# Patient Record
Sex: MALE | Race: BLACK OR AFRICAN AMERICAN | NOT HISPANIC OR LATINO | Employment: OTHER | URBAN - METROPOLITAN AREA
[De-identification: names, ages, dates, MRNs, and addresses within clinical notes are randomized per-mention and may not be internally consistent; named-entity substitution may affect disease eponyms.]

---

## 2017-01-09 DIAGNOSIS — Z21 HIV POSITIVE: ICD-10-CM

## 2017-01-09 DIAGNOSIS — B20 HUMAN IMMUNODEFICIENCY VIRUS (HIV) DISEASE: ICD-10-CM

## 2017-01-09 NOTE — TELEPHONE ENCOUNTER
Rx need to be faxed to [facility] for 90 day supply with patient ID# [id number]. (phone number [phone])

## 2017-01-09 NOTE — TELEPHONE ENCOUNTER
----- Message from Marci Kapadia sent at 1/9/2017  3:16 PM CST -----  Contact: patient  [phone]  Dave  -  Patient stated that a form that was sent over , the patient has the information on where to send the prescription to - call  Back number [phone]  Thanks,

## 2017-01-13 ENCOUNTER — LAB VISIT (OUTPATIENT)
Dept: LAB | Facility: HOSPITAL | Age: 57
End: 2017-01-13
Attending: INTERNAL MEDICINE
Payer: COMMERCIAL

## 2017-01-13 DIAGNOSIS — Z21 HIV POSITIVE: ICD-10-CM

## 2017-01-13 LAB
ALBUMIN SERPL BCP-MCNC: 4 G/DL
ALP SERPL-CCNC: 75 U/L
ALT SERPL W/O P-5'-P-CCNC: 44 U/L
ANION GAP SERPL CALC-SCNC: 8 MMOL/L
AST SERPL-CCNC: 44 U/L
BASOPHILS # BLD AUTO: 0.01 K/UL
BASOPHILS NFR BLD: 0.3 %
BILIRUB SERPL-MCNC: 0.4 MG/DL
BUN SERPL-MCNC: 18 MG/DL
CALCIUM SERPL-MCNC: 9 MG/DL
CHLORIDE SERPL-SCNC: 106 MMOL/L
CHOLEST/HDLC SERPL: 2.7 {RATIO}
CO2 SERPL-SCNC: 26 MMOL/L
CREAT SERPL-MCNC: 1.1 MG/DL
DIFFERENTIAL METHOD: ABNORMAL
EOSINOPHIL # BLD AUTO: 0.1 K/UL
EOSINOPHIL NFR BLD: 1.3 %
ERYTHROCYTE [DISTWIDTH] IN BLOOD BY AUTOMATED COUNT: 12.6 %
EST. GFR  (AFRICAN AMERICAN): >60 ML/MIN/1.73 M^2
EST. GFR  (NON AFRICAN AMERICAN): >60 ML/MIN/1.73 M^2
GLUCOSE SERPL-MCNC: 96 MG/DL
HCT VFR BLD AUTO: 42.6 %
HDL/CHOLESTEROL RATIO: 36.4 %
HDLC SERPL-MCNC: 173 MG/DL
HDLC SERPL-MCNC: 63 MG/DL
HGB BLD-MCNC: 14.3 G/DL
LDLC SERPL CALC-MCNC: 100 MG/DL
LYMPHOCYTES # BLD AUTO: 2.1 K/UL
LYMPHOCYTES NFR BLD: 53.8 %
MCH RBC QN AUTO: 30.6 PG
MCHC RBC AUTO-ENTMCNC: 33.6 %
MCV RBC AUTO: 91 FL
MONOCYTES # BLD AUTO: 0.4 K/UL
MONOCYTES NFR BLD: 10.5 %
NEUTROPHILS # BLD AUTO: 1.3 K/UL
NEUTROPHILS NFR BLD: 34.1 %
NONHDLC SERPL-MCNC: 110 MG/DL
PLATELET # BLD AUTO: 221 K/UL
PMV BLD AUTO: 11 FL
POTASSIUM SERPL-SCNC: 4.1 MMOL/L
PROT SERPL-MCNC: 7.3 G/DL
RBC # BLD AUTO: 4.67 M/UL
SODIUM SERPL-SCNC: 140 MMOL/L
TRIGL SERPL-MCNC: 50 MG/DL
WBC # BLD AUTO: 3.92 K/UL

## 2017-01-13 PROCEDURE — 80053 COMPREHEN METABOLIC PANEL: CPT

## 2017-01-13 PROCEDURE — 85025 COMPLETE CBC W/AUTO DIFF WBC: CPT

## 2017-01-13 PROCEDURE — 87536 HIV-1 QUANT&REVRSE TRNSCRPJ: CPT

## 2017-01-13 PROCEDURE — 80061 LIPID PANEL: CPT

## 2017-01-13 PROCEDURE — 86361 T CELL ABSOLUTE COUNT: CPT

## 2017-01-14 LAB
CD3+CD4+ CELLS # BLD: 768 CELLS/UL (ref 300–1400)
CD3+CD4+ CELLS NFR BLD: 34.6 % (ref 28–57)

## 2017-01-16 LAB
HIV UQ DATE RECEIVED: NORMAL
HIV UQ DATE REPORTED: NORMAL
HIV1 RNA # SERPL NAA+PROBE: <40 COPIES/ML
HIV1 RNA SERPL NAA+PROBE-LOG#: <1.6 LOG (10) COPIES/ML
HIV1 RNA SERPL QL NAA+PROBE: NOT DETECTED

## 2017-01-30 ENCOUNTER — OFFICE VISIT (OUTPATIENT)
Dept: INFECTIOUS DISEASES | Facility: CLINIC | Age: 57
End: 2017-01-30
Payer: COMMERCIAL

## 2017-01-30 VITALS
TEMPERATURE: 98 F | HEART RATE: 59 BPM | DIASTOLIC BLOOD PRESSURE: 75 MMHG | BODY MASS INDEX: 28.94 KG/M2 | SYSTOLIC BLOOD PRESSURE: 125 MMHG | WEIGHT: 225.5 LBS | HEIGHT: 74 IN

## 2017-01-30 DIAGNOSIS — M25.552 PAIN OF LEFT HIP JOINT: ICD-10-CM

## 2017-01-30 DIAGNOSIS — B20 HUMAN IMMUNODEFICIENCY VIRUS (HIV) DISEASE: ICD-10-CM

## 2017-01-30 DIAGNOSIS — B20 HIV DISEASE: Primary | ICD-10-CM

## 2017-01-30 PROCEDURE — 99999 PR PBB SHADOW E&M-EST. PATIENT-LVL III: CPT | Mod: PBBFAC,,, | Performed by: INTERNAL MEDICINE

## 2017-01-30 PROCEDURE — 90471 IMMUNIZATION ADMIN: CPT | Mod: S$GLB,,, | Performed by: INTERNAL MEDICINE

## 2017-01-30 PROCEDURE — 99214 OFFICE O/P EST MOD 30 MIN: CPT | Mod: 25,S$GLB,, | Performed by: INTERNAL MEDICINE

## 2017-01-30 PROCEDURE — 90733 MPSV4 VACCINE SUBQ: CPT | Mod: S$GLB,,, | Performed by: INTERNAL MEDICINE

## 2017-01-30 RX ORDER — FLUOCINONIDE GEL 0.5 MG/G
GEL TOPICAL 2 TIMES DAILY
Qty: 15 G | Refills: 1 | Status: SHIPPED | OUTPATIENT
Start: 2017-01-30 | End: 2017-08-10 | Stop reason: SDUPTHER

## 2017-01-30 NOTE — MR AVS SNAPSHOT
[facility]  [address]  [facility] [address]  Phone: [phone]  Fax: [phone]                  Simón Fong   2017 2:30 PM   Office Visit    Description:  Male : 1960   Provider:  Parisa Fowler MD   Department:  [facility]           Reason for Visit     Follow-up           Diagnoses this Visit        Comments    HIV disease    -  Primary     Pain of left hip joint                To Do List           Goals (5 Years of Data)     None       These Medications        Disp Refills Start End    koiyiri-xlph-elhtql-tenofo ala [phone] mg Tab 30 tablet 3 2017     Take 1 tablet by mouth once daily. - Oral    Pharmacy: [facility] - [place], [place] - [address] AT [facility] #: [phone]         [facility] On Call     [facility] On Call Nurse Care Line -  Assistance  Registered nurses in the [facility] On Call Center provide clinical advisement, health education, appointment booking, and other advisory services.  Call for this free service at [phone].             Medications           Message regarding Medications     Verify the changes and/or additions to your medication regime listed below are the same as discussed with your clinician today.  If any of these changes or additions are incorrect, please notify your healthcare provider.        START taking these NEW medications        Refills    wtspgtg-caqy-fdboqa-tenofo ala [phone] mg Tab 3    Sig: Take 1 tablet by mouth once daily.    Class: Print    Route: Oral           Verify that the below list of medications is an accurate representation of the medications you are currently taking.  If none reported, the list may be blank. If incorrect, please contact your healthcare provider. Carry this list with you in case of emergency.           Current Medications     zpyzwezm-xwpejjqh-axnatr-tenof, STRIBILD,  "[phone] mg (STRIBILD) [phone] mg per tablet Take 1 tablet by mouth once daily.    fluocinonide (LIDEX) 0.05 % gel Apply topically 2 (two) times daily.    itraconazole (SPORANOX) 100 mg Cap TAKE 2 CAPSULES BY MOUTH ONCE DAILY    qbvqixr-fafs-mdtldd-tenofo ala [phone] mg Tab Take 1 tablet by mouth once daily.           Clinical Reference Information           Vital Signs - Last Recorded  Most recent update: 1/30/2017  2:38 PM by Pamela Palumbo MA    BP Pulse Temp Ht Wt BMI    125/75 (BP Location: Right arm) (!) 59 97.9 °F (36.6 °C) (Oral) 6' 2" (1.88 m) 102.3 kg (225 lb 8.5 oz) 28.96 kg/m2      Blood Pressure          Most Recent Value    BP  125/75      Allergies as of 1/30/2017     Sulfa (Sulfonamide Antibiotics)      Immunizations Administered on Date of Encounter - 1/30/2017     None      Orders Placed During Today's Visit      Normal Orders This Visit    Ambulatory consult to Sports Medicine     Future Labs/Procedures Expected by Expires    Hepatitis B surface antibody  1/30/2017 1/30/2018    PSA, Screening  1/30/2017 1/30/2018    Quantiferon Gold TB  1/30/2017 1/30/2018    Vitamin D  1/30/2017 1/30/2018    FTA antibodies, IgG and IgM  1/30/2018 3/31/2018    Meningococcal conjugate vaccine 4-valent IM  As directed 7/30/2017      "

## 2017-01-30 NOTE — PROGRESS NOTES
Subjective:      Patient ID: Simón Fong is a 56 y.o. male.    Chief Complaint:HIV Positive/AIDS      History of Present Illness    Mr. Fong presents today to follow up for his HIV.     The patient has been seen by us for a number of years and in the past, he was on   Combivir and ritonavir.  He did have a drug holiday and he asked about that   once again and he has been discouraged from doing such.  His current medications   include Truvada and boosted Reyataz and most recently changed to Stribild.    Highly compliant with medications; no problems reported.  Still c/o of onychomycosis on great toe but better.       Routine examinations:  1. Vitamin D supplements daily.  2. Colonoscopy due 2018.  3. Last anal pap 2012; and 2015.  No dysplasia.  Family history:  two brothers were diagnosed with prostate cancer - the older one at 64 and a younger one, one year ago.  Component      Latest Ref Rng & Units 1/13/2017   WBC      3.90 - 12.70 K/uL 3.92   RBC      4.60 - 6.20 M/uL 4.67   Hemoglobin      14.0 - 18.0 g/dL 14.3   Hematocrit      40.0 - 54.0 % 42.6   MCV      82 - 98 fL 91   MCH      27.0 - 31.0 pg 30.6   MCHC      32.0 - 36.0 % 33.6   RDW      11.5 - 14.5 % 12.6   Platelets      150 - 350 K/uL 221   MPV      9.2 - 12.9 fL 11.0   Gran #      1.8 - 7.7 K/uL 1.3 (L)   Lymph #      1.0 - 4.8 K/uL 2.1   Mono #      0.3 - 1.0 K/uL 0.4   Eos #      0.0 - 0.5 K/uL 0.1   Baso #      0.00 - 0.20 K/uL 0.01   Gran%      38.0 - 73.0 % 34.1 (L)   Lymph%      18.0 - 48.0 % 53.8 (H)   Mono%      4.0 - 15.0 % 10.5   Eosinophil%      0.0 - 8.0 % 1.3   Basophil%      0.0 - 1.9 % 0.3   Differential Method       Automated   Sodium      136 - 145 mmol/L 140   Potassium      3.5 - 5.1 mmol/L 4.1   Chloride      95 - 110 mmol/L 106   CO2      23 - 29 mmol/L 26   Glucose      70 - 110 mg/dL 96   BUN, Bld      6 - 20 mg/dL 18   Creatinine      0.5 - 1.4 mg/dL 1.1   Calcium      8.7 - 10.5 mg/dL 9.0   Total Protein      6.0 - 8.4  g/dL 7.3   Albumin      3.5 - 5.2 g/dL 4.0   Total Bilirubin      0.1 - 1.0 mg/dL 0.4   Alkaline Phosphatase      55 - 135 U/L 75   AST      10 - 40 U/L 44 (H)   ALT      10 - 44 U/L 44   Anion Gap      8 - 16 mmol/L 8   eGFR if African American      >60 mL/min/1.73 m:2 >60.0   eGFR if non African American      >60 mL/min/1.73 m:2 >60.0   Cholesterol      120 - 199 mg/dL 173   Triglycerides      30 - 150 mg/dL 50   HDL      40 - 75 mg/dL 63   LDL Cholesterol      63.0 - 159.0 mg/dL 100.0   HDL/Chol Ratio      20.0 - 50.0 % 36.4   Total Cholesterol/HDL Ratio      2.0 - 5.0 2.7   Non-HDL Cholesterol      mg/dL 110   HIV-1 RNA      Not detected Not detected   HIV 1 RNA Ultra      <40 Copies/mL <40   Log HIV Copies/mL      <1.60 Log (10) Copies/mL <1.60   HIV UQ Date Received       1/14/17   HIV UQ Date Reported       1/16/17   CD4 % [place] T Cell      28 - 57 % 34.6   Absolute CD4      300 - 1400 cells/ul 768     Review of Systems   Review of Systems   Constitution: Negative for chills, decreased appetite, fever, weakness, malaise/fatigue, night sweats, weight gain and weight loss.   HENT: Negative for congestion, ear pain, headaches, hearing loss, hoarse voice, sore throat and tinnitus.    Eyes: Negative for blurred vision, redness and visual disturbance.   Cardiovascular: Negative for chest pain, leg swelling and palpitations.   Respiratory: Negative for cough, hemoptysis, shortness of breath and sputum production.    Hematologic/Lymphatic: Negative for adenopathy. Does not bruise/bleed easily.   Skin: Negative for dry skin, itching, rash and suspicious lesions.   Musculoskeletal: Positive for myalgias. Negative for back pain, joint pain and neck pain.   Gastrointestinal: Negative for abdominal pain, constipation, diarrhea, heartburn, nausea and vomiting.   Genitourinary: Negative for dysuria, flank pain, frequency, hematuria, hesitancy and urgency.   Neurological: Negative for dizziness, numbness and paresthesias.    Psychiatric/Behavioral: Negative for depression and memory loss. The patient does not have insomnia and is not nervous/anxious.      Objective:   Physical Exam   Constitutional: He is oriented to person, place, and time. He appears well-developed and well-nourished.   HENT:   Head: Normocephalic and atraumatic.   Right Ear: External ear normal.   Left Ear: External ear normal.   Mouth/Throat: Oropharynx is clear and moist.   Eyes: Conjunctivae and EOM are normal. Pupils are equal, round, and reactive to light.   Neck: Normal range of motion. Neck supple. No thyromegaly present.   Cardiovascular: Normal rate, regular rhythm and normal heart sounds.    No murmur heard.  Pulmonary/Chest: Effort normal and breath sounds normal. He has no wheezes. He has no rales.   Abdominal: Soft. Bowel sounds are normal. He exhibits no mass. There is no tenderness. There is no rebound.   Musculoskeletal: Normal range of motion.   Lymphadenopathy:     He has no cervical adenopathy.   Neurological: He is alert and oriented to person, place, and time.   Skin: Skin is warm and dry.   Psychiatric: He has a normal mood and affect. His behavior is normal.   Vitals reviewed.    Assessment:       1. HIV disease    2. Pain of left hip joint    3. Human immunodeficiency virus (HIV) disease          Plan:       1. Change Stribild to Genvoya.  2. Flu already done.  3. Menactra.  4. zostavax in the future.  5. Colonoscopy 2018.  6. Hip pain - referral to ortho.

## 2017-02-03 ENCOUNTER — INITIAL CONSULT (OUTPATIENT)
Dept: PHYSICAL MEDICINE AND REHAB | Facility: CLINIC | Age: 57
End: 2017-02-03
Payer: COMMERCIAL

## 2017-02-03 VITALS
SYSTOLIC BLOOD PRESSURE: 142 MMHG | BODY MASS INDEX: 28.76 KG/M2 | HEART RATE: 57 BPM | DIASTOLIC BLOOD PRESSURE: 85 MMHG | HEIGHT: 74 IN | WEIGHT: 224.13 LBS

## 2017-02-03 DIAGNOSIS — M54.16 LUMBAR RADICULOPATHY: Primary | ICD-10-CM

## 2017-02-03 DIAGNOSIS — S33.2XXS: ICD-10-CM

## 2017-02-03 PROBLEM — S33.2XXA SI JOINT DISLOCATION: Status: ACTIVE | Noted: 2017-02-03

## 2017-02-03 PROBLEM — G89.29 CHRONIC LEFT-SIDED LOW BACK PAIN WITH SCIATICA: Status: ACTIVE | Noted: 2017-02-03

## 2017-02-03 PROBLEM — M54.40 CHRONIC LEFT-SIDED LOW BACK PAIN WITH SCIATICA: Status: ACTIVE | Noted: 2017-02-03

## 2017-02-03 PROCEDURE — 99999 PR PBB SHADOW E&M-EST. PATIENT-LVL III: CPT | Mod: PBBFAC,,, | Performed by: PHYSICAL MEDICINE & REHABILITATION

## 2017-02-03 PROCEDURE — 99204 OFFICE O/P NEW MOD 45 MIN: CPT | Mod: S$GLB,,, | Performed by: PHYSICAL MEDICINE & REHABILITATION

## 2017-02-03 RX ORDER — GABAPENTIN 300 MG/1
300 CAPSULE ORAL 3 TIMES DAILY
Qty: 90 CAPSULE | Refills: 1 | Status: SHIPPED | OUTPATIENT
Start: 2017-02-03 | End: 2018-05-14 | Stop reason: ALTCHOICE

## 2017-02-03 RX ORDER — NAPROXEN 500 MG/1
500 TABLET ORAL 2 TIMES DAILY
Qty: 60 TABLET | Refills: 2 | Status: SHIPPED | OUTPATIENT
Start: 2017-02-03 | End: 2018-02-27 | Stop reason: SDUPTHER

## 2017-02-03 NOTE — MR AVS SNAPSHOT
[facility]  [address]  [facility] [address]  Phone: [phone]                  Simón Fong   2/3/2017 4:00 PM   Initial consult    Description:  Male : 1960   Provider:  Annie Garica MD   Department:  [facility]           Reason for Visit     Leg Pain           Diagnoses this Visit        Comments    Lumbar radiculopathy    -  Primary     SI joint dislocation, sequela                To Do List           Goals (5 Years of Data)     None      Follow-Up and Disposition     Return in about 3 weeks (around 2017).       These Medications        Disp Refills Start End    gabapentin (NEURONTIN) 300 MG capsule 90 capsule 1 2/3/2017 3/5/2017    Take 1 capsule (300 mg total) by mouth 3 (three) times daily. - Oral    Pharmacy: [facility] [address] [place] [address] [address] [facility] AT [facility] Ph #: [phone]       naproxen (EC NAPROSYN) 500 MG EC tablet 60 tablet 2 2/3/2017 3/5/2017    Take 1 tablet (500 mg total) by mouth 2 (two) times daily. - Oral    Pharmacy: [facility] [address] [place] [address] [address] [facility] Ph #: [phone]         [facility] On Call     [facility] On Call Nurse Care Line -  Assistance  Registered nurses in the [facility] On Call Center provide clinical advisement, health education, appointment booking, and other advisory services.  Call for this free service at [phone].             Medications           Message regarding Medications     Verify the changes and/or additions to your medication regime listed below are the same as discussed with your clinician today.  If any of these changes or additions are incorrect, please notify your healthcare provider.        START taking these NEW medications        Refills    gabapentin (NEURONTIN) 300 MG capsule 1    Sig: Take 1 capsule (300 mg total) by mouth 3 (three) times daily.    Class: Normal    Route:  "Oral    naproxen (EC NAPROSYN) 500 MG EC tablet 2    Sig: Take 1 tablet (500 mg total) by mouth 2 (two) times daily.    Class: Normal    Route: Oral           Verify that the below list of medications is an accurate representation of the medications you are currently taking.  If none reported, the list may be blank. If incorrect, please contact your healthcare provider. Carry this list with you in case of emergency.           Current Medications     fpwcdzk-yfaj-tbncxt-tenofo ala [phone] mg Tab Take 1 tablet by mouth once daily.    rtpvexyd-cvfsmejd-acbfft-tenof, STRIBILD, [phone] mg (STRIBILD) [phone] mg per tablet Take 1 tablet by mouth once daily.    fluocinonide (LIDEX) 0.05 % gel Apply topically 2 (two) times daily.    itraconazole (SPORANOX) 100 mg Cap TAKE 2 CAPSULES BY MOUTH ONCE DAILY    gabapentin (NEURONTIN) 300 MG capsule Take 1 capsule (300 mg total) by mouth 3 (three) times daily.    naproxen (EC NAPROSYN) 500 MG EC tablet Take 1 tablet (500 mg total) by mouth 2 (two) times daily.           Clinical Reference Information           Your Vitals Were     BP Pulse Height Weight BMI    142/85 57 6' 2" (1.88 m) 101.6 kg (224 lb 1.6 oz) 28.77 kg/m2      Blood Pressure          Most Recent Value    BP  (!)  142/85      Allergies as of 2/3/2017     Sulfa (Sulfonamide Antibiotics)      Immunizations Administered on Date of Encounter - 2/3/2017     None      Orders Placed During Today's Visit     Future Labs/Procedures Expected by Expires    MRI Lumbar Spine Without Contrast  2/3/2017 2/3/2018    X-Ray Hips Bilateral 2 View Inc AP Pelvis  2/3/2017 2/3/2018    X-Ray Lumbar Complete With Flex And Ext  2/3/2017 2/3/2018      Language Assistance Services     ATTENTION: Language assistance services are available, free of charge. Please call [phone].      ATENCIÓN: Si habla español, tiene a edwards disposición servicios gratuitos de asistencia lingüística. Llame al [phone].     CHÚ Ý: " N?u b?n nói Ti?ng Vi?t, có các d?ch v? h? tr? ngôn ng? mi?n phí dành cho b?n. G?i s? [phone].         [facility] complies with applicable Federal civil rights laws and does not discriminate on the basis of race, color, national origin, age, disability, or sex.

## 2017-02-03 NOTE — PROGRESS NOTES
Subjective:       Patient ID: Simón Fong is a 56 y.o. male.    Chief Complaint: Leg Pain (left side )    HPI   Mr. Fong is coming first time to clinic for left sided buttock pain.  Refereed by PCP, Janeth Molina.    Pain intensity: Current pain is 5 , an average pain is 5, worst pain  7-8, best pain 0.   Pain is localized at the lower left buttocks and upper thigh of left leg.     Acute on chronic pain . Length  ~3 months.   Left sided buttock pain is present since mid November 2016 when he was doing launches, alternating legs.   No significant back pain reported.    No past, recent injury, fall. He worked out and after he worked out, he noticed a pain.    Timing : pain is worse at daytime, when sitting, driving, no pain with walking, no pain at night while laying down.  Gets better with standing and walking.     QUALITY: Pain is a sharp pain, like shooting, stabbing pain, less of a burning pain.   No tingling, numbness in left buttock.    Symptoms interfere with daily activity.     Patient denies night fever/night sweats, bowel incontinence, significant weight loss and significant motor weakness ( red flags).    Patient denies any suicidal or homicidal ideations    Pain Medications:  Current:takes no pain medications.    Tried/ failed  in Past:  NSAIDs -  TCA -Never  SNRI - none  Anti-convulsants -none   Muscle Relaxants -none   Opioids-Never    Physical Therapy/Home Exercise: yes, he is very active  in gym,     report: Reviewed and consistent with  .    Pain Procedures:  No TREE  He is here for evaluation and treatment.    Past Medical History   Diagnosis Date    HIV infection        History reviewed. No pertinent past surgical history.    Family History   Problem Relation Age of Onset    Diabetes Mother     Heart disease Mother     Hypertension Mother     Stroke Mother     Hypertension Father     Cancer Sister     Cancer Brother        Social History     Social History    Marital status:  Single     Spouse name: N/A    Number of children: N/A    Years of education: N/A     Social History Main Topics    Smoking status: Never Smoker    Smokeless tobacco: None    Alcohol use No    Drug use: No    Sexual activity: Not Asked     Other Topics Concern    None     Social History Narrative       Current Outpatient Prescriptions   Medication Sig Dispense Refill    heguirx-uyal-pjbpvf-tenofo ala [phone] mg Tab Take 1 tablet by mouth once daily. 30 tablet 3    ivdeailq-ngbmdvrs-ocqtky-tenof, STRIBILD, [phone] mg (STRIBILD) [phone] mg per tablet Take 1 tablet by mouth once daily. 90 tablet 0    fluocinonide (LIDEX) 0.05 % gel Apply topically 2 (two) times daily. 15 g 1    itraconazole (SPORANOX) 100 mg Cap TAKE 2 CAPSULES BY MOUTH ONCE DAILY 60 capsule 0    gabapentin (NEURONTIN) 300 MG capsule Take 1 capsule (300 mg total) by mouth 3 (three) times daily. 90 capsule 1    naproxen (EC NAPROSYN) 500 MG EC tablet Take 1 tablet (500 mg total) by mouth 2 (two) times daily. 60 tablet 2     No current facility-administered medications for this visit.        Review of patient's allergies indicates:   Allergen Reactions    Sulfa (sulfonamide antibiotics)      Review of Systems   Constitutional: Negative for appetite change and fatigue.   Eyes: Negative for visual disturbance.   Respiratory: Negative for shortness of breath.    Cardiovascular: Negative for chest pain.   Gastrointestinal: Negative for constipation and diarrhea.   Genitourinary: Negative for dysuria, frequency and urgency.   Musculoskeletal: Negative for arthralgias, back pain, gait problem, joint swelling, myalgias, neck pain and neck stiffness.   Skin: Negative for rash and wound.   Neurological: Negative for dizziness, tremors, weakness, numbness and headaches.   Psychiatric/Behavioral: Negative for dysphoric mood.   All other systems reviewed and are negative.          Objective:      Physical Exam    GENERAL: The  patient is alert, oriented, pleasant.   HEENT; PERRLA  NECK: supple,   MUSCULOSKELETAL:   Gait is normal, he is able to walk on toes/heels, able to hop on either leg, with no difficulties.   Cervical spine :Full AROM in cervical spine,   Lumbar spine, full range of motion in all planes, flexion to 90 degrees , ext. 0.  Side bending and rotation to 35-40 degrees, b/l, with no pain,  Straight leg raising Negative bilaterally.   Positive tenderness at sciatica notch.   Negative sacral/lumbar thrust.  Negative SI joint tenderness.  Negative Mickey sign.   Full range of motion in all joints x4 extremities.   No pain with IR of Left hip.   Muscle strength 5/5 throughout x4 extremities.   No  joint laxity throughout x4 extremities.   NEUROLOGIC: Cranial nerves II through XII intact.   Deep tendon reflexes is normal, +2 in the upper and lower extremities bilaterally.   Muscle tone is normal.   Sensory is intact to light touch and pinprick throughout x4 extremities.       Assessment:           1. Lumbar radiculopathy    2. SI joint dislocation, sequela        Plan:        Simón was seen today for leg pain.    Diagnoses and all orders for this visit:    Lumbar radiculopathy  -     X-Ray Lumbar Complete With Flex And Ext; Future  -     X-Ray Hips Bilateral 2 View Inc AP Pelvis; Future  -     MRI Lumbar Spine Without Contrast; Future  -     gabapentin (NEURONTIN) 300 MG capsule; Take 1 capsule (300 mg total) by mouth 3 (three) times daily.  -     naproxen (EC NAPROSYN) 500 MG EC tablet; Take 1 tablet (500 mg total) by mouth 2 (two) times daily.    SI joint dislocation, sequela  -     X-Ray Lumbar Complete With Flex And Ext; Future  -     X-Ray Hips Bilateral 2 View Inc AP Pelvis; Future  -     MRI Lumbar Spine Without Contrast; Future  -     gabapentin (NEURONTIN) 300 MG capsule; Take 1 capsule (300 mg total) by mouth 3 (three) times daily.  -     naproxen (EC NAPROSYN) 500 MG EC tablet; Take 1 tablet (500 mg total) by mouth  2 (two) times daily.    Patient with acute back pain, in left buttock and upper thigh, in Dif. Dx. Left SI joint dysfunction and /or lumbar radiculopathy.     1. Discussed the left buttock and leg pain, its course and treatment. Questions answered.  Educational material distributed.  Discussed appropriate home exercise program.  Dicsussed appropriate use of ice and heat.     2. Diagnostics: Imaging Xray of Lumbar spine and hips to evaluate LS, and SI joints.  I also ordered MRI of Lumbar spine to evaluate for possible disc dz, and/or possible lumbar radiculopathy.     3. Pain management:  Patient refused all pain medications,   Naproxen 500 mg po BID, regularly   and Neurontin 300 mg, TID if tolerated, if not only at QHS, for neuropathic pain.     RTC in 4 weeks.      Total time spent face to face with patient was 45 minutes.   More than 50% of that time was spent in counseling on differential diagnosis, prognosis and treatment options.   I also caunsel patient on common and most usual side effect of prescribed medications.  I reviewed Primary care , and other specialty's notes to better coordinate patient's care.   All questions were answered, and patient voiced understanding.

## 2017-02-06 NOTE — PROGRESS NOTES
Subjective:       Patient ID: Simón Fong is a 56 y.o. male.    Chief Complaint: Leg Pain (left side )    Leg Pain    Pertinent negatives include no numbness.      Mr. Fong is coming first time to clinic for left sided buttock pain.    Referred by PCP, dr. Parisa Fowler.     Pain intensity: Current pain is 5 , an average pain is 5, worst pain  7-8, best pain 0.   Pain is localized at the lower left buttocks, very low, bellow the sciatica notch, and in upper thigh of left leg.     This is an acute on chronic pain . Length  2.5 months.   Left sided buttock pain is present since mid November 2016 when he was doing launches, alternating legs.   No significant back pain reported.  No past, recent injury, fall. He worked out and after he worked out, he noticed a pain.    Timing : pain is worse at daytime, when sitting, driving, no pain with walking, no pain at night while laying down.  Gets better with standing and walking.     QUALITY: Pain is a sharp pain, like a shooting, stabbing pain, less of a burning pain.   No tingling, numbness in left buttock.    Symptoms interfere with daily activity.     Patient denies night fever/night sweats, bowel incontinence, significant weight loss and significant motor weakness ( red flags).    Patient denies any suicidal or homicidal ideations    Pain Medications:   Current: takes no pain medications.  Tried/ failed  in Past:  NSAIDs -  TCA -Never  SNRI - none  Anti-convulsants -none   Muscle Relaxants -none   Opioids-Never    Physical Therapy/Home Exercise: he is very active, athletic, goes to gym.     report: Reviewed and consistent with  .    Pain Procedures:   +/- TREE  He is here for evaluation and treatment.    Past Medical History   Diagnosis Date    HIV infection        History reviewed. No pertinent past surgical history.    Family History   Problem Relation Age of Onset    Diabetes Mother     Heart disease Mother     Hypertension Mother     Stroke Mother      Hypertension Father     Cancer Sister     Cancer Brother        Social History     Social History    Marital status: Single     Spouse name: N/A    Number of children: N/A    Years of education: N/A     Social History Main Topics    Smoking status: Never Smoker    Smokeless tobacco: None    Alcohol use No    Drug use: No    Sexual activity: Not Asked     Other Topics Concern    None     Social History Narrative       Current Outpatient Prescriptions   Medication Sig Dispense Refill    tnefkzx-jffc-rgvvox-tenofo ala [phone] mg Tab Take 1 tablet by mouth once daily. 30 tablet 3    geasvpwg-ppziitnk-zeqocs-tenof, STRIBILD, [phone] mg (STRIBILD) [phone] mg per tablet Take 1 tablet by mouth once daily. 90 tablet 0    fluocinonide (LIDEX) 0.05 % gel Apply topically 2 (two) times daily. 15 g 1    itraconazole (SPORANOX) 100 mg Cap TAKE 2 CAPSULES BY MOUTH ONCE DAILY 60 capsule 0    gabapentin (NEURONTIN) 300 MG capsule Take 1 capsule (300 mg total) by mouth 3 (three) times daily. 90 capsule 1    naproxen (EC NAPROSYN) 500 MG EC tablet Take 1 tablet (500 mg total) by mouth 2 (two) times daily. 60 tablet 2     No current facility-administered medications for this visit.        Review of patient's allergies indicates:   Allergen Reactions    Sulfa (sulfonamide antibiotics)        Review of Systems   Constitutional: Negative for appetite change and fatigue.   Eyes: Negative for visual disturbance.   Respiratory: Negative for shortness of breath.    Cardiovascular: Negative for chest pain.   Gastrointestinal: Negative for constipation and diarrhea.   Genitourinary: Negative for dysuria, frequency and urgency.   Musculoskeletal: Positive for back pain. Negative for arthralgias, gait problem, joint swelling, myalgias, neck pain and neck stiffness.   Skin: Negative for rash and wound.   Neurological: Negative for dizziness, tremors, weakness, numbness and headaches.    Psychiatric/Behavioral: Negative for dysphoric mood.   All other systems reviewed and are negative.      Objective:      Physical Exam      GENERAL: The patient is alert, oriented, pleasant.   HEENT; PERRLA  NECK: supple,   MUSCULOSKELETAL:   Gait is normal .  Cervical spine :full  AROM in cervical spine in all planes.  Lumbar spine, full range of motion in all planes, flexion to 90 degrees , ext. 0.  Side bending and rotation to 35-40 degrees, b/l.  Negative facet loading b/l.  No paravertebral lumbar muscle tenderness.  Straight leg raising Negative bilaterally.   Full range of motion in all joints x4 extremities.   Muscle strength 5/5 throughout x4 extremities.   No  joint laxity throughout x4 extremities.   NEUROLOGIC: Cranial nerves II through XII intact.   Deep tendon reflexes is normal, +2 in the upper and lower extremities bilaterally.   Muscle tone is normal.   Sensory is intact to light touch and pinprick throughout x4 extremities.     Imaging: None.    Assessment:       1. Lumbar radiculopathy    2. SI joint dislocation, sequela        Plan:        Simón was seen today for leg pain.    Diagnoses and all orders for this visit:    Lumbar radiculopathy  -     X-Ray Lumbar Complete With Flex And Ext; Future  -     X-Ray Hips Bilateral 2 View Inc AP Pelvis; Future  -     MRI Lumbar Spine Without Contrast; Future  -     gabapentin (NEURONTIN) 300 MG capsule; Take 1 capsule (300 mg total) by mouth 3 (three) times daily.  -     naproxen (EC NAPROSYN) 500 MG EC tablet; Take 1 tablet (500 mg total) by mouth 2 (two) times daily.    SI joint dislocation, sequela  -     X-Ray Lumbar Complete With Flex And Ext; Future  -     X-Ray Hips Bilateral 2 View Inc AP Pelvis; Future  -     MRI Lumbar Spine Without Contrast; Future  -     gabapentin (NEURONTIN) 300 MG capsule; Take 1 capsule (300 mg total) by mouth 3 (three) times daily.  -     naproxen (EC NAPROSYN) 500 MG EC tablet; Take 1 tablet (500 mg total) by mouth 2  (two) times daily.    Patient with acute back pain, in left buttock and upper thigh, in Dif. Dx. Left SI joint,and /or lumbar radiculopathy.    1. Discussed the left buttock and leg pain, its course and treatment. Questions answered.  Educational material distributed.  Discussed appropriate home exercise program.  Dicsussed appropriate use of ice and heat.    2. Diagnostics: Imaging Xray of Lumbar spine and hips to evaluate LS, and SI joints.   I also ordered MRI of Lumbar spine to evaluate for possible disc dz, and/or possible lumbar radiculopathy.    3. Pain management:  Patient refused all pain medications,    Naproxen 500 mg po BID, regularly   and Neurontin 300 mg, TID if tolerated, if not only at [facility], for neuropathic pain.       RTC in 4 weeks.     Total time spent face to face with patient was 45 minutes.   More than 50% of that time was spent in counseling on diagnosis, prognosis and treatment options.   I also caunsel patient  on common and most usual side effect of prescribed medications.  I reviewed Primary care , and other specialty's notes to better coordinate patient's  care.   All questions were answered, and patient voiced understanding.

## 2017-02-06 NOTE — PROGRESS NOTES
Subjective:       Patient ID: Simón Fong is a 56 y.o. male.    Chief Complaint: Leg Pain (left side )    Leg Pain    Pertinent negatives include no numbness.      Mr. Fong is coming first time to clinic for left sided buttock pain.    Referred by PCP, dr. Parisa Fowler.     Pain intensity: Current pain is 5 , an average pain is 5, worst pain  7-8, best pain 0.   Pain is localized at the lower left buttocks, very low, bellow the sciatica notch, and in upper thigh of left leg.     This is an acute on chronic pain . Length  2.5 months.   Left sided buttock pain is present since mid November 2016 when he was doing launches, alternating legs.   No significant back pain reported.  No past, recent injury, fall. He worked out and after he worked out, he noticed a pain.    Timing : pain is worse at daytime, when sitting, driving, no pain with walking, no pain at night while laying down.  Gets better with standing and walking.     QUALITY: Pain is a sharp pain, like a shooting, stabbing pain, less of a burning pain.   No tingling, numbness in left buttock.    Symptoms interfere with daily activity.     Patient denies night fever/night sweats, bowel incontinence, significant weight loss and significant motor weakness ( red flags).    Patient denies any suicidal or homicidal ideations    Pain Medications:   Current: takes no pain medications.  Tried/ failed  in Past:  NSAIDs -  TCA -Never  SNRI - none  Anti-convulsants -none   Muscle Relaxants -none   Opioids-Never    Physical Therapy/Home Exercise: he is very active, athletic, goes to gym.     report: Reviewed and consistent with  .    Pain Procedures:   +/- TREE  He is here for evaluation and treatment.    Past Medical History   Diagnosis Date    HIV infection        History reviewed. No pertinent past surgical history.    Family History   Problem Relation Age of Onset    Diabetes Mother     Heart disease Mother     Hypertension Mother     Stroke Mother      Hypertension Father     Cancer Sister     Cancer Brother        Social History     Social History    Marital status: Single     Spouse name: N/A    Number of children: N/A    Years of education: N/A     Social History Main Topics    Smoking status: Never Smoker    Smokeless tobacco: None    Alcohol use No    Drug use: No    Sexual activity: Not Asked     Other Topics Concern    None     Social History Narrative       Current Outpatient Prescriptions   Medication Sig Dispense Refill    qfzloyw-znvo-ellaji-tenofo ala [phone] mg Tab Take 1 tablet by mouth once daily. 30 tablet 3    oecnspvz-tzqymlqy-lmmlbh-tenof, STRIBILD, [phone] mg (STRIBILD) [id number] mg per tablet Take 1 tablet by mouth once daily. 90 tablet 0    fluocinonide (LIDEX) 0.05 % gel Apply topically 2 (two) times daily. 15 g 1    itraconazole (SPORANOX) 100 mg Cap TAKE 2 CAPSULES BY MOUTH ONCE DAILY 60 capsule 0    gabapentin (NEURONTIN) 300 MG capsule Take 1 capsule (300 mg total) by mouth 3 (three) times daily. 90 capsule 1    naproxen (EC NAPROSYN) 500 MG EC tablet Take 1 tablet (500 mg total) by mouth 2 (two) times daily. 60 tablet 2     No current facility-administered medications for this visit.        Review of patient's allergies indicates:   Allergen Reactions    Sulfa (sulfonamide antibiotics)        Review of Systems   Constitutional: Negative for appetite change and fatigue.   Eyes: Negative for visual disturbance.   Respiratory: Negative for shortness of breath.    Cardiovascular: Negative for chest pain.   Gastrointestinal: Negative for constipation and diarrhea.   Genitourinary: Negative for dysuria, frequency and urgency.   Musculoskeletal: Positive for back pain. Negative for arthralgias, gait problem, joint swelling, myalgias, neck pain and neck stiffness.   Skin: Negative for rash and wound.   Neurological: Negative for dizziness, tremors, weakness, numbness and headaches.    Psychiatric/Behavioral: Negative for dysphoric mood.   All other systems reviewed and are negative.      Objective:      Physical Exam      GENERAL: The patient is alert, oriented, pleasant.   HEENT; PERRLA  NECK: supple,   MUSCULOSKELETAL:   Gait is normal .  Cervical spine :full  AROM in cervical spine in all planes.  Lumbar spine, full range of motion in all planes, flexion to 90 degrees , ext. 0.  Side bending and rotation to 35-40 degrees, b/l.  Negative facet loading b/l.  No paravertebral lumbar muscle tenderness.  Straight leg raising Negative bilaterally.   Full range of motion in all joints x4 extremities.   Muscle strength 5/5 throughout x4 extremities.   No  joint laxity throughout x4 extremities.   NEUROLOGIC: Cranial nerves II through XII intact.   Deep tendon reflexes is normal, +2 in the upper and lower extremities bilaterally.   Muscle tone is normal.   Sensory is intact to light touch and pinprick throughout x4 extremities.     Imaging: None.    Assessment:       1. Lumbar radiculopathy    2. SI joint dislocation, sequela        Plan:        Simón was seen today for leg pain.    Diagnoses and all orders for this visit:    Lumbar radiculopathy  -     X-Ray Lumbar Complete With Flex And Ext; Future  -     X-Ray Hips Bilateral 2 View Inc AP Pelvis; Future  -     MRI Lumbar Spine Without Contrast; Future  -     gabapentin (NEURONTIN) 300 MG capsule; Take 1 capsule (300 mg total) by mouth 3 (three) times daily.  -     naproxen (EC NAPROSYN) 500 MG EC tablet; Take 1 tablet (500 mg total) by mouth 2 (two) times daily.    SI joint dislocation, sequela  -     X-Ray Lumbar Complete With Flex And Ext; Future  -     X-Ray Hips Bilateral 2 View Inc AP Pelvis; Future  -     MRI Lumbar Spine Without Contrast; Future  -     gabapentin (NEURONTIN) 300 MG capsule; Take 1 capsule (300 mg total) by mouth 3 (three) times daily.  -     naproxen (EC NAPROSYN) 500 MG EC tablet; Take 1 tablet (500 mg total) by mouth 2  (two) times daily.    Patient with acute back pain, in left buttock and upper thigh, in Dif. Dx. Left SI joint,and /or lumbar radiculopathy.    1. Discussed the left buttock and leg pain, its course and treatment. Questions answered.  Educational material distributed.  Discussed appropriate home exercise program.  Dicsussed appropriate use of ice and heat.    2. Diagnostics: Imaging Xray of Lumbar spine and hips to evaluate LS, and SI joints.   I also ordered MRI of Lumbar spine to evaluate for possible disc dz, and/or possible lumbar radiculopathy.    3. Pain management:  Patient refused all pain medications,    Naproxen 500 mg po BID, regularly   and Neurontin 300 mg, TID if tolerated, if not only at [facility], for neuropathic pain.       RTC in 4 weeks.     Total time spent face to face with patient was 45 minutes.   More than 50% of that time was spent in counseling on diagnosis, prognosis and treatment options.   I also caunsel patient  on common and most usual side effect of prescribed medications.  I reviewed Primary care , and other specialty's notes to better coordinate patient's  care.   All questions were answered, and patient voiced understanding.

## 2017-02-10 ENCOUNTER — HOSPITAL ENCOUNTER (OUTPATIENT)
Dept: RADIOLOGY | Facility: HOSPITAL | Age: 57
Discharge: HOME | End: 2017-02-10
Attending: PHYSICAL MEDICINE & REHABILITATION
Payer: COMMERCIAL

## 2017-02-10 DIAGNOSIS — M54.16 LUMBAR RADICULOPATHY: ICD-10-CM

## 2017-02-10 DIAGNOSIS — S33.2XXS: ICD-10-CM

## 2017-02-10 PROCEDURE — 72114 X-RAY EXAM L-S SPINE BENDING: CPT | Mod: 26,,, | Performed by: RADIOLOGY

## 2017-02-10 PROCEDURE — 72148 MRI LUMBAR SPINE W/O DYE: CPT | Mod: 26,GC,, | Performed by: RADIOLOGY

## 2017-02-10 PROCEDURE — 73521 X-RAY EXAM HIPS BI 2 VIEWS: CPT | Mod: TC

## 2017-02-10 PROCEDURE — 72148 MRI LUMBAR SPINE W/O DYE: CPT | Mod: TC

## 2017-02-10 PROCEDURE — 72114 X-RAY EXAM L-S SPINE BENDING: CPT | Mod: TC

## 2017-02-10 PROCEDURE — 73521 X-RAY EXAM HIPS BI 2 VIEWS: CPT | Mod: 26,,, | Performed by: RADIOLOGY

## 2017-02-15 ENCOUNTER — TELEPHONE (OUTPATIENT)
Dept: PHYSICAL MEDICINE AND REHAB | Facility: CLINIC | Age: 57
End: 2017-02-15

## 2017-02-15 NOTE — TELEPHONE ENCOUNTER
----- Message from Annie Garcia MD sent at 2/15/2017  1:29 AM CST -----  Contact: Patient [phone]  It is OK to stop Neurontin if he is not responding.  If he is still in pain and needs to come earlier, we can schedule him for earlier appointment.  Thanks,       ----- Message -----     From: Candida Harman MA     Sent: 2/14/2017   2:43 PM       To: Annie Garcia MD        ----- Message -----     From: Joycelyn Caraballo     Sent: 2/14/2017   2:40 PM       To: Radha Valencia Staff    Patient is calling to see if should keep taking the gabapentin (NEURONTIN) 300 MG capsule, patient says he is not responding to it. He says medication is for his possible pinched nerve.

## 2017-02-15 NOTE — TELEPHONE ENCOUNTER
----- Message from Annie Garcia MD sent at 2/15/2017  1:29 AM CST -----  Contact: Patient [phone]  It is OK to stop Neurontin if he is not responding.  If he is still in pain and needs to come earlier, we can schedule him for earlier appointment.  Thanks,       ----- Message -----     From: Candida Harman MA     Sent: 2/14/2017   2:43 PM       To: Annie Garcia MD        ----- Message -----     From: Joycelyn Caraballo     Sent: 2/14/2017   2:40 PM       To: Radha Valencia Staff    Patient is calling to see if should keep taking the gabapentin (NEURONTIN) 300 MG capsule, patient says he is not responding to it. He says medication is for his possible pinched nerve.

## 2017-02-16 ENCOUNTER — OFFICE VISIT (OUTPATIENT)
Dept: PHYSICAL MEDICINE AND REHAB | Facility: CLINIC | Age: 57
End: 2017-02-16
Payer: COMMERCIAL

## 2017-02-16 VITALS
HEART RATE: 53 BPM | BODY MASS INDEX: 28.45 KG/M2 | SYSTOLIC BLOOD PRESSURE: 165 MMHG | DIASTOLIC BLOOD PRESSURE: 84 MMHG | WEIGHT: 221.69 LBS | HEIGHT: 74 IN

## 2017-02-16 DIAGNOSIS — G89.29 CHRONIC LEFT-SIDED LOW BACK PAIN WITH SCIATICA, SCIATICA LATERALITY UNSPECIFIED: ICD-10-CM

## 2017-02-16 DIAGNOSIS — M54.40 CHRONIC LEFT-SIDED LOW BACK PAIN WITH SCIATICA, SCIATICA LATERALITY UNSPECIFIED: ICD-10-CM

## 2017-02-16 DIAGNOSIS — M53.3 SACROILIAC JOINT DYSFUNCTION OF LEFT SIDE: Primary | ICD-10-CM

## 2017-02-16 DIAGNOSIS — M54.16 LUMBAR RADICULOPATHY: ICD-10-CM

## 2017-02-16 PROCEDURE — 99999 PR PBB SHADOW E&M-EST. PATIENT-LVL III: CPT | Mod: PBBFAC,,, | Performed by: PHYSICAL MEDICINE & REHABILITATION

## 2017-02-16 PROCEDURE — 99214 OFFICE O/P EST MOD 30 MIN: CPT | Mod: S$GLB,,, | Performed by: PHYSICAL MEDICINE & REHABILITATION

## 2017-02-16 NOTE — PROGRESS NOTES
Subjective:       Patient ID: Simón Fong is a 56 y.o. male.    Chief Complaint: Back Pain    Back Pain   Pertinent negatives include no chest pain, dysuria, headaches, numbness or weakness.    Mr. Fong returns  to clinic for left sided buttock pain.  V 02/03/ 17.   Pain intensity:   Current pain is 5 , an average pain is 5, worst pain  7 best pain 0.   Pain is localized at the lower left buttocks and upper thigh of left leg.     Acute on chronic pain . Length ~3 months.   Left sided buttock pain is present since mid November 2016 when he was doing launches, alternating legs.   No significant back pain reported.    No past, recent injury, fall. He worked out and after he worked out, he noticed a pain.    Timing : pain is worse at daytime, when sitting, driving, no pain with walking, no pain at night while laying down.  Gets better with standing and walking.     QUALITY: Pain is a sharp pain, like shooting, stabbing pain, less of a burning pain.   No tingling, numbness in left buttock.    Symptoms interfere with daily activity.     Pain Medications:  Current:takes no pain medications.   Stopped taking Gabapentin.       Physical Therapy/NO  Does Home Exercise: yes, he is very active  in gym,    Pain Procedures:  No TREE  Patient denies night fever/night sweats, bowel incontinence, significant weight loss and significant motor weakness ( red flags).  He is here for follow up,re  evaluation and further  treatment.    Past Medical History   Diagnosis Date    HIV infection        No past surgical history on file.    Family History   Problem Relation Age of Onset    Diabetes Mother     Heart disease Mother     Hypertension Mother     Stroke Mother     Hypertension Father     Cancer Sister     Cancer Brother        Social History     Social History    Marital status: Single     Spouse name: N/A    Number of children: N/A    Years of education: N/A     Social History Main Topics    Smoking status: Never  Smoker    Smokeless tobacco: None    Alcohol use No    Drug use: No    Sexual activity: Not Asked     Other Topics Concern    None     Social History Narrative       Current Outpatient Prescriptions   Medication Sig Dispense Refill    fwhnqal-cahj-cfhlld-tenofo ala [phone] mg Tab Take 1 tablet by mouth once daily. 30 tablet 3    nyomnsjg-gagveisc-hyyivn-tenof, STRIBILD, [phone] mg (STRIBILD) [id number] mg per tablet Take 1 tablet by mouth once daily. 90 tablet 0    fluocinonide (LIDEX) 0.05 % gel Apply topically 2 (two) times daily. 15 g 1    gabapentin (NEURONTIN) 300 MG capsule Take 1 capsule (300 mg total) by mouth 3 (three) times daily. 90 capsule 1    itraconazole (SPORANOX) 100 mg Cap TAKE 2 CAPSULES BY MOUTH ONCE DAILY 60 capsule 0    naproxen (EC NAPROSYN) 500 MG EC tablet Take 1 tablet (500 mg total) by mouth 2 (two) times daily. 60 tablet 2     No current facility-administered medications for this visit.        Review of patient's allergies indicates:   Allergen Reactions    Sulfa (sulfonamide antibiotics)      Review of Systems   Constitutional: Negative for appetite change and fatigue.   Eyes: Negative for visual disturbance.   Respiratory: Negative for shortness of breath.    Cardiovascular: Negative for chest pain.   Gastrointestinal: Negative for constipation and diarrhea.   Genitourinary: Negative for dysuria, frequency and urgency.   Musculoskeletal: Positive for back pain. Negative for arthralgias, gait problem, joint swelling, myalgias, neck pain and neck stiffness.   Skin: Negative for rash and wound.   Neurological: Negative for dizziness, tremors, weakness, numbness and headaches.   Psychiatric/Behavioral: Negative for dysphoric mood.   All other systems reviewed and are negative.          Objective:      Physical Exam    GENERAL: The patient is alert, oriented, pleasant.   HEENT; PERRLA  NECK: supple,   MUSCULOSKELETAL:   Gait is normal, he is able to walk on  toes/heels, able to hop on either leg, with no difficulties.   Cervical spine :Full AROM in cervical spine,   Lumbar spine, full range of motion in all planes, flexion to 90 degrees , ext. 0.  Side bending and rotation to 35-40 degrees, b/l, with no pain,  Straight leg raising Negative bilaterally.   Positive tenderness at sciatica notch.   Negative sacral/lumbar thrust.  Negative SI joint tenderness.  Negative Mickey sign.   Full range of motion in all joints x4 extremities.   No pain with IR of Left hip.   Muscle strength 5/5 throughout x4 extremities.   No  joint laxity throughout x4 extremities.   NEUROLOGIC: Cranial nerves II through XII intact.   Deep tendon reflexes is normal, +2 in the upper and lower extremities bilaterally.   Muscle tone is normal.   Sensory is intact to light touch and pinprick throughout x4 extremities.       Assessment:           1. Lumbar radiculopathy    2. Chronic left-sided low back pain with sciatica, sciatica laterality unspecified        Plan:       Sacroiliac joint dysfunction of left side  -     IR SI Joint Injection w/Imaging; Future  -     Ambulatory Referral to Physical/Occupational Therapy    Lumbar radiculopathy    Chronic left-sided low back pain with sciatica, sciatica laterality unspecified  -     Ambulatory Referral to Physical/Occupational Therapy         Patient with acute back pain, in left buttock and upper thigh, in Dif. Dx.still  Left SI joint dysfunction and /or lumbar radiculopathy.     1. Discussed the left buttock and leg pain, its' course and treatment.   Questions answered.  Referred to PT, for back SI joint stabilization, and back school.     2. Diagnostics: Imaging Xray of Lumbar spine and hips discussed.  MRI of Lumbar spine discussed in details on spine model.     3. Pain management:  Patient refused all pain medications,   Recommend to resume NSAIDs, Naproxen 500 mg po BID.  Stopped Neurontin.     4. Referred for Left SI joint injection by IR.    RTC  in 2 months.     Total time spent face to face with patient was 25  minutes.   More than 50% of that time was spent in counseling on differential diagnosis, prognosis and treatment options.   I also caunsel patient on common and most usual side effect of prescribed medications.  I reviewed Primary care , and other specialty's notes to better coordinate patient's care.   All questions were answered, and patient voiced understanding.

## 2017-02-16 NOTE — MR AVS SNAPSHOT
[facility]  [address]  [facility] [address]  Phone: [phone]                  Simón Fong   2017 10:00 AM   Office Visit    Description:  Male : 1960   Provider:  Annie Garcia MD   Department:  [facility]           Reason for Visit     Back Pain           Diagnoses this Visit        Comments    Sacroiliac joint dysfunction of left side    -  Primary     Lumbar radiculopathy         Chronic left-sided low back pain with sciatica, sciatica laterality unspecified                To Do List           Goals (5 Years of Data)     None      Follow-Up and Disposition     Return in about 4 weeks (around 3/16/2017).      [facility] On Call     [facility] On Call Nurse [facility] Line -  Assistance  Registered nurses in the [facility] On Call Center provide clinical advisement, health education, appointment booking, and other advisory services.  Call for this free service at [phone].             Medications           Message regarding Medications     Verify the changes and/or additions to your medication regime listed below are the same as discussed with your clinician today.  If any of these changes or additions are incorrect, please notify your healthcare provider.             Verify that the below list of medications is an accurate representation of the medications you are currently taking.  If none reported, the list may be blank. If incorrect, please contact your healthcare provider. Carry this list with you in case of emergency.           Current Medications     lcotbzr-ufyd-vbwywf-tenofo ala [phone] mg Tab Take 1 tablet by mouth once daily.    lkyfxqav-kucamsdc-ivpzfi-tenof, STRIBILD, [phone] mg (STRIBILD) [phone] mg per tablet Take 1 tablet by mouth once daily.    fluocinonide (LIDEX) 0.05 % gel Apply topically 2 (two) times daily.    gabapentin (NEURONTIN) 300 MG capsule Take 1 capsule (300 mg total) by mouth 3 (three)  "times daily.    itraconazole (SPORANOX) 100 mg Cap TAKE 2 CAPSULES BY MOUTH ONCE DAILY    naproxen (EC NAPROSYN) 500 MG EC tablet Take 1 tablet (500 mg total) by mouth 2 (two) times daily.           Clinical Reference Information           Your Vitals Were     BP Pulse Height Weight BMI    165/84 53 6' 2" (1.88 m) 100.5 kg (221 lb 10.8 oz) 28.46 kg/m2      Blood Pressure          Most Recent Value    BP  (!)  165/84      Allergies as of 2/16/2017     Sulfa (Sulfonamide Antibiotics)      Immunizations Administered on Date of Encounter - 2/16/2017     None      Orders Placed During Today's Visit     Future Labs/Procedures Expected by Expires    IR SI Joint Injection w/Imaging  2/16/2017 2/16/2018      Language Assistance Services     ATTENTION: Language assistance services are available, free of charge. Please call [phone].      ATENCIÓN: Si lizethla miranda, tiene a edwards disposición servicios gratuitos de asistencia lingüística. Llame al [phone].     TENNILLE Ý: N?u b?n nói Ti?ng Vi?t, có các d?ch v? h? tr? ngôn ng? mi?n phí dành cho b?n. G?i s? [phone].         [facility] complies with applicable Federal civil rights laws and does not discriminate on the basis of race, color, national origin, age, disability, or sex.        "

## 2017-02-16 NOTE — LETTER
February 19, 2017      Parisa Fowler MD  [address]  [facility] [address]           [facility]  [address]  [facility] [address]  Phone: [phone]          Patient: Simón Fong   MR Number: [id number]   YOB: 1960   Date of Visit: 2/16/2017       Dear Dr. Parisa Fowler:    Thank you for referring Simón Fong to me for evaluation. Attached you will find relevant portions of my assessment and plan of care.    If you have questions, please do not hesitate to call me. I look forward to following Simón Fong along with you.    Sincerely,    Annie Garcia MD    Enclosure  CC:  No Recipients    If you would like to receive this communication electronically, please contact [email or web address] or [phone] to request more information on [employer] access.    For providers and/or their staff who would like to refer a patient to [facility], please contact us through our one-stop-shop provider referral line, [facility], at [phone].    If you feel you have received this communication in error or would no longer like to receive these types of communications, please e-mail [email or web address]

## 2017-02-17 ENCOUNTER — TELEPHONE (OUTPATIENT)
Dept: PHYSICAL MEDICINE AND REHAB | Facility: CLINIC | Age: 57
End: 2017-02-17

## 2017-02-17 NOTE — TELEPHONE ENCOUNTER
----- Message from Josie Varma sent at 2/17/2017  1:20 PM CST -----  Contact: pt [phone]  Pt is calling to speak with nurse regarding an injection appt.pls call

## 2017-04-04 DIAGNOSIS — B20 HUMAN IMMUNODEFICIENCY VIRUS (HIV) DISEASE: ICD-10-CM

## 2017-04-05 DIAGNOSIS — B20 HUMAN IMMUNODEFICIENCY VIRUS (HIV) DISEASE: ICD-10-CM

## 2017-04-11 DIAGNOSIS — B20 HUMAN IMMUNODEFICIENCY VIRUS (HIV) DISEASE: ICD-10-CM

## 2017-04-11 NOTE — TELEPHONE ENCOUNTER
----- Message from Sherley Nguyen sent at 4/11/2017 12:05 PM CDT -----  Contact: Patient: [phone]  Patient called and stated that he needs a refill on his Stribild medicine.    [facility] - [address] [phone] (Phone)  [phone] (Fax)    Patient stated that the pharmacy needs to hear from the staff to fill the medicine.  Patient can be reached at [phone].  Please call.

## 2017-04-12 NOTE — TELEPHONE ENCOUNTER
----- Message from Marci Kapadia sent at 4/12/2017  9:10 AM CDT -----  Contact: patient   [phone]  Dave   -   Patient calling to speak with the Dr in regards to getting his medication   Thanks,

## 2017-04-14 ENCOUNTER — PATIENT MESSAGE (OUTPATIENT)
Dept: INFECTIOUS DISEASES | Facility: HOSPITAL | Age: 57
End: 2017-04-14

## 2017-04-17 DIAGNOSIS — B20 HUMAN IMMUNODEFICIENCY VIRUS (HIV) DISEASE: ICD-10-CM

## 2017-04-24 ENCOUNTER — TELEPHONE (OUTPATIENT)
Dept: INFECTIOUS DISEASES | Facility: CLINIC | Age: 57
End: 2017-04-24

## 2017-04-24 NOTE — TELEPHONE ENCOUNTER
----- Message from Eileen Alonso sent at 2017  3:11 PM CDT -----  Contact: Simón   [phone]   's pt./ pt.says he has been trying for 2 wks to get his medication:  Stribild,   but he needs a prior authorization .  Pharmacy: [facility]  [phone]  Give his name, , and ID No.   [place]  [id number]   .   Pls call when this is done.    Pt. Says he needs this done asap.

## 2017-07-26 ENCOUNTER — LAB VISIT (OUTPATIENT)
Dept: LAB | Facility: HOSPITAL | Age: 57
End: 2017-07-26
Attending: INTERNAL MEDICINE
Payer: COMMERCIAL

## 2017-07-26 DIAGNOSIS — B20 HIV DISEASE: ICD-10-CM

## 2017-07-26 LAB
25(OH)D3+25(OH)D2 SERPL-MCNC: 34 NG/ML
COMPLEXED PSA SERPL-MCNC: 4.6 NG/ML
HBV SURFACE AB SER-ACNC: POSITIVE M[IU]/ML

## 2017-07-26 PROCEDURE — 86780 TREPONEMA PALLIDUM: CPT

## 2017-07-26 PROCEDURE — 84153 ASSAY OF PSA TOTAL: CPT

## 2017-07-26 PROCEDURE — 36415 COLL VENOUS BLD VENIPUNCTURE: CPT

## 2017-07-26 PROCEDURE — 82306 VITAMIN D 25 HYDROXY: CPT

## 2017-07-26 PROCEDURE — 86706 HEP B SURFACE ANTIBODY: CPT

## 2017-08-02 LAB — T PALLIDUM AB SER QL IF: REACTIVE

## 2017-08-03 ENCOUNTER — PATIENT MESSAGE (OUTPATIENT)
Dept: INFECTIOUS DISEASES | Facility: CLINIC | Age: 57
End: 2017-08-03

## 2017-08-07 ENCOUNTER — TELEPHONE (OUTPATIENT)
Dept: INFECTIOUS DISEASES | Facility: CLINIC | Age: 57
End: 2017-08-07

## 2017-08-07 DIAGNOSIS — R97.20 ELEVATED PSA: Primary | ICD-10-CM

## 2017-08-09 ENCOUNTER — OFFICE VISIT (OUTPATIENT)
Dept: INFECTIOUS DISEASES | Facility: CLINIC | Age: 57
End: 2017-08-09
Payer: COMMERCIAL

## 2017-08-09 VITALS
HEART RATE: 73 BPM | HEIGHT: 73 IN | DIASTOLIC BLOOD PRESSURE: 82 MMHG | SYSTOLIC BLOOD PRESSURE: 142 MMHG | WEIGHT: 226.44 LBS | TEMPERATURE: 98 F | BODY MASS INDEX: 30.01 KG/M2

## 2017-08-09 DIAGNOSIS — R97.20 ELEVATED PSA: ICD-10-CM

## 2017-08-09 DIAGNOSIS — Z80.42 FAMILY HISTORY OF PROSTATE CANCER: ICD-10-CM

## 2017-08-09 DIAGNOSIS — B20 HUMAN IMMUNODEFICIENCY VIRUS (HIV) DISEASE: ICD-10-CM

## 2017-08-09 DIAGNOSIS — B20 HIV DISEASE: Primary | ICD-10-CM

## 2017-08-09 PROCEDURE — 99999 PR PBB SHADOW E&M-EST. PATIENT-LVL III: CPT | Mod: PBBFAC,,, | Performed by: INTERNAL MEDICINE

## 2017-08-09 PROCEDURE — 99214 OFFICE O/P EST MOD 30 MIN: CPT | Mod: S$GLB,,, | Performed by: INTERNAL MEDICINE

## 2017-08-09 NOTE — PROGRESS NOTES
Subjective:      Patient ID: Simón Fong is a 57 y.o. male.    Chief Complaint:Follow-up      History of Present Illness    Mr. Fong presents today to follow up for his HIV.     The patient has been seen by us for a number of years and in the past, he was on   Combivir and ritonavir.  He did have a drug holiday and he asked about that   once again and he has been discouraged from doing such.  His current medications   include Truvada and boosted Reyataz and most recently changed to Stribild.    Highly compliant with medications; no problems reported.  Still c/o of onychomycosis on great toe but better.       Routine examinations:  1. Vitamin D supplements daily.  2. Colonoscopy due 2018.  3. Last anal pap 2012; and 2015.  No dysplasia.  Family history:  two brothers were diagnosed with prostate cancer - the older one at 64 and a younger one, one year ago.    No major issues since was last seen.  He has retired and keeping active; exercises daily.    Component      Latest Ref Rng & Units 7/26/2017   PSA, SCREEN      0.00 - 4.00 ng/mL 4.6 (H)   Hep B S Ab       Positive (A)   Vit D, 25-Hydroxy      30 - 96 ng/mL 34   FTA-ABS      Non-reactive Reactive (A)       Review of Systems   Constitution: Negative for chills, decreased appetite, fever, weakness, malaise/fatigue, night sweats, weight gain and weight loss.   HENT: Negative for congestion, ear pain, headaches, hearing loss, hoarse voice, sore throat and tinnitus.    Eyes: Negative for blurred vision, redness and visual disturbance.   Cardiovascular: Negative for chest pain, leg swelling and palpitations.   Respiratory: Negative for cough, hemoptysis, shortness of breath and sputum production.    Hematologic/Lymphatic: Negative for adenopathy. Does not bruise/bleed easily.   Skin: Negative for dry skin, itching, rash and suspicious lesions.   Musculoskeletal: Negative for back pain, joint pain, myalgias and neck pain.   Gastrointestinal: Negative for abdominal  pain, constipation, diarrhea, heartburn, nausea and vomiting.   Genitourinary: Negative for dysuria, flank pain, frequency, hematuria, hesitancy and urgency.   Neurological: Negative for dizziness, numbness and paresthesias.   Psychiatric/Behavioral: Negative for depression and memory loss. The patient does not have insomnia and is not nervous/anxious.      Objective:   Physical Exam   Constitutional: He is oriented to person, place, and time. He appears well-developed and well-nourished.   HENT:   Head: Normocephalic and atraumatic.   Right Ear: External ear normal.   Left Ear: External ear normal.   Mouth/Throat: Oropharynx is clear and moist.   Eyes: Conjunctivae and EOM are normal. Pupils are equal, round, and reactive to light.   Neck: Normal range of motion. Neck supple. No thyromegaly present.   Cardiovascular: Normal rate, regular rhythm and normal heart sounds.    No murmur heard.  Pulmonary/Chest: Effort normal and breath sounds normal. He has no wheezes. He has no rales.   Abdominal: Soft. Bowel sounds are normal. He exhibits no mass. There is no tenderness. There is no rebound.   Musculoskeletal: Normal range of motion.   Lymphadenopathy:     He has no cervical adenopathy.   Neurological: He is alert and oriented to person, place, and time.   Skin: Skin is warm and dry.   Psychiatric: He has a normal mood and affect. His behavior is normal.   Vitals reviewed.    Assessment:       1. HIV disease    2. Human immunodeficiency virus (HIV) disease    3. Family history of prostate cancer    4. Elevated PSA          Plan:       1. In view of elevated PSA and family history, referral was placed for urology and patient declines at this time.  2. Will ck PSA with follow up in 6 months, at which time if still elevated, he will agree for referral.  3. Labs today.   4. He will notify us in 3 months and will change meds to Genvoya then.  5. zostavax on next visit.

## 2017-08-10 DIAGNOSIS — B20 HUMAN IMMUNODEFICIENCY VIRUS (HIV) DISEASE: ICD-10-CM

## 2017-08-11 RX ORDER — FLUOCINONIDE GEL 0.5 MG/G
GEL TOPICAL 2 TIMES DAILY
Qty: 15 G | Refills: 1 | Status: SHIPPED | OUTPATIENT
Start: 2017-08-11 | End: 2020-06-11

## 2017-08-13 ENCOUNTER — PATIENT MESSAGE (OUTPATIENT)
Dept: INFECTIOUS DISEASES | Facility: CLINIC | Age: 57
End: 2017-08-13

## 2017-08-13 PROBLEM — R97.20 ELEVATED PSA: Status: ACTIVE | Noted: 2017-08-13

## 2017-10-02 ENCOUNTER — PATIENT MESSAGE (OUTPATIENT)
Dept: INFECTIOUS DISEASES | Facility: CLINIC | Age: 57
End: 2017-10-02

## 2017-10-02 DIAGNOSIS — B20 HUMAN IMMUNODEFICIENCY VIRUS (HIV) DISEASE: ICD-10-CM

## 2017-10-02 NOTE — TELEPHONE ENCOUNTER
Simón Fowler MD 1 hour ago (8:28 AM)         Hi Dr. Acosta,     It is time for me to reorder my 3 month supply of Stribild.  You ask me to remind you to send in a prescription of  the newer version of the drug to my formerly [facility] mail order pharmacy.     thanks,

## 2017-10-19 ENCOUNTER — TELEPHONE (OUTPATIENT)
Dept: INFECTIOUS DISEASES | Facility: CLINIC | Age: 57
End: 2017-10-19

## 2017-10-19 NOTE — TELEPHONE ENCOUNTER
----- Message from Eileen Alonso sent at 10/16/2017 12:57 PM CDT -----  Contact: ranjeet tel:     [phone]   Pt.says he needs Fluocinoide sent to [email or web address] [email or web address].  Pls send the Prior Authorization.

## 2017-10-19 NOTE — TELEPHONE ENCOUNTER
Per patient's insurance, this medication does not need a PA and does not offer a tier decrease (to change from a 3 or 4 to a 1). Patient will have to pay the price of a tier 3 or 4. Patient is requesting an alternative medication. Please advise.

## 2017-10-27 NOTE — TELEPHONE ENCOUNTER
Per Dr. Acosta, patient will have to make an appointment with dermatology to get an alternative medication. Dr. Acosta is not the prescribing doctor for this medication.

## 2017-12-18 ENCOUNTER — TELEPHONE (OUTPATIENT)
Dept: INFECTIOUS DISEASES | Facility: CLINIC | Age: 57
End: 2017-12-18

## 2017-12-18 RX ORDER — ALBUTEROL SULFATE 90 UG/1
2 AEROSOL, METERED RESPIRATORY (INHALATION) EVERY 6 HOURS PRN
Qty: 18 G | Refills: 1 | Status: CANCELLED | OUTPATIENT
Start: 2017-12-18 | End: 2018-12-18

## 2017-12-18 NOTE — TELEPHONE ENCOUNTER
Patient did not go to Urgent care due to having a bad experience before. He stated he had this before.

## 2017-12-18 NOTE — TELEPHONE ENCOUNTER
----- Message from Marci Kapadia sent at 12/18/2017 12:49 PM CST -----  Contact: pt  [phone]  Dave  -  Pt   Calling to speak with the Dr in regards to his inhaler , call the pt back  to discuss

## 2018-02-20 ENCOUNTER — OFFICE VISIT (OUTPATIENT)
Dept: PODIATRY | Facility: CLINIC | Age: 58
End: 2018-02-20
Payer: COMMERCIAL

## 2018-02-20 ENCOUNTER — LAB VISIT (OUTPATIENT)
Dept: LAB | Facility: OTHER | Age: 58
End: 2018-02-20
Payer: COMMERCIAL

## 2018-02-20 VITALS — BODY MASS INDEX: 30.01 KG/M2 | HEIGHT: 73 IN | WEIGHT: 226.44 LBS

## 2018-02-20 DIAGNOSIS — B35.1 ONYCHOMYCOSIS DUE TO DERMATOPHYTE: Primary | ICD-10-CM

## 2018-02-20 DIAGNOSIS — B35.1 ONYCHOMYCOSIS DUE TO DERMATOPHYTE: ICD-10-CM

## 2018-02-20 DIAGNOSIS — M72.2 PLANTAR FASCIAL FIBROMATOSIS OF RIGHT FOOT: ICD-10-CM

## 2018-02-20 LAB
ALBUMIN SERPL BCP-MCNC: 4.4 G/DL
ALP SERPL-CCNC: 72 U/L
ALT SERPL W/O P-5'-P-CCNC: 25 U/L
AST SERPL-CCNC: 33 U/L
BILIRUB DIRECT SERPL-MCNC: 0.1 MG/DL
BILIRUB SERPL-MCNC: 0.2 MG/DL
PROT SERPL-MCNC: 7.6 G/DL

## 2018-02-20 PROCEDURE — 80076 HEPATIC FUNCTION PANEL: CPT

## 2018-02-20 PROCEDURE — 3008F BODY MASS INDEX DOCD: CPT | Mod: S$GLB,,,

## 2018-02-20 PROCEDURE — 36415 COLL VENOUS BLD VENIPUNCTURE: CPT

## 2018-02-20 PROCEDURE — 99203 OFFICE O/P NEW LOW 30 MIN: CPT | Mod: S$GLB,,,

## 2018-02-20 RX ORDER — METHYLPREDNISOLONE 4 MG/1
TABLET ORAL
Qty: 1 PACKAGE | Refills: 0 | Status: SHIPPED | OUTPATIENT
Start: 2018-02-20 | End: 2018-05-14 | Stop reason: ALTCHOICE

## 2018-02-20 RX ORDER — CLOTRIMAZOLE 1 G/ML
SOLUTION TOPICAL DAILY
Qty: 30 ML | Refills: 11 | Status: SHIPPED | OUTPATIENT
Start: 2018-02-20 | End: 2018-05-14 | Stop reason: ALTCHOICE

## 2018-02-20 NOTE — PROGRESS NOTES
Subjective:       Patient ID: Simón Fong is a 57 y.o. male.    Chief Complaint: Nail Problem (nail fungus )    HPI  Patient presents to the clinic reporting pain in his plantar mid arch and long thick toenails which are tender to the touch. He's had no prior treatment today. The pain in his arches worse when he standing. He generally wears tennis shoes. He does exercise on a routine basis.    Past Medical History:   Diagnosis Date    HIV infection        History reviewed. No pertinent surgical history.    Family History   Problem Relation Age of Onset    Diabetes Mother     Heart disease Mother     Hypertension Mother     Stroke Mother     Hypertension Father     Cancer Sister     Cancer Brother        Social History     Social History    Marital status: Single     Spouse name: N/A    Number of children: N/A    Years of education: N/A     Social History Main Topics    Smoking status: Never Smoker    Smokeless tobacco: Never Used    Alcohol use No    Drug use: No    Sexual activity: Not Asked     Other Topics Concern    None     Social History Narrative    None       Current Outpatient Prescriptions   Medication Sig Dispense Refill    elviteg-cob-emtri-tenof alafen (GENVOYA) [id number] mg Tab Take 1 tablet by mouth once daily. 90 tablet 1    itraconazole (SPORANOX) 100 mg Cap TAKE 2 CAPSULES BY MOUTH ONCE DAILY 60 capsule 0    naproxen (EC NAPROSYN) 500 MG EC tablet Take 1 tablet (500 mg total) by mouth 2 (two) times daily. 60 tablet 2    clotrimazole (LOTRIMIN) 1 % Soln Apply topically once daily. 30 mL 11    fluocinonide (LIDEX) 0.05 % gel Apply topically 2 (two) times daily. 15 g 1    gabapentin (NEURONTIN) 300 MG capsule Take 1 capsule (300 mg total) by mouth 3 (three) times daily. 90 capsule 1    methylPREDNISolone (MEDROL DOSEPACK) 4 mg tablet use as directed 1 Package 0     No current facility-administered medications for this visit.        Review of patient's allergies  indicates:   Allergen Reactions    Sulfa (sulfonamide antibiotics)        Review of Systems  ROS:  Constitution: Negative for chills, fever, weakness and malaise/fatigue.   HEENT: Negative for headaches.   Cardiovascular: Negative for chest pain and claudication.   Respiratory: Negative for cough and shortness of breath.   Musculoskeletal: Positive for right foot pain.  Negative for muscle cramps and muscle weakness.   Gastrointestinal: Negative for nausea and vomiting.   Neurological: Negative for numbness and paresthesias.   Dermatological: Negativefor skin rash, Negative for calluses, Positive for fungal nails, Negative for wound.        Objective:      Physical Exam  Constitutional:  Patient is oriented to person, place, and time. Vital signs are normal.  Appears well-developed and well-nourished.     Vascular:  Dorsalis pedis pulses are 2/4 on the right side, and 2/4 on the left side.   Posterior tibial pulses are 2/4 on the right side, and 2/4 on the left side.   Positive for digital hair growth, capillary fill time to all toes <3 seconds, toes are warm touch, trace pedal swelling    Skin/Dermatological:  Skin is warm and intact.  No cyanosis or clubbing.  No rashes noted.  No open wounds.  B/L great and left 3, b/l 5th  toenails yellow discolored, thickened 2-4 mm to base with subungual debris.  (--) keratosis     Musculoskeletal:      Pes cavus.   Fibrous minimally painful mass right mid plantar fascia.  Pedal rom within normal limits.  (--) ankle joint DF restriction with both knee flexed and extened.    Neurological:  (--) deficits to sharp/dull, light touch or vibratory sensation bilateral feet, ten points tested.   Muscle strength to tibialis anterior, extensor hallucis longus, extensor digitorum longus, peroneal muscles, flexor hallucis/digotorum longus, posterior tibial and gastrosoleal complex is 5/5, normal tone without assymmetry   Patellar reflexes are 2+ on the right side and 2+ on the left  side.  Achilles reflexes are 2+ on the right side and 2+ on the left side.      Assessment:       1. Onychomycosis due to dermatophyte    2. Plantar fascial fibromatosis of right foot        Plan:       Onychomycosis due to dermatophyte  -     Hepatic function panel; Future; Expected date: 02/20/2018  -     clotrimazole (LOTRIMIN) 1 % Soln; Apply topically once daily.  Dispense: 30 mL; Refill: 11    Plantar fascial fibromatosis of right foot  -     methylPREDNISolone (MEDROL DOSEPACK) 4 mg tablet; use as directed  Dispense: 1 Package; Refill: 0      We discussed using Lamisil for onychomycosis. LFT today.  This drug offers a fairly high but not universal cure rate. A 12 week treatment course is recommended. The patient is aware that rare cases of liver injury have been reported; and agrees to come in for liver function tests at 6 weeks of treatment. The symptoms of liver disease have been discussed; call if such occurs. In addition, some insurance plans do not cover the expense of this drug for treating a cosmetic condition, and the patient understands they may have to pay for the medication. Other side effects, such as headaches and rashes, have also been discussed.  He was also ordered topical Lotrimin to be used daily.  Keep feet clean and dry.  Vinegar soaks weekly (1 parts vinegar/2 part warm water).   Avoid cotton socks.  Powders to shoes, antiperspirants to feet daily. Lysol spray to shoes twice weekly.     Recommend Spenco orthotics for his cavus foot.  Consider intralesional injection if symptoms pesrists or intensify.  We did discuss excision as well.    RTC 3 months.    2/21/18 Lotrimin not covered, prescribed Penlac. LFT ok, oral lamisil ordered.  He does not want Medrol Dosepack and will be given an injection tomorrow.

## 2018-02-20 NOTE — PATIENT INSTRUCTIONS
Get blood test, if ok will order 12 weeks of oral lamisil.  You need to have to have another blood test in one month.    Biotin vitamin    Patient tolerated well.  Keep feet clean and dry.  Avoid cotton socks.  Antifungal sprays to the feet.  Lysol to shoes qod.  Vinegar soaks twice weekly.      Spenco Orthotic Arch Supports                               SPENCO Orthotic Arch Supports (AKA Spenco Orthotic Insoles) are ¾ length nylon (plastic arch supports that are covered with [employer] classic green neoprene cushion material. SPENCO Orthotic Arch Supports are designed to support the medial and lateral arch. A SPENCO Orthotic Arch Support is ideal for people that need corrective support, but have tried other higher or harder orthotics and found them to be uncomfortable to wear. The nylon support of the SPENCO Orthotic Arch support is softer and more flexible than plastics used in other, harder orthotics and arch supports. This means that people with naturally lower arches or people with extremely flat feet with find that these arch supports are more comfortable to get used to than other higher arch supports.  May be obtained at:     [employer], [employer] or online

## 2018-02-21 ENCOUNTER — TELEPHONE (OUTPATIENT)
Dept: PODIATRY | Facility: CLINIC | Age: 58
End: 2018-02-21

## 2018-02-21 RX ORDER — TERBINAFINE HYDROCHLORIDE 250 MG/1
250 TABLET ORAL DAILY
Qty: 90 TABLET | Refills: 0 | Status: SHIPPED | OUTPATIENT
Start: 2018-02-21 | End: 2018-05-14 | Stop reason: ALTCHOICE

## 2018-02-21 NOTE — TELEPHONE ENCOUNTER
Spoke with patient and let him know that his Liver function test is normal and that Dr. Fermin sent in [facility] to his Pharmacy. I also made him an appointment to get a steroid injection fro tomorrow.

## 2018-02-21 NOTE — TELEPHONE ENCOUNTER
----- Message from José Miguel Fermin DPM sent at 2/21/2018  8:46 AM CST -----  Please let the patient know that liver function test was normal and the patient may start the 3 month course of Lamisil.  Please remind to obtain second lft in 6 weeks.

## 2018-02-27 DIAGNOSIS — M54.16 LUMBAR RADICULOPATHY: ICD-10-CM

## 2018-02-27 DIAGNOSIS — S33.2XXS: ICD-10-CM

## 2018-02-27 RX ORDER — NAPROXEN 500 MG/1
500 TABLET ORAL 2 TIMES DAILY
Qty: 60 TABLET | Refills: 2 | Status: SHIPPED | OUTPATIENT
Start: 2018-02-27 | End: 2019-01-07 | Stop reason: ALTCHOICE

## 2018-03-23 ENCOUNTER — LAB VISIT (OUTPATIENT)
Dept: LAB | Facility: OTHER | Age: 58
End: 2018-03-23
Payer: COMMERCIAL

## 2018-03-23 DIAGNOSIS — B35.1 ONYCHOMYCOSIS DUE TO DERMATOPHYTE: ICD-10-CM

## 2018-03-23 LAB
ALBUMIN SERPL BCP-MCNC: 4.3 G/DL
ALP SERPL-CCNC: 63 U/L
ALT SERPL W/O P-5'-P-CCNC: 36 U/L
AST SERPL-CCNC: 63 U/L
BILIRUB DIRECT SERPL-MCNC: 0.2 MG/DL
BILIRUB SERPL-MCNC: 0.5 MG/DL
PROT SERPL-MCNC: 7.3 G/DL

## 2018-03-23 PROCEDURE — 36415 COLL VENOUS BLD VENIPUNCTURE: CPT

## 2018-03-23 PROCEDURE — 80076 HEPATIC FUNCTION PANEL: CPT

## 2018-03-24 ENCOUNTER — PATIENT MESSAGE (OUTPATIENT)
Dept: PODIATRY | Facility: CLINIC | Age: 58
End: 2018-03-24

## 2018-03-26 ENCOUNTER — PATIENT MESSAGE (OUTPATIENT)
Dept: PLASTIC SURGERY | Facility: CLINIC | Age: 58
End: 2018-03-26

## 2018-03-27 DIAGNOSIS — B35.1 ONYCHOMYCOSIS DUE TO DERMATOPHYTE: Primary | ICD-10-CM

## 2018-03-28 ENCOUNTER — TELEPHONE (OUTPATIENT)
Dept: PODIATRY | Facility: CLINIC | Age: 58
End: 2018-03-28

## 2018-03-28 NOTE — TELEPHONE ENCOUNTER
----- Message from José Miguel Fermin DPM sent at 3/24/2018 10:03 AM CDT -----  Please have this patient stop taking oral lamisil medication because his liver function test is elevated. Have him avoid alcohol. Repeat liver function test in one month. Please call him Monday morning.      Called and spoke with Patient letting him know to stop taking lamisil because liver function is elevated and  would like to repeat test in 1 month pt understand.

## 2018-03-29 ENCOUNTER — OFFICE VISIT (OUTPATIENT)
Dept: PODIATRY | Facility: CLINIC | Age: 58
End: 2018-03-29
Payer: COMMERCIAL

## 2018-03-29 VITALS — WEIGHT: 226 LBS | BODY MASS INDEX: 29.95 KG/M2 | HEIGHT: 73 IN

## 2018-03-29 DIAGNOSIS — M72.2 PLANTAR FASCIITIS: Primary | ICD-10-CM

## 2018-03-29 PROCEDURE — 99213 OFFICE O/P EST LOW 20 MIN: CPT | Mod: 25,S$GLB,,

## 2018-03-29 PROCEDURE — 20550 NJX 1 TENDON SHEATH/LIGAMENT: CPT | Mod: RT,S$GLB,,

## 2018-03-29 RX ORDER — DEXAMETHASONE SODIUM PHOSPHATE 4 MG/ML
4 INJECTION, SOLUTION INTRA-ARTICULAR; INTRALESIONAL; INTRAMUSCULAR; INTRAVENOUS; SOFT TISSUE
Status: COMPLETED | OUTPATIENT
Start: 2018-03-29 | End: 2018-03-29

## 2018-03-29 RX ADMIN — DEXAMETHASONE SODIUM PHOSPHATE 4 MG: 4 INJECTION, SOLUTION INTRA-ARTICULAR; INTRALESIONAL; INTRAMUSCULAR; INTRAVENOUS; SOFT TISSUE at 10:03

## 2018-03-29 NOTE — PROGRESS NOTES
Subjective:       Patient ID: Simón Fong is a 57 y.o. male.    Chief Complaint: Heel Pain (Right Heel)    HPI  Patient returns to the clinic reporting pain in the right heel, previously tried spenco orthotics and stretching.  Worse in am when he first steps down on floor.    Past Medical History:   Diagnosis Date    HIV infection        History reviewed. No pertinent surgical history.    Family History   Problem Relation Age of Onset    Diabetes Mother     Heart disease Mother     Hypertension Mother     Stroke Mother     Hypertension Father     Cancer Sister     Cancer Brother        Social History     Social History    Marital status: Single     Spouse name: N/A    Number of children: N/A    Years of education: N/A     Social History Main Topics    Smoking status: Never Smoker    Smokeless tobacco: Never Used    Alcohol use No    Drug use: No    Sexual activity: Not Asked     Other Topics Concern    None     Social History Narrative    None       Current Outpatient Prescriptions   Medication Sig Dispense Refill    clotrimazole (LOTRIMIN) 1 % Soln Apply topically once daily. 30 mL 11    elviteg-cob-emtri-tenof alafen (GENVOYA) [phone] mg Tab Take 1 tablet by mouth once daily. 90 tablet 1    fluocinonide (LIDEX) 0.05 % gel Apply topically 2 (two) times daily. 15 g 1    gabapentin (NEURONTIN) 300 MG capsule Take 1 capsule (300 mg total) by mouth 3 (three) times daily. 90 capsule 1    itraconazole (SPORANOX) 100 mg Cap TAKE 2 CAPSULES BY MOUTH ONCE DAILY 60 capsule 0    methylPREDNISolone (MEDROL DOSEPACK) 4 mg tablet use as directed 1 Package 0    naproxen (EC NAPROSYN) 500 MG EC tablet Take 1 tablet (500 mg total) by mouth 2 (two) times daily. 60 tablet 2    terbinafine HCl (LAMISIL) 250 mg tablet Take 1 tablet (250 mg total) by mouth once daily. 90 tablet 0     Current Facility-Administered Medications   Medication Dose Route Frequency Provider Last Rate Last Dose     dexamethasone injection 4 mg  4 mg Other 1 time in Clinic/HOD José Miguel Fermin DPM        triamcinolone acetonide injection 12 mg  12 mg Other 1 time in Clinic/HOD José Miguel Fermin DPM           Review of patient's allergies indicates:   Allergen Reactions    Sulfa (sulfonamide antibiotics)        Review of Systems  ROS:  Constitution: Negative for chills, fever, weakness and malaise/fatigue.   HEENT: Negative for headaches.   Cardiovascular: Negative for chest pain and claudication.   Respiratory: Negative for cough and shortness of breath.   Musculoskeletal: Positive for right foot pain.  Negative for muscle cramps and muscle weakness.   Gastrointestinal: Negative for nausea and vomiting.   Neurological: Negative for numbness and paresthesias.   Dermatological: Negativefor skin rash, Negative for calluses, Positive for fungal nails, Negative for wound.        Objective:      Physical Exam  Constitutional:  Patient is oriented to person, place, and time. Vital signs are normal.  Appears well-developed and well-nourished.     Vascular:  Dorsalis pedis pulses are 2/4 on the right side, and 2/4 on the left side.   Posterior tibial pulses are 2/4 on the right side, and 2/4 on the left side.   Positive for digital hair growth, capillary fill time to all toes <3 seconds, toes are warm touch, trace pedal swelling    Skin/Dermatological:  Skin is warm and intact.  No cyanosis or clubbing.  No rashes noted.  No open wounds.  B/L great and left 3, b/l 5th  toenails yellow discolored, thickened 2-4 mm to base with subungual debris.  (--) keratosis     Musculoskeletal:      Pes cavus.   Fibrous minimally painful mass right mid plantar fascia.  Pain right medial proximal plantar fascia. Pedal rom within normal limits.  (--) ankle joint DF restriction with both knee flexed and extened.    Neurological:  (--) deficits to sharp/dull, light touch or vibratory sensation bilateral feet, ten points tested.   Muscle strength to  tibialis anterior, extensor hallucis longus, extensor digitorum longus, peroneal muscles, flexor hallucis/digotorum longus, posterior tibial and gastrosoleal complex is 5/5, normal tone without assymmetry   Patellar reflexes are 2+ on the right side and 2+ on the left side.  Achilles reflexes are 2+ on the right side and 2+ on the left side.      Assessment:       1. Plantar fasciitis - Right Foot        Plan:       Plantar fasciitis - Right Foot  -     triamcinolone acetonide injection 12 mg; 1.2 mLs (12 mg total) by Other route one time.  -     dexamethasone injection 4 mg; 1 mL (4 mg total) by Other route one time.      #1 Plantar fasciitis right foot: We discussed the etiology of the problem and the patient was given literature regarding plantar fasciitis and stretching.  With the patient's permission, an injection of 12 mg of kenalog, 4 mg of dexamethasone phosphate and 1 cc of lidocaine 1% and 1 cc of marcaine 0.5% into the right medial plantar heel.  Patient tolerated well. We also discussed proper shoe gear.    Spenco orthotic supports were also recommended.  We discussed the possibility of another injection or physical therapy or surgery should this not resolve the problem.  Return to clinic prn.  .

## 2018-04-04 NOTE — TELEPHONE ENCOUNTER
----- Message from Mai Blandon sent at 4/4/2018  2:56 PM CDT -----  Contact: Self  Says the prescription wants them go through [facility].        ...  [facility] - [place], [place] - [address]  [address]  [address]  [facility] [address]  Phone: [phone] Fax: [phone]

## 2018-04-05 ENCOUNTER — TELEPHONE (OUTPATIENT)
Dept: INFECTIOUS DISEASES | Facility: CLINIC | Age: 58
End: 2018-04-05

## 2018-04-05 NOTE — TELEPHONE ENCOUNTER
----- Message from Mai Blandon sent at 4/5/2018 10:30 AM CDT -----  Contact: Self  Pt needs 90 day supply GENJOHN.        .[facility] - [place], [place] - [address]  [address]  [address]  [place] [address]  Phone: [phone] Fax: [phone]

## 2018-04-17 ENCOUNTER — LAB VISIT (OUTPATIENT)
Dept: LAB | Facility: OTHER | Age: 58
End: 2018-04-17
Payer: COMMERCIAL

## 2018-04-17 DIAGNOSIS — B35.1 ONYCHOMYCOSIS DUE TO DERMATOPHYTE: ICD-10-CM

## 2018-04-17 LAB
ALBUMIN SERPL BCP-MCNC: 4.1 G/DL
ALP SERPL-CCNC: 71 U/L
ALT SERPL W/O P-5'-P-CCNC: 36 U/L
AST SERPL-CCNC: 40 U/L
BILIRUB DIRECT SERPL-MCNC: 0.2 MG/DL
BILIRUB SERPL-MCNC: 0.4 MG/DL
PROT SERPL-MCNC: 7.6 G/DL

## 2018-04-17 PROCEDURE — 36415 COLL VENOUS BLD VENIPUNCTURE: CPT

## 2018-04-17 PROCEDURE — 80076 HEPATIC FUNCTION PANEL: CPT

## 2018-04-24 ENCOUNTER — TELEPHONE (OUTPATIENT)
Dept: INFECTIOUS DISEASES | Facility: CLINIC | Age: 58
End: 2018-04-24

## 2018-04-24 DIAGNOSIS — Z00.00 ENCOUNTER FOR PREVENTIVE CARE: ICD-10-CM

## 2018-04-24 DIAGNOSIS — Z80.42 FAMILY HISTORY OF PROSTATE CANCER: Primary | ICD-10-CM

## 2018-04-24 DIAGNOSIS — Z21 HIV POSITIVE: ICD-10-CM

## 2018-04-26 ENCOUNTER — TELEPHONE (OUTPATIENT)
Dept: OTOLARYNGOLOGY | Facility: CLINIC | Age: 58
End: 2018-04-26

## 2018-04-26 ENCOUNTER — PATIENT MESSAGE (OUTPATIENT)
Dept: PODIATRY | Facility: CLINIC | Age: 58
End: 2018-04-26

## 2018-04-26 NOTE — TELEPHONE ENCOUNTER
----- Message from José Miguel Fermin DPM sent at 4/26/2018 12:34 PM CDT -----  Please let the patient know that second liver function test was normal and the patient may finish the 3 month course of Lamisil using the pulsed dosing schedule.    Called pt, no answer. Left voice message requesting a call back at [phone].

## 2018-04-30 ENCOUNTER — LAB VISIT (OUTPATIENT)
Dept: LAB | Facility: HOSPITAL | Age: 58
End: 2018-04-30
Payer: COMMERCIAL

## 2018-04-30 DIAGNOSIS — B20 HIV DISEASE: ICD-10-CM

## 2018-04-30 DIAGNOSIS — Z21 HIV POSITIVE: ICD-10-CM

## 2018-04-30 DIAGNOSIS — Z80.42 FAMILY HISTORY OF PROSTATE CANCER: ICD-10-CM

## 2018-04-30 DIAGNOSIS — Z00.00 ENCOUNTER FOR PREVENTIVE CARE: ICD-10-CM

## 2018-04-30 LAB
ALBUMIN SERPL BCP-MCNC: 4 G/DL
ALP SERPL-CCNC: 70 U/L
ALT SERPL W/O P-5'-P-CCNC: 28 U/L
ANION GAP SERPL CALC-SCNC: 8 MMOL/L
AST SERPL-CCNC: 32 U/L
BASOPHILS # BLD AUTO: 0.01 K/UL
BASOPHILS NFR BLD: 0.2 %
BILIRUB SERPL-MCNC: 0.4 MG/DL
BUN SERPL-MCNC: 19 MG/DL
CALCIUM SERPL-MCNC: 9.6 MG/DL
CD3+CD4+ CELLS # BLD: 935 CELLS/UL (ref 300–1400)
CD3+CD4+ CELLS NFR BLD: 37.5 % (ref 28–57)
CHLORIDE SERPL-SCNC: 105 MMOL/L
CHOLEST SERPL-MCNC: 206 MG/DL
CHOLEST/HDLC SERPL: 2.7 {RATIO}
CO2 SERPL-SCNC: 28 MMOL/L
COMPLEXED PSA SERPL-MCNC: 7.5 NG/ML
CREAT SERPL-MCNC: 1 MG/DL
DIFFERENTIAL METHOD: NORMAL
EOSINOPHIL # BLD AUTO: 0 K/UL
EOSINOPHIL NFR BLD: 0.4 %
ERYTHROCYTE [DISTWIDTH] IN BLOOD BY AUTOMATED COUNT: 12.6 %
EST. GFR  (AFRICAN AMERICAN): >60 ML/MIN/1.73 M^2
EST. GFR  (NON AFRICAN AMERICAN): >60 ML/MIN/1.73 M^2
GLUCOSE SERPL-MCNC: 94 MG/DL
HCT VFR BLD AUTO: 45.9 %
HDLC SERPL-MCNC: 75 MG/DL
HDLC SERPL: 36.4 %
HGB BLD-MCNC: 15 G/DL
IMM GRANULOCYTES # BLD AUTO: 0.01 K/UL
IMM GRANULOCYTES NFR BLD AUTO: 0.2 %
LDLC SERPL CALC-MCNC: 117.6 MG/DL
LYMPHOCYTES # BLD AUTO: 2 K/UL
LYMPHOCYTES NFR BLD: 43.8 %
MCH RBC QN AUTO: 30.7 PG
MCHC RBC AUTO-ENTMCNC: 32.7 G/DL
MCV RBC AUTO: 94 FL
MONOCYTES # BLD AUTO: 0.4 K/UL
MONOCYTES NFR BLD: 8.8 %
NEUTROPHILS # BLD AUTO: 2.1 K/UL
NEUTROPHILS NFR BLD: 46.6 %
NONHDLC SERPL-MCNC: 131 MG/DL
NRBC BLD-RTO: 0 /100 WBC
PLATELET # BLD AUTO: 218 K/UL
PMV BLD AUTO: 9.7 FL
POTASSIUM SERPL-SCNC: 4.2 MMOL/L
PROT SERPL-MCNC: 7.7 G/DL
RBC # BLD AUTO: 4.89 M/UL
SODIUM SERPL-SCNC: 141 MMOL/L
TRIGL SERPL-MCNC: 67 MG/DL
WBC # BLD AUTO: 4.54 K/UL

## 2018-04-30 PROCEDURE — 85025 COMPLETE CBC W/AUTO DIFF WBC: CPT

## 2018-04-30 PROCEDURE — 84153 ASSAY OF PSA TOTAL: CPT

## 2018-04-30 PROCEDURE — 86361 T CELL ABSOLUTE COUNT: CPT

## 2018-04-30 PROCEDURE — 87536 HIV-1 QUANT&REVRSE TRNSCRPJ: CPT

## 2018-04-30 PROCEDURE — 80061 LIPID PANEL: CPT

## 2018-04-30 PROCEDURE — 80053 COMPREHEN METABOLIC PANEL: CPT

## 2018-05-14 ENCOUNTER — OFFICE VISIT (OUTPATIENT)
Dept: INFECTIOUS DISEASES | Facility: CLINIC | Age: 58
End: 2018-05-14
Payer: COMMERCIAL

## 2018-05-14 ENCOUNTER — CLINICAL SUPPORT (OUTPATIENT)
Dept: INFECTIOUS DISEASES | Facility: CLINIC | Age: 58
End: 2018-05-14
Payer: COMMERCIAL

## 2018-05-14 ENCOUNTER — LAB VISIT (OUTPATIENT)
Dept: LAB | Facility: HOSPITAL | Age: 58
End: 2018-05-14
Attending: INTERNAL MEDICINE
Payer: COMMERCIAL

## 2018-05-14 VITALS
SYSTOLIC BLOOD PRESSURE: 157 MMHG | WEIGHT: 211.19 LBS | TEMPERATURE: 98 F | HEART RATE: 99 BPM | DIASTOLIC BLOOD PRESSURE: 102 MMHG | HEIGHT: 73 IN | BODY MASS INDEX: 27.99 KG/M2

## 2018-05-14 DIAGNOSIS — R97.20 ELEVATED PSA: Primary | ICD-10-CM

## 2018-05-14 DIAGNOSIS — B20 HIV DISEASE: ICD-10-CM

## 2018-05-14 DIAGNOSIS — R97.20 ELEVATED PSA: ICD-10-CM

## 2018-05-14 LAB
BACTERIA #/AREA URNS AUTO: NORMAL /HPF
BILIRUB UR QL STRIP: NEGATIVE
CLARITY UR REFRACT.AUTO: ABNORMAL
COLOR UR AUTO: ABNORMAL
GLUCOSE UR QL STRIP: NEGATIVE
HGB UR QL STRIP: NEGATIVE
KETONES UR QL STRIP: NEGATIVE
LEUKOCYTE ESTERASE UR QL STRIP: NEGATIVE
MICROSCOPIC COMMENT: NORMAL
NITRITE UR QL STRIP: NEGATIVE
PH UR STRIP: 5 [PH] (ref 5–8)
PROT UR QL STRIP: NEGATIVE
RBC #/AREA URNS AUTO: 0 /HPF (ref 0–4)
SP GR UR STRIP: 1.02 (ref 1–1.03)
SQUAMOUS #/AREA URNS AUTO: 0 /HPF
URN SPEC COLLECT METH UR: ABNORMAL
UROBILINOGEN UR STRIP-ACNC: NEGATIVE EU/DL
WBC #/AREA URNS AUTO: 1 /HPF (ref 0–5)

## 2018-05-14 PROCEDURE — 99999 PR PBB SHADOW E&M-EST. PATIENT-LVL III: CPT | Mod: PBBFAC,,, | Performed by: INTERNAL MEDICINE

## 2018-05-14 PROCEDURE — 88112 CYTOPATH CELL ENHANCE TECH: CPT | Mod: 26,,, | Performed by: PATHOLOGY

## 2018-05-14 PROCEDURE — 87086 URINE CULTURE/COLONY COUNT: CPT

## 2018-05-14 PROCEDURE — 99214 OFFICE O/P EST MOD 30 MIN: CPT | Mod: S$GLB,,, | Performed by: INTERNAL MEDICINE

## 2018-05-14 PROCEDURE — 88112 CYTOPATH CELL ENHANCE TECH: CPT | Performed by: PATHOLOGY

## 2018-05-14 PROCEDURE — 90736 HZV VACCINE LIVE SUBQ: CPT | Mod: S$GLB,,, | Performed by: INTERNAL MEDICINE

## 2018-05-14 PROCEDURE — 90471 IMMUNIZATION ADMIN: CPT | Mod: S$GLB,,, | Performed by: INTERNAL MEDICINE

## 2018-05-14 PROCEDURE — 81001 URINALYSIS AUTO W/SCOPE: CPT

## 2018-05-14 RX ORDER — CICLOPIROX 80 MG/ML
SOLUTION TOPICAL
Refills: 11 | COMMUNITY
Start: 2018-02-21 | End: 2018-05-14 | Stop reason: ALTCHOICE

## 2018-05-14 NOTE — PROGRESS NOTES
Pt received the Shingrix vaccination. Pt tolerated the injection well. Pt left the unit in NAD. Return appt provided.

## 2018-05-14 NOTE — PROGRESS NOTES
Subjective:      Patient ID: Simón Fong is a 58 y.o. male.    Chief Complaint:Follow-up      History of Present Illness    Mr. Fong presents today to follow up for his HIV.     The patient has been seen by us for a number of years and in the past, he was on   Combivir and ritonavir.  He did have a drug holiday and he asked about that   once again and he has been discouraged from doing such.  His current medications   include Truvada and boosted Reyataz and most recently changed to Stribild.    Highly compliant with medications; no problems reported.      Routine examinations:  1. Vitamin D supplements daily.  2. Colonoscopy due 2018.  3. Last anal pap 2012; and 2015.  No dysplasia.  Family history:  two brothers were diagnosed with prostate cancer - the older one at 64 and a younger one, one year ago.    PSA was elevated on last visit - 4.6.  Blood pressure elevated - 157/102 -    No major issues since was last seen.  He has retired and keeping active; exercises daily.    Review of Systems   Constitution: Negative for chills, decreased appetite, fever, weakness, malaise/fatigue, night sweats, weight gain and weight loss.   HENT: Negative for congestion, ear pain, hearing loss, hoarse voice, sore throat and tinnitus.    Eyes: Negative for blurred vision, redness and visual disturbance.   Cardiovascular: Negative for chest pain, leg swelling and palpitations.   Respiratory: Negative for cough, hemoptysis, shortness of breath and sputum production.    Hematologic/Lymphatic: Negative for adenopathy. Does not bruise/bleed easily.   Skin: Negative for dry skin, itching, rash and suspicious lesions.   Musculoskeletal: Negative for back pain, joint pain, myalgias and neck pain.   Gastrointestinal: Negative for abdominal pain, constipation, diarrhea, heartburn, nausea and vomiting.   Genitourinary: Negative for dysuria, flank pain, frequency, hematuria, hesitancy and urgency.   Neurological: Negative for dizziness,  headaches, numbness and paresthesias.   Psychiatric/Behavioral: Negative for depression and memory loss. The patient does not have insomnia and is not nervous/anxious.      Objective:   Physical Exam   Constitutional: He is oriented to person, place, and time. He appears well-developed and well-nourished.   HENT:   Head: Normocephalic and atraumatic.   Mouth/Throat: Oropharynx is clear and moist.   Eyes: Conjunctivae and EOM are normal. Pupils are equal, round, and reactive to light.   Neck: Normal range of motion. Neck supple. No thyromegaly present.   Cardiovascular: Normal rate, regular rhythm and normal heart sounds.    No murmur heard.  Pulmonary/Chest: Effort normal and breath sounds normal. He has no wheezes. He has no rales.   Abdominal: Soft. Bowel sounds are normal. He exhibits no mass. There is no tenderness. There is no rebound.   Genitourinary: Prostate normal.   Genitourinary Comments: No nodules palpated.    Musculoskeletal: Normal range of motion.   Lymphadenopathy:     He has no cervical adenopathy.   Neurological: He is alert and oriented to person, place, and time.   Skin: Skin is warm and dry.   Psychiatric: He has a normal mood and affect. His behavior is normal.   Vitals reviewed.    Assessment:       1. Elevated PSA    2. HIV disease          Plan:       1. Urinalysis and urine culture after prostatic massage.  2. Referral to urology.  3. Anal pap.   4. Shingrix x 2 and menactra.  5. Follow up in 6 months with labs prior.

## 2018-05-15 LAB — BACTERIA UR CULT: NO GROWTH

## 2018-06-04 ENCOUNTER — OFFICE VISIT (OUTPATIENT)
Dept: UROLOGY | Facility: CLINIC | Age: 58
End: 2018-06-04
Payer: COMMERCIAL

## 2018-06-04 VITALS
BODY MASS INDEX: 27.7 KG/M2 | WEIGHT: 209 LBS | RESPIRATION RATE: 15 BRPM | DIASTOLIC BLOOD PRESSURE: 92 MMHG | HEART RATE: 86 BPM | SYSTOLIC BLOOD PRESSURE: 162 MMHG | HEIGHT: 73 IN

## 2018-06-04 DIAGNOSIS — R97.20 ELEVATED PSA: Primary | ICD-10-CM

## 2018-06-04 DIAGNOSIS — D49.59 NEOPLASM OF PROSTATE: ICD-10-CM

## 2018-06-04 DIAGNOSIS — Z80.42 FAMILY HISTORY OF PROSTATE CANCER: ICD-10-CM

## 2018-06-04 PROCEDURE — 99999 PR PBB SHADOW E&M-EST. PATIENT-LVL III: CPT | Mod: PBBFAC,,, | Performed by: UROLOGY

## 2018-06-04 PROCEDURE — 99205 OFFICE O/P NEW HI 60 MIN: CPT | Mod: S$GLB,,, | Performed by: UROLOGY

## 2018-06-04 RX ORDER — CREATINE 100 %
POWDER (GRAM) MISCELLANEOUS
COMMUNITY
End: 2023-02-13

## 2018-06-04 RX ORDER — CHOLESTEROL/SOYBEAN OIL/C/E 60-200-80
POWDER (GRAM) ORAL
COMMUNITY

## 2018-06-04 RX ORDER — NICOTINE POLACRILEX 2 MG
GUM BUCCAL
COMMUNITY
End: 2023-02-13

## 2018-06-04 RX ORDER — AMOXICILLIN 500 MG
CAPSULE ORAL DAILY
COMMUNITY

## 2018-06-04 RX ORDER — BLACK COHOSH ROOT 200 MG
1000 CAPSULE ORAL DAILY
COMMUNITY

## 2018-06-04 RX ORDER — GLUCOSAMINE/CHONDRO SU A 500-400 MG
1 TABLET ORAL 3 TIMES DAILY
COMMUNITY

## 2018-06-04 NOTE — PROGRESS NOTES
Subjective:       Patient ID: Simón Fong is a 58 y.o. male.    Chief Complaint: Elevated PSA (family hx of prostate ca)      HPI: Simón Fong is a 58 y.o. Black or  male who presents today for evaluation and management of an elevated PSA.    The patient does have a family history of prostate cancer with two of his brothers having undergone surgery for their localized prostate cancer.    The patient's PSA chronology is listed below:    Lab Results   Component Value Date    PSA 7.5 (H) 04/30/2018    PSA 4.6 (H) 07/26/2017    PSA 3.5 04/17/2014    PSA 3.48 12/19/2012    PSA 2.97 02/29/2012    PSA 2.3 04/15/2010    PSA 2.4 01/08/2009    PSA 0.8 09/24/2007    PSA 1.1 05/17/2006    PSA 1.0 09/07/2004     He denies hematuria and/or UTI symptoms.    He presents today to discuss his elevated PSA and strong family history of prostate cancer.    Review of patient's allergies indicates:   Allergen Reactions    Sulfa (sulfonamide antibiotics)        Current Outpatient Prescriptions   Medication Sig Dispense Refill    alendronate-vitamin D3 (FOSAMAX PLUS D) 70 mg- 2,800 unit per tablet Take 1 tablet by mouth every 7 days.      ascorbic acid, vitamin C, (VITAMIN C) 1000 MG tablet Take 1,000 mg by mouth once daily.      biotin 1 mg Cap Take by mouth.      creatine, bulk, 100 % Powd by Misc.(Non-Drug; Combo Route) route.      elviteg-cob-emtri-tenof ALAFEN (GENVOYA) [phone] mg Tab Take 1 tablet by mouth once daily. 90 tablet 1    fish oil-omega-3 fatty acids 300-1,000 mg capsule Take by mouth once daily.      glucosamine-chondroitin 500-400 mg tablet Take 1 tablet by mouth 3 (three) times daily.      leucine-isoleucine-valine (NEOKE BCAA4) 82 gram-328 kcal/100 gram Powd Take by mouth.      fluocinonide (LIDEX) 0.05 % gel Apply topically 2 (two) times daily. 15 g 1    naproxen (EC NAPROSYN) 500 MG EC tablet Take 1 tablet (500 mg total) by mouth 2 (two) times daily. 60 tablet 2     No current  facility-administered medications for this visit.        Past Medical History:   Diagnosis Date    HIV infection        History reviewed. No pertinent surgical history.    Family History   Problem Relation Age of Onset    Diabetes Mother     Heart disease Mother     Hypertension Mother     Stroke Mother     Hypertension Father     Cancer Sister     Cancer Brother        Review of Systems    Review of Systems   Constitutional: Negative for fever, chills, activity change, appetite change and unexpected weight change.   HENT: Negative for congestion, nosebleeds, sneezing, sore throat and trouble swallowing.    Eyes: Negative for pain, discharge, redness and visual disturbance.   Respiratory: Negative for cough, choking, chest tightness and shortness of breath.    Cardiovascular: Negative for chest pain, palpitations and leg swelling.   Gastrointestinal: Negative for nausea, vomiting, abdominal pain, diarrhea, blood in stool, abdominal distention and anal bleeding.  Genitourinary: As documented per HPI   Endocrine: Negative for cold intolerance, heat intolerance, polydipsia, polyphagia and polyuria.   Musculoskeletal: Negative for myalgias, gait problem, neck pain and neck stiffness.   Skin: Negative for color change, pallor, rash and wound.   Allergic/Immunologic: Negative for immunocompromised state.   Neurological: Negative for seizures, facial asymmetry, speech difficulty, weakness and light-headedness.   Hematological: Negative for adenopathy. Does not bruise/bleed easily.   Psychiatric/Behavioral: Negative for hallucinations, behavioral problems, self-injury and agitation. The patient is not hyperactive.    All other systems were reviewed and were negative.      Objective:     Vitals:    06/04/18 0758   BP: (!) 162/92   Pulse: 86   Resp: 15     Physical Exam   Vitals reviewed.  Constitutional: He is oriented to person, place, and time. He appears well-developed and well-nourished. No distress.   HENT:    Head: Normocephalic and atraumatic.   Right Ear: External ear normal.   Left Ear: External ear normal.   Nose: Nose normal.   Eyes: EOM are normal. Pupils are equal, round, and reactive to light. Right eye exhibits no discharge. Left eye exhibits no discharge.   Neck: Normal range of motion. Neck supple. No tracheal deviation present. No thyroid enlargement or tenderness.  Cardiovascular: Regular rhythm and intact distal pulses. No signs of peripheral edema.    Pulmonary/Chest: Effort normal and breath sounds normal. No stridor. No respiratory distress. He has no wheezes.   Abdominal: Soft. Bowel sounds are normal. He exhibits no distension. There is no tenderness. Hernia confirmed negative in the right inguinal area and confirmed negative in the left inguinal area. No hepatic or splenic enlargement or tenderness.  Genitourinary: Penis normal. Right testis shows no mass, no swelling and no tenderness. Right testis is descended. Left testis shows no mass, no swelling and no tenderness. Left testis is descended. Circumcised. No phimosis or hypospadias.   BRIANNA: Normal rectal tone. Gr. 2, smooth, no nodules or induration.  Musculoskeletal: Normal range of motion. He exhibits no edema.   Neurological: He is alert and oriented to person, place, and time. He exhibits normal muscle tone. Coordination normal.   Skin: Skin is warm. No rash noted.   Lymphatic: No palpable lymph nodes.  Psychiatric: He has a normal mood and affect. His behavior is normal. Judgment and thought content normal.     Lab Results   Component Value Date    PSA 7.5 (H) 04/30/2018    PSA 4.6 (H) 07/26/2017    PSA 3.5 04/17/2014     Lab Results   Component Value Date    CREATININE 1.0 04/30/2018     Lab Results   Component Value Date    EGFRNONAA >60.0 04/30/2018     Lab Results   Component Value Date    ESTGFRAFRICA >60.0 04/30/2018     No relevant  imaging to review.    Assessment:       1. Elevated PSA    2. Family history of prostate cancer    3.  Neoplasm of prostate        Plan:     Simón was seen today for elevated psa.    Diagnoses and all orders for this visit:    Elevated PSA    Family history of prostate cancer    Neoplasm of prostate  -     MRI Prostate W W/O Contrast; Future    The patient has an elevated PSA with a strong family history of prostate cancer.     I discussed with the patient the finding of an abnormal PSA test.  The patient is aware that PSA is a protein made by the prostate in both benign and malignant conditions.  We discussed the most common differential diagnosis of an elevated PSA including BPH, prostatitis (chronic and acute), PIN (a pre-cancerous condition of the prostate) and prostate cancer.  The issues of sensitivity,   specificity and the predictive values of PSA were discussed in detail.  The   patient is aware that the relevant issues regarding his risk of having prostate cancer are his PSA and any abnormalities of a BRIANNA. It was explained to the patient in detail that not all men with an elevated PSA have prostate cancer nor do all men without prostate cancer have a normal PSA. We had a long discussion about the benefits and limitations of PSA testing/screening. Although imperfect, PSA testing does have some utility, and I explained what I thought were the benefits of PSA screening. Importantly, it can be helpful in identifying at risk patients for a significant prostate cancer.    The patient is very well-informed. We discussed the utility of a MRI of the prostate in this circumstance as a way to better determine if a prostate needle biopsy is both necessary and, if so, better directed. The patient was amenable to proceeding in this direction.    We will plan on a MRI of the prostate to determine if a prostate needle biopsy is necessary. I will see the patient back after his MRI.    I answered all his questions.    I encouraged him or any of his family members to call or email me with questions and/or concerns.    I  spent 60 minutes with the patient of which more than half was spent in coordinating the patient's care as well as in direct consultation with the patient in regards to our treatment and plan.

## 2018-06-04 NOTE — LETTER
June 4, 2018      Parisa Fowler MD  [address]  [facility] [address]           [facility]  [address]  [facility] [address]  Phone: [phone]          Patient: Simón Fong   MR Number: [id number]   YOB: 1960   Date of Visit: 6/4/2018       Dear Dr. Parisa Fowler:    Thank you for referring Simón Fong to me for evaluation. Attached you will find relevant portions of my assessment and plan of care.    If you have questions, please do not hesitate to call me. I look forward to following Simón Fong along with you.    Sincerely,    José Miguel Richardson MD    Enclosure  CC:  No Recipients    If you would like to receive this communication electronically, please contact [email or web address] or [phone] to request more information on [facility] Link access.    For providers and/or their staff who would like to refer a patient to [facility], please contact us through our one-stop-shop provider referral line, [facility] , at [phone].    If you feel you have received this communication in error or would no longer like to receive these types of communications, please e-mail [email or web address]

## 2018-06-04 NOTE — PATIENT INSTRUCTIONS
PSA Test     PSA is a simple blood test.   The prostate specific antigen (PSA) test is a blood test. It can help find prostate cancer. PSA is a substance in semen. Its made by the prostate. It is normal for some PSA to leak from the prostate into the bloodstream. But sometimes, more than a normal amount of PSA gets into the blood. The PSA test measures the amount of PSA in the blood. If the test shows high blood level of PSA, other tests are needed to help find the cause.  Possible causes of increased PSA  Several things can cause extra PSA to enter the blood, such as:  · Prostate cancer  · Prostate infection (prostatitis)  · Enlarged prostate not due to cancer (benign prostatic hyperplasia, or BPH)  · Prostate massage  · Prostate biopsy  Why a PSA test is done  A PSA test can be done to check for prostate cancer. But the PSA test by itself cant tell for sure whether or not a man has prostate cancer. And not all health care providers agree if men should have PSA tests to screen for prostate cancer. The American Cancer Society and many other organizations advise men talk with their health care provider about if prostate cancer screening is right for them. Talk with your health care provider about the PSA test beginning around age 50, or earlier if youre at higher risk.  A PSA test may also be done if a problem is found during a routine prostate exam. And it may be done if you have symptoms that suggest that you have a prostate problem. You may need a PSA if you have symptoms such as:  · Needing to urinate more often  · Waking often to urinate at night  · Having to strain when urinating  · Seeing blood in your urine  · Having pain when urinating  How a PSA test is done  Before a PSA test, you may have a digital rectal exam (BRIANNA) of the prostate. For this exam, the health care provider inserts a lubricated, gloved finger into the rectum to feel the prostate. Then youll be sent to have your blood drawn for the PSA  test. The test may be done in the health care providers office. Or it may be at a lab, clinic, or hospital. Blood is taken from your arm and sent to a lab to be tested.  Getting your results  The time it takes to get your test results varies. Ask your health care provider when you can expect your results. You and your health care provider will discuss the results. A normal range for your PSA depends on a number of factors. These include your age and the size of your prostate. They also include your risk factors for cancer, your symptoms, and the results of any previous PSA tests youve had. All of these things are taken into account when your PSA tests numbers are assessed.  If you're at higher risk for prostate cancer  If you are -American or have a family history of prostate cancer, talk with your health care provider about PSA tests by age 40 to 45.   Date Last Reviewed: 3/24/2015  © 8583-2700 [employer]. [address] [place] [address], [place] [address]. All rights reserved. This information is not intended as a substitute for professional medical care. Always follow your healthcare professional's instructions.

## 2018-06-28 ENCOUNTER — OFFICE VISIT (OUTPATIENT)
Dept: OPTOMETRY | Facility: CLINIC | Age: 58
End: 2018-06-28
Payer: COMMERCIAL

## 2018-06-28 DIAGNOSIS — Z13.5 GLAUCOMA SCREENING: ICD-10-CM

## 2018-06-28 DIAGNOSIS — H52.13 MYOPIA OF BOTH EYES WITH ASTIGMATISM AND PRESBYOPIA: ICD-10-CM

## 2018-06-28 DIAGNOSIS — H25.13 NUCLEAR SCLEROSIS, BILATERAL: Primary | ICD-10-CM

## 2018-06-28 DIAGNOSIS — H52.4 MYOPIA OF BOTH EYES WITH ASTIGMATISM AND PRESBYOPIA: ICD-10-CM

## 2018-06-28 DIAGNOSIS — H52.203 MYOPIA OF BOTH EYES WITH ASTIGMATISM AND PRESBYOPIA: ICD-10-CM

## 2018-06-28 PROCEDURE — 92004 COMPRE OPH EXAM NEW PT 1/>: CPT | Mod: S$GLB,,, | Performed by: OPTOMETRIST

## 2018-06-28 PROCEDURE — 92015 DETERMINE REFRACTIVE STATE: CPT | Mod: S$GLB,,, | Performed by: OPTOMETRIST

## 2018-06-28 PROCEDURE — 99999 PR PBB SHADOW E&M-EST. PATIENT-LVL II: CPT | Mod: PBBFAC,,, | Performed by: OPTOMETRIST

## 2018-06-28 NOTE — PROGRESS NOTES
HPI     Pt states that he believes his vision is doing well for distance and near   but did just start playing tennis and wants to see how his vision is. Pt   denies any signs of flashes or floaters OU at this time. No pain or   discomfort OU. No use of eye gtts.     RAYMOND- many years ago with Dr. Garcia     Last edited by Julissa Lambert on 6/28/2018  9:29 AM. (History)            Assessment /Plan     For exam results, see Encounter Report.    Nuclear sclerosis, bilateral  -Educated patient on presence of cataracts at today's exam, monitor at annual dilated fundus exam. 8+ years surgical estimate.    Glaucoma screening  -Monitor with annual eye exam and IOP check    Myopia of both eyes with astigmatism and presbyopia  Eyeglass Final Rx     Eyeglass Final Rx       Sphere Cylinder Axis Dist VA Add    Right -0.50 +1.25 165 20/20 +2.00    Left -0.50 +1.75 180 20/25 +2.00    Type:  PAL    Expiration Date:  6/29/2019                  RTC 1 yr

## 2018-07-02 ENCOUNTER — PATIENT MESSAGE (OUTPATIENT)
Dept: OPTOMETRY | Facility: CLINIC | Age: 58
End: 2018-07-02

## 2018-07-11 ENCOUNTER — CLINICAL SUPPORT (OUTPATIENT)
Dept: INFECTIOUS DISEASES | Facility: CLINIC | Age: 58
End: 2018-07-11
Payer: COMMERCIAL

## 2018-07-11 PROCEDURE — 90750 HZV VACC RECOMBINANT IM: CPT | Mod: S$GLB,,, | Performed by: INTERNAL MEDICINE

## 2018-07-11 PROCEDURE — 90471 IMMUNIZATION ADMIN: CPT | Mod: S$GLB,,, | Performed by: INTERNAL MEDICINE

## 2018-07-11 PROCEDURE — 99999 PR PBB SHADOW E&M-EST. PATIENT-LVL I: CPT | Mod: PBBFAC,,,

## 2018-07-18 ENCOUNTER — PATIENT MESSAGE (OUTPATIENT)
Dept: INFECTIOUS DISEASES | Facility: CLINIC | Age: 58
End: 2018-07-18

## 2018-07-20 ENCOUNTER — HOSPITAL ENCOUNTER (OUTPATIENT)
Dept: RADIOLOGY | Facility: HOSPITAL | Age: 58
Discharge: HOME | End: 2018-07-20
Attending: UROLOGY
Payer: COMMERCIAL

## 2018-07-20 DIAGNOSIS — D49.59 NEOPLASM OF PROSTATE: ICD-10-CM

## 2018-07-20 PROCEDURE — 72197 MRI PELVIS W/O & W/DYE: CPT | Mod: TC

## 2018-07-20 PROCEDURE — 25500020 PHARM REV CODE 255: Performed by: UROLOGY

## 2018-07-20 PROCEDURE — A9585 GADOBUTROL INJECTION: HCPCS | Performed by: UROLOGY

## 2018-07-20 PROCEDURE — 72197 MRI PELVIS W/O & W/DYE: CPT | Mod: 26,,, | Performed by: RADIOLOGY

## 2018-07-20 RX ORDER — GADOBUTROL 604.72 MG/ML
10 INJECTION INTRAVENOUS
Status: COMPLETED | OUTPATIENT
Start: 2018-07-20 | End: 2018-07-20

## 2018-07-20 RX ADMIN — GADOBUTROL 10 ML: 604.72 INJECTION INTRAVENOUS at 06:07

## 2018-07-23 ENCOUNTER — OFFICE VISIT (OUTPATIENT)
Dept: UROLOGY | Facility: CLINIC | Age: 58
End: 2018-07-23
Payer: COMMERCIAL

## 2018-07-23 VITALS
WEIGHT: 210 LBS | HEIGHT: 73 IN | SYSTOLIC BLOOD PRESSURE: 148 MMHG | HEART RATE: 84 BPM | DIASTOLIC BLOOD PRESSURE: 90 MMHG | RESPIRATION RATE: 15 BRPM | BODY MASS INDEX: 27.83 KG/M2

## 2018-07-23 DIAGNOSIS — Z80.42 FAMILY HISTORY OF PROSTATE CANCER: ICD-10-CM

## 2018-07-23 DIAGNOSIS — R97.20 ELEVATED PSA: Primary | ICD-10-CM

## 2018-07-23 PROCEDURE — 99999 PR PBB SHADOW E&M-EST. PATIENT-LVL III: CPT | Mod: PBBFAC,,, | Performed by: UROLOGY

## 2018-07-23 PROCEDURE — 99215 OFFICE O/P EST HI 40 MIN: CPT | Mod: S$GLB,,, | Performed by: UROLOGY

## 2018-07-23 NOTE — PROGRESS NOTES
Subjective:       Patient ID: Simón Fong is a 58 y.o. male.    Chief Complaint: Elevated PSA (MRI results)      HPI: Simón Fong is a 58 y.o. Black or  male who returns today in follow-up for evaluation and management of an elevated PSA.    The patient does have a family history of prostate cancer with two of his brothers having undergone surgery for their localized prostate cancer.    The patient's PSA chronology is listed below:    Lab Results   Component Value Date    PSA 7.5 (H) 04/30/2018    PSA 4.6 (H) 07/26/2017    PSA 3.5 04/17/2014    PSA 3.48 12/19/2012    PSA 2.97 02/29/2012    PSA 2.3 04/15/2010    PSA 2.4 01/08/2009    PSA 0.8 09/24/2007    PSA 1.1 05/17/2006    PSA 1.0 09/07/2004     He denies hematuria and/or UTI symptoms.    He returns today to discuss his elevated PSA and strong family history of prostate cancer as well as review a dedicated MRI of his prostate to better evaluate his elevated PSA.    Review of patient's allergies indicates:   Allergen Reactions    Sulfa (sulfonamide antibiotics)        Current Outpatient Prescriptions   Medication Sig Dispense Refill    alendronate-vitamin D3 (FOSAMAX PLUS D) 70 mg- 2,800 unit per tablet Take 1 tablet by mouth every 7 days.      ascorbic acid, vitamin C, (VITAMIN C) 1000 MG tablet Take 1,000 mg by mouth once daily.      biotin 1 mg Cap Take by mouth.      creatine, bulk, 100 % Powd by Misc.(Non-Drug; Combo Route) route.      elviteg-cob-emtri-tenof ALAFEN (GENVOYA) 022-018-517-10 mg Tab Take 1 tablet by mouth once daily. 90 tablet 1    fish oil-omega-3 fatty acids 300-1,000 mg capsule Take by mouth once daily.      glucosamine-chondroitin 500-400 mg tablet Take 1 tablet by mouth 3 (three) times daily.      leucine-isoleucine-valine (NEOKE BCAA4) 82 gram-328 kcal/100 gram Powd Take by mouth.      fluocinonide (LIDEX) 0.05 % gel Apply topically 2 (two) times daily. 15 g 1    naproxen (EC NAPROSYN) 500 MG EC tablet Take  1 tablet (500 mg total) by mouth 2 (two) times daily. 60 tablet 2     No current facility-administered medications for this visit.        Past Medical History:   Diagnosis Date    HIV infection        History reviewed. No pertinent surgical history.    Family History   Problem Relation Age of Onset    Diabetes Mother     Heart disease Mother     Hypertension Mother     Stroke Mother     Hypertension Father     Cancer Sister     Cancer Brother        Review of Systems    Review of Systems   Constitutional: Negative for fever, chills, activity change, appetite change and unexpected weight change.   HENT: Negative for congestion, nosebleeds, sneezing, sore throat and trouble swallowing.    Eyes: Negative for pain, discharge, redness and visual disturbance.   Respiratory: Negative for cough, choking, chest tightness and shortness of breath.    Cardiovascular: Negative for chest pain, palpitations and leg swelling.   Gastrointestinal: Negative for nausea, vomiting, abdominal pain, diarrhea, blood in stool, abdominal distention and anal bleeding.  Genitourinary: As documented per HPI   Endocrine: Negative for cold intolerance, heat intolerance, polydipsia, polyphagia and polyuria.   Musculoskeletal: Negative for myalgias, gait problem, neck pain and neck stiffness.   Skin: Negative for color change, pallor, rash and wound.   Allergic/Immunologic: Negative for immunocompromised state.   Neurological: Negative for seizures, facial asymmetry, speech difficulty, weakness and light-headedness.   Hematological: Negative for adenopathy. Does not bruise/bleed easily.   Psychiatric/Behavioral: Negative for hallucinations, behavioral problems, self-injury and agitation. The patient is not hyperactive.    All other systems were reviewed and were negative.      Objective:     Vitals:    07/23/18 0757   BP: (!) 148/90   Pulse: 84   Resp: 15     Physical Exam   Vitals reviewed.  Constitutional: He is oriented to person, place,  and time. He appears well-developed and well-nourished. No distress.   HENT:   Head: Normocephalic and atraumatic.   Right Ear: External ear normal.   Left Ear: External ear normal.   Nose: Nose normal.   Eyes: EOM are normal. Pupils are equal, round, and reactive to light. Right eye exhibits no discharge. Left eye exhibits no discharge.   Neck: Normal range of motion. Neck supple. No tracheal deviation present. No thyroid enlargement or tenderness.  Cardiovascular: Regular rhythm and intact distal pulses. No signs of peripheral edema.    Pulmonary/Chest: Effort normal and breath sounds normal. No stridor. No respiratory distress. He has no wheezes.   Abdominal: Soft. Bowel sounds are normal. He exhibits no distension. There is no tenderness. Hernia confirmed negative in the right inguinal area and confirmed negative in the left inguinal area. No hepatic or splenic enlargement or tenderness.  Genitourinary: Penis normal. Right testis shows no mass, no swelling and no tenderness. Right testis is descended. Left testis shows no mass, no swelling and no tenderness. Left testis is descended. Circumcised. No phimosis or hypospadias.   BRIANNA: Normal rectal tone. Gr. 2, smooth, no nodules or induration.  Musculoskeletal: Normal range of motion. He exhibits no edema.   Neurological: He is alert and oriented to person, place, and time. He exhibits normal muscle tone. Coordination normal.   Skin: Skin is warm. No rash noted.   Lymphatic: No palpable lymph nodes.  Psychiatric: He has a normal mood and affect. His behavior is normal. Judgment and thought content normal.     Lab Results   Component Value Date    PSA 7.5 (H) 04/30/2018    PSA 4.6 (H) 07/26/2017    PSA 3.5 04/17/2014     Lab Results   Component Value Date    CREATININE 1.0 04/30/2018     Lab Results   Component Value Date    EGFRNONAA >60.0 04/30/2018     Lab Results   Component Value Date    ESTGFRAFRICA >60.0 04/30/2018     MRI prostate with and without (7/20/18):  Pending    Assessment:       1. Elevated PSA    2. Family history of prostate cancer        Plan:     Simón was seen today for elevated psa.    Diagnoses and all orders for this visit:    Elevated PSA    Family history of prostate cancer    The patient has an elevated PSA with a strong family history of prostate cancer.     I discussed with the patient the finding of an abnormal PSA test.  The patient is aware that PSA is a protein made by the prostate in both benign and malignant conditions.  We discussed the most common differential diagnosis of an elevated PSA including BPH, prostatitis (chronic and acute), PIN (a pre-cancerous condition of the prostate) and prostate cancer.  The issues of sensitivity,   specificity and the predictive values of PSA were discussed in detail.  The   patient is aware that the relevant issues regarding his risk of having prostate cancer are his PSA and any abnormalities of a BRIANNA. It was explained to the patient in detail that not all men with an elevated PSA have prostate cancer nor do all men without prostate cancer have a normal PSA. We had a long discussion about the benefits and limitations of PSA testing/screening. Although imperfect, PSA testing does have some utility, and I explained what I thought were the benefits of PSA screening. Importantly, it can be helpful in identifying at risk patients for a significant prostate cancer.    The patient is very well-informed. His MRI read is still pending, however by my interpretation, it appears that he has a hypoechoic area in the left peripheral zone. I reviewed the films with the patient and discussed the likely need for a MRI fusion biopsy. We discussed that procedure, and I answered all his questions. We will wait for the final radiology read and then proceed from there.    I encouraged him or any of his family members to call or email me with questions and/or concerns.    I spent 45 minutes with the patient of which more than  half was spent in coordinating the patient's care as well as in direct consultation with the patient in regards to our treatment and plan.

## 2018-07-23 NOTE — PATIENT INSTRUCTIONS
Magnetic Resonance Imaging (MRI)     You will be asked to hold very still during the scan.     Magnetic resonance imaging (MRI) is a test that lets your doctor see detailed pictures of the inside of your body. MRI combines the use of strong magnets and radio waves to form an MRI image.  How do I get ready for an MRI?  · Follow any directions you are given for not eating or drinking before the test.  · Ask your provider if you should stop taking any medicine before the test.  · Follow your normal daily routine unless your provider tells you otherwise.  · You'll be asked to remove your watch, jewelry, hearing aids, credit cards, pens, pocket knives, eyeglasses, and other metal objects.  · You may be asked to remove your makeup. Makeup may contain some metal.  · Most MRI tests take 30 to 60 minutes. Depending on the type of MRI you are having, the test may take longer. Give yourself extra time to check in.     MRI uses strong magnets. Metal is affected by magnets and can distort the image. The magnet used in MRI can cause metal objects in your body to move. If you have a metal implant, you may not be able to have an MRI unless the implant is certified as MRI safe. People with these implants should not have an MRI:  · Ear (cochlear) implants  · Certain clips used for brain aneurysms  · Certain metal coils put in blood vessels  · Most defibrillators  · Most pacemakers  Be sure to tell the radiologist or technologist if you:  · Have had any previous surgeries  · Have a pacemaker, surgical clips, metal plate or pins, an artificial joint, staples or screws, ear (cochlear) implants, or other implants  · Wear a medicated adhesive patch  · Have metal splinters in your body  · Have implanted nerve stimulators or drug-infusion ports  · Have tattoos or body piercings. Some tattoo inks contain metal.  · Work with metal  · Have braces. You must remove any dental work.  · Have a bullet or other metal in your body  Also tell the  radiologist or technologist if you:  · Are pregnant or think you may be  · Are afraid of small, enclosed spaces (claustrophobic)  · Are allergic to X-ray dye (contrast medium), iodine, shellfish, or any medicines  · Have other allergies  · Are breastfeeding  · Have a history of cancer  · Have any serious health problems. This includes kidney disease or a liver transplant. You may not be able to have the contrast material used for MRI.   What happens during an MRI?  · You may be asked to wear a hospital gown.  · You may be given earplugs to wear if you need them.  · You may be injected with a special dye (contrast) that improves the MRI image.   · Youll lie down on a platform that slides into the magnet.  What happens after an MRI?  · You can get back to normal activities right away. If you were given contrast, it will pass naturally through your body within a day. You may be told to drink more water or other fluids during this time.   · Your doctor will discuss the test results with you during a follow-up appointment or over the phone.  · Your next appointment is: __________________  Date Last Reviewed: 6/2/2015  © 3973-6797 [employer]. [address] [place] [address], [place] [address]. All rights reserved. This information is not intended as a substitute for professional medical care. Always follow your healthcare professional's instructions.

## 2018-07-24 ENCOUNTER — TELEPHONE (OUTPATIENT)
Dept: UROLOGY | Facility: CLINIC | Age: 58
End: 2018-07-24

## 2018-07-24 ENCOUNTER — PATIENT MESSAGE (OUTPATIENT)
Dept: UROLOGY | Facility: CLINIC | Age: 58
End: 2018-07-24

## 2018-07-24 DIAGNOSIS — R97.20 ELEVATED PSA: Primary | ICD-10-CM

## 2018-07-24 RX ORDER — CIPROFLOXACIN 500 MG/1
500 TABLET ORAL 2 TIMES DAILY
Qty: 4 TABLET | Refills: 0 | Status: SHIPPED | OUTPATIENT
Start: 2018-07-24 | End: 2018-07-26

## 2018-07-24 NOTE — TELEPHONE ENCOUNTER
----- Message from Juanita Damian sent at 7/24/2018  4:05 PM CDT -----  Contact: pt   Name of Who is Calling: DYANA DIETZ [[id number]]      What is the request in detail: Patient is requesting a callback from staff to clarify information about his procedure on 8/13. Please contact to further discuss and advise      Can the clinic reply by [facility]: No       [facility]: [phone]

## 2018-07-24 NOTE — TELEPHONE ENCOUNTER
----- Message from Ramya Harmon sent at 7/24/2018  2:29 PM CDT -----  Contact: Pt  Name of Who is Calling: DYANA DIETZ [[id number]]      What is the request in detail: Patient states he would like to speak with the staff in regards to scheduling a biopsy he states an order has been placed by his urologist at ....Please contact to further discuss and advise       Can the clinic reply by [facility]: No      What Number to Call Back if not in [facility]: [phone]

## 2018-07-25 ENCOUNTER — PATIENT MESSAGE (OUTPATIENT)
Dept: UROLOGY | Facility: CLINIC | Age: 58
End: 2018-07-25

## 2018-07-26 ENCOUNTER — LAB VISIT (OUTPATIENT)
Dept: LAB | Facility: OTHER | Age: 58
End: 2018-07-26
Payer: COMMERCIAL

## 2018-07-26 DIAGNOSIS — R97.20 ELEVATED PSA: ICD-10-CM

## 2018-07-26 PROCEDURE — 87086 URINE CULTURE/COLONY COUNT: CPT

## 2018-07-27 ENCOUNTER — TELEPHONE (OUTPATIENT)
Dept: RADIATION ONCOLOGY | Facility: CLINIC | Age: 58
End: 2018-07-27

## 2018-07-27 ENCOUNTER — PATIENT MESSAGE (OUTPATIENT)
Dept: UROLOGY | Facility: CLINIC | Age: 58
End: 2018-07-27

## 2018-07-27 LAB — BACTERIA UR CULT: NO GROWTH

## 2018-07-27 NOTE — TELEPHONE ENCOUNTER
----- Message from Crystal Singh sent at 7/27/2018 12:57 PM CDT -----  Pt would like to make a consult appointment.  Please call at [phone].  Return call to patient questions answered regarding radiation therapy Patient is having bx on 8/13/18   He will call for appt if his bx comes back +

## 2018-07-30 ENCOUNTER — TELEPHONE (OUTPATIENT)
Dept: UROLOGY | Facility: CLINIC | Age: 58
End: 2018-07-30

## 2018-07-30 NOTE — TELEPHONE ENCOUNTER
----- Message from Diamante Kumar sent at 7/30/2018 11:06 AM CDT -----            Name of Who is Calling: DYANA DIETZ [[id number]]      What is the request in detail: Pt is calling to speak with Macarena regarding his upcoming Biopsy. Please call to discuss.      Can the clinic reply by [facility]: no      What [facility]: [phone]

## 2018-08-13 ENCOUNTER — PROCEDURE VISIT (OUTPATIENT)
Dept: UROLOGY | Facility: CLINIC | Age: 58
End: 2018-08-13
Payer: COMMERCIAL

## 2018-08-13 VITALS
WEIGHT: 209.69 LBS | DIASTOLIC BLOOD PRESSURE: 84 MMHG | HEART RATE: 52 BPM | TEMPERATURE: 98 F | SYSTOLIC BLOOD PRESSURE: 167 MMHG | HEIGHT: 74 IN | RESPIRATION RATE: 18 BRPM | BODY MASS INDEX: 26.91 KG/M2

## 2018-08-13 DIAGNOSIS — R97.20 ELEVATED PSA: ICD-10-CM

## 2018-08-13 PROCEDURE — 88342 IMHCHEM/IMCYTCHM 1ST ANTB: CPT | Mod: 26,,, | Performed by: PATHOLOGY

## 2018-08-13 PROCEDURE — 88341 IMHCHEM/IMCYTCHM EA ADD ANTB: CPT | Performed by: PATHOLOGY

## 2018-08-13 PROCEDURE — 88341 IMHCHEM/IMCYTCHM EA ADD ANTB: CPT | Mod: 26,,, | Performed by: PATHOLOGY

## 2018-08-13 PROCEDURE — 76872 US TRANSRECTAL: CPT | Mod: 26,S$GLB,, | Performed by: UROLOGY

## 2018-08-13 PROCEDURE — 55700 TRANSRECTAL ULTRASOUND W/ BIOPSY: CPT | Mod: S$GLB,,, | Performed by: UROLOGY

## 2018-08-13 PROCEDURE — 76942 ECHO GUIDE FOR BIOPSY: CPT | Mod: 26,59,S$GLB, | Performed by: UROLOGY

## 2018-08-13 PROCEDURE — 96372 THER/PROPH/DIAG INJ SC/IM: CPT | Mod: S$GLB,,, | Performed by: UROLOGY

## 2018-08-13 PROCEDURE — 88305 TISSUE EXAM BY PATHOLOGIST: CPT | Mod: 26,,, | Performed by: PATHOLOGY

## 2018-08-13 PROCEDURE — 88305 TISSUE EXAM BY PATHOLOGIST: CPT | Performed by: PATHOLOGY

## 2018-08-13 RX ORDER — CEFTRIAXONE 1 G/1
1 INJECTION, POWDER, FOR SOLUTION INTRAMUSCULAR; INTRAVENOUS
Status: COMPLETED | OUTPATIENT
Start: 2018-08-13 | End: 2018-08-13

## 2018-08-13 RX ORDER — LIDOCAINE HYDROCHLORIDE 20 MG/ML
JELLY TOPICAL
Status: COMPLETED | OUTPATIENT
Start: 2018-08-13 | End: 2018-08-13

## 2018-08-13 RX ORDER — LIDOCAINE HYDROCHLORIDE AND EPINEPHRINE 10; 10 MG/ML; UG/ML
20 INJECTION, SOLUTION INFILTRATION; PERINEURAL
Status: COMPLETED | OUTPATIENT
Start: 2018-08-13 | End: 2018-08-13

## 2018-08-13 RX ADMIN — LIDOCAINE HYDROCHLORIDE: 20 JELLY TOPICAL at 03:08

## 2018-08-13 RX ADMIN — LIDOCAINE HYDROCHLORIDE AND EPINEPHRINE 20 ML: 10; 10 INJECTION, SOLUTION INFILTRATION; PERINEURAL at 03:08

## 2018-08-13 RX ADMIN — CEFTRIAXONE 1 G: 1 INJECTION, POWDER, FOR SOLUTION INTRAMUSCULAR; INTRAVENOUS at 03:08

## 2018-08-13 NOTE — PROCEDURES
"Transrectal Ultrasound w/ Biopsy  Date/Time: 8/13/2018 4:04 PM  Performed by: Reyes Wright MD  Authorized by: Marline May NP     Consent Done?:  Yes (Written)  Time out: Immediately prior to procedure a "time out" was called to verify the correct patient, procedure, equipment, support staff and site/side marked as required.    Indications: Prostate Nodules and Elevated PSA    Preparation: Patient was prepped and draped in usual sterile fashion    Anesthesia:  Lidocaine jelly and 20cc's 1% Lidocaine  Patient sedated: No    Prostate Size:  36  Lesions:: No    Left Base Biopsies: 2  Left Mid Biopsies: 2  Left [place] Biopsies: 2  Right Base Biopsies: 2  Right Mid Biopsies: 2  Right [place] Biopsies: 2  Total Biopsies:  12    Patient tolerance:  Patient tolerated the procedure well with no immediate complications     Urine cs neg  cipro and rocephin done  Enemas done    MR and US images segmented and co-registered with UroNav  2 additional biopsies taken from area of PIRADS3 target    Elevated psa  Prostate nodule in MR PIRADS3  Fam hist prostate ca    Standard post biopsy instructions given  Dr Richardson will give patient biopsy results        "

## 2018-08-13 NOTE — PATIENT INSTRUCTIONS

## 2018-08-14 ENCOUNTER — PATIENT MESSAGE (OUTPATIENT)
Dept: UROLOGY | Facility: CLINIC | Age: 58
End: 2018-08-14

## 2018-08-16 ENCOUNTER — TELEPHONE (OUTPATIENT)
Dept: UROLOGY | Facility: CLINIC | Age: 58
End: 2018-08-16

## 2018-08-16 NOTE — TELEPHONE ENCOUNTER
I spoke to pt.  He was concerned because he is having blood in his urine and semen.  I told him this was normal.  I asked if he had had a BM and he had not, so I suggested a suppository today and miralax BID.  He agreed.  He will call in AM to update us.  He understands that if he has chills or fever, he is to go directly to ER.

## 2018-08-16 NOTE — TELEPHONE ENCOUNTER
----- Message from Juanita Rickie sent at 8/16/2018  9:37 AM CDT -----  Contact: Pt   Name of Who is Calling: DYANA DIETZ [[id number]]      What is the request in detail: Patient states he is experiencing some pain and a little blood in urine from biopsy on 8/13, requesting a callback from staff to confirm if it's normal. Please contact to further discuss and advise      Can the clinic reply by [facility]: No       What Number to [facility] Back [facility]: [phone]

## 2018-08-23 ENCOUNTER — PATIENT MESSAGE (OUTPATIENT)
Dept: UROLOGY | Facility: CLINIC | Age: 58
End: 2018-08-23

## 2018-08-27 ENCOUNTER — PATIENT MESSAGE (OUTPATIENT)
Dept: UROLOGY | Facility: CLINIC | Age: 58
End: 2018-08-27

## 2018-08-27 ENCOUNTER — RESEARCH ENCOUNTER (OUTPATIENT)
Dept: RESEARCH | Facility: HOSPITAL | Age: 58
End: 2018-08-27

## 2018-08-27 ENCOUNTER — OFFICE VISIT (OUTPATIENT)
Dept: UROLOGY | Facility: CLINIC | Age: 58
End: 2018-08-27
Payer: COMMERCIAL

## 2018-08-27 VITALS
BODY MASS INDEX: 27.08 KG/M2 | RESPIRATION RATE: 15 BRPM | SYSTOLIC BLOOD PRESSURE: 136 MMHG | HEIGHT: 74 IN | DIASTOLIC BLOOD PRESSURE: 82 MMHG | WEIGHT: 211 LBS | HEART RATE: 74 BPM

## 2018-08-27 DIAGNOSIS — C61 PROSTATE CANCER: Primary | ICD-10-CM

## 2018-08-27 DIAGNOSIS — Z80.42 FAMILY HISTORY OF PROSTATE CANCER: ICD-10-CM

## 2018-08-27 PROCEDURE — 99999 PR PBB SHADOW E&M-EST. PATIENT-LVL III: CPT | Mod: PBBFAC,,, | Performed by: UROLOGY

## 2018-08-27 PROCEDURE — 99215 OFFICE O/P EST HI 40 MIN: CPT | Mod: S$GLB,,, | Performed by: UROLOGY

## 2018-08-27 NOTE — PATIENT INSTRUCTIONS
What Is Prostate Cancer?    Cancer is when cells in the body change and grow out of control. Cancer cells can form lumps of tissue called tumors. Cancer that starts in the prostate is called prostate cancer. It can grow and spread beyond the prostate. Cancer that spreads is harder to treat.  Understanding the prostate  The prostate is a gland in men about the size and shape of a walnut. It surrounds the upper part of the urethra. This is the tube that carries urine from the bladder. The prostate makes some of the fluid thats part of semen. During orgasm, semen leaves the body through the urethra.  When prostate cancer forms  As a man ages, the cells of his prostate may change to form tumors or other growths. The types of growths include:  · Noncancerous growths. As a man ages, the prostate may grow larger. This is called benign prostatic hyperplasia (BPH). With BPH, extra prostate tissue often squeezes the urethra, causing symptoms such as trouble urinating. But BPH is not cancer and does not lead to cancer.  · Atypical cells. Sometimes prostate cells dont look like normal (typical) prostate cells. One type of abnormal growth is called prostatic intraepithelial neoplasia or PIN. Although PIN cells are not cancer cells, they may be a sign that cancer is likely to form.  · Cancer. When abnormal prostate cells grow out of control and start to invade other tissues, they are called cancer cells. These cells may or may not lead to symptoms. Some tumors can be felt during a physical exam, and some cant. Prostate cancer may grow into nearby organs or spread to nearby lymph nodes. Lymph nodes are small organs around the body that are part of the immune system. In some cases, the cancer spreads to bones or organs in distant parts of the body. This is called metastasis.  Diagnosing prostate cancer  Prostate cancer may not cause symptoms at first. Urinary problems are often not a sign of cancer, but of another condition,  such as BPH. To find out if you have prostate cancer, your healthcare provider must examine you and order tests. The tests help confirm a diagnosis of cancer. They also help give more information about a cancerous tumor. Tests might include:  · Prostate specific antigen (PSA) testing. PSA is a chemical made by prostate tissue. The amount of PSA in the blood (PSA level) is tested to check a mans risk for prostate cancer. In general, a high or rising PSA level may mean an increased cancer risk. A PSA test by itself cannot show if a man has prostate cancer. PSA testing is also used to check the success of cancer treatments.  · Core needle biopsy. This test is done to determine if a man has prostate cancer. A hollow needle is used to take small pieces of tissue from the prostate. This helps give more information about the cells. Before the test, pain medicine may be given to prevent pain. During the test, a small probe is inserted into the rectum. The probe sends an image of the prostate to a video monitor. With this image as a guide, the healthcare provider uses a thin, hollow needle to remove tiny tissue samples from the prostate. These are sent to a lab where they are looked at for cancer cells.  Date Last Reviewed: 5/1/2017  © 7101-5491 [employer]. [address] [place] [address], [place] [address]. All rights reserved. This information is not intended as a substitute for professional medical care. Always follow your healthcare professional's instructions.

## 2018-08-27 NOTE — PROGRESS NOTES
"  Protocol: [id number], Testing Decision Aids to Improve Prostate Cancer Decisions for Minority Men  Sponsor: [employer]  IRB# [id number]  Investigator: Dr. MANGO Richardson  Patient Initials: DAFNE LEOS        Informed Consent Process     Research coordinator met with patient on Monday, August 27, 2018 to discussed the above mentioned study. Patient was awake, alert, and oriented to person, place, and time. He stated he was informed and eligible for the above-mentioned trial and expressed interest in discussing participation as outline below. Consent was discussed in detailed.     The patient states he understands the purpose of the trial: to test whether the use of a decision aid (a visual aid with educational information) can improve patients' knowledge of their condition and options for treatment, and whether using a decision aid can help when talking with their doctor. Patient stated understanding.  Length of Study and Number of participants were discussed. The patient will be enrolled in the study for 12 months. About 172 men will participate in this study. Patient stated understanding.  Procedures were discussed. A computer will by chance assign the hospital to one of four groups in the study (randomization). Neither patient nor his doctor can choose the group he will be in. [facility] was assigned Group 2- use one decision aid ("Knowing your Options"), just before his visit with the urologist. Group 2 will be asked to go to the clinic for his visit with the urologist early so that he will have time to use the decision aid. All Groups- After patient's visit with the urologist, he will be asked to complete a survey about his treatment choice, his knowledge about his disease and treatment choices, and his experience in discussing treatment options. All Groups- About 1 year after his visit with the urologist, if he has an appointment with him or her, patient will be asked to complete another survey to ask him about his prostate " cancer treatment choice and about the results of that choice. If he does not have an appointment with his urologist at this time, research staff from his doctor's office will mail to him the survey so that he can complete it at home. A stamped, addressed envelope will be provided with the survey. The survey should take about 15 to 20 minutes to complete. Patient stated understanding.  Risks- Patient may lose time at work or home and spend more time in the hospital or doctor's office than usual. He may be asked sensitive or private questions which he normally does not discuss. He doesn't have to answer any question that he does not want to during the surveys. Patient stated understanding.  Potential Benefits- were discussed. It is not possible to know now if a decision aid for prostate cancer treatment decisions will increase patient's knowledge of treatment options and help him talk with his urologist about his treatment preferences. It is also not known if a decision aid used before his doctor visit or during his doctor visit is more helpful. This study will help researchers learn how to better help people in the future who must make difficult treatment decisions. Patient stated understanding.  Costs were discussed. There are no costs for participating in this study. Patient will not be paid for taking part in this study. Although the sponsor may pay for certain study-related items and services, any other tests, procedures, or medications that may be necessary for the treatment of his medical condition will be billed to his insurance in the normal way. He may be responsible for co-payments or deductibles. These costs are not covered by this research study.   If patient have any questions about treatment for which he may be responsible for paying and is encouraging to discuss this with his physician or study staff. Patient stated understanding.  Alternatives were discussed. Patient does not have to join this study.  If he does not join, his care at [facility] will not be affected. Patient stated understanding.  Voluntary participation, new findings, and study withdrawal were discussed. Participation in this study is voluntary and he may withdraw consent at any time. Patient stated understanding.  Confidentiality and HIPPA were reviewed as per listed in ICF. Patient stated understanding.     After the above information was reviewed, the patient was given the opportunity to ask questions and all questions were answered to their satisfaction. The patient stated he freely consented to participate in this trial and signed consent form unassisted. Patient was given a signed copy of the consent form. My contact information was given to the patient in the event he has any questions as it relates to above-mentioned trial. He was also encouraged to visit, clinicaltrials.gov should he wish to find out more information about this trial. Patient stated understanding.

## 2018-08-27 NOTE — PROGRESS NOTES
Protocol: [id number], Testing Decision Aids to Improve Prostate Cancer Decisions for Minority Men  Sponsor: [employer]  IRB # [id number]  Investigator: Dr. MANGO Richardson  Patient Initials: J,J     Eligibility Note     The patient is eligible for above-mentioned trial based on the following per section 3.2 of protocol:     Patient biopsy was within 4 months prior to registration showing newly diagnosed prostate cancer, per pathology report from 2018 and Dr. Richardson notes.  Patient PSA was 7.5 which is < 50 ng/ML.  Patient does not have a history of non-cutaneous malignancy in the previous 5 years.  Patient first consultation was performed by Dr. Richardson on 2018.  Patient is not enrolled in any other clinical trial for the treatment of cancer.  Patient has no impaired decision-making capacity.  Patient can read and comprehend English.  Patient  is 1960  58 years old which is > 18 years old.

## 2018-08-27 NOTE — PROGRESS NOTES
Subjective:       Patient ID: Simón oFng is a 58 y.o. male.    Chief Complaint: Elevated PSA (Uro-caity results)      HPI: Simón Fong is a 58 y.o. Black or  male who returns today in follow-up for evaluation and management of an elevated PSA s/p MRI fusion prostate needle biopsy on 8/13/18.    The patient does have a family history of prostate cancer with two of his brothers having undergone surgery for their localized prostate cancer.    The patient's PSA chronology is listed below:    Lab Results   Component Value Date    PSA 7.5 (H) 04/30/2018    PSA 4.6 (H) 07/26/2017    PSA 3.5 04/17/2014    PSA 3.48 12/19/2012    PSA 2.97 02/29/2012    PSA 2.3 04/15/2010    PSA 2.4 01/08/2009    PSA 0.8 09/24/2007    PSA 1.1 05/17/2006    PSA 1.0 09/07/2004     The patient underwent a MRI fusion biopsy on 8/13/18 which showed 2 positive cores with Alcides 6 disease, less than 5% on each positive core.    The patient returns today to discuss his prostate needle biopsy results.    He is doing well since the biopsy with no complaints.    Review of patient's allergies indicates:   Allergen Reactions    Sulfa (sulfonamide antibiotics)        Current Outpatient Medications   Medication Sig Dispense Refill    alendronate-vitamin D3 (FOSAMAX PLUS D) 70 mg- 2,800 unit per tablet Take 1 tablet by mouth every 7 days.      ascorbic acid, vitamin C, (VITAMIN C) 1000 MG tablet Take 1,000 mg by mouth once daily.      biotin 1 mg Cap Take by mouth.      creatine, bulk, 100 % Powd by Misc.(Non-Drug; Combo Route) route.      elviteg-cob-emtri-tenof ALAFEN (GENVOYA) [id number] mg Tab Take 1 tablet by mouth once daily. 90 tablet 1    fish oil-omega-3 fatty acids 300-1,000 mg capsule Take by mouth once daily.      glucosamine-chondroitin 500-400 mg tablet Take 1 tablet by mouth 3 (three) times daily.      leucine-isoleucine-valine (NEOKE BCAA4) 82 gram-328 kcal/100 gram Powd Take by mouth.      fluocinonide  (LIDEX) 0.05 % gel Apply topically 2 (two) times daily. 15 g 1    naproxen (EC NAPROSYN) 500 MG EC tablet Take 1 tablet (500 mg total) by mouth 2 (two) times daily. 60 tablet 2     No current facility-administered medications for this visit.        Past Medical History:   Diagnosis Date    HIV infection        History reviewed. No pertinent surgical history.    Family History   Problem Relation Age of Onset    Diabetes Mother     Heart disease Mother     Hypertension Mother     Stroke Mother     Hypertension Father     Cancer Sister     Cancer Brother        Review of Systems    Review of Systems   Constitutional: Negative for fever, chills, activity change, appetite change and unexpected weight change.   HENT: Negative for congestion, nosebleeds, sneezing, sore throat and trouble swallowing.    Eyes: Negative for pain, discharge, redness and visual disturbance.   Respiratory: Negative for cough, choking, chest tightness and shortness of breath.    Cardiovascular: Negative for chest pain, palpitations and leg swelling.   Gastrointestinal: Negative for nausea, vomiting, abdominal pain, diarrhea, blood in stool, abdominal distention and anal bleeding.  Genitourinary: As documented per HPI   Endocrine: Negative for cold intolerance, heat intolerance, polydipsia, polyphagia and polyuria.   Musculoskeletal: Negative for myalgias, gait problem, neck pain and neck stiffness.   Skin: Negative for color change, pallor, rash and wound.   Allergic/Immunologic: Negative for immunocompromised state.   Neurological: Negative for seizures, facial asymmetry, speech difficulty, weakness and light-headedness.   Hematological: Negative for adenopathy. Does not bruise/bleed easily.   Psychiatric/Behavioral: Negative for hallucinations, behavioral problems, self-injury and agitation. The patient is not hyperactive.    All other systems were reviewed and were negative.      Objective:     Vitals:    08/27/18 0801   BP: 136/82    Pulse: 74   Resp: 15     Physical Exam   Vitals reviewed.  Constitutional: He is oriented to person, place, and time. He appears well-developed and well-nourished. No distress.   HENT:   Head: Normocephalic and atraumatic.   Right Ear: External ear normal.   Left Ear: External ear normal.   Nose: Nose normal.   Eyes: EOM are normal. Pupils are equal, round, and reactive to light. Right eye exhibits no discharge. Left eye exhibits no discharge.   Neck: Normal range of motion. Neck supple. No tracheal deviation present. No thyroid enlargement or tenderness.  Cardiovascular: Regular rhythm and intact distal pulses. No signs of peripheral edema.    Pulmonary/Chest: Effort normal and breath sounds normal. No stridor. No respiratory distress. He has no wheezes.   Abdominal: Soft. Bowel sounds are normal. He exhibits no distension. There is no tenderness. Hernia confirmed negative in the right inguinal area and confirmed negative in the left inguinal area. No hepatic or splenic enlargement or tenderness.  Genitourinary: Penis normal. Right testis shows no mass, no swelling and no tenderness. Right testis is descended. Left testis shows no mass, no swelling and no tenderness. Left testis is descended. Circumcised. No phimosis or hypospadias.   BRIANNA: Normal rectal tone. Gr. 2, smooth, no nodules or induration.  Musculoskeletal: Normal range of motion. He exhibits no edema.   Neurological: He is alert and oriented to person, place, and time. He exhibits normal muscle tone. Coordination normal.   Skin: Skin is warm. No rash noted.   Lymphatic: No palpable lymph nodes.  Psychiatric: He has a normal mood and affect. His behavior is normal. Judgment and thought content normal.     Lab Results   Component Value Date    PSA 7.5 (H) 04/30/2018    PSA 4.6 (H) 07/26/2017    PSA 3.5 04/17/2014     Lab Results   Component Value Date    CREATININE 1.0 04/30/2018     Lab Results   Component Value Date    EGFRNONAA >60.0 04/30/2018      Lab Results   Component Value Date    ESTGFRAFRICA >60.0 04/30/2018     MRI prostate with and without (7/20/18): Heterogeneity of peripheral zone T2 signal, with single lesion appearing equivocal for prostate cancer.  PIRADS 3 (the presence of clinically significant cancer is equivocal)    Assessment:       1. Prostate cancer    2. Family history of prostate cancer        Plan:     Simón was seen today for elevated psa.    Diagnoses and all orders for this visit:    Prostate cancer  -     PSA, total and free; Future    Family history of prostate cancer  -     PSA, total and free; Future    The patient has newly diagnosed low-risk, low-volume prostate cancer.    We spent a long time discussing the nature and natural history of prostate cancer. We also spent a long time discussing the difference between significant and insignificant prostate cancer and what each means and the treatment recommended for each. For low-risk, low-volume prostate cancer, active surveillance is an excellent treatment strategy, and I outlined that for the patient. It is a very reasonable alternative to active treatment and appears to carry very little risk of disease progression while on a course of active surveillance. Importantly, the patient and/or his partner/family need to be comfortable with active surveillance and the necessary follow-up. For those who wish definitve treatment, both surgery and radiation therapy offer excellent treatment results with varying potential side-effects (erectile dysfunction, voiding dysfunction, and bowel dysfunction [specific to radiation therapy]). We spent a long-time discussing the logistics of each treatment choice, and I answered all questions completely.    For this patient, I have recommended active surveillance of his prostate cancer, which the patient was more than happy to follow. We discussed the protocol here for active surveillance. I will see the patient back in 3 months with a PSA.    I  answered all his questions.    I encouraged him or any of his family members to call or email me with questions and/or concerns.    I spent 40 minutes with the patient of which more than half was spent in coordinating the patient's care as well as in direct consultation with the patient in regards to our treatment and plan.

## 2018-10-05 ENCOUNTER — TELEPHONE (OUTPATIENT)
Dept: INFECTIOUS DISEASES | Facility: CLINIC | Age: 58
End: 2018-10-05

## 2018-10-05 DIAGNOSIS — Z00.00 PREVENTATIVE HEALTH CARE: Primary | ICD-10-CM

## 2018-10-05 DIAGNOSIS — Z21 HIV POSITIVE: ICD-10-CM

## 2018-10-09 ENCOUNTER — PATIENT MESSAGE (OUTPATIENT)
Dept: INFECTIOUS DISEASES | Facility: CLINIC | Age: 58
End: 2018-10-09

## 2018-11-06 ENCOUNTER — PATIENT MESSAGE (OUTPATIENT)
Dept: UROLOGY | Facility: CLINIC | Age: 58
End: 2018-11-06

## 2018-11-14 ENCOUNTER — PATIENT MESSAGE (OUTPATIENT)
Dept: INFECTIOUS DISEASES | Facility: HOSPITAL | Age: 58
End: 2018-11-14

## 2018-11-14 ENCOUNTER — TELEPHONE (OUTPATIENT)
Dept: INFECTIOUS DISEASES | Facility: CLINIC | Age: 58
End: 2018-11-14

## 2018-11-14 NOTE — TELEPHONE ENCOUNTER
----- Message from Brigitte Tapia sent at 11/14/2018  8:18 AM CST -----  Contact: Self [phone]  PT BP was 175/110 at his dentist appointment on yesterday.   11/13 at 6 PM  208/111   11/13 @ 9 /101   11/14 @ 730 /96     PT is requesting a call at [phone].

## 2018-11-15 ENCOUNTER — OFFICE VISIT (OUTPATIENT)
Dept: INTERNAL MEDICINE | Facility: CLINIC | Age: 58
End: 2018-11-15
Payer: COMMERCIAL

## 2018-11-15 VITALS
DIASTOLIC BLOOD PRESSURE: 100 MMHG | HEIGHT: 74 IN | HEART RATE: 71 BPM | SYSTOLIC BLOOD PRESSURE: 142 MMHG | WEIGHT: 213.38 LBS | OXYGEN SATURATION: 99 % | BODY MASS INDEX: 27.39 KG/M2

## 2018-11-15 DIAGNOSIS — I10 HYPERTENSION, UNSPECIFIED TYPE: Primary | ICD-10-CM

## 2018-11-15 PROCEDURE — 99999 PR PBB SHADOW E&M-EST. PATIENT-LVL III: CPT | Mod: PBBFAC,,, | Performed by: STUDENT IN AN ORGANIZED HEALTH CARE EDUCATION/TRAINING PROGRAM

## 2018-11-15 PROCEDURE — 99213 OFFICE O/P EST LOW 20 MIN: CPT | Mod: S$GLB,,, | Performed by: STUDENT IN AN ORGANIZED HEALTH CARE EDUCATION/TRAINING PROGRAM

## 2018-11-15 RX ORDER — AMLODIPINE BESYLATE 10 MG/1
10 TABLET ORAL DAILY
Qty: 90 TABLET | Refills: 3 | Status: SHIPPED | OUTPATIENT
Start: 2018-11-15 | End: 2019-09-16 | Stop reason: SDUPTHER

## 2018-11-15 NOTE — PATIENT INSTRUCTIONS
Discharge Instructions: Taking Your Blood Pressure  Blood pressure is the force of blood as it moves from the heart through the blood vessels. You can take your own blood pressure reading using a digital monitor. Take readings as often as your healthcare provider instructs. Take your readings each time in the same way, using the same arm. Here are guidelines for taking your blood pressure.  The American Heart Association (AHA) recommends purchasing a blood pressure monitor that is validated and approved by the Association for the Advancement of Medical Instrumentation, the [place] Hypertension Society, and the International Protocol for the Validation of Automated BP Measuring Devices. If the blood pressure monitor is for a senior adult, a pregnant woman, or a child, make certain it is validated for use with such a population. For the most reliable readings, the AHA recommends an automatic, cuff-style, upper arm (bicep) monitor. The readings from finger and wrist monitors are not as reliable as the upper arm monitor.        Step 1. Relax    · Wait at least a half hour after smoking, eating, or exercising. Do not drink coffee, tea, soda, or other caffeinated beverages before checking your blood pressure.   · Sit comfortably at a table. Place the monitor near you.  · Rest for a few minutes before you begin.        Step 2. Wrap the cuff    · Place your arm on the table, palm up. Put your arm in a position that is level with your heart. Wrap the cuff around your upper arm, about an inch above your elbow. Its best to wrap the cuff on bare skin, not over clothing.  · Make sure your cuff fits. If it doesnt wrap around your upper arm, order a larger cuff. A cuff that is too large or too small can result in an inaccurate blood pressure reading.           Step 3. Inflate the cuff    · Pump the cuff until the scale reads 200. If you have a self-inflating cuff, push the button that starts the pump.  · The cuff will  tighten, then loosen.  · The numbers will change. When they stop changing, your blood pressure reading will appear.  · If you get a reading that is too high or too low for you, relax for a few minutes. Then do the test again.    Step 4. Write down the results  · Write down your blood pressure numbers. Zack the date and time. Keep your results in one place, such as a notebook.  · Remove the cuff from your arm. Turn off the machine.  · Take the readings with you to your medical appointments.  · If you start a new blood pressure medicine, or change a blood pressure medicine dose, note the day you started the new drug or dosage on your blood pressure recording sheet. This will help your healthcare provider monitor the effect of medication changes.     Date Last Reviewed: 4/27/2016  © 8771-2659 [employer]. [address] [place] [address]. All rights reserved. This information is not intended as a substitute for professional medical care. Always follow your healthcare professional's instructions.        Discharge Instructions: Taking Calcium Channel Blockers  Your healthcare provider prescribed a medicine called a calcium channel blocker for you. This type of medicine can treat high blood pressure, correct abnormal heart rhythms, and relieve a type of chest pain called angina.     The name of your calcium channel blocker is  _____________________      Home care  · Follow the fact sheet that came with your medicine. It tells you when and how to take your medicine. Ask for a sheet if you didnt get one.  · Take this medicine exactly as directed, even if you feel fine.  · If you miss a dose of this medicine, take it as soon as you remember--unless its almost time for your next dose. In that case, just wait and take your next dose at the normal time. Dont take a double dose. If you aren't sure what to do, call your healthcare provider or your pharmacist.  · Dont drive until you know how you will  react to this medicine.  · Tell your healthcare provider about any other medicines or herbal remedies you are using.  · Be sure to give this medicine time to work. It may take several weeks to lower blood pressure.  · Learn to take your own pulse. Keep a record of your results. Ask your provider which pulse rates mean that you need medical attention.  · Dont eat grapefruit or drink grapefruit juice. These may interact with calcium channel blockers.  · Ask your healthcare provider how much exercise and activity is safe.  · See your provider regularly while taking this medicine.  Possible side effects  Tell your healthcare provider if you have any of these side effects. Dont stop taking the medicine unless your doctor tells you to. Mild side effects include:  · Sore, bleeding gums  · Mild headache  · Dizziness or lightheadedness  · Flushing  · Nausea  · Leg swelling  When to call your healthcare provider  Call your healthcare provider right away if you have any of the following:  · Severe headache  · Slow, weak pulse  · Breathing problems, coughing, or wheezing  · Irregular, fast, or pounding heartbeat  · Skin rash  · Swollen ankles, feet, or lower legs  · Constipation   Date Last Reviewed: 6/1/2016  © 3499-5663 [employer]. [address] [place] [address], [place] [address]. All rights reserved. This information is not intended as a substitute for professional medical care. Always follow your healthcare professional's instructions.

## 2018-11-15 NOTE — PROGRESS NOTES
Subjective     Chief Complaint: High Blood Pressure    History of Present Illness:  Mr. Simón Fong is a 58 y.o. male with PMHx of HIV (seen by Dr. Dave Avitia in ID clinic) who presents today for further evaluated of high blood pressure. Patient states that he was at the dentists' office when he was told his BP was 175/110 and then 176/94 however patient remained asymptomatic with no headaches, lightheadedness/dizziness, chest pain, or SOB. Patient has been having elevated BP as seen in chart review. Patient has not been having any stress at home and is retired, is very physically active and works out twice a day (strength training and cardio). States that he takes a fat burner and preworkout everyday before his workouts. Family history significant for HTN in his mom and dad and in all 9 of his other siblings.     ROS    PAST HISTORY:     Past Medical History:   Diagnosis Date    HIV infection        No past surgical history on file.    Family History   Problem Relation Age of Onset    Diabetes Mother     Heart disease Mother     Hypertension Mother     Stroke Mother     Hypertension Father     Cancer Sister     Cancer Brother        Social History     Socioeconomic History    Marital status: Single     Spouse name: Not on file    Number of children: Not on file    Years of education: Not on file    Highest education level: Not on file   Social Needs    Financial resource strain: Not on file    Food insecurity - worry: Not on file    Food insecurity - inability: Not on file    Transportation needs - medical: Not on file    Transportation needs - non-medical: Not on file   Occupational History    Not on file   Tobacco Use    Smoking status: Never Smoker    Smokeless tobacco: Never Used   Substance and Sexual Activity    Alcohol use: No    Drug use: No    Sexual activity: Not on file   Other Topics Concern    Not on file   Social History Narrative    Not on file       MEDICATIONS &  ALLERGIES:     Current Outpatient Medications on File Prior to Visit   Medication Sig    alendronate-vitamin D3 (FOSAMAX PLUS D) 70 mg- 2,800 unit per tablet Take 1 tablet by mouth every 7 days.    ascorbic acid, vitamin C, (VITAMIN C) 1000 MG tablet Take 1,000 mg by mouth once daily.    biotin 1 mg Cap Take by mouth.    creatine, bulk, 100 % Powd by Misc.(Non-Drug; Combo Route) route.    elviteg-cob-emtri-tenof ALAFEN (GENVOYA) [id number] mg Tab Take 1 tablet by mouth once daily.    fish oil-omega-3 fatty acids 300-1,000 mg capsule Take by mouth once daily.    fluocinonide (LIDEX) 0.05 % gel Apply topically 2 (two) times daily.    glucosamine-chondroitin 500-400 mg tablet Take 1 tablet by mouth 3 (three) times daily.    leucine-isoleucine-valine (NEOKE BCAA4) 82 gram-328 kcal/100 gram Powd Take by mouth.    naproxen (EC NAPROSYN) 500 MG EC tablet Take 1 tablet (500 mg total) by mouth 2 (two) times daily.     No current facility-administered medications on file prior to visit.        Review of patient's allergies indicates:   Allergen Reactions    Sulfa (sulfonamide antibiotics)        OBJECTIVE:     Vital Signs:  There were no vitals filed for this visit.    There is no height or weight on file to calculate BMI.     Physical Exam:  General:  Well developed, well nourished, no acute distress  Head: Normocephalic, atraumatic  Eyes: PERRL, EOMI, clear sclera  Throat: No posterior pharyngeal erythema or exudate, no tonsillar exudate  Neck: supple, normal ROM, no thyromegaly   CVS:  RRR, S1 and S2 normal, no murmurs, rubs, gallops, 2+ peripheral pulses  Resp:  Lungs clear to auscultation, no wheezes, rales, rhonchi, cough  GI:  Abdomen soft, non-tender, non-distended, normoactive bowel sounds  MSK:  No muscle atrophy, cyanosis, peripheral edema   Skin:  No rashes, ulcers, erythema  Neuro:  CNII-XII grossly intact, no focal deficits noted  Psych:  Appropriate mood and affect, normal  judgement    Laboratory  Lab Results   Component Value Date    WBC 4.54 04/30/2018    HGB 15.0 04/30/2018    HCT 45.9 04/30/2018    MCV 94 04/30/2018     04/30/2018     @LGIDIXASV34(GLU,NA,K,Cl,CO2,BUN,Creatinine,Calcium,MG)@  Lab Results   Component Value Date    INR 1.2 02/08/2012       ASSESSMENT & PLAN:   Mr. Simón Fong is a 58 y.o. male with PMHx of HIV (seen by Dr. Dave Avitia in ID clinic) who presents today for further evaluated of high blood pressure.     Simón was seen today for hypertension and establish care.    Diagnoses and all orders for this visit:    Hypertension, unspecified type  -     amLODIPine (NORVASC) 10 MG tablet; Take 1 tablet (10 mg total) by mouth once daily.  -     Persistently elevated SBP during prior clinic visits, will start amlodipine 10 mg daily as patient without evidence of CKD or DMII  -     CBC, CMP, U/A without any proteinuria  -     Lipid panel reveals ASCVD risk of 13.4% however will treat HTN prior to adding moderate intensity statin therapy  -     Instructed to take BP at least twice daily and RTC with logs, restrict salt intake, continue exercise and diet    RTC in 1 month     Perla Coffey MD  Internal Medicine PGY3  [facility]  [address] [place] [id number] [phone]  Attending Physician: Dr. Elias

## 2018-12-06 ENCOUNTER — TELEPHONE (OUTPATIENT)
Dept: INFECTIOUS DISEASES | Facility: CLINIC | Age: 58
End: 2018-12-06

## 2018-12-06 NOTE — TELEPHONE ENCOUNTER
----- Message from Eileen Alonso sent at 12/6/2018  8:03 AM CST -----  Contact: Kobe   tel:  [phone]   Pt.says he wants to speak to  You today.   [birth year] your birthday.

## 2018-12-14 ENCOUNTER — PATIENT MESSAGE (OUTPATIENT)
Dept: INTERNAL MEDICINE | Facility: CLINIC | Age: 58
End: 2018-12-14

## 2018-12-21 ENCOUNTER — LAB VISIT (OUTPATIENT)
Dept: LAB | Facility: HOSPITAL | Age: 58
End: 2018-12-21
Attending: INTERNAL MEDICINE
Payer: COMMERCIAL

## 2018-12-21 DIAGNOSIS — Z00.00 PREVENTATIVE HEALTH CARE: ICD-10-CM

## 2018-12-21 DIAGNOSIS — Z21 HIV POSITIVE: ICD-10-CM

## 2018-12-21 LAB
ALBUMIN SERPL BCP-MCNC: 4.2 G/DL
ALP SERPL-CCNC: 74 U/L
ALT SERPL W/O P-5'-P-CCNC: 32 U/L
ANION GAP SERPL CALC-SCNC: 8 MMOL/L
AST SERPL-CCNC: 46 U/L
BASOPHILS # BLD AUTO: 0.02 K/UL
BASOPHILS NFR BLD: 0.6 %
BILIRUB SERPL-MCNC: 0.3 MG/DL
BUN SERPL-MCNC: 22 MG/DL
CALCIUM SERPL-MCNC: 9.9 MG/DL
CD3+CD4+ CELLS # BLD: 565 CELLS/UL (ref 300–1400)
CD3+CD4+ CELLS NFR BLD: 35.9 % (ref 28–57)
CHLORIDE SERPL-SCNC: 104 MMOL/L
CHOLEST SERPL-MCNC: 166 MG/DL
CHOLEST/HDLC SERPL: 2.3 {RATIO}
CO2 SERPL-SCNC: 29 MMOL/L
COMPLEXED PSA SERPL-MCNC: 6.5 NG/ML
CREAT SERPL-MCNC: 0.9 MG/DL
DIFFERENTIAL METHOD: ABNORMAL
EOSINOPHIL # BLD AUTO: 0.1 K/UL
EOSINOPHIL NFR BLD: 1.4 %
ERYTHROCYTE [DISTWIDTH] IN BLOOD BY AUTOMATED COUNT: 12.4 %
EST. GFR  (AFRICAN AMERICAN): >60 ML/MIN/1.73 M^2
EST. GFR  (NON AFRICAN AMERICAN): >60 ML/MIN/1.73 M^2
GLUCOSE SERPL-MCNC: 94 MG/DL
HCT VFR BLD AUTO: 43.2 %
HDLC SERPL-MCNC: 72 MG/DL
HDLC SERPL: 43.4 %
HGB BLD-MCNC: 13.8 G/DL
IMM GRANULOCYTES # BLD AUTO: 0.01 K/UL
IMM GRANULOCYTES NFR BLD AUTO: 0.3 %
LDLC SERPL CALC-MCNC: 84 MG/DL
LYMPHOCYTES # BLD AUTO: 1.6 K/UL
LYMPHOCYTES NFR BLD: 46.2 %
MCH RBC QN AUTO: 29.6 PG
MCHC RBC AUTO-ENTMCNC: 31.9 G/DL
MCV RBC AUTO: 93 FL
MONOCYTES # BLD AUTO: 0.4 K/UL
MONOCYTES NFR BLD: 11.8 %
NEUTROPHILS # BLD AUTO: 1.4 K/UL
NEUTROPHILS NFR BLD: 39.7 %
NONHDLC SERPL-MCNC: 94 MG/DL
NRBC BLD-RTO: 0 /100 WBC
PLATELET # BLD AUTO: 217 K/UL
PMV BLD AUTO: 10.8 FL
POTASSIUM SERPL-SCNC: 4.6 MMOL/L
PROT SERPL-MCNC: 7.8 G/DL
RBC # BLD AUTO: 4.67 M/UL
SODIUM SERPL-SCNC: 141 MMOL/L
TRIGL SERPL-MCNC: 50 MG/DL
WBC # BLD AUTO: 3.55 K/UL

## 2018-12-21 PROCEDURE — 80053 COMPREHEN METABOLIC PANEL: CPT

## 2018-12-21 PROCEDURE — 85025 COMPLETE CBC W/AUTO DIFF WBC: CPT

## 2018-12-21 PROCEDURE — 84153 ASSAY OF PSA TOTAL: CPT

## 2018-12-21 PROCEDURE — 87536 HIV-1 QUANT&REVRSE TRNSCRPJ: CPT

## 2018-12-21 PROCEDURE — 86361 T CELL ABSOLUTE COUNT: CPT

## 2018-12-21 PROCEDURE — 80061 LIPID PANEL: CPT

## 2019-01-04 ENCOUNTER — TELEPHONE (OUTPATIENT)
Dept: UROLOGY | Facility: CLINIC | Age: 59
End: 2019-01-04

## 2019-01-07 ENCOUNTER — TELEPHONE (OUTPATIENT)
Dept: UROLOGY | Facility: CLINIC | Age: 59
End: 2019-01-07

## 2019-01-07 ENCOUNTER — CLINICAL SUPPORT (OUTPATIENT)
Dept: INFECTIOUS DISEASES | Facility: CLINIC | Age: 59
End: 2019-01-07
Payer: COMMERCIAL

## 2019-01-07 ENCOUNTER — OFFICE VISIT (OUTPATIENT)
Dept: INFECTIOUS DISEASES | Facility: CLINIC | Age: 59
End: 2019-01-07
Payer: COMMERCIAL

## 2019-01-07 VITALS
TEMPERATURE: 99 F | SYSTOLIC BLOOD PRESSURE: 150 MMHG | DIASTOLIC BLOOD PRESSURE: 80 MMHG | HEIGHT: 74 IN | WEIGHT: 212.5 LBS | BODY MASS INDEX: 27.27 KG/M2 | HEART RATE: 93 BPM

## 2019-01-07 DIAGNOSIS — B20 HUMAN IMMUNODEFICIENCY VIRUS (HIV) DISEASE: Primary | ICD-10-CM

## 2019-01-07 DIAGNOSIS — Z80.42 FAMILY HISTORY OF PROSTATE CANCER: ICD-10-CM

## 2019-01-07 DIAGNOSIS — R97.20 ELEVATED PSA: ICD-10-CM

## 2019-01-07 DIAGNOSIS — I10 ESSENTIAL HYPERTENSION: ICD-10-CM

## 2019-01-07 DIAGNOSIS — R74.8 ELEVATED LIVER ENZYMES: ICD-10-CM

## 2019-01-07 PROCEDURE — 90471 MENINGOCOCCAL CONJUGATE VACCINE 4-VALENT IM (MENACTRA): ICD-10-PCS | Mod: S$GLB,,, | Performed by: INTERNAL MEDICINE

## 2019-01-07 PROCEDURE — 90734 MENINGOCOCCAL CONJUGATE VACCINE 4-VALENT IM (MENACTRA): ICD-10-PCS | Mod: S$GLB,,, | Performed by: INTERNAL MEDICINE

## 2019-01-07 PROCEDURE — 90471 IMMUNIZATION ADMIN: CPT | Mod: S$GLB,,, | Performed by: INTERNAL MEDICINE

## 2019-01-07 PROCEDURE — 99214 OFFICE O/P EST MOD 30 MIN: CPT | Mod: S$GLB,,, | Performed by: INTERNAL MEDICINE

## 2019-01-07 PROCEDURE — 99999 PR PBB SHADOW E&M-EST. PATIENT-LVL III: CPT | Mod: PBBFAC,,, | Performed by: INTERNAL MEDICINE

## 2019-01-07 PROCEDURE — 90734 MENACWYD/MENACWYCRM VACC IM: CPT | Mod: S$GLB,,, | Performed by: INTERNAL MEDICINE

## 2019-01-07 PROCEDURE — 99214 PR OFFICE/OUTPT VISIT, EST, LEVL IV, 30-39 MIN: ICD-10-PCS | Mod: S$GLB,,, | Performed by: INTERNAL MEDICINE

## 2019-01-07 PROCEDURE — 99999 PR PBB SHADOW E&M-EST. PATIENT-LVL III: ICD-10-PCS | Mod: PBBFAC,,, | Performed by: INTERNAL MEDICINE

## 2019-01-07 NOTE — PROGRESS NOTES
Subjective:      Patient ID: Simón Fong is a 58 y.o. male.    Chief Complaint:Follow-up      History of Present Illness    Mr. Fong presents today to follow up for his HIV.     The patient has been seen by us for a number of years and in the past, he was on   Combivir and ritonavir.  He did have a drug holiday and he asked about that   once again and he has been discouraged from doing such.    He was switched to Truvada and boosted Reyataz; and then Stribild and Genvoya now.      Highly compliant with medications; no problems reported.      Routine examinations:  1. Vitamin D supplements daily.  2. Colonoscopy due 2018 which would be 7 years from the last.  3. Last anal pap 2012; and 2015 and 2018.  No dysplasia.  Family history:  two brothers were diagnosed with prostate cancer - the older one at 64 and a younger one, one year ago.  4. Vaccines updated except for menactra.     PSA was elevated on last visit - 4.6 and seen by urology - biopsy 8/13/2018 - prostate adenocarcinoma; followed by [facility].    Blood pressure elevated on last visit.. - 157/102 - BP med started - amlodipine.  BP at 150/80    He has retired and keeping active; exercises daily.    Component      Latest Ref Rng & Units 12/21/2018   WBC      3.90 - 12.70 K/uL 3.55 (L)   RBC      4.60 - 6.20 M/uL 4.67   Hemoglobin      14.0 - 18.0 g/dL 13.8 (L)   Hematocrit      40.0 - 54.0 % 43.2   MCV      82 - 98 fL 93   MCH      27.0 - 31.0 pg 29.6   MCHC      32.0 - 36.0 g/dL 31.9 (L)   RDW      11.5 - 14.5 % 12.4   Platelets      150 - 350 K/uL 217   MPV      9.2 - 12.9 fL 10.8   Immature Granulocytes      0.0 - 0.5 % 0.3   Gran # (ANC)      1.8 - 7.7 K/uL 1.4 (L)   Immature Grans (Abs)      0.00 - 0.04 K/uL 0.01   Lymph #      1.0 - 4.8 K/uL 1.6   Mono #      0.3 - 1.0 K/uL 0.4   Eos #      0.0 - 0.5 K/uL 0.1   Baso #      0.00 - 0.20 K/uL 0.02   nRBC      0 /100 WBC 0   Gran%      38.0 - 73.0 % 39.7   Lymph%      18.0 - 48.0 % 46.2   Mono%      4.0 -  15.0 % 11.8   Eosinophil%      0.0 - 8.0 % 1.4   Basophil%      0.0 - 1.9 % 0.6   Differential Method       Automated   Sodium      136 - 145 mmol/L 141   Potassium      3.5 - 5.1 mmol/L 4.6   Chloride      95 - 110 mmol/L 104   CO2      23 - 29 mmol/L 29   Glucose      70 - 110 mg/dL 94   BUN, Bld      6 - 20 mg/dL 22 (H)   Creatinine      0.5 - 1.4 mg/dL 0.9   Calcium      8.7 - 10.5 mg/dL 9.9   Total Protein      6.0 - 8.4 g/dL 7.8   Albumin      3.5 - 5.2 g/dL 4.2   Total Bilirubin      0.1 - 1.0 mg/dL 0.3   Alkaline Phosphatase      55 - 135 U/L 74   AST      10 - 40 U/L 46 (H)   ALT      10 - 44 U/L 32   Anion Gap      8 - 16 mmol/L 8   eGFR if African American      >60 mL/min/1.73 m:2 >60.0   eGFR if non African American      >60 mL/min/1.73 m:2 >60.0   Cholesterol      120 - 199 mg/dL 166   Triglycerides      30 - 150 mg/dL 50   HDL      40 - 75 mg/dL 72   LDL Cholesterol      63.0 - 159.0 mg/dL 84.0   HDL/Chol Ratio      20.0 - 50.0 % 43.4   Total Cholesterol/HDL Ratio      2.0 - 5.0 2.3   Non-HDL Cholesterol      mg/dL 94   HIV-1 RNA, Qual      Not detected Not detected   HIV 1 RNA Ultra      <40 Copies/mL <40   Log HIV Copies/mL      <1.60 Log (10) Copies/mL <1.60   HIV UQ Date Received       12/22/18   HIV UQ Date Reported       12/24/18   CD4 % [place] T Cell      28 - 57 % 35.9   Absolute CD4      300 - 1400 cells/ul 565   PSA, SCREEN      0.00 - 4.00 ng/mL 6.5 (H)       Review of Systems   Constitution: Negative for chills, decreased appetite, fever, weakness, malaise/fatigue, night sweats, weight gain and weight loss.   HENT: Negative for congestion, ear pain, hearing loss, hoarse voice, sore throat and tinnitus.    Eyes: Negative for blurred vision, redness and visual disturbance.   Cardiovascular: Negative for chest pain, leg swelling and palpitations.   Respiratory: Negative for cough, hemoptysis, shortness of breath and sputum production.    Hematologic/Lymphatic: Negative for adenopathy. Does not  bruise/bleed easily.   Skin: Negative for dry skin, itching, rash and suspicious lesions.   Musculoskeletal: Negative for back pain, joint pain, myalgias and neck pain.   Gastrointestinal: Negative for abdominal pain, constipation, diarrhea, heartburn, nausea and vomiting.   Genitourinary: Negative for dysuria, flank pain, frequency, hematuria, hesitancy and urgency.   Neurological: Negative for dizziness, headaches, numbness and paresthesias.   Psychiatric/Behavioral: Negative for depression and memory loss. The patient does not have insomnia and is not nervous/anxious.      Objective:   Physical Exam   Constitutional: He is oriented to person, place, and time. He appears well-developed and well-nourished.   HENT:   Head: Normocephalic and atraumatic.   Right Ear: External ear normal.   Left Ear: External ear normal.   Mouth/Throat: Oropharynx is clear and moist.   Eyes: Conjunctivae and EOM are normal. Pupils are equal, round, and reactive to light.   Neck: Normal range of motion. Neck supple. No thyromegaly present.   Cardiovascular: Normal rate, regular rhythm and normal heart sounds.   No murmur heard.  Pulmonary/Chest: Effort normal and breath sounds normal. He has no wheezes. He has no rales.   Abdominal: Soft. Bowel sounds are normal. He exhibits no mass. There is no tenderness. There is no rebound.   Musculoskeletal: Normal range of motion.   Lymphadenopathy:     He has no cervical adenopathy.   Neurological: He is alert and oriented to person, place, and time.   Skin: Skin is warm and dry.   Psychiatric: He has a normal mood and affect. His behavior is normal.   Vitals reviewed.    Assessment:       1. Human immunodeficiency virus (HIV) disease    2. Essential hypertension    3. Elevated liver enzymes    4. Elevated PSA    5. Family history of prostate cancer          Plan:       1. Given elevated liver enzymes, will get liver ultrasound; hepatitis serologies negative.  2. Colonoscopy 2020.   3. Update  vaccines - Menactra.  4. Follow up with [facility] for prostate cancer.   5. Continue vitamin d supplements; if not elevated, will consider Rx.

## 2019-01-07 NOTE — TELEPHONE ENCOUNTER
----- Message from Jodee Boo sent at 1/7/2019  3:48 PM CST -----  Contact: Pt:[phone]  .Needs Advice    Reason for call:Pt called and states he would like to speak with the nurse in regards to some questions and concerns he has.       Communication Preference:[phone]    Additional Information:

## 2019-01-07 NOTE — TELEPHONE ENCOUNTER
Called pt and cancelled his psa since he just had one drawn per his PCP.  All questions answered.  Pt verbalized understanding to all.

## 2019-01-11 ENCOUNTER — TELEPHONE (OUTPATIENT)
Dept: INFECTIOUS DISEASES | Facility: CLINIC | Age: 59
End: 2019-01-11

## 2019-01-11 NOTE — TELEPHONE ENCOUNTER
----- Message from Parisa Fowler MD sent at 1/11/2019  1:06 PM CST -----  Contact: Self  Whenever he wants to do it - is ok  ----- Message -----  From: Pamela Palumbo MA  Sent: 1/11/2019  11:30 AM  To: Parisa Fowler MD    Patient would like to know if this test has to be done now? He has a deductible and would like to know if this test could be done later?    Please advise.    ----- Message -----  From: Mai Blandon  Sent: 1/11/2019  10:44 AM  To: Janeth Corrigan    .Needs Advice    Reason for call: Pt is asking if absolutly necessary or can be delay : US ABD will cost Pt- $200, scheduled for 1/14, please call        Communication Preference:  [phone]    Additional Information:

## 2019-01-14 ENCOUNTER — HOSPITAL ENCOUNTER (OUTPATIENT)
Dept: CARDIOLOGY | Facility: CLINIC | Age: 59
Discharge: HOME | End: 2019-01-14
Payer: COMMERCIAL

## 2019-01-14 DIAGNOSIS — I10 ESSENTIAL HYPERTENSION: ICD-10-CM

## 2019-01-14 PROCEDURE — 93010 ELECTROCARDIOGRAM REPORT: CPT | Mod: S$GLB,,, | Performed by: INTERNAL MEDICINE

## 2019-01-14 PROCEDURE — 93005 ELECTROCARDIOGRAM TRACING: CPT | Mod: S$GLB,,, | Performed by: INTERNAL MEDICINE

## 2019-01-14 PROCEDURE — 93010 EKG 12-LEAD: ICD-10-PCS | Mod: S$GLB,,, | Performed by: INTERNAL MEDICINE

## 2019-01-14 PROCEDURE — 93005 EKG 12-LEAD: ICD-10-PCS | Mod: S$GLB,,, | Performed by: INTERNAL MEDICINE

## 2019-01-16 ENCOUNTER — OFFICE VISIT (OUTPATIENT)
Dept: UROLOGY | Facility: CLINIC | Age: 59
End: 2019-01-16
Payer: COMMERCIAL

## 2019-01-16 VITALS
WEIGHT: 213.88 LBS | HEART RATE: 84 BPM | DIASTOLIC BLOOD PRESSURE: 84 MMHG | BODY MASS INDEX: 27.46 KG/M2 | SYSTOLIC BLOOD PRESSURE: 131 MMHG

## 2019-01-16 DIAGNOSIS — C61 PROSTATE CANCER: Primary | ICD-10-CM

## 2019-01-16 DIAGNOSIS — Z80.42 FAMILY HISTORY OF PROSTATE CANCER: ICD-10-CM

## 2019-01-16 PROCEDURE — 99999 PR PBB SHADOW E&M-EST. PATIENT-LVL III: CPT | Mod: PBBFAC,,, | Performed by: UROLOGY

## 2019-01-16 PROCEDURE — 99214 PR OFFICE/OUTPT VISIT, EST, LEVL IV, 30-39 MIN: ICD-10-PCS | Mod: S$GLB,,, | Performed by: UROLOGY

## 2019-01-16 PROCEDURE — 99214 OFFICE O/P EST MOD 30 MIN: CPT | Mod: S$GLB,,, | Performed by: UROLOGY

## 2019-01-16 PROCEDURE — 99999 PR PBB SHADOW E&M-EST. PATIENT-LVL III: ICD-10-PCS | Mod: PBBFAC,,, | Performed by: UROLOGY

## 2019-01-16 NOTE — PROGRESS NOTES
Subjective:       Patient ID: Simón Fong is a 58 y.o. male.    Chief Complaint: Follow-up      HPI: Simón Fong is a 58 y.o. Black or  male who returns today in follow-up for management of a low-volume, low-risk prostate cancer on active surveillance since 8/2018.    The patient presented with an elevated PSA and a family history of prostate cancer. He underwent a MRI fusion prostate needle biopsy on 8/13/18 which showed 2 positive cores with Marcella 6 disease, less than 5% on each positive core.    The patient's PSA chronology is listed below:    Lab Results   Component Value Date    PSA 6.5 (H) 12/21/2018    PSA 7.5 (H) 04/30/2018    PSA 4.6 (H) 07/26/2017    PSA 3.5 04/17/2014    PSA 3.48 12/19/2012    PSA 2.97 02/29/2012    PSA 2.3 04/15/2010    PSA 2.4 01/08/2009    PSA 0.8 09/24/2007    PSA 1.1 05/17/2006     The patient returns today for routine surveillance as part of his active surveillance protocol.    He is doing very well without any complaints.    Review of patient's allergies indicates:   Allergen Reactions    Sulfa (sulfonamide antibiotics)        Current Outpatient Medications   Medication Sig Dispense Refill    amLODIPine (NORVASC) 10 MG tablet Take 1 tablet (10 mg total) by mouth once daily. 90 tablet 3    ascorbic acid, vitamin C, (VITAMIN C) 1000 MG tablet Take 1,000 mg by mouth once daily.      biotin 1 mg Cap Take by mouth.      creatine, bulk, 100 % Powd by Misc.(Non-Drug; Combo Route) route.      elviteg-cob-emtri-tenof ALAFEN (GENVOYA) 034-315-406-10 mg Tab Take 1 tablet by mouth once daily. 90 tablet 3    fish oil-omega-3 fatty acids 300-1,000 mg capsule Take by mouth once daily.      fluocinonide (LIDEX) 0.05 % gel Apply topically 2 (two) times daily. 15 g 1    glucosamine-chondroitin 500-400 mg tablet Take 1 tablet by mouth 3 (three) times daily.      leucine-isoleucine-valine (NEOKE BCAA4) 82 gram-328 kcal/100 gram Powd Take by mouth.       No current  facility-administered medications for this visit.        Past Medical History:   Diagnosis Date    HIV infection        History reviewed. No pertinent surgical history.    Family History   Problem Relation Age of Onset    Diabetes Mother     Heart disease Mother     Hypertension Mother     Stroke Mother     Hypertension Father     Cancer Sister     Cancer Brother        Review of Systems    Review of Systems   Constitutional: Negative for fever, chills, activity change, appetite change and unexpected weight change.   HENT: Negative for congestion, nosebleeds, sneezing, sore throat and trouble swallowing.    Eyes: Negative for pain, discharge, redness and visual disturbance.   Respiratory: Negative for cough, choking, chest tightness and shortness of breath.    Cardiovascular: Negative for chest pain, palpitations and leg swelling.   Gastrointestinal: Negative for nausea, vomiting, abdominal pain, diarrhea, blood in stool, abdominal distention and anal bleeding.  Genitourinary: As documented per HPI   Endocrine: Negative for cold intolerance, heat intolerance, polydipsia, polyphagia and polyuria.   Musculoskeletal: Negative for myalgias, gait problem, neck pain and neck stiffness.   Skin: Negative for color change, pallor, rash and wound.   Allergic/Immunologic: Negative for immunocompromised state.   Neurological: Negative for seizures, facial asymmetry, speech difficulty, weakness and light-headedness.   Hematological: Negative for adenopathy. Does not bruise/bleed easily.   Psychiatric/Behavioral: Negative for hallucinations, behavioral problems, self-injury and agitation. The patient is not hyperactive.    All other systems were reviewed and were negative.      Objective:     Vitals:    01/16/19 0943   BP: 131/84   Pulse: 84     Physical Exam   Vitals reviewed.  Constitutional: He is oriented to person, place, and time. He appears well-developed and well-nourished. No distress.   HENT:   Head:  Normocephalic and atraumatic.   Right Ear: External ear normal.   Left Ear: External ear normal.   Nose: Nose normal.   Eyes: EOM are normal. Pupils are equal, round, and reactive to light. Right eye exhibits no discharge. Left eye exhibits no discharge.   Neck: Normal range of motion. Neck supple. No tracheal deviation present. No thyroid enlargement or tenderness.  Cardiovascular: Regular rhythm and intact distal pulses. No signs of peripheral edema.    Pulmonary/Chest: Effort normal and breath sounds normal. No stridor. No respiratory distress. He has no wheezes.   Abdominal: Soft. Bowel sounds are normal. He exhibits no distension. There is no tenderness. Hernia confirmed negative in the right inguinal area and confirmed negative in the left inguinal area. No hepatic or splenic enlargement or tenderness.  Genitourinary: Penis normal. Right testis shows no mass, no swelling and no tenderness. Right testis is descended. Left testis shows no mass, no swelling and no tenderness. Left testis is descended. Circumcised. No phimosis or hypospadias.   BRIANNA: Normal rectal tone. Gr. 2, smooth, no nodules or induration.  Musculoskeletal: Normal range of motion. He exhibits no edema.   Neurological: He is alert and oriented to person, place, and time. He exhibits normal muscle tone. Coordination normal.   Skin: Skin is warm. No rash noted.   Lymphatic: No palpable lymph nodes.  Psychiatric: He has a normal mood and affect. His behavior is normal. Judgment and thought content normal.     Lab Results   Component Value Date    PSA 6.5 (H) 12/21/2018    PSA 7.5 (H) 04/30/2018    PSA 4.6 (H) 07/26/2017     Lab Results   Component Value Date    CREATININE 0.9 12/21/2018     Lab Results   Component Value Date    EGFRNONAA >60.0 12/21/2018     Lab Results   Component Value Date    ESTGFRAFRICA >60.0 12/21/2018     MRI prostate with and without (7/20/18): Heterogeneity of peripheral zone T2 signal, with single lesion appearing  equivocal for prostate cancer.  PIRADS 3 (the presence of clinically significant cancer is equivocal)    Assessment:       1. Prostate cancer    2. Family history of prostate cancer        Plan:     Simón was seen today for follow-up.    Diagnoses and all orders for this visit:    Prostate cancer  -     Prostate Specific Antigen, Diagnostic; Future    Family history of prostate cancer  -     Prostate Specific Antigen, Diagnostic; Future    The patient has low-risk, low-volume prostate cancer on active surveillance since 8/2018.    We spent a long time discussing the nature and natural history of prostate cancer. We also spent a long time discussing the difference between significant and insignificant prostate cancer and what each means and the treatment recommended for each. For low-risk, low-volume prostate cancer, active surveillance is an excellent treatment strategy, and I outlined that for the patient. It is a very reasonable alternative to active treatment and appears to carry very little risk of disease progression while on a course of active surveillance. Importantly, the patient and/or his partner/family need to be comfortable with active surveillance and the necessary follow-up. For those who wish definitve treatment, both surgery and radiation therapy offer excellent treatment results with varying potential side-effects (erectile dysfunction, voiding dysfunction, and bowel dysfunction [specific to radiation therapy]). We spent a long-time discussing the logistics of each treatment choice, and I answered all questions completely.    The patient is doing very well and his PSA result is reassuring. We will continue with active surveillance of his prostate cancer.    I will see him back in 6 months with a repeat PSA.    I answered all his questions.    I encouraged him or any of his family members to call or email me with questions and/or concerns.    I spent 30 minutes with the patient of which more than  half was spent in coordinating the patient's care as well as in direct consultation with the patient in regards to our treatment and plan.

## 2019-01-16 NOTE — PATIENT INSTRUCTIONS
What Is Prostate Cancer?    Cancer is when cells in the body change and grow out of control. Cancer cells can form lumps of tissue called tumors. Cancer that starts in the prostate is called prostate cancer. It can grow and spread beyond the prostate. Cancer that spreads is harder to treat.  Understanding the prostate  The prostate is a gland in men about the size and shape of a walnut. It surrounds the upper part of the urethra. This is the tube that carries urine from the bladder. The prostate makes some of the fluid thats part of semen. During orgasm, semen leaves the body through the urethra.  When prostate cancer forms  As a man ages, the cells of his prostate may change to form tumors or other growths. The types of growths include:  · Noncancerous growths. As a man ages, the prostate may grow larger. This is called benign prostatic hyperplasia (BPH). With BPH, extra prostate tissue often squeezes the urethra, causing symptoms such as trouble urinating. But BPH is not cancer and does not lead to cancer.  · Atypical cells. Sometimes prostate cells dont look like normal (typical) prostate cells. One type of abnormal growth is called prostatic intraepithelial neoplasia or PIN. Although PIN cells are not cancer cells, they may be a sign that cancer is likely to form.  · Cancer. When abnormal prostate cells grow out of control and start to invade other tissues, they are called cancer cells. These cells may or may not lead to symptoms. Some tumors can be felt during a physical exam, and some cant. Prostate cancer may grow into nearby organs or spread to nearby lymph nodes. Lymph nodes are small organs around the body that are part of the immune system. In some cases, the cancer spreads to bones or organs in distant parts of the body. This is called metastasis.  Diagnosing prostate cancer  Prostate cancer may not cause symptoms at first. Urinary problems are often not a sign of cancer, but of another condition,  such as BPH. To find out if you have prostate cancer, your healthcare provider must examine you and order tests. The tests help confirm a diagnosis of cancer. They also help give more information about a cancerous tumor. Tests might include:  · Prostate specific antigen (PSA) testing. PSA is a chemical made by prostate tissue. The amount of PSA in the blood (PSA level) is tested to check a mans risk for prostate cancer. In general, a high or rising PSA level may mean an increased cancer risk. A PSA test by itself cannot show if a man has prostate cancer. PSA testing is also used to check the success of cancer treatments.  · Core needle biopsy. This test is done to determine if a man has prostate cancer. A hollow needle is used to take small pieces of tissue from the prostate. This helps give more information about the cells. Before the test, pain medicine may be given to prevent pain. During the test, a small probe is inserted into the rectum. The probe sends an image of the prostate to a video monitor. With this image as a guide, the healthcare provider uses a thin, hollow needle to remove tiny tissue samples from the prostate. These are sent to a lab where they are looked at for cancer cells.  Date Last Reviewed: 5/1/2017  © 8048-3464 [employer]. [address] [place] [address], [place] [address]. All rights reserved. This information is not intended as a substitute for professional medical care. Always follow your healthcare professional's instructions.

## 2019-06-11 ENCOUNTER — OFFICE VISIT (OUTPATIENT)
Dept: DERMATOLOGY | Facility: CLINIC | Age: 59
End: 2019-06-11
Payer: COMMERCIAL

## 2019-06-11 VITALS — HEIGHT: 74 IN | WEIGHT: 213 LBS | BODY MASS INDEX: 27.34 KG/M2

## 2019-06-11 DIAGNOSIS — B35.1 ONYCHOMYCOSIS: ICD-10-CM

## 2019-06-11 DIAGNOSIS — L60.3 ONYCHODYSTROPHY: Primary | ICD-10-CM

## 2019-06-11 DIAGNOSIS — B36.0 TINEA VERSICOLOR: ICD-10-CM

## 2019-06-11 DIAGNOSIS — L80 VITILIGO: ICD-10-CM

## 2019-06-11 PROCEDURE — 99203 PR OFFICE/OUTPT VISIT, NEW, LEVL III, 30-44 MIN: ICD-10-PCS | Mod: S$GLB,,, | Performed by: DERMATOLOGY

## 2019-06-11 PROCEDURE — 99203 OFFICE O/P NEW LOW 30 MIN: CPT | Mod: S$GLB,,, | Performed by: DERMATOLOGY

## 2019-06-11 PROCEDURE — 99999 PR PBB SHADOW E&M-EST. PATIENT-LVL III: ICD-10-PCS | Mod: PBBFAC,,, | Performed by: DERMATOLOGY

## 2019-06-11 PROCEDURE — 99999 PR PBB SHADOW E&M-EST. PATIENT-LVL III: CPT | Mod: PBBFAC,,, | Performed by: DERMATOLOGY

## 2019-06-11 RX ORDER — CICLOPIROX 80 MG/ML
SOLUTION TOPICAL
Qty: 1 BOTTLE | Refills: 5 | Status: SHIPPED | OUTPATIENT
Start: 2019-06-11 | End: 2020-06-11

## 2019-06-11 RX ORDER — SELENIUM SULFIDE 22.5 MG/ML
SHAMPOO TOPICAL
Qty: 180 ML | Refills: 2 | Status: SHIPPED | OUTPATIENT
Start: 2019-06-11 | End: 2019-06-11

## 2019-06-11 RX ORDER — SELENIUM SULFIDE 2.5 MG/100ML
LOTION TOPICAL
Qty: 118 ML | Refills: 2 | Status: SHIPPED | OUTPATIENT
Start: 2019-06-11 | End: 2020-06-11

## 2019-06-11 NOTE — PROGRESS NOTES
Subjective:       Patient ID:  Simón Fong is a 59 y.o. male who presents for   Chief Complaint   Patient presents with    Skin Discoloration     scalp    Nail Problem     R big toe     HPI    New patient here today for skin discoloration in the scalp, noticed about 2 months ago, just having color changes. Tried psoriasis shampoo and cream, no change.   Hx of toenail fungus to the R big toe, was cleared with lamisil. Came back about 2 months ago, using topical RX penlac solution, doesn't notice change.    Review of Systems   Constitutional: Negative for fever, chills and fatigue.   HENT: Negative for congestion and sore throat.    Respiratory: Negative for cough.    Musculoskeletal: Negative for joint swelling and arthralgias.   Skin: Positive for dry skin, activity-related sunscreen use and wears hat. Negative for itching and rash.   Hematologic/Lymphatic: Does not bruise/bleed easily.        Objective:    Physical Exam   Constitutional: He appears well-developed and well-nourished. No distress.   Eyes: No conjunctival no injection.   Cardiovascular: There is no local extremity swelling.     Neurological: He is alert and oriented to person, place, and time. He is not disoriented.   Psychiatric: He has a normal mood and affect.   Skin:   Areas Examined (abnormalities noted in diagram):   Scalp / Hair Palpated and Inspected  Head / Face Inspection Performed  Neck Inspection Performed  Back Inspection Performed  RUE Inspected  LUE Inspection Performed  Nails and Digits Inspection Performed                       Diagram Legend     Erythematous scaling macule/papule c/w actinic keratosis       Vascular papule c/w angioma      Pigmented verrucoid papule/plaque c/w seborrheic keratosis      Yellow umbilicated papule c/w sebaceous hyperplasia      Irregularly shaped tan macule c/w lentigo     1-2 mm smooth white papules consistent with Milia      Movable subcutaneous cyst with punctum c/w epidermal inclusion cyst       Subcutaneous movable cyst c/w pilar cyst      Firm pink to brown papule c/w dermatofibroma      Pedunculated fleshy papule(s) c/w skin tag(s)      Evenly pigmented macule c/w junctional nevus     Mildly variegated pigmented, slightly irregular-bordered macule c/w mildly atypical nevus      Flesh colored to evenly pigmented papule c/w intradermal nevus       Pink pearly papule/plaque c/w basal cell carcinoma      Erythematous hyperkeratotic cursted plaque c/w SCC      Surgical scar with no sign of skin cancer recurrence      Open and closed comedones      Inflammatory papules and pustules      Verrucoid papule consistent consistent with wart     Erythematous eczematous patches and plaques     Dystrophic onycholytic nail with subungual debris c/w onychomycosis     Umbilicated papule    Erythematous-base heme-crusted tan verrucoid plaque consistent with inflamed seborrheic keratosis     Erythematous Silvery Scaling Plaque c/w Psoriasis     See annotation      Assessment / Plan:        Onychodystrophy  Discussed with patient the etiology and pathogenesis of the disease or skin lesion(s) and possible treatments and aggravators.    Patient instructed to start Amlactin cream or lotion nightly to AK prone areas or other specified affected areas.  Warned of skin irritation and to decrease frequency of usage if this occurs.  No hot water bathing reviewed.  Primary issue dc'd.    Onychomycosis  -     ciclopirox (PENLAC) 8 % Soln; Apply daily to affected nail. Must remove and restart weekly.  Dispense: 1 Bottle; Refill: 5  Reviewed with patient different treatment options and associated risks.  Chronic nature of this condition discussed with patient.  Proper application of medications and or care for affected area(s) and condition(s) reviewed.  Secondary issue dc'd.    Tinea versicolor  -     selenium sulfide 2.25 % Sham; Use on scalp x 3 minute soaks qd or qod.  Watch for irritation.  Dispense: 180 mL; Refill:  2  Possible.  Discussed with patient the etiology and pathogenesis of the disease or skin lesion(s) and possible treatments and aggravators.    Proper application of medications and or care for affected area(s) and condition(s) reviewed.  Reviewed with patient different treatment options and associated risks.    Vitiligo  Suspected.  Treat first for atypical tv and then reeval.  Discussed with patient the etiology and pathogenesis of the disease or skin lesion(s) and possible treatments and aggravators.               No follow-ups on file.

## 2019-06-12 ENCOUNTER — TELEPHONE (OUTPATIENT)
Dept: INFECTIOUS DISEASES | Facility: CLINIC | Age: 59
End: 2019-06-12

## 2019-06-12 DIAGNOSIS — Z21 HIV POSITIVE: Primary | ICD-10-CM

## 2019-06-21 ENCOUNTER — LAB VISIT (OUTPATIENT)
Dept: LAB | Facility: OTHER | Age: 59
End: 2019-06-21
Payer: COMMERCIAL

## 2019-06-21 DIAGNOSIS — Z21 HIV POSITIVE: ICD-10-CM

## 2019-06-21 LAB
ALBUMIN SERPL BCP-MCNC: 4.3 G/DL (ref 3.5–5.2)
ALP SERPL-CCNC: 69 U/L (ref 55–135)
ALT SERPL W/O P-5'-P-CCNC: 24 U/L (ref 10–44)
ANION GAP SERPL CALC-SCNC: 9 MMOL/L (ref 8–16)
AST SERPL-CCNC: 25 U/L (ref 10–40)
BASOPHILS # BLD AUTO: 0.01 K/UL (ref 0–0.2)
BASOPHILS NFR BLD: 0.3 % (ref 0–1.9)
BILIRUB SERPL-MCNC: 0.5 MG/DL (ref 0.1–1)
BUN SERPL-MCNC: 17 MG/DL (ref 6–20)
CALCIUM SERPL-MCNC: 9.4 MG/DL (ref 8.7–10.5)
CD3+CD4+ CELLS # BLD: 687 CELLS/UL (ref 300–1400)
CD3+CD4+ CELLS NFR BLD: 36.9 % (ref 28–57)
CHLORIDE SERPL-SCNC: 106 MMOL/L (ref 95–110)
CHOLEST SERPL-MCNC: 162 MG/DL (ref 120–199)
CHOLEST/HDLC SERPL: 2.4 {RATIO} (ref 2–5)
CO2 SERPL-SCNC: 27 MMOL/L (ref 23–29)
CREAT SERPL-MCNC: 1.1 MG/DL (ref 0.5–1.4)
DIFFERENTIAL METHOD: ABNORMAL
EOSINOPHIL # BLD AUTO: 0.1 K/UL (ref 0–0.5)
EOSINOPHIL NFR BLD: 1.5 % (ref 0–8)
ERYTHROCYTE [DISTWIDTH] IN BLOOD BY AUTOMATED COUNT: 12.9 % (ref 11.5–14.5)
EST. GFR  (AFRICAN AMERICAN): >60 ML/MIN/1.73 M^2
EST. GFR  (NON AFRICAN AMERICAN): >60 ML/MIN/1.73 M^2
GLUCOSE SERPL-MCNC: 87 MG/DL (ref 70–110)
HCT VFR BLD AUTO: 42.2 % (ref 40–54)
HDLC SERPL-MCNC: 67 MG/DL (ref 40–75)
HDLC SERPL: 41.4 % (ref 20–50)
HGB BLD-MCNC: 14.1 G/DL (ref 14–18)
LDLC SERPL CALC-MCNC: 84.6 MG/DL (ref 63–159)
LYMPHOCYTES # BLD AUTO: 1.6 K/UL (ref 1–4.8)
LYMPHOCYTES NFR BLD: 48.6 % (ref 18–48)
MCH RBC QN AUTO: 30.1 PG (ref 27–31)
MCHC RBC AUTO-ENTMCNC: 33.4 G/DL (ref 32–36)
MCV RBC AUTO: 90 FL (ref 82–98)
MONOCYTES # BLD AUTO: 0.3 K/UL (ref 0.3–1)
MONOCYTES NFR BLD: 10.2 % (ref 4–15)
NEUTROPHILS # BLD AUTO: 1.3 K/UL (ref 1.8–7.7)
NEUTROPHILS NFR BLD: 39.1 % (ref 38–73)
NONHDLC SERPL-MCNC: 95 MG/DL
PLATELET # BLD AUTO: 244 K/UL (ref 150–350)
PMV BLD AUTO: 9.9 FL (ref 9.2–12.9)
POTASSIUM SERPL-SCNC: 4.2 MMOL/L (ref 3.5–5.1)
PROT SERPL-MCNC: 7.4 G/DL (ref 6–8.4)
RBC # BLD AUTO: 4.69 M/UL (ref 4.6–6.2)
SODIUM SERPL-SCNC: 142 MMOL/L (ref 136–145)
TRIGL SERPL-MCNC: 52 MG/DL (ref 30–150)
WBC # BLD AUTO: 3.33 K/UL (ref 3.9–12.7)

## 2019-06-21 PROCEDURE — 85025 COMPLETE CBC W/AUTO DIFF WBC: CPT

## 2019-06-21 PROCEDURE — 80061 LIPID PANEL: CPT

## 2019-06-21 PROCEDURE — 80053 COMPREHEN METABOLIC PANEL: CPT

## 2019-06-21 PROCEDURE — 86361 T CELL ABSOLUTE COUNT: CPT

## 2019-06-21 PROCEDURE — 87536 HIV-1 QUANT&REVRSE TRNSCRPJ: CPT

## 2019-07-08 ENCOUNTER — OFFICE VISIT (OUTPATIENT)
Dept: INFECTIOUS DISEASES | Facility: CLINIC | Age: 59
End: 2019-07-08
Payer: COMMERCIAL

## 2019-07-08 VITALS
TEMPERATURE: 98 F | HEIGHT: 73 IN | SYSTOLIC BLOOD PRESSURE: 151 MMHG | BODY MASS INDEX: 28.16 KG/M2 | HEART RATE: 77 BPM | DIASTOLIC BLOOD PRESSURE: 90 MMHG | WEIGHT: 212.5 LBS

## 2019-07-08 DIAGNOSIS — R97.20 ELEVATED PSA: ICD-10-CM

## 2019-07-08 DIAGNOSIS — B20 HUMAN IMMUNODEFICIENCY VIRUS (HIV) DISEASE: ICD-10-CM

## 2019-07-08 DIAGNOSIS — I10 ESSENTIAL HYPERTENSION: ICD-10-CM

## 2019-07-08 DIAGNOSIS — Z80.42 FAMILY HISTORY OF PROSTATE CANCER: ICD-10-CM

## 2019-07-08 DIAGNOSIS — R74.8 ELEVATED LIVER ENZYMES: Primary | ICD-10-CM

## 2019-07-08 PROCEDURE — 99214 OFFICE O/P EST MOD 30 MIN: CPT | Mod: S$GLB,,, | Performed by: INTERNAL MEDICINE

## 2019-07-08 PROCEDURE — 99999 PR PBB SHADOW E&M-EST. PATIENT-LVL III: ICD-10-PCS | Mod: PBBFAC,,, | Performed by: INTERNAL MEDICINE

## 2019-07-08 PROCEDURE — 99999 PR PBB SHADOW E&M-EST. PATIENT-LVL III: CPT | Mod: PBBFAC,,, | Performed by: INTERNAL MEDICINE

## 2019-07-08 PROCEDURE — 99214 PR OFFICE/OUTPT VISIT, EST, LEVL IV, 30-39 MIN: ICD-10-PCS | Mod: S$GLB,,, | Performed by: INTERNAL MEDICINE

## 2019-07-08 NOTE — PROGRESS NOTES
Subjective:      Patient ID: Simón Fong is a 59 y.o. male.    Chief Complaint:Follow-up      History of Present Illness    Mr. Fong presents today to follow up for his HIV.     Summary:   The patient has been seen by us for a number of years and in the past, he was on   Combivir and ritonavir.  He did have a drug holiday and he asked about that   once again and he has been discouraged from doing such.    He was switched to Truvada and boosted Reyataz; and then Stribild and Genvoya now.      Highly adherent with medications; no problems reported.      Routine examinations:  1. Vitamin D supplements daily.  2. Colonoscopy due 2018 which would be 7 years from the last.  3. Last anal pap 2012; and 2015 and 2018.  No dysplasia.  Family history:  two brothers were diagnosed with prostate cancer - the older one at 64 and a younger one, one year ago.  4. Vaccines updated    PSA was elevated on last visit - 4.6 and seen by urology - biopsy 8/13/2018 - prostate adenocarcinoma; followed by [facility].    Blood pressure elevated on last visit.. - 157/102 - BP med started - amlodipine.  BP at 150/80  Now still elevated.     He has retired and keeping active; exercises daily.    Review of Systems   Constitution: Negative for chills, decreased appetite, fever, malaise/fatigue, night sweats, weight gain and weight loss.   HENT: Negative for congestion, ear pain, hearing loss, hoarse voice, sore throat and tinnitus.    Eyes: Negative for blurred vision, redness and visual disturbance.   Cardiovascular: Negative for chest pain, leg swelling and palpitations.   Respiratory: Negative for cough, hemoptysis, shortness of breath and sputum production.    Hematologic/Lymphatic: Negative for adenopathy. Does not bruise/bleed easily.   Skin: Negative for dry skin, itching, rash and suspicious lesions.   Musculoskeletal: Negative for back pain, joint pain, myalgias and neck pain.   Gastrointestinal: Negative for abdominal pain, constipation,  diarrhea, heartburn, nausea and vomiting.   Genitourinary: Negative for dysuria, flank pain, frequency, hematuria, hesitancy and urgency.   Neurological: Negative for dizziness, headaches, numbness, paresthesias and weakness.   Psychiatric/Behavioral: Negative for depression and memory loss. The patient does not have insomnia and is not nervous/anxious.      Objective:   Physical Exam   Constitutional: He is oriented to person, place, and time. He appears well-developed and well-nourished.   HENT:   Head: Normocephalic and atraumatic.   Mouth/Throat: Oropharynx is clear and moist.   Eyes: Pupils are equal, round, and reactive to light. Conjunctivae and EOM are normal.   Neck: Normal range of motion. Neck supple. No thyromegaly present.   Cardiovascular: Normal rate, regular rhythm and normal heart sounds.   No murmur heard.  Pulmonary/Chest: Effort normal and breath sounds normal. He has no wheezes. He has no rales.   Abdominal: Soft. Bowel sounds are normal. He exhibits no mass. There is no tenderness. There is no rebound.   Musculoskeletal: Normal range of motion.   Lymphadenopathy:     He has no cervical adenopathy.   Neurological: He is alert and oriented to person, place, and time.   Skin: Skin is warm and dry.   Psychiatric: He has a normal mood and affect. His behavior is normal.   Vitals reviewed.    Assessment:       1. Elevated liver enzymes    2. Human immunodeficiency virus (HIV) disease    3. Elevated PSA    4. Family history of prostate cancer    5. Essential hypertension          Plan:         1. Ultrasound abdomen.  2. Follow up in 6 months with labs prior.   3. Better control of blood pressure - keep diary but suspect needs additional drug.   4. Repeat colonoscopy either 2020 or 2021.

## 2019-07-18 ENCOUNTER — PATIENT MESSAGE (OUTPATIENT)
Dept: UROLOGY | Facility: CLINIC | Age: 59
End: 2019-07-18

## 2019-07-24 ENCOUNTER — PATIENT MESSAGE (OUTPATIENT)
Dept: INFECTIOUS DISEASES | Facility: CLINIC | Age: 59
End: 2019-07-24

## 2019-07-25 ENCOUNTER — PATIENT MESSAGE (OUTPATIENT)
Dept: INFECTIOUS DISEASES | Facility: CLINIC | Age: 59
End: 2019-07-25

## 2019-07-26 ENCOUNTER — TELEPHONE (OUTPATIENT)
Dept: UROLOGY | Facility: CLINIC | Age: 59
End: 2019-07-26

## 2019-07-26 NOTE — TELEPHONE ENCOUNTER
Called pt and rescheduled his appt per his request. Pt verbalized understanding and thanked me for calling him back.

## 2019-07-26 NOTE — TELEPHONE ENCOUNTER
----- Message from Stefani Valentine RN sent at 7/25/2019  4:57 PM CDT -----  Contact: Pt.Self       ----- Message -----  From: Bella Higginbotham  Sent: 7/25/2019   8:13 AM  To: [facility] Staff    Needs Advice    Reason for call:  Pt. States he would like to speak with nurse in reference to getting new urologist          Communication Preference:  [phone]    Additional Information:    Thank You

## 2019-07-29 ENCOUNTER — OFFICE VISIT (OUTPATIENT)
Dept: DERMATOLOGY | Facility: CLINIC | Age: 59
End: 2019-07-29
Payer: COMMERCIAL

## 2019-07-29 VITALS — WEIGHT: 212 LBS | BODY MASS INDEX: 27.97 KG/M2

## 2019-07-29 DIAGNOSIS — B36.0 TINEA VERSICOLOR: Primary | ICD-10-CM

## 2019-07-29 DIAGNOSIS — L80 VITILIGO: ICD-10-CM

## 2019-07-29 DIAGNOSIS — L60.3 ONYCHODYSTROPHY: ICD-10-CM

## 2019-07-29 DIAGNOSIS — B35.1 ONYCHOMYCOSIS: ICD-10-CM

## 2019-07-29 PROCEDURE — 99999 PR PBB SHADOW E&M-EST. PATIENT-LVL II: ICD-10-PCS | Mod: PBBFAC,,, | Performed by: DERMATOLOGY

## 2019-07-29 PROCEDURE — 99213 OFFICE O/P EST LOW 20 MIN: CPT | Mod: S$GLB,,, | Performed by: DERMATOLOGY

## 2019-07-29 PROCEDURE — 99213 PR OFFICE/OUTPT VISIT, EST, LEVL III, 20-29 MIN: ICD-10-PCS | Mod: S$GLB,,, | Performed by: DERMATOLOGY

## 2019-07-29 PROCEDURE — 99999 PR PBB SHADOW E&M-EST. PATIENT-LVL II: CPT | Mod: PBBFAC,,, | Performed by: DERMATOLOGY

## 2019-07-29 NOTE — PROGRESS NOTES
Last OV 6/11/2019  Onychodystrophy  Discussed with patient the etiology and pathogenesis of the disease or skin lesion(s) and possible treatments and aggravators.    Patient instructed to start Amlactin cream or lotion nightly to AK prone areas or other specified affected areas.  Warned of skin irritation and to decrease frequency of usage if this occurs.  No hot water bathing reviewed.  Primary issue dc'd.     Onychomycosis  -     ciclopirox (PENLAC) 8 % Soln; Apply daily to affected nail. Must remove and restart weekly.  Dispense: 1 Bottle; Refill: 5  Reviewed with patient different treatment options and associated risks.  Chronic nature of this condition discussed with patient.  Proper application of medications and or care for affected area(s) and condition(s) reviewed.  Secondary issue dc'd.     Tinea versicolor  -     selenium sulfide 2.25 % Sham; Use on scalp x 3 minute soaks qd or qod.  Watch for irritation.  Dispense: 180 mL; Refill: 2  Possible.  Discussed with patient the etiology and pathogenesis of the disease or skin lesion(s) and possible treatments and aggravators.    Proper application of medications and or care for affected area(s) and condition(s) reviewed.  Reviewed with patient different treatment options and associated risks.     Vitiligo  Suspected.  Treat first for atypical tv and then reeval.  Discussed with patient the etiology and pathogenesis of the disease or skin lesion(s) and possible treatments and aggravators.       Returns today for follow up.  Using the seleniun sulfide daily with about 3 minute soak.  Additionally using the Penlac to paint toenails daily.   Some improvement.      Subjective:       Patient ID:  Simón Fong is a 59 y.o. male who presents for No chief complaint on file.    HPI    Review of Systems   Constitutional: Negative for fever, chills and fatigue.   HENT: Negative for congestion and sore throat.    Respiratory: Negative for cough.    Musculoskeletal:  Negative for joint swelling and arthralgias.   Skin: Positive for dry skin, activity-related sunscreen use and wears hat. Negative for itching and rash.   Hematologic/Lymphatic: Does not bruise/bleed easily.        Objective:    Physical Exam   Constitutional: He appears well-developed and well-nourished. No distress.   Eyes: No conjunctival no injection.   Cardiovascular: There is no local extremity swelling.     Neurological: He is alert and oriented to person, place, and time. He is not disoriented.   Psychiatric: He has a normal mood and affect.   Skin:   Areas Examined (abnormalities noted in diagram):   Scalp / Hair Palpated and Inspected  Head / Face Inspection Performed  Neck Inspection Performed  Chest / Axilla Inspection Performed  Abdomen Inspection Performed  Back Inspection Performed  RUE Inspected  LUE Inspection Performed  RLE Inspected  LLE Inspection Performed  Nails and Digits Inspection Performed                       Diagram Legend     Erythematous scaling macule/papule c/w actinic keratosis       Vascular papule c/w angioma      Pigmented verrucoid papule/plaque c/w seborrheic keratosis      Yellow umbilicated papule c/w sebaceous hyperplasia      Irregularly shaped tan macule c/w lentigo     1-2 mm smooth white papules consistent with Milia      Movable subcutaneous cyst with punctum c/w epidermal inclusion cyst      Subcutaneous movable cyst c/w pilar cyst      Firm pink to brown papule c/w dermatofibroma      Pedunculated fleshy papule(s) c/w skin tag(s)      Evenly pigmented macule c/w junctional nevus     Mildly variegated pigmented, slightly irregular-bordered macule c/w mildly atypical nevus      Flesh colored to evenly pigmented papule c/w intradermal nevus       Pink pearly papule/plaque c/w basal cell carcinoma      Erythematous hyperkeratotic cursted plaque c/w SCC      Surgical scar with no sign of skin cancer recurrence      Open and closed comedones      Inflammatory papules and  pustules      Verrucoid papule consistent consistent with wart     Erythematous eczematous patches and plaques     Dystrophic onycholytic nail with subungual debris c/w onychomycosis     Umbilicated papule    Erythematous-base heme-crusted tan verrucoid plaque consistent with inflamed seborrheic keratosis     Erythematous Silvery Scaling Plaque c/w Psoriasis     See annotation      Assessment / Plan:        Tinea versicolor  Watch for seb derm.  Discussed with patient the etiology and pathogenesis of the disease or skin lesion(s) and possible treatments and aggravators.    Reviewed with patient different treatment options and associated risks.  Cont janet sulf 2.5 regularly.  Chronic nature of this condition discussed with patient.    Hypopigmentation  Okay to get some sun.  Discussed with the patient the risk of color scars or hyperpigmentation or hypopigmentation that could take months to resolve.  Discussed with patient the etiology and pathogenesis of the disease or skin lesion(s) and possible treatments and aggravators.    Skin biopsy offered today, but patient wishes to wait on this for now.    Onychomycosis  Suspected.  Cont penlac qhs.  Reviewed with patient different treatment options and associated risks.    Onychodystrophy  Improving.  Cont amlactin qam.  Reviewed with patient different treatment options and associated risks.    Vitiligo  Unlikely.  Discussed with patient the etiology and pathogenesis of the disease or skin lesion(s) and possible treatments and aggravators.    We will recheck the scalp at our follow up appointment.             Follow up in about 2 months (around 9/29/2019).

## 2019-07-29 NOTE — PATIENT INSTRUCTIONS
Continue the selenium sulfide as a body wash at least 3 times a week allowing to soak at least 3 minutes each treatment.    Continue the Penlac to the toenails daily.        These treatments will continue to make improvements to skin but will take time for the discoloration to improve.  Some nail treatments can take 6 months to a year to resolve and will need continued treatment.

## 2019-08-01 ENCOUNTER — PATIENT MESSAGE (OUTPATIENT)
Dept: UROLOGY | Facility: CLINIC | Age: 59
End: 2019-08-01

## 2019-08-02 ENCOUNTER — LAB VISIT (OUTPATIENT)
Dept: LAB | Facility: OTHER | Age: 59
End: 2019-08-02
Attending: UROLOGY
Payer: COMMERCIAL

## 2019-08-02 DIAGNOSIS — Z80.42 FAMILY HISTORY OF PROSTATE CANCER: ICD-10-CM

## 2019-08-02 DIAGNOSIS — C61 PROSTATE CANCER: ICD-10-CM

## 2019-08-02 LAB — COMPLEXED PSA SERPL-MCNC: 8.2 NG/ML (ref 0–4)

## 2019-08-02 PROCEDURE — 84153 ASSAY OF PSA TOTAL: CPT

## 2019-08-02 PROCEDURE — 36415 COLL VENOUS BLD VENIPUNCTURE: CPT

## 2019-08-06 ENCOUNTER — OFFICE VISIT (OUTPATIENT)
Dept: UROLOGY | Facility: CLINIC | Age: 59
End: 2019-08-06
Payer: COMMERCIAL

## 2019-08-06 VITALS
DIASTOLIC BLOOD PRESSURE: 78 MMHG | WEIGHT: 214.06 LBS | BODY MASS INDEX: 28.24 KG/M2 | SYSTOLIC BLOOD PRESSURE: 139 MMHG | HEART RATE: 84 BPM

## 2019-08-06 DIAGNOSIS — N40.1 BPH WITH URINARY OBSTRUCTION: ICD-10-CM

## 2019-08-06 DIAGNOSIS — C61 PROSTATE CANCER: Primary | ICD-10-CM

## 2019-08-06 DIAGNOSIS — R97.20 ELEVATED PSA: ICD-10-CM

## 2019-08-06 DIAGNOSIS — R35.1 NOCTURIA: ICD-10-CM

## 2019-08-06 DIAGNOSIS — N13.8 BPH WITH URINARY OBSTRUCTION: ICD-10-CM

## 2019-08-06 LAB
BILIRUB SERPL-MCNC: NORMAL MG/DL
BLOOD URINE, POC: NORMAL
COLOR, POC UA: YELLOW
GLUCOSE UR QL STRIP: NORMAL
KETONES UR QL STRIP: NORMAL
LEUKOCYTE ESTERASE URINE, POC: NORMAL
NITRITE, POC UA: NORMAL
PH, POC UA: 5
PROTEIN, POC: NORMAL
SPECIFIC GRAVITY, POC UA: 1.02
UROBILINOGEN, POC UA: NORMAL

## 2019-08-06 PROCEDURE — 81002 POCT URINE DIPSTICK WITHOUT MICROSCOPE: ICD-10-PCS | Mod: S$GLB,,, | Performed by: NURSE PRACTITIONER

## 2019-08-06 PROCEDURE — 99214 PR OFFICE/OUTPT VISIT, EST, LEVL IV, 30-39 MIN: ICD-10-PCS | Mod: 25,S$GLB,, | Performed by: NURSE PRACTITIONER

## 2019-08-06 PROCEDURE — 99214 OFFICE O/P EST MOD 30 MIN: CPT | Mod: 25,S$GLB,, | Performed by: NURSE PRACTITIONER

## 2019-08-06 PROCEDURE — 81002 URINALYSIS NONAUTO W/O SCOPE: CPT | Mod: S$GLB,,, | Performed by: NURSE PRACTITIONER

## 2019-08-06 PROCEDURE — 99999 PR PBB SHADOW E&M-EST. PATIENT-LVL IV: ICD-10-PCS | Mod: PBBFAC,,, | Performed by: NURSE PRACTITIONER

## 2019-08-06 PROCEDURE — 99999 PR PBB SHADOW E&M-EST. PATIENT-LVL IV: CPT | Mod: PBBFAC,,, | Performed by: NURSE PRACTITIONER

## 2019-08-06 RX ORDER — DUTASTERIDE 0.5 MG/1
0.5 CAPSULE, LIQUID FILLED ORAL DAILY
Qty: 90 CAPSULE | Refills: 3 | Status: SHIPPED | OUTPATIENT
Start: 2019-08-06 | End: 2020-06-11

## 2019-08-06 NOTE — PROGRESS NOTES
Subjective:       Patient ID: Simón Fong is a 59 y.o. male.    Chief Complaint: Prostate Cancer (f/u psa)    Simón Fong is a 59 y.o. male who returns today in follow-up for management of a low-volume, low-risk prostate cancer on active surveillance since 8/2018.  The patient presented with an elevated PSA and a family history of prostate cancer.   He underwent a MRI fusion prostate needle biopsy on 08/13/2018 which showed 2 positive cores with Sinton 6 disease, less than 5% on each positive core.  His PSA was 7.5 at the time with a prostate volume of 47. 3cc    He was last seen in clinic with Dr. Richardson 01/16/2019.    He is here today for his 6 month visit for active surveillance of prostate cancer.  He voices no complaints.  No issues with urination or ED  No abdominal pain or discomfort.     He had questions regarding his surveillance, PSA results, & treatment; he had interest in Laser Focal Therapy with MRI-guided laser ablation but he now realizes he is not a candidate.   He also concerned that his rise in PSA can be from BPH and not just prostate cancer.   No one has addressed his BPH.  He does report nocturia x 2.               PSA                      8.2 (H)             08/02/2019                    PSA                      6.5 (H)             12/21/2018                 PSA                      7.5 (H)             04/30/2018                 PSA                      4.6 (H)             07/26/2017                 PSA                      3.5                 04/17/2014                 PSA                      3.48                12/19/2012                 PSA                      2.97                02/29/2012                 PSA                      2.3                 04/15/2010                 PSA                      2.4                 01/08/2009                 PSA                      0.8                 09/24/2007                 PSA                      1.1                 05/17/2006                                Past Medical History:  No date: HIV infection    History reviewed. No pertinent surgical history.    Review of patient's family history indicates:  Problem: Diabetes      Relation: Mother          Age of Onset: (Not Specified)  Problem: Heart disease      Relation: Mother          Age of Onset: (Not Specified)  Problem: Hypertension      Relation: Mother          Age of Onset: (Not Specified)  Problem: Stroke      Relation: Mother          Age of Onset: (Not Specified)  Problem: Hypertension      Relation: Father          Age of Onset: (Not Specified)  Problem: Cancer      Relation: Sister          Age of Onset: (Not Specified)  Problem: Cancer      Relation: Brother          Age of Onset: (Not Specified)      Social History    Socioeconomic History      Marital status: Single      Spouse name: Not on file      Number of children: Not on file      Years of education: Not on file      Highest education level: Not on file    Occupational History      Not on file    Social Needs      Financial resource strain: Not on file      Food insecurity:        Worry: Not on file        Inability: Not on file      Transportation needs:        Medical: Not on file        Non-medical: Not on file    Tobacco Use      Smoking status: Never Smoker      Smokeless tobacco: Never Used    Substance and Sexual Activity      Alcohol use: No      Drug use: No      Sexual activity: Not on file    Lifestyle      Physical activity:        Days per week: Not on file        Minutes per session: Not on file      Stress: Not on file    Relationships      Social connections:        Talks on phone: Not on file        Gets together: Not on file        Attends [employer] service: Not on file        Active member of club or organization: Not on file        Attends meetings of clubs or organizations: Not on file        Relationship status: Not on file    Other Topics      Concerns:        Not on file    Social History Narrative      Not  on file      Allergies:  Sulfa (sulfonamide antibiotics)    Medications:  Current Outpatient Medications:   amLODIPine (NORVASC) 10 MG tablet, Take 1 tablet (10 mg total) by mouth once daily., Disp: 90 tablet, Rfl: 3  ascorbic acid, vitamin C, (VITAMIN C) 1000 MG tablet, Take 1,000 mg by mouth once daily., Disp: , Rfl:   biotin 1 mg Cap, Take by mouth., Disp: , Rfl:   ciclopirox (PENLAC) 8 % Soln, Apply daily to affected nail. Must remove and restart weekly., Disp: 1 Bottle, Rfl: 5  creatine, bulk, 100 % Powd, by Misc.(Non-Drug; Combo Route) route., Disp: , Rfl:   elviteg-cob-emtri-tenof ALAFEN (GENVOYA) [phone] mg Tab, Take 1 tablet by mouth once daily., Disp: 90 tablet, Rfl: 3  fish oil-omega-3 fatty acids 300-1,000 mg capsule, Take by mouth once daily., Disp: , Rfl:   glucosamine-chondroitin 500-400 mg tablet, Take 1 tablet by mouth 3 (three) times daily., Disp: , Rfl:   leucine-isoleucine-valine (NEOKE BCAA4) 82 gram-328 kcal/100 gram Powd, Take by mouth., Disp: , Rfl:   selenium sulfide 2.5 % Lotn, Use on scalp qd or less for 3-5 minute soaks.  Watch for skin irritation and if so, use less often or for less time., Disp: 118 mL, Rfl: 2  dutasteride (AVODART) 0.5 mg capsule, Take 1 capsule (0.5 mg total) by mouth once daily., Disp: 90 capsule, Rfl: 3  fluocinonide (LIDEX) 0.05 % gel, Apply topically 2 (two) times daily., Disp: 15 g, Rfl: 1             Review of Systems   Constitutional: Negative for activity change, appetite change, chills and fever.   HENT: Negative for facial swelling and trouble swallowing.    Eyes: Negative for visual disturbance.   Respiratory: Negative for chest tightness and shortness of breath.    Cardiovascular: Negative for chest pain and palpitations.   Gastrointestinal: Negative.  Negative for abdominal pain, constipation, diarrhea, nausea and vomiting.   Genitourinary: Positive for nocturia. Negative for difficulty urinating, dysuria, flank pain, hematuria, penile  pain, penile swelling, scrotal swelling and testicular pain.        Ok with urination  Nocturia x 2.     Musculoskeletal: Negative for back pain, gait problem, myalgias and neck stiffness.   Skin: Negative for rash.   Neurological: Negative for dizziness and speech difficulty.   Hematological: Does not bruise/bleed easily.   Psychiatric/Behavioral: Negative for behavioral problems.       Objective:      Physical Exam   Nursing note and vitals reviewed.  Constitutional: He is oriented to person, place, and time. Vital signs are normal. He appears well-developed and well-nourished. He is active and cooperative.  Non-toxic appearance. He does not have a sickly appearance.   Urine dipped clear of infection in the office today.     HENT:   Head: Normocephalic and atraumatic.   Right Ear: External ear normal.   Left Ear: External ear normal.   Nose: Nose normal.   Mouth/Throat: Mucous membranes are normal.   Eyes: Conjunctivae and lids are normal. No scleral icterus.   Neck: Trachea normal, normal range of motion and full passive range of motion without pain. Neck supple. No JVD present. No tracheal deviation present.   Cardiovascular: Normal rate, S1 normal and S2 normal.    Pulmonary/Chest: Effort normal. No respiratory distress. He exhibits no tenderness.   Abdominal: Soft. Normal appearance and bowel sounds are normal. There is no hepatosplenomegaly. There is no tenderness. There is no CVA tenderness.   Genitourinary: Rectum normal, testes normal and penis normal. Rectal exam shows no external hemorrhoid, no mass and no tenderness. Prostate is enlarged. Prostate is not tender. Right testis shows no mass, no swelling and no tenderness. Left testis shows no mass, no swelling and no tenderness. Circumcised. No hypospadias, penile erythema or penile tenderness. No discharge found.       Musculoskeletal: Normal range of motion.   Lymphadenopathy: No inguinal adenopathy noted on the right or left side.   Neurological: He is  alert and oriented to person, place, and time. He has normal strength.   Skin: Skin is warm, dry and intact.     Psychiatric: He has a normal mood and affect. His behavior is normal. Judgment and thought content normal.       Assessment:       1. Prostate cancer    2. Elevated PSA    3. BPH with urinary obstruction    4. Nocturia        Plan:         I spent 35 minutes with the patient of which more than half was spent in direct consultation with the patient in regards to our treatment and plan.    Education and recommendations of today's plan of care including home remedies.  We discussed his PSA and our recommendations for active surveillance.   Recommend restaging biopsy 14-16 months after initial biopsy; discussed check to make sure of his disease progression. If the same the monitor with PSA's.  He does not want another prostate biopsy.   We discussed MRI of prostate and PSA checks are just tools to help monitor; only a biopsy with can truly diagnosis progression; we used examples of (+) PCa with low risk findings on MRI;  We discussed that his own MRI PiRads 3 lesion that was targeted was negative.  We reviewed the other contributory factors for rising/changing PSA results besides PCa.   It can be from BPH but BPH would not cause a jump from 6.5 to 8.2 in 6 months.   We discussed treatment for BPH with medical management from alpha blocker as well as 5 alpha-reductase types; discussed benefits risks effectiveness of each;  We discussed the alpha blocker not going to have any effect on prostate like the 5 apha-reductase will; discussed it takes 6 months to see a difference as well   He is interested in trying 5 alpha-reductase;   Recommend repeating PSA in 3 weeks: 08.22/2019;  If all good then will start the Avodart (dutasteride 05mg) daily; understands that after 6 months his PSA will be lower; but to get his true PSA will need to be corrected by doubling.  This will also help monitor his PCa progression  since after 6 months only the PCa will cause changes in his PSA.   PSA 01/2020 with MRI

## 2019-08-06 NOTE — PATIENT INSTRUCTIONS
PSA                      8.2 (H)             08/02/2019                    PSA                      6.5 (H)             12/21/2018                 PSA                      7.5 (H)             04/30/2018                 PSA                      4.6 (H)             07/26/2017                 PSA                      3.5                 04/17/2014                 PSA                      3.48                12/19/2012                 PSA                      2.97                02/29/2012                 PSA                      2.3                 04/15/2010                 PSA                      2.4                 01/08/2009                 PSA                      0.8                 09/24/2007                 PSA                      1.1                 05/17/2006                               BPH (Enlarged Prostate)  The prostate is a gland at the base of the bladder. As some men get older, the prostate may get bigger in size. This problem is called benign prostatic hyperplasia (BPH). BPH puts pressure on the urethra. This is the tube that carries urine from the bladder to the penis. It may interfere with the flow of urine. It may also keep the bladder from emptying fully.    Symptoms of BPH include trouble starting urination and feeling as though the bladder isnt emptying all the way. It also includes a weak urine stream, dribbling and leaking of urine, and frequent and urgent urination (especially at night). BPH can increase the risk of urinary infections. It can also block off urine flow completely. If this occurs, a thin tube (catheter) may be passed into the bladder to help drain urine.  If symptoms are mild, no treatment may be needed right now. If symptoms are more severe, treatment is likely needed. The goal of treatment is to improve urine flow and reduce symptoms. Treatments can include medicine and procedures. Your healthcare provider will discuss treatment options with you as needed.  Home  care  The following guidelines will help you care for yourself at home:  · Urinate as soon as you feel the urge. Don't try to hold your urine.  · Don't limit your fluid intake during the day. Drink 6 to 8 glasses of water or liquids a day. This prevents bacteria from building up in the bladder.  · Avoid drinking fluids after dinner to help reduce urination during the night.  · Avoid medicines that can worsen your symptoms. These include certain cold and allergy medicines and antidepressants. Diuretics used for high blood pressure can also worsen symptoms. Talk to your doctor about the medicines you take. Other choices may work better for you.  Prostate cancer screening  BPH does not increase the risk of prostate cancer. But because prostate cancer is a common cancer in men, screening is sometimes recommended. This may help detect the cancer in its early stages when treatment is most effective. Factors that can increase the risk of prostate cancer include being -American or having a father or brother who had prostate cancer. A high-fat diet may also increase the risk of prostate cancer. Talk to your healthcare provider to see whether you should be screened for prostate cancer.  Follow-up care  Follow up with your healthcare provider, or as advised  To learn more, go to:  · National Kidney & Urologic Diseases Information Clearinghouse  kidney.niddk.nih.gov, 969.749.7721  When to seek medical advice  Call your healthcare provider right away if any of these occur:  · Fever of 100.4°F (38.0°C) or higher, or as advised  · Unable to pass urine for 8 hours  · Increasing pressure or pain in your bladder (lower abdomen)  · Blood in the urine  · Increasing low back pain, not related to injury  · Symptoms of urinary infection (increased urge to urinate, burning when passing urine, foul-smelling urine)  Date Last Reviewed: 7/1/2016  © 5628-7962 [employer]. [address] [place] [address], [place] [address]. All  rights reserved. This information is not intended as a substitute for professional medical care. Always follow your healthcare professional's instructions.        Prostate Cancer: Staging  Staging of cancer is a way of noting how big the cancer is and if it has spread. The stage is important in helping to decide on treatment choices.   How staging starts  Health care providers use different rating systems to stage cancer. The American Joint Committee on Cancer staging system is used most often for prostate cancer. Its called the TNM system:  · T stands for tumor. This notes the size of the tumor and if it has spread into nearby areas.  · N stands for nodes. Lymph nodes are small organs around the body. They help the body fight infections. This category notes if cancer cells have spread to the nearby lymph nodes.  · M stands for metastasis. This category notes if the cancer has spread to other organs in the body. This may include a lung, your bones, liver, or brain. It also includes lymph nodes that are not near your kidneys.  Numbers from 0 to 4 are assigned to the T, N, and M categories.  Additional information  Other information used to stage prostate cancer includes:  · Your blood PSA level at the time of diagnosis  · How different the cancer cells look from normal prostate cells (grade of cancer)  The stages of prostate cancer  The information above is then combined to give a prostate cancer stage. The stages are:   · Stage I. The cancer is only in 1 small part of the prostate. It has a low grade and a fairly low PSA level.  · Stage IIA and IIB. The cancer is only in the prostate, but in more areas of the prostate. Or the grade of the cancer or the PSA level is higher.  · Stage III. The cancer has grown outside the prostate. It has not reached nearby lymph nodes or distant organs.  · Stage IV. The cancer has grown into nearby tissues such as the bladder. It has spread to nearby lymph nodes. Or it has spread to  distant parts of the body.  Talking with your health care provider  The staging system for prostate cancer is complex. Ask your dayami care provider any questions you have about your stage.  Date Last Reviewed: 4/9/2015 © 2000-2017 [employer]. [address] [place] [address], [place] [address]. All rights reserved. This information is not intended as a substitute for professional medical care. Always follow your healthcare professional's instructions.        Prostate Problems and Related Urinary Symptoms    Many men have problems with the prostate at some time in their lives. The prostate gland is part of the male reproductive system. Its located just below the bladder. The prostate surrounds the urethra (the tube that carries urine and semen out of the body). The main function of the prostate gland is to add fluid to the semen. When problems occur in the prostate, the bladder and urethra are often affected as well. The most common prostate problems are described below.  BPH  BPH (benign prostatic hyperplasia) develops when changing hormone levels cause the prostate to grow larger. This often starts around age 50. Excess tissue can block the urethra, making it harder for urine to flow. The enlarged prostate can also press on the bladder, so you may need to urinate more often. Other symptoms include straining during urination, a weak urine stream, urinating more at night, incontinence, dribbling at the end of urination, and feeling that the bladder isnt emptying all the way. Note that BPH is not cancer and does not cause cancer.  How BPH affects the bladder  Pushing to urinate through a narrowed urethra can cause the bladder walls to thicken or stretch out of shape. A stretched bladder may have problems emptying all the way. If urine stays in the bladder longer than it should, you may develop an infection or bladder stones. Also, the kidneys cant drain properly into a bladder that doesnt empty completely.  This can lead to kidney failure. Pressure from urine buildup can also cause leaking of urine (called overflow incontinence).  Other prostate problems  · Prostatitis is an infection or inflammation that causes the prostate to become painful and swollen. The swelling narrows the urethra and can block the bladder neck. Prostatitis can cause a burning sensation during urination. You may also feel pressure or pain in the genital area. In some cases, prostatitis can cause fever and chills, and can make you very sick.  · Cancer occurs when abnormal cells form a tumor (a lump of cells that grow uncontrolled). Some tumors can be felt during a physical exam, others cant. Prostate cancer often causes no symptoms at all, especially in its early stages. Prostate symptoms are more likely to be caused by a problem that is NOT cancer.   Date Last Reviewed: 1/1/2017 © 2000-2017 [employer]. [address] [place] [address]. All rights reserved. This information is not intended as a substitute for professional medical care. Always follow your healthcare professional's instructions.        Dutasteride capsules  What is this medicine?  DUTASTERIDE (doo TAS ana lilia marleen) is used to treat benign prostatic hyperplasia (BPH) in men. This is a condition that causes you to have an enlarged prostate. This medicine helps to control your symptoms, decrease urinary retention, and reduces your risk of needing surgery.  How should I use this medicine?  Take this medicine by mouth with a glass of water. Follow the directions on the prescription label. Do not cut, crush, chew or open this medicine. You can take this medicine with or without food. Take your doses at regular intervals. Do not take your medicine more often than directed. Do not stop taking except on your doctor's advice.  Talk to your pediatrician regarding the use of this medicine in children. Special care may be needed.  What side effects may I notice from  receiving this medicine?  Side effects that you should report to your doctor or health care professional as soon as possible:  · allergic reactions like skin rash, itching or hives, swelling of the face, lips, or tongue  · changes in breast like lumps, pain or fluids leaking from the nipple  · pain in the testicles  Side effects that usually do not require medical attention (report to your doctor or health care professional if they continue or are bothersome):  · change in sex drive or performance  What may interact with this medicine?  · antiviral medicines for HIV or AIDS  · boceprevir  · certain medicines for fungal infections like ketoconazole, itraconazole, voriconazole  · certain medicines for infection like erythromycin, telithromycin  · cimetidine  · diltiazem  · saw palmetto or other dietary supplements  · verapamil  What if I miss a dose?  If you miss a dose, take it as soon as you can. If it is almost time for your next dose, take only that dose. Do not take double or extra doses.  Where should I keep my medicine?  Keep out of the reach of children.  Store at room temperature between 15 and 30 degrees C (59 and 86 degrees F). Keep container tightly closed. Throw away any unused medicine after the expiration date.  What should I tell my health care provider before I take this medicine?  They need to know if you have any of these conditions:  · liver disease  · prostate cancer  · an unusual or allergic reaction to dutasteride, finasteride, other medicines, foods, dyes, or preservatives  · pregnant or trying to get pregnant  · breast-feeding  What should I watch for while using this medicine?  Do not donate blood while you are taking this medicine. This will prevent giving this medicine to a pregnant female through a blood transfusion. Ask your doctor or health care professional when it is safe to donate blood after you stop taking this medicine.  Contact your doctor or health care professional if your  symptoms do not start to get better. You may need to take this medicine for 6 to 12 months to get the best results.  Women who are pregnant or may get pregnant must not handle this medicine. The active ingredient could harm the unborn baby. If a pregnant woman or woman who may become pregnant comes into contact with a leaking capsule, she should wash the exposed area of skin with soap and water immediately and check with her doctor or health care professional.  This medicine can interfere with PSA laboratory tests for prostate cancer. If you are scheduled to have a lab test for prostate cancer, tell your doctor or health care professional that you are taking this medicine.  NOTE:This sheet is a summary. It may not cover all possible information. If you have questions about this medicine, talk to your doctor, pharmacist, or health care provider. Copyright© 2017 [employer]

## 2019-08-12 ENCOUNTER — RESEARCH ENCOUNTER (OUTPATIENT)
Dept: RESEARCH | Facility: HOSPITAL | Age: 59
End: 2019-08-12

## 2019-08-12 ENCOUNTER — PATIENT MESSAGE (OUTPATIENT)
Dept: UROLOGY | Facility: CLINIC | Age: 59
End: 2019-08-12

## 2019-08-12 DIAGNOSIS — C61 PROSTATE CANCER: Primary | ICD-10-CM

## 2019-08-12 DIAGNOSIS — R97.20 ELEVATED PSA: ICD-10-CM

## 2019-08-12 NOTE — PROGRESS NOTES
Protocol: [id number], Testing Decision Aids to Improve Prostate Cancer Decisions for Minority Men  Sponsor: [employer]  IRB # [id number]  Treating Investigator: Dr. MANGO Richardson  Patient Initials: DAFNE LEOS     Progress Note     Completed patient J, J 12-month follow-up for above-mentioned protocol.     Patient most recent contact was 08/06/2019, KAL Wright NP (Urology) office visit.  His PSA (6 months after diagnosis) completed 12/21/2018 was 6.5. 12 months PSA 08/02/2019 was 8.2.    The patient did completed the (12 month) protocol assessment. Patient returned booklet in the self-addressed envelope.    Also completed End Study form. Primary reason- protocol-defined follow-up completed.

## 2019-08-22 ENCOUNTER — LAB VISIT (OUTPATIENT)
Dept: LAB | Facility: OTHER | Age: 59
End: 2019-08-22
Attending: NURSE PRACTITIONER
Payer: COMMERCIAL

## 2019-08-22 DIAGNOSIS — R97.20 ELEVATED PSA: ICD-10-CM

## 2019-08-22 DIAGNOSIS — C61 PROSTATE CANCER: ICD-10-CM

## 2019-08-22 LAB
PROSTATE SPECIFIC ANTIGEN, TOTAL: 6.4 NG/ML (ref 0–4)
PSA FREE MFR SERPL: 10 %
PSA FREE SERPL-MCNC: 0.64 NG/ML (ref 0.01–1.5)

## 2019-08-22 PROCEDURE — 84154 ASSAY OF PSA FREE: CPT

## 2019-08-22 PROCEDURE — 36415 COLL VENOUS BLD VENIPUNCTURE: CPT

## 2019-09-16 ENCOUNTER — PATIENT MESSAGE (OUTPATIENT)
Dept: INFECTIOUS DISEASES | Facility: CLINIC | Age: 59
End: 2019-09-16

## 2019-09-16 DIAGNOSIS — I10 HYPERTENSION, UNSPECIFIED TYPE: ICD-10-CM

## 2019-09-16 DIAGNOSIS — Z21 HIV POSITIVE: Primary | ICD-10-CM

## 2019-09-16 RX ORDER — AMLODIPINE BESYLATE 10 MG/1
10 TABLET ORAL DAILY
Qty: 90 TABLET | Refills: 0 | Status: SHIPPED | OUTPATIENT
Start: 2019-09-16 | End: 2019-09-17 | Stop reason: SDUPTHER

## 2019-09-16 NOTE — TELEPHONE ENCOUNTER
Hi, please call patient -- to set up with new pcp in resident clinic .  I will send in one prescription of this medicine, but patient needs to be seen for any further prescriptions.  Thank you, Dylan Elias

## 2019-09-17 ENCOUNTER — TELEPHONE (OUTPATIENT)
Dept: INFECTIOUS DISEASES | Facility: HOSPITAL | Age: 59
End: 2019-09-17

## 2019-09-17 RX ORDER — AMLODIPINE BESYLATE 10 MG/1
10 TABLET ORAL DAILY
Qty: 90 TABLET | Refills: 3 | Status: SHIPPED | OUTPATIENT
Start: 2019-09-17 | End: 2021-02-01

## 2019-10-23 ENCOUNTER — TELEPHONE (OUTPATIENT)
Dept: INFECTIOUS DISEASES | Facility: CLINIC | Age: 59
End: 2019-10-23

## 2019-10-23 NOTE — TELEPHONE ENCOUNTER
----- Message from Anne Marie Rodriguez MA sent at 10/23/2019  8:46 AM CDT -----  Contact: self/[phone]  Pt states he is requesting a call , he is trying to get an order for a flu shot

## 2019-10-29 ENCOUNTER — CLINICAL SUPPORT (OUTPATIENT)
Dept: INFECTIOUS DISEASES | Facility: CLINIC | Age: 59
End: 2019-10-29
Payer: COMMERCIAL

## 2019-10-29 PROCEDURE — 90471 FLU VACCINE (QUAD) GREATER THAN OR EQUAL TO 3YO PRESERVATIVE FREE IM: ICD-10-PCS | Mod: S$GLB,,, | Performed by: INTERNAL MEDICINE

## 2019-10-29 PROCEDURE — 90686 FLU VACCINE (QUAD) GREATER THAN OR EQUAL TO 3YO PRESERVATIVE FREE IM: ICD-10-PCS | Mod: S$GLB,,, | Performed by: INTERNAL MEDICINE

## 2019-10-29 PROCEDURE — 90471 IMMUNIZATION ADMIN: CPT | Mod: S$GLB,,, | Performed by: INTERNAL MEDICINE

## 2019-10-29 PROCEDURE — 90686 IIV4 VACC NO PRSV 0.5 ML IM: CPT | Mod: S$GLB,,, | Performed by: INTERNAL MEDICINE

## 2020-01-06 ENCOUNTER — LAB VISIT (OUTPATIENT)
Dept: LAB | Facility: OTHER | Age: 60
End: 2020-01-06
Attending: INTERNAL MEDICINE
Payer: COMMERCIAL

## 2020-01-06 DIAGNOSIS — N13.8 BPH WITH URINARY OBSTRUCTION: ICD-10-CM

## 2020-01-06 DIAGNOSIS — Z21 HIV POSITIVE: ICD-10-CM

## 2020-01-06 DIAGNOSIS — N40.1 BPH WITH URINARY OBSTRUCTION: ICD-10-CM

## 2020-01-06 DIAGNOSIS — C61 PROSTATE CANCER: ICD-10-CM

## 2020-01-06 DIAGNOSIS — R35.1 NOCTURIA: ICD-10-CM

## 2020-01-06 DIAGNOSIS — R97.20 ELEVATED PSA: ICD-10-CM

## 2020-01-06 LAB
ALBUMIN SERPL BCP-MCNC: 4.5 G/DL (ref 3.5–5.2)
ALP SERPL-CCNC: 87 U/L (ref 55–135)
ALT SERPL W/O P-5'-P-CCNC: 26 U/L (ref 10–44)
ANION GAP SERPL CALC-SCNC: 9 MMOL/L (ref 8–16)
AST SERPL-CCNC: 29 U/L (ref 10–40)
BASOPHILS # BLD AUTO: 0.01 K/UL (ref 0–0.2)
BASOPHILS NFR BLD: 0.3 % (ref 0–1.9)
BILIRUB SERPL-MCNC: 0.4 MG/DL (ref 0.1–1)
BUN SERPL-MCNC: 12 MG/DL (ref 6–20)
CALCIUM SERPL-MCNC: 9.8 MG/DL (ref 8.7–10.5)
CHLORIDE SERPL-SCNC: 104 MMOL/L (ref 95–110)
CO2 SERPL-SCNC: 29 MMOL/L (ref 23–29)
COMPLEXED PSA SERPL-MCNC: 7.3 NG/ML (ref 0–4)
CREAT SERPL-MCNC: 1.1 MG/DL (ref 0.5–1.4)
DIFFERENTIAL METHOD: ABNORMAL
EOSINOPHIL # BLD AUTO: 0.1 K/UL (ref 0–0.5)
EOSINOPHIL NFR BLD: 1.9 % (ref 0–8)
ERYTHROCYTE [DISTWIDTH] IN BLOOD BY AUTOMATED COUNT: 12.3 % (ref 11.5–14.5)
EST. GFR  (AFRICAN AMERICAN): >60 ML/MIN/1.73 M^2
EST. GFR  (NON AFRICAN AMERICAN): >60 ML/MIN/1.73 M^2
GLUCOSE SERPL-MCNC: 91 MG/DL (ref 70–110)
HCT VFR BLD AUTO: 43.4 % (ref 40–54)
HGB BLD-MCNC: 14.3 G/DL (ref 14–18)
IMM GRANULOCYTES # BLD AUTO: 0 K/UL (ref 0–0.04)
IMM GRANULOCYTES NFR BLD AUTO: 0 % (ref 0–0.5)
LYMPHOCYTES # BLD AUTO: 1.7 K/UL (ref 1–4.8)
LYMPHOCYTES NFR BLD: 55.2 % (ref 18–48)
MCH RBC QN AUTO: 29.5 PG (ref 27–31)
MCHC RBC AUTO-ENTMCNC: 32.9 G/DL (ref 32–36)
MCV RBC AUTO: 90 FL (ref 82–98)
MONOCYTES # BLD AUTO: 0.3 K/UL (ref 0.3–1)
MONOCYTES NFR BLD: 8.9 % (ref 4–15)
NEUTROPHILS # BLD AUTO: 1.1 K/UL (ref 1.8–7.7)
NEUTROPHILS NFR BLD: 33.7 % (ref 38–73)
NRBC BLD-RTO: 0 /100 WBC
PLATELET # BLD AUTO: 261 K/UL (ref 150–350)
PMV BLD AUTO: 9.7 FL (ref 9.2–12.9)
POTASSIUM SERPL-SCNC: 4.8 MMOL/L (ref 3.5–5.1)
PROT SERPL-MCNC: 7.9 G/DL (ref 6–8.4)
RBC # BLD AUTO: 4.84 M/UL (ref 4.6–6.2)
SODIUM SERPL-SCNC: 142 MMOL/L (ref 136–145)
WBC # BLD AUTO: 3.15 K/UL (ref 3.9–12.7)

## 2020-01-06 PROCEDURE — 84153 ASSAY OF PSA TOTAL: CPT

## 2020-01-06 PROCEDURE — 80061 LIPID PANEL: CPT

## 2020-01-06 PROCEDURE — 80053 COMPREHEN METABOLIC PANEL: CPT

## 2020-01-06 PROCEDURE — 36415 COLL VENOUS BLD VENIPUNCTURE: CPT

## 2020-01-06 PROCEDURE — 87536 HIV-1 QUANT&REVRSE TRNSCRPJ: CPT

## 2020-01-06 PROCEDURE — 85025 COMPLETE CBC W/AUTO DIFF WBC: CPT

## 2020-01-06 PROCEDURE — 86361 T CELL ABSOLUTE COUNT: CPT

## 2020-01-07 ENCOUNTER — PATIENT MESSAGE (OUTPATIENT)
Dept: UROLOGY | Facility: CLINIC | Age: 60
End: 2020-01-07

## 2020-01-07 DIAGNOSIS — C61 PROSTATE CANCER: Primary | ICD-10-CM

## 2020-01-07 DIAGNOSIS — R97.20 ELEVATED PSA: ICD-10-CM

## 2020-01-07 LAB
CD3+CD4+ CELLS # BLD: 650 CELLS/UL (ref 300–1400)
CD3+CD4+ CELLS NFR BLD: 36.5 % (ref 28–57)
CHOLEST SERPL-MCNC: 157 MG/DL (ref 120–199)
CHOLEST/HDLC SERPL: 2.6 {RATIO} (ref 2–5)
HDLC SERPL-MCNC: 60 MG/DL (ref 40–75)
HDLC SERPL: 38.2 % (ref 20–50)
LDLC SERPL CALC-MCNC: 86.4 MG/DL (ref 63–159)
NONHDLC SERPL-MCNC: 97 MG/DL
TRIGL SERPL-MCNC: 53 MG/DL (ref 30–150)

## 2020-01-08 ENCOUNTER — PATIENT MESSAGE (OUTPATIENT)
Dept: UROLOGY | Facility: CLINIC | Age: 60
End: 2020-01-08

## 2020-02-10 ENCOUNTER — OFFICE VISIT (OUTPATIENT)
Dept: INFECTIOUS DISEASES | Facility: CLINIC | Age: 60
End: 2020-02-10
Payer: COMMERCIAL

## 2020-02-10 VITALS
DIASTOLIC BLOOD PRESSURE: 94 MMHG | TEMPERATURE: 98 F | HEART RATE: 79 BPM | HEIGHT: 73 IN | WEIGHT: 217.63 LBS | SYSTOLIC BLOOD PRESSURE: 140 MMHG | BODY MASS INDEX: 28.84 KG/M2

## 2020-02-10 DIAGNOSIS — Z12.11 COLON CANCER SCREENING: Primary | ICD-10-CM

## 2020-02-10 DIAGNOSIS — R97.20 ELEVATED PSA: ICD-10-CM

## 2020-02-10 DIAGNOSIS — B20 HUMAN IMMUNODEFICIENCY VIRUS (HIV) DISEASE: ICD-10-CM

## 2020-02-10 DIAGNOSIS — Z80.42 FAMILY HISTORY OF PROSTATE CANCER: ICD-10-CM

## 2020-02-10 PROCEDURE — 99214 PR OFFICE/OUTPT VISIT, EST, LEVL IV, 30-39 MIN: ICD-10-PCS | Mod: S$GLB,,, | Performed by: INTERNAL MEDICINE

## 2020-02-10 PROCEDURE — 99999 PR PBB SHADOW E&M-EST. PATIENT-LVL IV: CPT | Mod: PBBFAC,,, | Performed by: INTERNAL MEDICINE

## 2020-02-10 PROCEDURE — 99999 PR PBB SHADOW E&M-EST. PATIENT-LVL IV: ICD-10-PCS | Mod: PBBFAC,,, | Performed by: INTERNAL MEDICINE

## 2020-02-10 PROCEDURE — 99214 OFFICE O/P EST MOD 30 MIN: CPT | Mod: S$GLB,,, | Performed by: INTERNAL MEDICINE

## 2020-02-10 NOTE — PROGRESS NOTES
Subjective:      Patient ID: Simón Fong is a 59 y.o. male.    Chief Complaint:HIV Positive/AIDS      History of Present Illness    Presents for HIV follow up.    Summary:   The patient has been seen by us for a number of years and in the past, he was on   Combivir and ritonavir.  He did have a drug holiday and he asked about that   once again and he has been discouraged from doing such.    He was switched to Truvada and boosted Reyataz; and then Stribild and Genvoya now.      Highly adherent with medications; no problems reported.      Routine examinations:  1. Vitamin D supplements daily.  2. Colonoscopy due 2018 which would be 7 years from the last.  3. Last anal pap 2012; and 2015 and 2018.  No dysplasia.  Family history:  two brothers were diagnosed with prostate cancer - the older one at 64 and a younger one also.  4. Vaccines updated - pneumovax.    Prostate cancer: biopsy 8/13/2018 - prostate adenocarcinoma; followed by Tammy Wright.    Blood pressure elevated here;   Will follow up at home and bring BP machine to next apt.     He has retired and keeping active; exercises daily.    Component      Latest Ref Rng & Units 1/6/2020   WBC      3.90 - 12.70 K/uL 3.15 (L)   RBC      4.60 - 6.20 M/uL 4.84   Hemoglobin      14.0 - 18.0 g/dL 14.3   Hematocrit      40.0 - 54.0 % 43.4   MCV      82 - 98 fL 90   MCH      27.0 - 31.0 pg 29.5   MCHC      32.0 - 36.0 g/dL 32.9   RDW      11.5 - 14.5 % 12.3   Platelets      150 - 350 K/uL 261   MPV      9.2 - 12.9 fL 9.7   Immature Granulocytes      0.0 - 0.5 % 0.0   Gran # (ANC)      1.8 - 7.7 K/uL 1.1 (L)   Immature Grans (Abs)      0.00 - 0.04 K/uL 0.00   Lymph #      1.0 - 4.8 K/uL 1.7   Mono #      0.3 - 1.0 K/uL 0.3   Eos #      0.0 - 0.5 K/uL 0.1   Baso #      0.00 - 0.20 K/uL 0.01   nRBC      0 /100 WBC 0   Gran%      38.0 - 73.0 % 33.7 (L)   Lymph%      18.0 - 48.0 % 55.2 (H)   Mono%      4.0 - 15.0 % 8.9   Eosinophil%      0.0 - 8.0 % 1.9   Basophil%      0.0 -  1.9 % 0.3   Differential Method       Automated   Sodium      136 - 145 mmol/L 142   Potassium      3.5 - 5.1 mmol/L 4.8   Chloride      95 - 110 mmol/L 104   CO2      23 - 29 mmol/L 29   Glucose      70 - 110 mg/dL 91   BUN, Bld      6 - 20 mg/dL 12   Creatinine      0.5 - 1.4 mg/dL 1.1   Calcium      8.7 - 10.5 mg/dL 9.8   PROTEIN TOTAL      6.0 - 8.4 g/dL 7.9   Albumin      3.5 - 5.2 g/dL 4.5   BILIRUBIN TOTAL      0.1 - 1.0 mg/dL 0.4   Alkaline Phosphatase      55 - 135 U/L 87   AST      10 - 40 U/L 29   ALT      10 - 44 U/L 26   Anion Gap      8 - 16 mmol/L 9   eGFR if African American      >60 mL/min/1.73 m:2 >60   eGFR if non African American      >60 mL/min/1.73 m:2 >60   Cholesterol      120 - 199 mg/dL 157   Triglycerides      30 - 150 mg/dL 53   HDL      40 - 75 mg/dL 60   LDL Cholesterol External      63.0 - 159.0 mg/dL 86.4   Hdl/Cholesterol Ratio      20.0 - 50.0 % 38.2   Total Cholesterol/HDL Ratio      2.0 - 5.0 2.6   Non-HDL Cholesterol      mg/dL 97   HIV-1 RNA, Qual      Not detected Not detected   HIV 1 RNA Ultra      <40 Copies/mL <40   Log HIV Copies/mL      <1.60 Log (10) Copies/mL <1.60   HIV UQ Date Received       1/8/20   HIV UQ Date Reported       1/8/20   CD4 % [place] T Cell      28 - 57 % 36.5   Absolute CD4      300 - 1400 cells/ul 650.0     Review of Systems   Constitution: Negative for chills, decreased appetite, fever, malaise/fatigue, night sweats, weight gain and weight loss.   HENT: Negative for congestion, ear pain, hearing loss, hoarse voice, sore throat and tinnitus.    Eyes: Negative for blurred vision, redness and visual disturbance.   Cardiovascular: Negative for chest pain, leg swelling and palpitations.   Respiratory: Negative for cough, hemoptysis, shortness of breath and sputum production.    Hematologic/Lymphatic: Negative for adenopathy. Does not bruise/bleed easily.   Skin: Negative for dry skin, itching, rash and suspicious lesions.   Musculoskeletal: Negative for  back pain, joint pain, myalgias and neck pain.   Gastrointestinal: Negative for abdominal pain, constipation, diarrhea, heartburn, nausea and vomiting.   Genitourinary: Negative for dysuria, flank pain, frequency, hematuria, hesitancy and urgency.   Neurological: Negative for dizziness, headaches, numbness, paresthesias and weakness.   Psychiatric/Behavioral: Negative for depression and memory loss. The patient does not have insomnia and is not nervous/anxious.      Objective:   Physical Exam   Constitutional: He is oriented to person, place, and time. He appears well-developed and well-nourished.   HENT:   Head: Normocephalic and atraumatic.   Mouth/Throat: Oropharynx is clear and moist.   Eyes: Pupils are equal, round, and reactive to light. Conjunctivae and EOM are normal.   Neck: Normal range of motion. Neck supple. No thyromegaly present.   Cardiovascular: Normal rate, regular rhythm and normal heart sounds.   No murmur heard.  Pulmonary/Chest: Effort normal and breath sounds normal. He has no wheezes. He has no rales.   Abdominal: Soft. Bowel sounds are normal. He exhibits no mass. There is no tenderness. There is no rebound.   Musculoskeletal: Normal range of motion.   Lymphadenopathy:     He has no cervical adenopathy.   Neurological: He is alert and oriented to person, place, and time.   Skin: Skin is warm and dry.   Psychiatric: He has a normal mood and affect. His behavior is normal.   Vitals reviewed.    Assessment:       1. Colon cancer screening    2. Human immunodeficiency virus (HIV) disease    3. Elevated PSA    4. Family history of prostate cancer          Plan:       1. All vaccines up to date - pneumovax.  2. Colonoscopy.   3. Follow up with urology for prostate cancer.  4. RTC 6 months with lab work prior.

## 2020-03-06 DIAGNOSIS — Z21 HIV POSITIVE: ICD-10-CM

## 2020-03-06 NOTE — TELEPHONE ENCOUNTER
----- Message from Dalia Harding MA sent at 3/6/2020  4:21 PM CST -----  Contact: Simón    teL:   [phone]       ----- Message -----  From: Eileen Alonso  Sent: 3/6/2020   4:13 PM CST  To: Janeth TRAMMELL Staff    Caller says he needs you to call the Pharmacy:  [facility]  .     Pt.says he left a msg yesterday.    GENVOYA.    Needs to get a refill.

## 2020-03-10 ENCOUNTER — TELEPHONE (OUTPATIENT)
Dept: INFECTIOUS DISEASES | Facility: CLINIC | Age: 60
End: 2020-03-10

## 2020-03-10 NOTE — TELEPHONE ENCOUNTER
Spoke with insurance company. Patients Genvoya has  Been approved for 12  Months starting today.    Overrode by Fabi LEOS  @ [facility] pharmacy management  Public Service  [phone]

## 2020-03-10 NOTE — TELEPHONE ENCOUNTER
----- Message from Gilma Min MA sent at 3/10/2020  3:56 PM CDT -----  Contact: Simón  tel:    [phone]       ----- Message -----  From: Eileen Alonso  Sent: 3/10/2020   3:51 PM CDT  To: Janeth TRAMMELL Staff    Caller says they closed the case on Genvoya.    They received your response after the case was closed.  It was received too late.     The no. To call is :  [phone]  And give the pre-authorization no. For the Genvoya.     Will need  You to call this in .

## 2020-03-17 ENCOUNTER — PATIENT MESSAGE (OUTPATIENT)
Dept: INFECTIOUS DISEASES | Facility: CLINIC | Age: 60
End: 2020-03-17

## 2020-06-08 ENCOUNTER — OFFICE VISIT (OUTPATIENT)
Dept: URGENT CARE | Facility: CLINIC | Age: 60
End: 2020-06-08
Payer: COMMERCIAL

## 2020-06-08 VITALS
SYSTOLIC BLOOD PRESSURE: 156 MMHG | RESPIRATION RATE: 16 BRPM | TEMPERATURE: 102 F | OXYGEN SATURATION: 96 % | DIASTOLIC BLOOD PRESSURE: 88 MMHG | HEART RATE: 104 BPM | HEIGHT: 73 IN | BODY MASS INDEX: 26.51 KG/M2 | WEIGHT: 200 LBS

## 2020-06-08 DIAGNOSIS — R50.9 FEVER AND CHILLS: Primary | ICD-10-CM

## 2020-06-08 DIAGNOSIS — I10 ESSENTIAL HYPERTENSION: ICD-10-CM

## 2020-06-08 DIAGNOSIS — M79.10 MUSCLE PAIN: ICD-10-CM

## 2020-06-08 LAB — SARS-COV-2 RNA RESP QL NAA+PROBE: NOT DETECTED

## 2020-06-08 PROCEDURE — 99214 OFFICE O/P EST MOD 30 MIN: CPT | Mod: S$GLB,,, | Performed by: NURSE PRACTITIONER

## 2020-06-08 PROCEDURE — U0003 INFECTIOUS AGENT DETECTION BY NUCLEIC ACID (DNA OR RNA); SEVERE ACUTE RESPIRATORY SYNDROME CORONAVIRUS 2 (SARS-COV-2) (CORONAVIRUS DISEASE [COVID-19]), AMPLIFIED PROBE TECHNIQUE, MAKING USE OF HIGH THROUGHPUT TECHNOLOGIES AS DESCRIBED BY CMS-2020-01-R: HCPCS

## 2020-06-08 PROCEDURE — 99214 PR OFFICE/OUTPT VISIT, EST, LEVL IV, 30-39 MIN: ICD-10-PCS | Mod: S$GLB,,, | Performed by: NURSE PRACTITIONER

## 2020-06-08 RX ORDER — IBUPROFEN 200 MG
600 TABLET ORAL
Status: COMPLETED | OUTPATIENT
Start: 2020-06-08 | End: 2020-06-08

## 2020-06-08 RX ADMIN — Medication 600 MG: at 09:06

## 2020-06-08 NOTE — PROGRESS NOTES
"Subjective:       Patient ID: Simón Fong is a 60 y.o. male.    Vitals:  height is 6' 1" (1.854 m) and weight is 90.7 kg (200 lb). His temperature is 101.5 °F (38.6 °C) (abnormal). His blood pressure is 156/88 (abnormal) and his pulse is 104. His respiration is 16 and oxygen saturation is 96%.     Chief Complaint: Fever    Patient states he started to feel bad out muscle aches yesterday.  Thought that it was the pre work at that he had taken.  He did take Aleve last night for the body aches.  Denies exposure that he is aware of.  States that he is compliant with all medication and is nondetectable at this time.    Fever    This is a new problem. The current episode started yesterday. The problem occurs constantly. The problem has been unchanged. His temperature was unmeasured prior to arrival. Associated symptoms include muscle aches. Pertinent negatives include no chest pain, congestion, coughing, diarrhea, headaches, nausea, rash, sore throat, urinary pain or vomiting. He has tried NSAIDs for the symptoms. The treatment provided mild relief.       Constitution: Positive for fever. Negative for chills and fatigue.   HENT: Negative for congestion and sore throat.    Neck: Negative for painful lymph nodes.   Cardiovascular: Negative for chest pain and leg swelling.   Eyes: Negative for double vision and blurred vision.   Respiratory: Negative for cough and shortness of breath.    Gastrointestinal: Negative for nausea, vomiting and diarrhea.   Genitourinary: Negative for dysuria, frequency and urgency.   Musculoskeletal: Positive for muscle ache. Negative for joint pain, joint swelling and muscle cramps.   Skin: Negative for color change, pale and rash.   Allergic/Immunologic: Negative for seasonal allergies.   Neurological: Negative for dizziness, history of vertigo, light-headedness, passing out and headaches.   Hematologic/Lymphatic: Negative for swollen lymph nodes, easy bruising/bleeding and history of blood " clots. Does not bruise/bleed easily.   Psychiatric/Behavioral: Negative for nervous/anxious, sleep disturbance and depression. The patient is not nervous/anxious.        Objective:      Physical Exam   Constitutional: He is oriented to person, place, and time. He appears well-developed and well-nourished. He is cooperative.  Non-toxic appearance. He does not have a sickly appearance. He does not appear ill. No distress.   Febrile in clinic, hypertensive   HENT:   Head: Normocephalic and atraumatic.   Right Ear: Hearing, tympanic membrane, external ear and ear canal normal.   Left Ear: Hearing, tympanic membrane, external ear and ear canal normal.   Nose: Nose normal. No mucosal edema, rhinorrhea or nasal deformity. No epistaxis. Right sinus exhibits no maxillary sinus tenderness and no frontal sinus tenderness. Left sinus exhibits no maxillary sinus tenderness and no frontal sinus tenderness.   Mouth/Throat: Uvula is midline, oropharynx is clear and moist and mucous membranes are normal. No trismus in the jaw. Normal dentition. No uvula swelling. No oropharyngeal exudate, posterior oropharyngeal edema or posterior oropharyngeal erythema.   Eyes: Conjunctivae and lids are normal. No scleral icterus.   Neck: Trachea normal, normal range of motion, full passive range of motion without pain and phonation normal. Neck supple. No neck rigidity. No edema and no erythema present.   Cardiovascular: Regular rhythm, S1 normal, S2 normal, normal heart sounds and normal pulses. Tachycardia present.   Pulmonary/Chest: Effort normal and breath sounds normal. No stridor. No respiratory distress. He has no decreased breath sounds. He has no wheezes. He has no rhonchi.   Abdominal: Soft. Normal appearance and bowel sounds are normal. He exhibits no distension. There is no tenderness. There is no guarding.   Musculoskeletal: Normal range of motion. He exhibits no edema or deformity.   Lymphadenopathy:     He has no cervical  adenopathy.   Neurological: He is alert and oriented to person, place, and time. He exhibits normal muscle tone. Coordination normal.   Skin: Skin is warm, dry, intact, not diaphoretic and not pale.   Psychiatric: He has a normal mood and affect. His speech is normal and behavior is normal. Judgment and thought content normal. Cognition and memory are normal.   Nursing note and vitals reviewed.        Assessment:       1. Fever and chills    2. Muscle pain    3. Essential hypertension        Plan:         Fever and chills    Muscle pain  -     COVID-19 Routine Screening    Essential hypertension    Other orders  -     ibuprofen tablet 600 mg         Patient Instructions   Instructions for Patients with Confirmed or Suspected COVID-19    If you are awaiting your test result, you will either be called or it will be released to the patient portal.  If you have any questions about your test, please visit [email or web address] or call our COVID-19 information line at [phone].      Preventing the Spread of Coronavirus Disease 2019 (COVID-19) in Homes and Residential Communities -- Patients     Prevention steps for people with confirmed or suspected COVID-19 (including persons under investigation) who do not need to be hospitalized and people with confirmed COVID-19 who were hospitalized and determined to be medically stable to go home.    Stay home except to get medical care.    Separate yourself from other people and animals in your home.    Call ahead before visiting your doctor.    Wear a face mask.    Cover your coughs and sneezes.    Clean your hands often.    Avoid sharing personal household items.    Clean all high-touch surfaces every day.    Monitor your symptoms. Seek prompt medical attention if your illness is worsening (e.g., difficulty breathing). Before seeking care, call your healthcare provider.    If you have a medical emergency and must call 911, notify the dispatcher that you  have or are being evaluated for COVID-19. If possible, put on a face mask before emergency medical services arrive.    Use the following symptom-based strategy to return to normal activity following a suspected or confirmed case of COVID-19. Continue isolation until:   o At least 3 days (72 hours) have passed since recovery defined as resolution of fever without the use of fever-reducing medications and improvement in respiratory symptoms (e.g. cough, shortness of breath), and   o At least 10 days have passed since symptoms first appeared.     Precautions for household members, intimate partners and caregivers in a non-healthcare setting of a patient with symptomatic laboratory-confirmed COVID-19 or a patient under investigation.     Household members, intimate partners and caregivers in a non-healthcare setting may have close contact with a person with symptomatic, laboratory-confirmed COVID-19 or a person under investigation. Close contacts should monitor their health; they should call their healthcare provider right away if they develop symptoms suggestive of COVID-19 (e.g., fever, cough, shortness of breath). Close contacts should also follow these recommendations:      Make sure that you understand and can help the patient follow their healthcare providers instructions for medication(s) and care. You should help the patient with basic needs in the home and provide support for getting groceries, prescriptions, and other personal needs.    Monitor the patients symptoms. If the patient is getting sicker, call his or her healthcare provider and tell them that the patient has laboratory-confirmed COVID-19. This will help the healthcare providers office take steps to keep people in the office or waiting room from getting infected. Ask the healthcare provider to call the local or state health department for additional guidance. If the patient has a medical emergency and you need to call 911, notify the dispatch  personnel that the patient has or is being evaluated for COVID-19.    Household members should stay in another room or be  from the patient as much as possible. Household members should use a separate bedroom and bathroom, if available.    Prohibit visitors who do not have an essential need to be in the home.    Household members should care for any pets. Do not handle pets or other animals while sick.    Make sure that shared spaces in the home have good air flow, such as by an air conditioner.    Perform hand hygiene frequently. Wash your hands often with soap and water for at least 20 seconds or use an alcohol-based hand  that contains 60 to 95% alcohol, covering all surfaces of your hands and rubbing them together until they feel dry. Soap and water are preferred if hands are visibly dirty.    Avoid touching your eyes, nose and mouth with unwashed hands.    The patient should wear a face mask when you are around other people. If the patient is not able to wear a face mask (for example, because it causes trouble breathing), you, as the caregiver, should wear a mask when you are in the same room as the patient.    Wear a disposable face mask and gloves when you touch or have contact with the patients blood, stool or body fluids, such as saliva, sputum, nasal mucus, vomit and urine.   o Throw out disposable face masks and gloves after using them. Do not reuse.   o When removing personal protective equipment, first remove and dispose of gloves. Then, immediately clean your hands with soap and water or alcohol-based hand . Next, remove and dispose of face mask, and immediately clean your hands again with soap and water or alcohol-based hand .    Avoid sharing household items with the patient. You should not share dishes, drinking glasses, cups, eating utensils, towels, bedding or other items. After the patient uses these items, you should wash them thoroughly (see below  Wash laundry thoroughly).    Clean all high-touch surfaces, such as counters, tabletops, doorknobs, bathroom fixtures, toilets, phones, keyboards, tablets and bedside tables, every day. Also, clean any surfaces that may have blood, stool or body fluids on them.   o Use a household cleaning spray or wipe, according to the label instructions. Labels contain instructions for safe and effective use of the cleaning product including precautions you should take when applying the product, such as wearing gloves and making sure you have good ventilation during use of the product.    Wash laundry thoroughly.   o Immediately remove and wash clothes or bedding that have blood, stool or body fluids on them.  o Wear disposable gloves while handling soiled items and keep soiled items away from your body. Clean your hands (with soap and water or an alcohol-based hand ) immediately after removing your gloves.   o Read and follow directions on labels of laundry or clothing items and detergent. In general, using a normal laundry detergent according to washing machine instructions and dry thoroughly using the warmest temperatures recommended on the clothing label.    Place all used disposable gloves, face masks and other contaminated items in a lined container before disposing of them with other household waste. Clean your hands (with soap and water or an alcohol-based hand ) immediately after handling these items. Soap and water should be used preferentially if hands are visibly dirty.   Discuss any additional questions with your state or local health department

## 2020-06-08 NOTE — PATIENT INSTRUCTIONS
Instructions for Patients with Confirmed or Suspected COVID-19    If you are awaiting your test result, you will either be called or it will be released to the patient portal.  If you have any questions about your test, please visit [email or web address] or call our COVID-19 information line at [phone].      Preventing the Spread of Coronavirus Disease 2019 (COVID-19) in Homes and Residential Communities -- Patients     Prevention steps for people with confirmed or suspected COVID-19 (including persons under investigation) who do not need to be hospitalized and people with confirmed COVID-19 who were hospitalized and determined to be medically stable to go home.    Stay home except to get medical care.    Separate yourself from other people and animals in your home.    Call ahead before visiting your doctor.    Wear a face mask.    Cover your coughs and sneezes.    Clean your hands often.    Avoid sharing personal household items.    Clean all high-touch surfaces every day.    Monitor your symptoms. Seek prompt medical attention if your illness is worsening (e.g., difficulty breathing). Before seeking care, call your healthcare provider.    If you have a medical emergency and must call 911, notify the dispatcher that you have or are being evaluated for COVID-19. If possible, put on a face mask before emergency medical services arrive.    Use the following symptom-based strategy to return to normal activity following a suspected or confirmed case of COVID-19. Continue isolation until:   o At least 3 days (72 hours) have passed since recovery defined as resolution of fever without the use of fever-reducing medications and improvement in respiratory symptoms (e.g. cough, shortness of breath), and   o At least 10 days have passed since symptoms first appeared.     Precautions for household members, intimate partners and caregivers in a non-healthcare setting of a patient with symptomatic  laboratory-confirmed COVID-19 or a patient under investigation.     Household members, intimate partners and caregivers in a non-healthcare setting may have close contact with a person with symptomatic, laboratory-confirmed COVID-19 or a person under investigation. Close contacts should monitor their health; they should call their healthcare provider right away if they develop symptoms suggestive of COVID-19 (e.g., fever, cough, shortness of breath). Close contacts should also follow these recommendations:      Make sure that you understand and can help the patient follow their healthcare providers instructions for medication(s) and care. You should help the patient with basic needs in the home and provide support for getting groceries, prescriptions, and other personal needs.    Monitor the patients symptoms. If the patient is getting sicker, call his or her healthcare provider and tell them that the patient has laboratory-confirmed COVID-19. This will help the healthcare providers office take steps to keep people in the office or waiting room from getting infected. Ask the healthcare provider to call the local or [facility] health department for additional guidance. If the patient has a medical emergency and you need to call 911, notify the dispatch personnel that the patient has or is being evaluated for COVID-19.    Household members should stay in another room or be  from the patient as much as possible. Household members should use a separate bedroom and bathroom, if available.    Prohibit visitors who do not have an essential need to be in the home.    Household members should care for any pets. Do not handle pets or other animals while sick.    Make sure that shared spaces in the home have good air flow, such as by an air conditioner.    Perform hand hygiene frequently. Wash your hands often with soap and water for at least 20 seconds or use an alcohol-based hand  that contains 60 to  95% alcohol, covering all surfaces of your hands and rubbing them together until they feel dry. Soap and water are preferred if hands are visibly dirty.    Avoid touching your eyes, nose and mouth with unwashed hands.    The patient should wear a face mask when you are around other people. If the patient is not able to wear a face mask (for example, because it causes trouble breathing), you, as the caregiver, should wear a mask when you are in the same room as the patient.    Wear a disposable face mask and gloves when you touch or have contact with the patients blood, stool or body fluids, such as saliva, sputum, nasal mucus, vomit and urine.   o Throw out disposable face masks and gloves after using them. Do not reuse.   o When removing personal protective equipment, first remove and dispose of gloves. Then, immediately clean your hands with soap and water or alcohol-based hand . Next, remove and dispose of face mask, and immediately clean your hands again with soap and water or alcohol-based hand .    Avoid sharing household items with the patient. You should not share dishes, drinking glasses, cups, eating utensils, towels, bedding or other items. After the patient uses these items, you should wash them thoroughly (see below Wash laundry thoroughly).    Clean all high-touch surfaces, such as counters, tabletops, doorknobs, bathroom fixtures, toilets, phones, keyboards, tablets and bedside tables, every day. Also, clean any surfaces that may have blood, stool or body fluids on them.   o Use a household cleaning spray or wipe, according to the label instructions. Labels contain instructions for safe and effective use of the cleaning product including precautions you should take when applying the product, such as wearing gloves and making sure you have good ventilation during use of the product.    Wash laundry thoroughly.   o Immediately remove and wash clothes or bedding that have  blood, stool or body fluids on them.  o Wear disposable gloves while handling soiled items and keep soiled items away from your body. Clean your hands (with soap and water or an alcohol-based hand ) immediately after removing your gloves.   o Read and follow directions on labels of laundry or clothing items and detergent. In general, using a normal laundry detergent according to washing machine instructions and dry thoroughly using the warmest temperatures recommended on the clothing label.    Place all used disposable gloves, face masks and other contaminated items in a lined container before disposing of them with other household waste. Clean your hands (with soap and water or an alcohol-based hand ) immediately after handling these items. Soap and water should be used preferentially if hands are visibly dirty.   Discuss any additional questions with your state or local health department

## 2020-06-09 ENCOUNTER — TELEPHONE (OUTPATIENT)
Dept: URGENT CARE | Facility: CLINIC | Age: 60
End: 2020-06-09

## 2020-06-09 ENCOUNTER — OFFICE VISIT (OUTPATIENT)
Dept: URGENT CARE | Facility: CLINIC | Age: 60
End: 2020-06-09
Payer: COMMERCIAL

## 2020-06-09 VITALS
RESPIRATION RATE: 16 BRPM | DIASTOLIC BLOOD PRESSURE: 78 MMHG | WEIGHT: 200 LBS | OXYGEN SATURATION: 96 % | TEMPERATURE: 103 F | HEIGHT: 73 IN | BODY MASS INDEX: 26.51 KG/M2 | SYSTOLIC BLOOD PRESSURE: 137 MMHG | HEART RATE: 95 BPM

## 2020-06-09 DIAGNOSIS — R68.83 CHILLS: ICD-10-CM

## 2020-06-09 DIAGNOSIS — R10.9 ACUTE FLANK PAIN: ICD-10-CM

## 2020-06-09 DIAGNOSIS — R05.9 COUGH: ICD-10-CM

## 2020-06-09 DIAGNOSIS — M79.10 MUSCLE PAIN: ICD-10-CM

## 2020-06-09 DIAGNOSIS — R50.81 FEVER IN OTHER DISEASES: Primary | ICD-10-CM

## 2020-06-09 DIAGNOSIS — B34.9 VIRAL SYNDROME: ICD-10-CM

## 2020-06-09 LAB
BILIRUB UR QL STRIP: NEGATIVE
GLUCOSE UR QL STRIP: NEGATIVE
KETONES UR QL STRIP: NEGATIVE
LEUKOCYTE ESTERASE UR QL STRIP: NEGATIVE
PH, POC UA: 5.5 (ref 5–8)
POC BLOOD, URINE: NEGATIVE
POC NITRATES, URINE: NEGATIVE
PROT UR QL STRIP: POSITIVE
SP GR UR STRIP: 1.02 (ref 1–1.03)
UROBILINOGEN UR STRIP-ACNC: NORMAL (ref 0.3–2.2)

## 2020-06-09 PROCEDURE — 99214 OFFICE O/P EST MOD 30 MIN: CPT | Mod: 25,S$GLB,, | Performed by: NURSE PRACTITIONER

## 2020-06-09 PROCEDURE — 99214 PR OFFICE/OUTPT VISIT, EST, LEVL IV, 30-39 MIN: ICD-10-PCS | Mod: 25,S$GLB,, | Performed by: NURSE PRACTITIONER

## 2020-06-09 PROCEDURE — U0003 INFECTIOUS AGENT DETECTION BY NUCLEIC ACID (DNA OR RNA); SEVERE ACUTE RESPIRATORY SYNDROME CORONAVIRUS 2 (SARS-COV-2) (CORONAVIRUS DISEASE [COVID-19]), AMPLIFIED PROBE TECHNIQUE, MAKING USE OF HIGH THROUGHPUT TECHNOLOGIES AS DESCRIBED BY CMS-2020-01-R: HCPCS

## 2020-06-09 PROCEDURE — 96372 PR INJECTION,THERAP/PROPH/DIAG2ST, IM OR SUBCUT: ICD-10-PCS | Mod: S$GLB,,, | Performed by: NURSE PRACTITIONER

## 2020-06-09 PROCEDURE — 81003 URINALYSIS AUTO W/O SCOPE: CPT | Mod: QW,S$GLB,, | Performed by: NURSE PRACTITIONER

## 2020-06-09 PROCEDURE — 81003 POCT URINALYSIS, DIPSTICK, AUTOMATED, W/O SCOPE: ICD-10-PCS | Mod: QW,S$GLB,, | Performed by: NURSE PRACTITIONER

## 2020-06-09 PROCEDURE — 87086 URINE CULTURE/COLONY COUNT: CPT

## 2020-06-09 PROCEDURE — 96372 THER/PROPH/DIAG INJ SC/IM: CPT | Mod: S$GLB,,, | Performed by: NURSE PRACTITIONER

## 2020-06-09 RX ORDER — KETOROLAC TROMETHAMINE 30 MG/ML
30 INJECTION, SOLUTION INTRAMUSCULAR; INTRAVENOUS
Status: COMPLETED | OUTPATIENT
Start: 2020-06-09 | End: 2020-06-09

## 2020-06-09 RX ADMIN — KETOROLAC TROMETHAMINE 30 MG: 30 INJECTION, SOLUTION INTRAMUSCULAR; INTRAVENOUS at 05:06

## 2020-06-09 NOTE — PROGRESS NOTES
"Subjective:       Patient ID: Simón Fong is a 60 y.o. male.    Vitals:  height is 6' 1" (1.854 m) and weight is 90.7 kg (200 lb). His tympanic temperature is 102.5 °F (39.2 °C) (abnormal). His blood pressure is 137/78 and his pulse is 95. His respiration is 16 and oxygen saturation is 96%.     Chief Complaint: URI     Mild cough, fever, chills, and back pain x 2 days. Pt. was seen here yesterday and tested negative for COVID.  He has become increasingly febrile depsite compliance with alternating tylenol and ibuprofen.  Last dose of Ibuprofen was at 3 PM.    Pt reports this morning he woke up feeling much better but took his supplements and deteriorated soon after.  Mr. Fong also stated that he is also constipated, but has no abdominal pain.    + HIV-- taking medication as prescribed.     URI    This is a new problem. The current episode started in the past 7 days. The problem has been gradually worsening. The maximum temperature recorded prior to his arrival was 102 - 102.9 F. The fever has been present for 3 to 4 days. Associated symptoms include coughing. Pertinent negatives include no abdominal pain, chest pain, congestion, diarrhea, dysuria, ear pain, headaches, joint pain, joint swelling, nausea, neck pain, plugged ear sensation, rash, rhinorrhea, sinus pain, sneezing, sore throat, swollen glands, vomiting or wheezing. He has tried NSAIDs and acetaminophen for the symptoms. The treatment provided mild relief.       Constitution: Positive for chills, sweating and fever. Negative for fatigue.   HENT: Negative for ear pain, congestion, sinus pain, sinus pressure, sore throat and voice change.    Neck: Negative for neck pain and painful lymph nodes.   Cardiovascular: Negative for chest pain.   Eyes: Negative for eye redness.   Respiratory: Positive for cough. Negative for chest tightness, sputum production, bloody sputum, COPD, shortness of breath, stridor, wheezing and asthma.    Gastrointestinal: Positive " for constipation. Negative for abdominal pain, nausea, vomiting and diarrhea.   Genitourinary: Negative for dysuria, frequency, urgency, urine decreased, flank pain, bladder incontinence, hematuria, history of kidney stones and penile discharge.   Musculoskeletal: Positive for back pain. Negative for trauma, joint pain and muscle ache.   Skin: Negative for rash and wound.   Allergic/Immunologic: Positive for immunocompromised state (HIV +-- on meds-- HIV not detectable) and immunizations up-to-date. Negative for seasonal allergies, asthma, chronic cough and sneezing.   Neurological: Negative for headaches, disorientation and altered mental status.   Hematologic/Lymphatic: Negative for swollen lymph nodes.   Psychiatric/Behavioral: Negative for altered mental status, disorientation and confusion.       Objective:      Physical Exam   Constitutional: He is oriented to person, place, and time. He appears well-developed and well-nourished. He is cooperative.  Non-toxic appearance. He does not have a sickly appearance. He does not appear ill. No distress.   HENT:   Head: Normocephalic and atraumatic.   Right Ear: Hearing and external ear normal.   Left Ear: Hearing and external ear normal.   Nose: Nose normal. No mucosal edema, rhinorrhea or nasal deformity. No epistaxis. Right sinus exhibits no maxillary sinus tenderness and no frontal sinus tenderness. Left sinus exhibits no maxillary sinus tenderness and no frontal sinus tenderness.   Mouth/Throat: Uvula is midline, oropharynx is clear and moist and mucous membranes are normal. No trismus in the jaw. Normal dentition. No uvula swelling.   Eyes: Conjunctivae and lids are normal. No scleral icterus.   Neck: Trachea normal, full passive range of motion without pain and phonation normal. Neck supple. No neck rigidity. No edema and no erythema present.   Cardiovascular: Normal rate, regular rhythm, normal heart sounds, intact distal pulses and normal pulses.    Pulmonary/Chest: Effort normal and breath sounds normal. No accessory muscle usage. No respiratory distress. He has no decreased breath sounds. He has no wheezes. He has no rhonchi. He has no rales.   Abdominal: Soft. Normal appearance. Bowel sounds are decreased. There is no tenderness. There is no rigidity, no rebound, no guarding and no CVA tenderness.   Musculoskeletal: Normal range of motion. He exhibits no edema or deformity.   Neurological: He is alert and oriented to person, place, and time. He exhibits normal muscle tone. Coordination normal.   Skin: Skin is warm, dry, intact, not diaphoretic, not pale and no rash.   Psychiatric: He has a normal mood and affect. His speech is normal and behavior is normal. Thought content normal.   Nursing note and vitals reviewed.        Assessment:       1. Fever in other diseases    2. Acute flank pain    3. Viral syndrome    4. Chills    5. Cough    6. Muscle pain    7. Fever        Plan:         Fever in other diseases  -     POCT Urinalysis, Dipstick, Automated, W/O Scope  -     Culture, Urine  -     ketorolac injection 30 mg    Acute flank pain  -     POCT Urinalysis, Dipstick, Automated, W/O Scope  -     Culture, Urine  -     ketorolac injection 30 mg    Viral syndrome  -     ketorolac injection 30 mg    Chills  -     COVID-19 Routine Screening    Cough  -     ketorolac injection 30 mg  -     COVID-19 Routine Screening    Muscle pain  -     COVID-19 Routine Screening    Fever  -     COVID-19 Routine Screening        Pt. Was tested yesterday for COVID-19, and it was negative.  Since patient is HIV+ (taking medication) with a 102 fever, and it's possible COVID test yesterday may be a false negative, will test again today.  Urine dip negative today-- sending urine culture.  No other symptoms

## 2020-06-09 NOTE — PATIENT INSTRUCTIONS
Return to Urgent Care or go to ER if symptoms worsen or fail to improve.  Follow up with PCP as recommended for further management.   Notify your infectious disease provider and let her know what is going on.   We will contact you in 24-48 hours with your test results for COVID and 3-5 days with your urine culture results.  Please remain home quarantined at least until you are notified of your test results.   Hold ibuprofen x 24 hours.  You may resume at 5pm tomorrow as prescribed.  May continue tylenol every 6 hours according to label instructions,.     To help treat your constipation, it is recommended that you do the followin. Increase your water intake in ounces equal to 1/2 your body weight in pounds. For instance if you weigh 150 pounds, drink 75 (1/2 of 150) ounces of water DAILY  2. Increase your intake of fresh fruits and vegetables and fiber gradually, as allowed considering any health conditions you may have.   3. Take Milk of Magnesia or Senokot-S according to label instructions. Both medications are over the counter at your  local pharmacy. Once bowel movement is achieved and abdominal bloating/distension resolves, it may be necessary  that you continue ONE of the following indefinitely:      A) Senakot-S: 1 capsule one to two times per day   B) Miralax: 1 capful daily per label instructions  C) Smooth move tea daily  D) Metamucil daily according to label instructions    Instructions for Patients with Confirmed or Suspected COVID-19    If you are awaiting your test result, you will either be called or it will be released to the patient portal.  If you have any questions about your test, please visit [email or web address] or call our COVID-19 information line at [phone].      Preventing the Spread of Coronavirus Disease 2019 (COVID-19) in Homes and Residential Communities -- Patients     Prevention steps for people with confirmed or suspected COVID-19 (including persons under  investigation) who do not need to be hospitalized and people with confirmed COVID-19 who were hospitalized and determined to be medically stable to go home.    Stay home except to get medical care.    Separate yourself from other people and animals in your home.    Call ahead before visiting your doctor.    Wear a face mask.    Cover your coughs and sneezes.    Clean your hands often.    Avoid sharing personal household items.    Clean all high-touch surfaces every day.    Monitor your symptoms. Seek prompt medical attention if your illness is worsening (e.g., difficulty breathing). Before seeking care, call your healthcare provider.    If you have a medical emergency and must call 911, notify the dispatcher that you have or are being evaluated for COVID-19. If possible, put on a face mask before emergency medical services arrive.    Use the following symptom-based strategy to return to normal activity following a suspected or confirmed case of COVID-19. Continue isolation until:   o At least 3 days (72 hours) have passed since recovery defined as resolution of fever without the use of fever-reducing medications and improvement in respiratory symptoms (e.g. cough, shortness of breath), and   o At least 10 days have passed since symptoms first appeared.     Precautions for household members, intimate partners and caregivers in a non-healthcare setting of a patient with symptomatic laboratory-confirmed COVID-19 or a patient under investigation.     Household members, intimate partners and caregivers in a non-healthcare setting may have close contact with a person with symptomatic, laboratory-confirmed COVID-19 or a person under investigation. Close contacts should monitor their health; they should call their healthcare provider right away if they develop symptoms suggestive of COVID-19 (e.g., fever, cough, shortness of breath). Close contacts should also follow these recommendations:      Make sure that  you understand and can help the patient follow their healthcare providers instructions for medication(s) and care. You should help the patient with basic needs in the home and provide support for getting groceries, prescriptions, and other personal needs.    Monitor the patients symptoms. If the patient is getting sicker, call his or her healthcare provider and tell them that the patient has laboratory-confirmed COVID-19. This will help the healthcare providers office take steps to keep people in the office or waiting room from getting infected. Ask the healthcare provider to call the local or [facility] health department for additional guidance. If the patient has a medical emergency and you need to call 911, notify the dispatch personnel that the patient has or is being evaluated for COVID-19.    Household members should stay in another room or be  from the patient as much as possible. Household members should use a separate bedroom and bathroom, if available.    Prohibit visitors who do not have an essential need to be in the home.    Household members should care for any pets. Do not handle pets or other animals while sick.    Make sure that shared spaces in the home have good air flow, such as by an air conditioner.    Perform hand hygiene frequently. Wash your hands often with soap and water for at least 20 seconds or use an alcohol-based hand  that contains 60 to 95% alcohol, covering all surfaces of your hands and rubbing them together until they feel dry. Soap and water are preferred if hands are visibly dirty.    Avoid touching your eyes, nose and mouth with unwashed hands.    The patient should wear a face mask when you are around other people. If the patient is not able to wear a face mask (for example, because it causes trouble breathing), you, as the caregiver, should wear a mask when you are in the same room as the patient.    Wear a disposable face mask and gloves when you  touch or have contact with the patients blood, stool or body fluids, such as saliva, sputum, nasal mucus, vomit and urine.   o Throw out disposable face masks and gloves after using them. Do not reuse.   o When removing personal protective equipment, first remove and dispose of gloves. Then, immediately clean your hands with soap and water or alcohol-based hand . Next, remove and dispose of face mask, and immediately clean your hands again with soap and water or alcohol-based hand .    Avoid sharing household items with the patient. You should not share dishes, drinking glasses, cups, eating utensils, towels, bedding or other items. After the patient uses these items, you should wash them thoroughly (see below Wash laundry thoroughly).    Clean all high-touch surfaces, such as counters, tabletops, doorknobs, bathroom fixtures, toilets, phones, keyboards, tablets and bedside tables, every day. Also, clean any surfaces that may have blood, stool or body fluids on them.   o Use a household cleaning spray or wipe, according to the label instructions. Labels contain instructions for safe and effective use of the cleaning product including precautions you should take when applying the product, such as wearing gloves and making sure you have good ventilation during use of the product.    Wash laundry thoroughly.   o Immediately remove and wash clothes or bedding that have blood, stool or body fluids on them.  o Wear disposable gloves while handling soiled items and keep soiled items away from your body. Clean your hands (with soap and water or an alcohol-based hand ) immediately after removing your gloves.   o Read and follow directions on labels of laundry or clothing items and detergent. In general, using a normal laundry detergent according to washing machine instructions and dry thoroughly using the warmest temperatures recommended on the clothing label.    Place all used disposable  gloves, face masks and other contaminated items in a lined container before disposing of them with other household waste. Clean your hands (with soap and water or an alcohol-based hand ) immediately after handling these items. Soap and water should be used preferentially if hands are visibly dirty.   Discuss any additional questions with your state or local health department

## 2020-06-11 ENCOUNTER — HOSPITAL ENCOUNTER (OUTPATIENT)
Facility: HOSPITAL | Age: 60
Discharge: HOME | End: 2020-06-13
Attending: EMERGENCY MEDICINE | Admitting: EMERGENCY MEDICINE
Payer: COMMERCIAL

## 2020-06-11 DIAGNOSIS — R05.9 COUGH: ICD-10-CM

## 2020-06-11 DIAGNOSIS — R50.9 FEVER: ICD-10-CM

## 2020-06-11 DIAGNOSIS — B20 HIV INFECTION, UNSPECIFIED SYMPTOM STATUS: ICD-10-CM

## 2020-06-11 DIAGNOSIS — J18.9 PNEUMONIA OF RIGHT LOWER LOBE DUE TO INFECTIOUS ORGANISM: Primary | ICD-10-CM

## 2020-06-11 LAB
ALBUMIN SERPL BCP-MCNC: 3.4 G/DL (ref 3.5–5.2)
ALP SERPL-CCNC: 94 U/L (ref 55–135)
ALT SERPL W/O P-5'-P-CCNC: 43 U/L (ref 10–44)
ANION GAP SERPL CALC-SCNC: 10 MMOL/L (ref 8–16)
AST SERPL-CCNC: 44 U/L (ref 10–40)
BACTERIA #/AREA URNS AUTO: NORMAL /HPF
BACTERIA UR CULT: NO GROWTH
BASOPHILS # BLD AUTO: 0.03 K/UL (ref 0–0.2)
BASOPHILS NFR BLD: 0.2 % (ref 0–1.9)
BILIRUB SERPL-MCNC: 0.9 MG/DL (ref 0.1–1)
BILIRUB UR QL STRIP: NEGATIVE
BUN SERPL-MCNC: 14 MG/DL (ref 6–20)
CALCIUM SERPL-MCNC: 9.5 MG/DL (ref 8.7–10.5)
CHLORIDE SERPL-SCNC: 93 MMOL/L (ref 95–110)
CLARITY UR REFRACT.AUTO: CLEAR
CO2 SERPL-SCNC: 28 MMOL/L (ref 23–29)
COLOR UR AUTO: YELLOW
CREAT SERPL-MCNC: 1.2 MG/DL (ref 0.5–1.4)
DIFFERENTIAL METHOD: ABNORMAL
EOSINOPHIL # BLD AUTO: 0 K/UL (ref 0–0.5)
EOSINOPHIL NFR BLD: 0 % (ref 0–8)
ERYTHROCYTE [DISTWIDTH] IN BLOOD BY AUTOMATED COUNT: 12.7 % (ref 11.5–14.5)
EST. GFR  (AFRICAN AMERICAN): >60 ML/MIN/1.73 M^2
EST. GFR  (NON AFRICAN AMERICAN): >60 ML/MIN/1.73 M^2
GLUCOSE SERPL-MCNC: 159 MG/DL (ref 70–110)
GLUCOSE UR QL STRIP: NEGATIVE
HCT VFR BLD AUTO: 39.6 % (ref 40–54)
HGB BLD-MCNC: 13.5 G/DL (ref 14–18)
HGB UR QL STRIP: ABNORMAL
HYALINE CASTS UR QL AUTO: 0 /LPF
IMM GRANULOCYTES # BLD AUTO: 0.11 K/UL (ref 0–0.04)
IMM GRANULOCYTES NFR BLD AUTO: 0.9 % (ref 0–0.5)
INFLUENZA A, MOLECULAR: NEGATIVE
INFLUENZA B, MOLECULAR: NEGATIVE
KETONES UR QL STRIP: NEGATIVE
LEUKOCYTE ESTERASE UR QL STRIP: NEGATIVE
LYMPHOCYTES # BLD AUTO: 0.5 K/UL (ref 1–4.8)
LYMPHOCYTES NFR BLD: 4.4 % (ref 18–48)
MAGNESIUM SERPL-MCNC: 2 MG/DL (ref 1.6–2.6)
MCH RBC QN AUTO: 29.7 PG (ref 27–31)
MCHC RBC AUTO-ENTMCNC: 34.1 G/DL (ref 32–36)
MCV RBC AUTO: 87 FL (ref 82–98)
MICROSCOPIC COMMENT: NORMAL
MONOCYTES # BLD AUTO: 0.7 K/UL (ref 0.3–1)
MONOCYTES NFR BLD: 5.6 % (ref 4–15)
NEUTROPHILS # BLD AUTO: 10.7 K/UL (ref 1.8–7.7)
NEUTROPHILS NFR BLD: 88.9 % (ref 38–73)
NITRITE UR QL STRIP: NEGATIVE
NRBC BLD-RTO: 0 /100 WBC
PH UR STRIP: 5 [PH] (ref 5–8)
PLATELET # BLD AUTO: 200 K/UL (ref 150–350)
PLATELET BLD QL SMEAR: ABNORMAL
PMV BLD AUTO: 10.1 FL (ref 9.2–12.9)
POTASSIUM SERPL-SCNC: 3.7 MMOL/L (ref 3.5–5.1)
PROT SERPL-MCNC: 7.7 G/DL (ref 6–8.4)
PROT UR QL STRIP: ABNORMAL
RBC # BLD AUTO: 4.54 M/UL (ref 4.6–6.2)
RBC #/AREA URNS AUTO: 4 /HPF (ref 0–4)
SARS-COV-2 RDRP RESP QL NAA+PROBE: NEGATIVE
SARS-COV-2 RNA RESP QL NAA+PROBE: NOT DETECTED
SODIUM SERPL-SCNC: 131 MMOL/L (ref 136–145)
SP GR UR STRIP: 1.02 (ref 1–1.03)
SPECIMEN SOURCE: NORMAL
SQUAMOUS #/AREA URNS AUTO: 0 /HPF
URN SPEC COLLECT METH UR: ABNORMAL
WBC # BLD AUTO: 12.05 K/UL (ref 3.9–12.7)
WBC #/AREA URNS AUTO: 2 /HPF (ref 0–5)

## 2020-06-11 PROCEDURE — 93010 ELECTROCARDIOGRAM REPORT: CPT | Mod: ,,, | Performed by: INTERNAL MEDICINE

## 2020-06-11 PROCEDURE — 83735 ASSAY OF MAGNESIUM: CPT

## 2020-06-11 PROCEDURE — 80053 COMPREHEN METABOLIC PANEL: CPT

## 2020-06-11 PROCEDURE — 87536 HIV-1 QUANT&REVRSE TRNSCRPJ: CPT

## 2020-06-11 PROCEDURE — 99285 EMERGENCY DEPT VISIT HI MDM: CPT | Mod: ,,, | Performed by: PHYSICIAN ASSISTANT

## 2020-06-11 PROCEDURE — 93005 ELECTROCARDIOGRAM TRACING: CPT

## 2020-06-11 PROCEDURE — G0378 HOSPITAL OBSERVATION PER HR: HCPCS

## 2020-06-11 PROCEDURE — 99285 EMERGENCY DEPT VISIT HI MDM: CPT | Mod: 25

## 2020-06-11 PROCEDURE — 81001 URINALYSIS AUTO W/SCOPE: CPT

## 2020-06-11 PROCEDURE — 93010 EKG 12-LEAD: ICD-10-PCS | Mod: ,,, | Performed by: INTERNAL MEDICINE

## 2020-06-11 PROCEDURE — 86361 T CELL ABSOLUTE COUNT: CPT

## 2020-06-11 PROCEDURE — 87502 INFLUENZA DNA AMP PROBE: CPT

## 2020-06-11 PROCEDURE — U0002 COVID-19 LAB TEST NON-CDC: HCPCS

## 2020-06-11 PROCEDURE — 99220 PR INITIAL OBSERVATION CARE,LEVL III: ICD-10-PCS | Mod: ,,, | Performed by: HOSPITALIST

## 2020-06-11 PROCEDURE — 63700000 PHARM REV CODE 250 ALT 637 W/O HCPCS: Performed by: PHYSICIAN ASSISTANT

## 2020-06-11 PROCEDURE — 87040 BLOOD CULTURE FOR BACTERIA: CPT

## 2020-06-11 PROCEDURE — 99285 PR EMERGENCY DEPT VISIT,LEVEL V: ICD-10-PCS | Mod: ,,, | Performed by: PHYSICIAN ASSISTANT

## 2020-06-11 PROCEDURE — 85025 COMPLETE CBC W/AUTO DIFF WBC: CPT

## 2020-06-11 PROCEDURE — 96374 THER/PROPH/DIAG INJ IV PUSH: CPT

## 2020-06-11 PROCEDURE — 25000003 PHARM REV CODE 250: Performed by: STUDENT IN AN ORGANIZED HEALTH CARE EDUCATION/TRAINING PROGRAM

## 2020-06-11 PROCEDURE — 99220 PR INITIAL OBSERVATION CARE,LEVL III: CPT | Mod: ,,, | Performed by: HOSPITALIST

## 2020-06-11 PROCEDURE — 63600175 PHARM REV CODE 636 W HCPCS: Performed by: PHYSICIAN ASSISTANT

## 2020-06-11 RX ORDER — CEFTRIAXONE 1 G/1
1 INJECTION, POWDER, FOR SOLUTION INTRAMUSCULAR; INTRAVENOUS
Status: COMPLETED | OUTPATIENT
Start: 2020-06-11 | End: 2020-06-11

## 2020-06-11 RX ORDER — ACETAMINOPHEN 500 MG
1000 TABLET ORAL EVERY 6 HOURS PRN
COMMUNITY
End: 2023-02-13

## 2020-06-11 RX ORDER — ONDANSETRON 8 MG/1
8 TABLET, ORALLY DISINTEGRATING ORAL EVERY 8 HOURS PRN
Status: DISCONTINUED | OUTPATIENT
Start: 2020-06-11 | End: 2020-06-13 | Stop reason: HOSPADM

## 2020-06-11 RX ORDER — CEFTRIAXONE 1 G/1
1 INJECTION, POWDER, FOR SOLUTION INTRAMUSCULAR; INTRAVENOUS
Status: DISCONTINUED | OUTPATIENT
Start: 2020-06-12 | End: 2020-06-12

## 2020-06-11 RX ORDER — AZITHROMYCIN 250 MG/1
500 TABLET, FILM COATED ORAL
Status: COMPLETED | OUTPATIENT
Start: 2020-06-11 | End: 2020-06-11

## 2020-06-11 RX ORDER — IBUPROFEN 400 MG/1
400 TABLET ORAL EVERY 6 HOURS PRN
COMMUNITY
End: 2021-06-17

## 2020-06-11 RX ORDER — AMOXICILLIN 250 MG
1 CAPSULE ORAL 2 TIMES DAILY
Status: DISCONTINUED | OUTPATIENT
Start: 2020-06-11 | End: 2020-06-13 | Stop reason: HOSPADM

## 2020-06-11 RX ORDER — TALC
6 POWDER (GRAM) TOPICAL NIGHTLY PRN
Status: DISCONTINUED | OUTPATIENT
Start: 2020-06-11 | End: 2020-06-13 | Stop reason: HOSPADM

## 2020-06-11 RX ORDER — AMLODIPINE BESYLATE 10 MG/1
10 TABLET ORAL DAILY
Status: DISCONTINUED | OUTPATIENT
Start: 2020-06-12 | End: 2020-06-13 | Stop reason: HOSPADM

## 2020-06-11 RX ORDER — ACETAMINOPHEN 500 MG
1000 TABLET ORAL EVERY 6 HOURS PRN
Status: DISCONTINUED | OUTPATIENT
Start: 2020-06-11 | End: 2020-06-13 | Stop reason: HOSPADM

## 2020-06-11 RX ADMIN — ACETAMINOPHEN 1000 MG: 500 TABLET ORAL at 08:06

## 2020-06-11 RX ADMIN — CEFTRIAXONE SODIUM 1 G: 1 INJECTION, POWDER, FOR SOLUTION INTRAMUSCULAR; INTRAVENOUS at 06:06

## 2020-06-11 RX ADMIN — AZITHROMYCIN 500 MG: 250 TABLET, FILM COATED ORAL at 06:06

## 2020-06-11 RX ADMIN — Medication 6 MG: at 08:06

## 2020-06-11 NOTE — ED PROVIDER NOTES
Encounter Date: 6/11/2020       History     Chief Complaint   Patient presents with    Fever     COVID negative on Monday and Tuesday     HPI   Simón Fong is a 60 y.o. male with medical history of HIV, HTN presenting to the ED with the chief complaint of fever. Patient reports intermittent fevers for the past 5 days. He experiences diffuse myaglia and headache whenever he experiences the fevers. He has been alternating Tylenol and IBU to manage his fevers. He developed a cough over the past 2 days. He tried taking Robitussin, but felt like it made his cough worse. He noticed a few dark red specs in a tissue after he coughed this morning which he thought was blood. He has been evaluated at  twice previously for his symptoms and had two negative COVID-19 testing as well as a negative urine dipstick. He reports he feels like his symptoms have improved today. He denies history of TB, recent foreign travel, pulmonary disease, history of DVT/PE. He is compliant with his anti-virals and has been non-detectable for over 30 years. No tobacco use and he exercises regularly.      Review of patient's allergies indicates:   Allergen Reactions    Sulfa (sulfonamide antibiotics)      Past Medical History:   Diagnosis Date    HIV infection     Hypertension      Past Surgical History:   Procedure Laterality Date    PROSTATE BIOPSY       Family History   Problem Relation Age of Onset    Diabetes Mother     Heart disease Mother     Hypertension Mother     Stroke Mother     Hypertension Father     Cancer Sister     Cancer Brother      Social History     Tobacco Use    Smoking status: Never Smoker    Smokeless tobacco: Never Used   Substance Use Topics    Alcohol use: No    Drug use: No     Review of Systems   Constitutional: Positive for fever.   HENT: Negative for sore throat.    Eyes: Negative for pain.   Respiratory: Positive for cough. Negative for shortness of breath.    Cardiovascular: Negative for chest  pain.   Gastrointestinal: Negative for nausea.   Genitourinary: Negative for discharge and dysuria.   Musculoskeletal: Positive for myalgias. Negative for back pain.   Skin: Negative for rash.   Neurological: Positive for headaches. Negative for weakness.   Hematological: Does not bruise/bleed easily.       Physical Exam     Initial Vitals [06/11/20 1442]   BP Pulse Resp Temp SpO2   (!) 148/72 102 15 100.2 °F (37.9 °C) (!) 94 %      MAP       --         Physical Exam    Constitutional: He appears well-developed and well-nourished. He is not diaphoretic. No distress.   HENT:   Head: Normocephalic and atraumatic.   Mouth/Throat: Oropharynx is clear and moist. No oropharyngeal exudate.   Eyes: EOM are normal. Pupils are equal, round, and reactive to light.   Neck: Normal range of motion. Neck supple.   Cardiovascular: Normal rate and regular rhythm.   Pulmonary/Chest: No respiratory distress. He has no wheezes.   Speaking full sentences without difficulty   Abdominal: Soft. There is no tenderness.   Musculoskeletal: Normal range of motion. He exhibits no tenderness.   No calf tenderness or asymmetry   Neurological: He is alert and oriented to person, place, and time. He has normal strength. No cranial nerve deficit or sensory deficit.   Skin: Skin is warm and dry. No rash noted. No erythema.       ED Course   Procedures  Labs Reviewed   CBC W/ AUTO DIFFERENTIAL - Abnormal; Notable for the following components:       Result Value    RBC 4.54 (*)     Hemoglobin 13.5 (*)     Hematocrit 39.6 (*)     Immature Granulocytes 0.9 (*)     Gran # (ANC) 10.7 (*)     Immature Grans (Abs) 0.11 (*)     Lymph # 0.5 (*)     Gran% 88.9 (*)     Lymph% 4.4 (*)     All other components within normal limits   COMPREHENSIVE METABOLIC PANEL - Abnormal; Notable for the following components:    Sodium 131 (*)     Chloride 93 (*)     Glucose 159 (*)     Albumin 3.4 (*)     AST 44 (*)     All other components within normal limits   URINALYSIS,  REFLEX TO URINE CULTURE - Abnormal; Notable for the following components:    Protein, UA 2+ (*)     Occult Blood UA 1+ (*)     All other components within normal limits    Narrative:     Preferred Collection Type->Urine, Clean Catch   INFLUENZA A & B BY MOLECULAR   CULTURE, BLOOD   CULTURE, BLOOD   CULTURE, RESPIRATORY   SARS-COV-2 RNA AMPLIFICATION, QUAL   MAGNESIUM   URINALYSIS MICROSCOPIC    Narrative:     Preferred Collection Type->Urine, Clean Catch   HIV RNA, QUANTITATIVE, PCR   T-HELPER CELLS (CD4) COUNT          Imaging Results          X-Ray Chest AP Portable (Final result)  Result time 06/11/20 17:08:26    Final result by Richar Schmitt MD (06/11/20 17:08:26)                 Impression:      Consolidation the right lung base, consistent with pneumonia.      Electronically signed by: Richar Schmitt MD  Date:    06/11/2020  Time:    17:08             Narrative:    EXAMINATION:  XR CHEST AP PORTABLE    CLINICAL HISTORY:  Cough    TECHNIQUE:  Single frontal view of the chest was performed.    COMPARISON:  02/08/2012.    FINDINGS:  The trachea is unremarkable.  The cardiomediastinal silhouette is upper limits of normal.  The left hemidiaphragm is unremarkable.  There is obscuration of the right hemidiaphragm.  There are no pleural effusions.  There is no evidence of a pneumothorax.  There is no evidence of pneumomediastinum.  There is a consolidation in the right lung base.  The osseous structures demonstrate degenerative changes.                                 Medical Decision Making:   History:   Old Medical Records: I decided to obtain old medical records.  Old Records Summarized: records from clinic visits.  Clinical Tests:   Lab Tests: Ordered and Reviewed  Radiological Study: Ordered and Reviewed  Medical Tests: Ordered and Reviewed  Other:   I have discussed this case with another health care provider.       <> Summary of the Discussion: Hospital Medicine       APC / Resident Notes:   60 y.o. male with  medical history of HIV, HTN presenting to the ED c/o intermittent fevers for the past 5 days. Previously seen in  twice and had COVID-19 testing negative x2. Reports having a cough over the past 2 days and thought he noticed blood in his sputum today. DDx broad and includes but not limited to pneumonia, COVID-19, viral syndrome, bacteremia, UTI, pulmonary embolism. No confusion, nuchal rigidity, neurovascular deficits and do not suspect meningitis.      Work-up significant for RLL pneumonia. WBC at upper limits 12. COVID-19 negative. Given history of HIV, do not feel outpatient therapy appropriate at this time. Placed in observation for further management. Rocephin and Azithromycin given for coverage. Patient expresses understanding and agreeable to the plan. I have discussed the care of this patient with my supervising physician.         Attending Attestation:     Physician Attestation Statement for NP/PA:   I discussed this assessment and plan of this patient with the NP/PA, but I did not personally examine the patient. The face to face encounter was performed by the NP/PA.                                Clinical Impression:       ICD-10-CM ICD-9-CM   1. Pneumonia of right lower lobe due to infectious organism J18.1 486   2. Fever R50.9 780.60   3. Cough R05 786.2   4. HIV infection, unspecified symptom status B20 V08         Disposition:   Disposition: Placed in Observation  Condition: Fair     ED Disposition Condition    Observation                           Presley Upton PA-C  06/11/20 2008       Tierney Zimmerman MD  06/13/20 [id number]

## 2020-06-11 NOTE — ED NOTES
LOC: The patient is awake, alert, and oriented to place, time, situation. Affect is appropriate.  Speech is appropriate and clear.     APPEARANCE: Patient resting comfortably in no acute distress.  Patient is clean and well groomed.    SKIN: The skin is warm and dry; color consistent with ethnicity.  Patient has normal skin turgor and moist mucus membranes.  Skin intact; no breakdown or bruising noted.     MUSCULOSKELETAL: Patient moving upper and lower extremities without difficulty.  Denies weakness.     RESPIRATORY: Airway is open and patent. Respirations spontaneous, even, easy, and non-labored.  Patient has a normal effort and rate.  No accessory muscle use noted. reports cough with production of blood-tinged to brown sputum.     CARDIAC: Pt is tachycardic with a HR of 102.  No peripheral edema noted. No complaints of chest pain.      ABDOMEN: Soft and non tender to palpation.  No distention noted.     NEUROLOGIC: Eyes open spontaneously.  Behavior appropriate to situation.  Follows commands; facial expression symmetrical.  Purposeful motor response noted; normal sensation in all extremities.    \

## 2020-06-11 NOTE — ED TRIAGE NOTES
Pt with fever and chills since Sunday. Pt was seen at Urgent Care on Monday where he tested negative for Covid-19. Pt states he developed a cough on Tuesday and was again tested for Covid and again was negative. Pt reports he is now noticing blood in his sputum.  Pt denies SOB.

## 2020-06-12 LAB
ANION GAP SERPL CALC-SCNC: 9 MMOL/L (ref 8–16)
ANISOCYTOSIS BLD QL SMEAR: SLIGHT
BASOPHILS # BLD AUTO: 0.02 K/UL (ref 0–0.2)
BASOPHILS NFR BLD: 0.2 % (ref 0–1.9)
BUN SERPL-MCNC: 13 MG/DL (ref 6–20)
CALCIUM SERPL-MCNC: 9.1 MG/DL (ref 8.7–10.5)
CD3+CD4+ CELLS # BLD: 167 CELLS/UL (ref 300–1400)
CD3+CD4+ CELLS NFR BLD: 29.8 % (ref 28–57)
CHLORIDE SERPL-SCNC: 95 MMOL/L (ref 95–110)
CK SERPL-CCNC: 912 U/L (ref 20–200)
CO2 SERPL-SCNC: 27 MMOL/L (ref 23–29)
CREAT SERPL-MCNC: 1 MG/DL (ref 0.5–1.4)
CRP SERPL-MCNC: 418.8 MG/L (ref 0–8.2)
D DIMER PPP IA.FEU-MCNC: 1.81 MG/L FEU
DIFFERENTIAL METHOD: ABNORMAL
EOSINOPHIL # BLD AUTO: 0 K/UL (ref 0–0.5)
EOSINOPHIL NFR BLD: 0 % (ref 0–8)
ERYTHROCYTE [DISTWIDTH] IN BLOOD BY AUTOMATED COUNT: 12.9 % (ref 11.5–14.5)
ERYTHROCYTE [SEDIMENTATION RATE] IN BLOOD BY WESTERGREN METHOD: 83 MM/HR (ref 0–23)
EST. GFR  (AFRICAN AMERICAN): >60 ML/MIN/1.73 M^2
EST. GFR  (NON AFRICAN AMERICAN): >60 ML/MIN/1.73 M^2
FERRITIN SERPL-MCNC: 706 NG/ML (ref 20–300)
GLUCOSE SERPL-MCNC: 141 MG/DL (ref 70–110)
HCT VFR BLD AUTO: 36.4 % (ref 40–54)
HGB BLD-MCNC: 12.3 G/DL (ref 14–18)
IMM GRANULOCYTES # BLD AUTO: 0.11 K/UL (ref 0–0.04)
IMM GRANULOCYTES NFR BLD AUTO: 0.9 % (ref 0–0.5)
LACTATE SERPL-SCNC: 1.3 MMOL/L (ref 0.5–2.2)
LDH SERPL L TO P-CCNC: 331 U/L (ref 110–260)
LYMPHOCYTES # BLD AUTO: 0.9 K/UL (ref 1–4.8)
LYMPHOCYTES NFR BLD: 6.9 % (ref 18–48)
MAGNESIUM SERPL-MCNC: 2.1 MG/DL (ref 1.6–2.6)
MCH RBC QN AUTO: 29.3 PG (ref 27–31)
MCHC RBC AUTO-ENTMCNC: 33.8 G/DL (ref 32–36)
MCV RBC AUTO: 87 FL (ref 82–98)
MONOCYTES # BLD AUTO: 0.9 K/UL (ref 0.3–1)
MONOCYTES NFR BLD: 7.4 % (ref 4–15)
NEUTROPHILS # BLD AUTO: 10.5 K/UL (ref 1.8–7.7)
NEUTROPHILS NFR BLD: 84.6 % (ref 38–73)
NRBC BLD-RTO: 0 /100 WBC
PHOSPHATE SERPL-MCNC: 2.9 MG/DL (ref 2.7–4.5)
PLATELET # BLD AUTO: 210 K/UL (ref 150–350)
PLATELET BLD QL SMEAR: ABNORMAL
PMV BLD AUTO: 10.5 FL (ref 9.2–12.9)
POIKILOCYTOSIS BLD QL SMEAR: SLIGHT
POLYCHROMASIA BLD QL SMEAR: ABNORMAL
POTASSIUM SERPL-SCNC: 3.4 MMOL/L (ref 3.5–5.1)
PROCALCITONIN SERPL IA-MCNC: 0.84 NG/ML
RBC # BLD AUTO: 4.2 M/UL (ref 4.6–6.2)
SARS-COV-2 RNA RESP QL NAA+PROBE: NOT DETECTED
SODIUM SERPL-SCNC: 131 MMOL/L (ref 136–145)
TOXIC GRANULES BLD QL SMEAR: PRESENT
WBC # BLD AUTO: 12.38 K/UL (ref 3.9–12.7)

## 2020-06-12 PROCEDURE — 83605 ASSAY OF LACTIC ACID: CPT

## 2020-06-12 PROCEDURE — 36415 COLL VENOUS BLD VENIPUNCTURE: CPT

## 2020-06-12 PROCEDURE — 84100 ASSAY OF PHOSPHORUS: CPT

## 2020-06-12 PROCEDURE — 85652 RBC SED RATE AUTOMATED: CPT

## 2020-06-12 PROCEDURE — 87205 SMEAR GRAM STAIN: CPT

## 2020-06-12 PROCEDURE — 85025 COMPLETE CBC W/AUTO DIFF WBC: CPT

## 2020-06-12 PROCEDURE — 96376 TX/PRO/DX INJ SAME DRUG ADON: CPT

## 2020-06-12 PROCEDURE — 96375 TX/PRO/DX INJ NEW DRUG ADDON: CPT

## 2020-06-12 PROCEDURE — 25000003 PHARM REV CODE 250: Performed by: STUDENT IN AN ORGANIZED HEALTH CARE EDUCATION/TRAINING PROGRAM

## 2020-06-12 PROCEDURE — 82550 ASSAY OF CK (CPK): CPT

## 2020-06-12 PROCEDURE — 82728 ASSAY OF FERRITIN: CPT

## 2020-06-12 PROCEDURE — 63600175 PHARM REV CODE 636 W HCPCS: Performed by: STUDENT IN AN ORGANIZED HEALTH CARE EDUCATION/TRAINING PROGRAM

## 2020-06-12 PROCEDURE — 86140 C-REACTIVE PROTEIN: CPT

## 2020-06-12 PROCEDURE — 63600175 PHARM REV CODE 636 W HCPCS: Performed by: HOSPITALIST

## 2020-06-12 PROCEDURE — 94761 N-INVAS EAR/PLS OXIMETRY MLT: CPT

## 2020-06-12 PROCEDURE — 83615 LACTATE (LD) (LDH) ENZYME: CPT

## 2020-06-12 PROCEDURE — 87040 BLOOD CULTURE FOR BACTERIA: CPT

## 2020-06-12 PROCEDURE — 80048 BASIC METABOLIC PNL TOTAL CA: CPT

## 2020-06-12 PROCEDURE — 87070 CULTURE OTHR SPECIMN AEROBIC: CPT | Mod: 59

## 2020-06-12 PROCEDURE — 85379 FIBRIN DEGRADATION QUANT: CPT

## 2020-06-12 PROCEDURE — G0378 HOSPITAL OBSERVATION PER HR: HCPCS

## 2020-06-12 PROCEDURE — 27000221 HC OXYGEN, UP TO 24 HOURS

## 2020-06-12 PROCEDURE — 83735 ASSAY OF MAGNESIUM: CPT

## 2020-06-12 PROCEDURE — 84145 PROCALCITONIN (PCT): CPT

## 2020-06-12 PROCEDURE — U0003 INFECTIOUS AGENT DETECTION BY NUCLEIC ACID (DNA OR RNA); SEVERE ACUTE RESPIRATORY SYNDROME CORONAVIRUS 2 (SARS-COV-2) (CORONAVIRUS DISEASE [COVID-19]), AMPLIFIED PROBE TECHNIQUE, MAKING USE OF HIGH THROUGHPUT TECHNOLOGIES AS DESCRIBED BY CMS-2020-01-R: HCPCS

## 2020-06-12 RX ORDER — GUAIFENESIN 600 MG/1
600 TABLET, EXTENDED RELEASE ORAL 2 TIMES DAILY
Status: DISCONTINUED | OUTPATIENT
Start: 2020-06-12 | End: 2020-06-13 | Stop reason: HOSPADM

## 2020-06-12 RX ORDER — ACETAMINOPHEN AND CODEINE PHOSPHATE 120; 12 MG/5ML; MG/5ML
5 SOLUTION ORAL EVERY 4 HOURS PRN
Status: DISCONTINUED | OUTPATIENT
Start: 2020-06-12 | End: 2020-06-13 | Stop reason: HOSPADM

## 2020-06-12 RX ORDER — BENZONATATE 100 MG/1
100 CAPSULE ORAL 3 TIMES DAILY PRN
Status: DISCONTINUED | OUTPATIENT
Start: 2020-06-12 | End: 2020-06-13 | Stop reason: HOSPADM

## 2020-06-12 RX ORDER — CEFTRIAXONE 1 G/1
1 INJECTION, POWDER, FOR SOLUTION INTRAMUSCULAR; INTRAVENOUS
Status: DISCONTINUED | OUTPATIENT
Start: 2020-06-12 | End: 2020-06-13 | Stop reason: HOSPADM

## 2020-06-12 RX ORDER — POTASSIUM CHLORIDE 20 MEQ/1
40 TABLET, EXTENDED RELEASE ORAL ONCE
Status: COMPLETED | OUTPATIENT
Start: 2020-06-12 | End: 2020-06-12

## 2020-06-12 RX ADMIN — GUAIFENESIN 600 MG: 600 TABLET, EXTENDED RELEASE ORAL at 08:06

## 2020-06-12 RX ADMIN — GUAIFENESIN 600 MG: 600 TABLET, EXTENDED RELEASE ORAL at 09:06

## 2020-06-12 RX ADMIN — PIPERACILLIN AND TAZOBACTAM 4.5 G: 4; .5 INJECTION, POWDER, LYOPHILIZED, FOR SOLUTION INTRAVENOUS; PARENTERAL at 04:06

## 2020-06-12 RX ADMIN — ACETAMINOPHEN 1000 MG: 500 TABLET ORAL at 02:06

## 2020-06-12 RX ADMIN — Medication 6 MG: at 08:06

## 2020-06-12 RX ADMIN — AMLODIPINE BESYLATE 10 MG: 10 TABLET ORAL at 09:06

## 2020-06-12 RX ADMIN — DOCUSATE SODIUM 50 MG AND SENNOSIDES 8.6 MG 1 TABLET: 8.6; 5 TABLET, FILM COATED ORAL at 09:06

## 2020-06-12 RX ADMIN — CEFTRIAXONE SODIUM 1 G: 1 INJECTION, POWDER, FOR SOLUTION INTRAMUSCULAR; INTRAVENOUS at 05:06

## 2020-06-12 RX ADMIN — BENZONATATE 100 MG: 100 CAPSULE ORAL at 03:06

## 2020-06-12 RX ADMIN — ACETAMINOPHEN AND CODEINE PHOSPHATE 5 ML: 120; 12 SOLUTION ORAL at 08:06

## 2020-06-12 RX ADMIN — ACETAMINOPHEN AND CODEINE PHOSPHATE 5 ML: 120; 12 SOLUTION ORAL at 02:06

## 2020-06-12 RX ADMIN — VANCOMYCIN HYDROCHLORIDE 2000 MG: 500 INJECTION, POWDER, LYOPHILIZED, FOR SOLUTION INTRAVENOUS at 04:06

## 2020-06-12 RX ADMIN — ACETAMINOPHEN 1000 MG: 500 TABLET ORAL at 01:06

## 2020-06-12 RX ADMIN — BENZONATATE 100 MG: 100 CAPSULE ORAL at 12:06

## 2020-06-12 RX ADMIN — DOCUSATE SODIUM 50 MG AND SENNOSIDES 8.6 MG 1 TABLET: 8.6; 5 TABLET, FILM COATED ORAL at 08:06

## 2020-06-12 RX ADMIN — POTASSIUM CHLORIDE 40 MEQ: 1500 TABLET, EXTENDED RELEASE ORAL at 12:06

## 2020-06-12 NOTE — HOSPITAL COURSE
"Pt admitted 6/11 for RLL pna. Pt started on azithromycin & ceftriaxone for CAP. CD4 on 6/11 was 167. Consulted ID. Per ID, "CD4 count can be artificially low and is an unreliable marker for immunosuppression  during illness. current 29% which is decrease from before but far above the cut off of 14% which is the equivalent of absolute CD4 less than 200." Fever curve and cough improving. Oxygen weaned to RA. Discharged home w/ tylenol and codeine and total 5 day course of levaquin. F/u w/ Dr. Fowler in clinic.   "

## 2020-06-12 NOTE — SUBJECTIVE & OBJECTIVE
Past Medical History:   Diagnosis Date    HIV infection     Hypertension        Past Surgical History:   Procedure Laterality Date    PROSTATE BIOPSY         Review of patient's allergies indicates:   Allergen Reactions    Sulfa (sulfonamide antibiotics)        No current facility-administered medications on file prior to encounter.      Current Outpatient Medications on File Prior to Encounter   Medication Sig    acetaminophen (TYLENOL) 500 MG tablet Take 1,000 mg by mouth every 6 (six) hours as needed for Pain.    amLODIPine (NORVASC) 10 MG tablet Take 1 tablet (10 mg total) by mouth once daily.    ascorbic acid, vitamin C, (VITAMIN C) 1000 MG tablet Take 1,000 mg by mouth once daily.     biotin 1 mg Cap Take by mouth.    creatine, bulk, 100 % Powd by Misc.(Non-Drug; Combo Route) route.    elviteg-cob-emtri-tenof ALAFEN (GENVOYA) [phone] mg Tab Take 1 tablet by mouth once daily.    fish oil-omega-3 fatty acids 300-1,000 mg capsule Take by mouth once daily.    ibuprofen (ADVIL,MOTRIN) 400 MG tablet Take 400 mg by mouth every 6 (six) hours as needed for Other.    leucine-isoleucine-valine (NEOKE BCAA4) 82 gram-328 kcal/100 gram Powd Take by mouth.    glucosamine-chondroitin 500-400 mg tablet Take 1 tablet by mouth 3 (three) times daily.    [DISCONTINUED] ciclopirox (PENLAC) 8 % Soln Apply daily to affected nail. Must remove and restart weekly.    [DISCONTINUED] dutasteride (AVODART) 0.5 mg capsule Take 1 capsule (0.5 mg total) by mouth once daily.    [DISCONTINUED] fluocinonide (LIDEX) 0.05 % gel Apply topically 2 (two) times daily.    [DISCONTINUED] selenium sulfide 2.5 % Lotn Use on scalp qd or less for 3-5 minute soaks.  Watch for skin irritation and if so, use less often or for less time.     Family History     Problem Relation (Age of Onset)    Cancer Sister, Brother    Diabetes Mother    Heart disease Mother    Hypertension Mother, Father    Stroke Mother        Tobacco Use    Smoking  status: Never Smoker    Smokeless tobacco: Never Used   Substance and Sexual Activity    Alcohol use: No    Drug use: No    Sexual activity: Not on file     Review of Systems   Constitutional: Positive for appetite change, chills and fever. Negative for diaphoresis.   Respiratory: Positive for cough. Negative for shortness of breath and wheezing.    Cardiovascular: Negative for chest pain and leg swelling.   Gastrointestinal: Negative for abdominal distention, abdominal pain, diarrhea and nausea.   Musculoskeletal: Positive for myalgias (mostly in arms b/l).   Skin: Negative for pallor and rash.   Neurological: Negative for weakness and light-headedness.   Hematological: Does not bruise/bleed easily.     Objective:     Vital Signs (Most Recent):  Temp: 100.2 °F (37.9 °C) (06/11/20 1910)  Pulse: 88 (06/11/20 1816)  Resp: 18 (06/11/20 1816)  BP: (!) 144/76 (06/11/20 1816)  SpO2: (!) 93 % (06/11/20 1816) Vital Signs (24h Range):  Temp:  [99.8 °F (37.7 °C)-100.4 °F (38 °C)] 100.2 °F (37.9 °C)  Pulse:  [] 88  Resp:  [15-18] 18  SpO2:  [93 %-94 %] 93 %  BP: (126-148)/(72-76) 144/76     Weight: 90.7 kg (200 lb)  Body mass index is 26.39 kg/m².    Physical Exam   Constitutional: He is oriented to person, place, and time. He appears well-developed and well-nourished.   Appears anxious   HENT:   Head: Normocephalic and atraumatic.   Neck: Normal range of motion. Neck supple.   Cardiovascular: Normal rate and regular rhythm.   No murmur heard.  Pulmonary/Chest: Effort normal and breath sounds normal. No respiratory distress. He has no wheezes. He has no rales.   Abdominal: Soft. Bowel sounds are normal. He exhibits no distension. There is no tenderness.   Musculoskeletal: He exhibits no edema or tenderness.   Lymphadenopathy:     He has no cervical adenopathy.   Neurological: He is alert and oriented to person, place, and time.   Skin: Skin is warm and dry.

## 2020-06-12 NOTE — CODE/ RAPID DOCUMENTATION
Rapid Response Nurse Chart Check     Chart check completed, patient has been running fevers, abnormal VS noted. Temp 101.5, tylenol administered. Call [phone] for further concerns or assistance.

## 2020-06-12 NOTE — PROGRESS NOTES
[facility] Hospital Medicine  Progress Note    Patient Name: Simón Fong  MRN: [id number]  Patient Class: OP- Observation   Admission Date: 6/11/2020  Length of Stay: 0 days  Attending Physician: Phillip Madera MD  Primary Care Provider: Parisa Fowler MD    [facility] Medicine Team: [facility] MED 3 Christiane Murray MD    Subjective:     Principal Problem:Pneumonia of right lower lobe due to infectious organism        HPI:  Pt is 60yoM w/ PMH HTN & HIV (CD4 650) who presents w/ 5dy intermittent fever & 2dys productive cough. Pt says he was feeling well until 6/7 when he started having fevers & myalgias. He went to urgent care twice where he tested negative for covid twice & they told him to alternate tylenol & ibuprofen that pt says provided only temporary relief. Pt says he's been drinking up to 1 gallon water daily for the last week & had decreased appetite. Pt denies night sweats, chest pain, SOB, diarrhea, LE swelling.     EKG showed normal sinus rhythm. CXR showed large RLL consolidation.   In ED: WBC 12, Na 131, Glu 159, T 100.4, O2 sat 95% RA, /76. Pt received 1 dose azithromycin & rocephin.     Overview/Hospital Course:  Pt admitted 6/11 for RLL pna. Pt started on azithromycin & ceftriaxone for CAP. CD4 on 6/11 was 167. Consulted ID.     Interval History: Pt was retested for COVID overnight because it seemed like pt had increased O2 requirement & had fever spikes. However, covid very unlikely & pt did not have increased O2 requirements.     Review of Systems   Constitutional: Positive for appetite change and fever. Negative for chills and diaphoresis.   Respiratory: Positive for cough. Negative for shortness of breath and wheezing.    Cardiovascular: Negative for chest pain and leg swelling.   Gastrointestinal: Negative for abdominal distention, abdominal pain, diarrhea and nausea.   Musculoskeletal: Negative for myalgias (mostly in arms b/l).   Skin: Negative for pallor and  rash.   Neurological: Negative for weakness and light-headedness.   Hematological: Does not bruise/bleed easily.     Objective:     Vital Signs (Most Recent):  Temp: 98.6 °F (37 °C) (06/12/20 1608)  Pulse: 93 (06/12/20 1233)  Resp: 18 (06/12/20 1233)  BP: (!) 172/79 (06/12/20 1233)  SpO2: 95 % (06/12/20 1233) Vital Signs (24h Range):  Temp:  [98.6 °F (37 °C)-102.6 °F (39.2 °C)] 98.6 °F (37 °C)  Pulse:  [74-93] 93  Resp:  [18-22] 18  SpO2:  [92 %-97 %] 95 %  BP: (115-172)/(62-86) 172/79     Weight: 104 kg (229 lb 4.5 oz)  Body mass index is 30.25 kg/m².  No intake or output data in the 24 hours ending 06/12/20 1642   Physical Exam   Constitutional: He is oriented to person, place, and time. He appears well-developed and well-nourished.   Appears anxious   HENT:   Head: Normocephalic and atraumatic.   Neck: Normal range of motion. Neck supple.   Cardiovascular: Normal rate and regular rhythm.   No murmur heard.  Pulmonary/Chest: Effort normal and breath sounds normal. No respiratory distress. He has no wheezes. He has no rales.   Abdominal: Soft. Bowel sounds are normal. He exhibits no distension. There is no tenderness.   Musculoskeletal: He exhibits no edema or tenderness.   Lymphadenopathy:     He has no cervical adenopathy.   Neurological: He is alert and oriented to person, place, and time.   Skin: Skin is warm and dry.           Assessment/Plan:      * Pneumonia of right lower lobe due to infectious organism  Pt admitted 6/11 for fever, cough & myalgias. CXR showed RLL consolidation. WBC 12, T 100.4. Pt O2 sat 95% RA. Covid neg X3. Flu neg. Pt started on azithromycin & rocephin in ED.     Plan:  - continue rocephin & azithromycin  - tylenol PRN myalgias  - f/u sputum culture  - daily CBC          Hypertension  Continue home amlodipine      Human immunodeficiency virus (HIV) disease  CD4 in January 2020 was 650. Viral load undetectable. Home med is Genvoya & pt says he is compliant w/ his meds.     Plan:  - f/u  repeat CD4 & RNA- repeat CD4 167  - consulted ID   - continue home Genvoya          VTE Risk Mitigation (From admission, onward)         Ordered     IP VTE LOW RISK PATIENT  Once      06/11/20 1902                      Christiane Murray MD  Department of Hospital Medicine   [facility]

## 2020-06-12 NOTE — PROGRESS NOTES
VANCOMYCIN DOSING BY PHARMACY DISCONTINUATION NOTE    Simón Fong is a 60 y.o. male who had been consulted for vancomycin dosing.    The pharmacy consult for vancomycin dosing has been discontinued.     Vancomycin Dosing by Pharmacy Consult will sign-off. Please reconsult if necessary. Thank you for allowing us to participate in this patient's care.       Verena Madrigal, PharmD  [id number]

## 2020-06-12 NOTE — ASSESSMENT & PLAN NOTE
Pt admitted 6/11 for fever, cough & myalgias. CXR showed RLL consolidation. WBC 12, T 100.4. Pt O2 sat 95% RA. Covid neg X3. Flu neg. Pt started on azithromycin & rocephin in ED.     Plan:  - continue rocephin & azithromycin  - tylenol PRN myalgias  - f/u sputum culture  - daily CBC

## 2020-06-12 NOTE — ASSESSMENT & PLAN NOTE
CD4 in January 2020 was 650. Viral load undetectable. Home med is Genvoya & pt says he is compliant w/ his meds.     Plan:  - f/u repeat CD4 & RNA- repeat CD4 167  - consulted ID   - continue home Genvoya

## 2020-06-12 NOTE — ASSESSMENT & PLAN NOTE
CD4 in January 2020 was 650. Viral load undetectable. Home med is Genvoya & pt says he is compliant w/ his meds.     Plan:  - f/u repeat CD4 & RNA  - continue home Genvoya

## 2020-06-12 NOTE — PROGRESS NOTES
Pharmacokinetic Initial Assessment: IV Vancomycin    Assessment/Plan:    Initiate intravenous vancomycin with loading dose of 2000 mg once followed by a maintenance dose of vancomycin 1500mg IV every 12 hours  Desired empiric serum trough concentration is 15 to 20 mcg/mL  Draw vancomycin trough level 30 min prior to fourth dose on 06/13 at approximately 1630  Pharmacy will continue to follow and monitor vancomycin.      Please contact pharmacy at extension [phone] with any questions regarding this assessment.     Thank you for the consult,   Armando Donovanborn       Patient brief summary:  Simón Fong is a 60 y.o. male initiated on antimicrobial therapy with IV Vancomycin for treatment of suspected pneumonia    Drug Allergies:   Review of patient's allergies indicates:   Allergen Reactions    Sulfa (sulfonamide antibiotics)        Actual Body Weight:   104 kg    Renal Function:   Estimated Creatinine Clearance: 82.9 mL/min (based on SCr of 1.2 mg/dL).,     Dialysis Method (if applicable):  N/A    CBC (last 72 hours):  Recent Labs   Lab Result Units 06/11/20  1641   WBC K/uL 12.05   Hemoglobin g/dL 13.5*   Hematocrit % 39.6*   Platelets K/uL 200   Gran% % 88.9*   Lymph% % 4.4*   Mono% % 5.6   Eosinophil% % 0.0   Basophil% % 0.2   Differential Method  Automated       Metabolic Panel (last 72 hours):  Recent Labs   Lab Result Units 06/11/20  1641 06/11/20  1642   Sodium mmol/L 131*  --    Potassium mmol/L 3.7  --    Chloride mmol/L 93*  --    CO2 mmol/L 28  --    Glucose mg/dL 159*  --    Glucose, UA   --  Negative   BUN, Bld mg/dL 14  --    Creatinine mg/dL 1.2  --    Albumin g/dL 3.4*  --    Total Bilirubin mg/dL 0.9  --    Alkaline Phosphatase U/L 94  --    AST U/L 44*  --    ALT U/L 43  --    Magnesium mg/dL 2.0  --        Drug levels (last 3 results):  No results for input(s): VANCOMYCINRA, VANCOMYCINPE, VANCOMYCINTR in the last 72 hours.    Microbiologic Results:  Microbiology Results (last 7 days)     Procedure  Component Value Units Date/Time    Blood culture [[id number]]     Order Status:  Sent Specimen:  Blood     Blood culture [[id number]]     Order Status:  Sent Specimen:  Blood     Blood culture #2 **CANNOT BE ORDERED STAT** [[id number]] Collected:  06/11/20 1642    Order Status:  Completed Specimen:  Blood from Peripheral, Antecubital, Left Updated:  06/12/20 0119     Blood Culture, Routine No Growth to date    Blood culture #1 **CANNOT BE ORDERED STAT** [[id number]] Collected:  06/11/20 1627    Order Status:  Completed Specimen:  Blood from Peripheral, Antecubital, Right Updated:  06/12/20 0119     Blood Culture, Routine No Growth to date    Culture, Respiratory with Gram Stain [[id number]]     Order Status:  No result Specimen:  Sputum, Expectorated     Influenza A & B by Molecular [[id number]] Collected:  06/11/20 1712    Order Status:  Completed Specimen:  Nasopharyngeal Swab Updated:  06/11/20 1743     Influenza A, Molecular Negative     Influenza B, Molecular Negative     Flu A & B Source NP    Influenza A & B by Molecular [[id number]] Collected:  06/11/20 1641    Order Status:  Canceled Specimen:  Nasopharyngeal Swab

## 2020-06-12 NOTE — PLAN OF CARE
CM called the patient to discuss discharge planning assessment. Pt lives with family and has transportation home at discharge. Pt verified PCP and Pharmacy. CM will continue to follow for discharge needs.       06/12/20 0621   Discharge Assessment   Assessment Type Discharge Planning Assessment   Confirmed/corrected address and phone number on facesheet? Yes   Assessment information obtained from? Patient   Communicated expected length of stay with patient/caregiver yes   Prior to hospitilization cognitive status: Alert/Oriented   Prior to hospitalization functional status: Independent   Current cognitive status: Alert/Oriented   Current Functional Status: Independent   Lives With alone   Able to Return to Prior Arrangements yes   Is patient able to care for self after discharge? Yes   Patient's perception of discharge disposition home or selfcare   Readmission Within the Last 30 Days no previous admission in last 30 days   Patient currently being followed by outpatient case management? No   Patient currently receives any other outside agency services? No   Equipment Currently Used at Home none   Do you have any problems affording any of your prescribed medications? No   Is the patient taking medications as prescribed? yes   Does the patient have transportation home? Yes   Transportation Anticipated family or friend will provide   Does the patient receive services at the [facility]madin Clinic? No   Discharge Plan A Home with family   Discharge Plan B Home Health   DME Needed Upon Discharge  none   Patient/Family in Agreement with Plan yes

## 2020-06-12 NOTE — MEDICAL/APP STUDENT
"[facility]    History & Physical      Patient Name: Simón Fong  MRN: [id number]  Admission Date: 6/11/2020  Attending Physician: Phillip Madera MD  Primary Care Provider: Parisa Fowler MD    [place]ospital Medicine [place] [facility] [place] [facility] 3 Genoveva Kennedy MS4    Patient information was obtained from patient and ER records.     Subjective:     Principal Problem:Pneumonia of right lower lobe due to infectious organism    Chief Complaint:   Chief Complaint   Patient presents with    Fever     COVID negative on Monday and Tuesday        HPI: Mr. Simón Fong is a 59 y/o man with PMHx of HTN and HIV (CD4 650 in 1/2020, viral load undetectable) who presents with 5 days of intermittent fevers up to 102.9 and 3 days of worsening productive cough. Patient reports feeling feverish and developing myalgias the night of 6/7. He has been alternating between tylenol and ibuprofen every 3 hours for his fevers with only some relief. He has associated chills and "shakes". His cough is productive of clear sputum with some brown/dark red streaks or specks. He tried taking Robitussin DM but felt it made his cough worse. Patient reports decreased appetite and increased water intake (1 gallon per day). He denies SOB, N/V, diarrhea, sore throat, recent sick contacts, and recent travel. He denies chest pain, palpitations, and lightheadedness. He presented to Urgent Care twice on 6/8 and 6/9 where he tested negative for Covid x2.       Patient follows up with his PCP regularly for his HIV and reports being compliant with his Genvoya. His viral load has been undetectable for 35 years. He reports receiving Prevnar vaccine in 4/2014 but missed the following one last year.     Past Medical History:   Diagnosis Date    HIV infection     Hypertension        Past Surgical History:   Procedure Laterality Date    PROSTATE BIOPSY         Review of patient's allergies indicates:   Allergen Reactions    Sulfa (sulfonamide antibiotics)  "       No current facility-administered medications on file prior to encounter.      Current Outpatient Medications on File Prior to Encounter   Medication Sig    acetaminophen (TYLENOL) 500 MG tablet Take 1,000 mg by mouth every 6 (six) hours as needed for Pain.    amLODIPine (NORVASC) 10 MG tablet Take 1 tablet (10 mg total) by mouth once daily.    ascorbic acid, vitamin C, (VITAMIN C) 1000 MG tablet Take 1,000 mg by mouth once daily.     biotin 1 mg Cap Take by mouth.    creatine, bulk, 100 % Powd by Misc.(Non-Drug; Combo Route) route.    elviteg-cob-emtri-tenof ALAFEN (GENVOYA) [phone] mg Tab Take 1 tablet by mouth once daily.    fish oil-omega-3 fatty acids 300-1,000 mg capsule Take by mouth once daily.    ibuprofen (ADVIL,MOTRIN) 400 MG tablet Take 400 mg by mouth every 6 (six) hours as needed for Other.    leucine-isoleucine-valine (NEOKE BCAA4) 82 gram-328 kcal/100 gram Powd Take by mouth.    glucosamine-chondroitin 500-400 mg tablet Take 1 tablet by mouth 3 (three) times daily.    [DISCONTINUED] ciclopirox (PENLAC) 8 % Soln Apply daily to affected nail. Must remove and restart weekly.    [DISCONTINUED] dutasteride (AVODART) 0.5 mg capsule Take 1 capsule (0.5 mg total) by mouth once daily.    [DISCONTINUED] fluocinonide (LIDEX) 0.05 % gel Apply topically 2 (two) times daily.    [DISCONTINUED] selenium sulfide 2.5 % Lotn Use on scalp qd or less for 3-5 minute soaks.  Watch for skin irritation and if so, use less often or for less time.     Family History     Problem Relation (Age of Onset)    Cancer Sister, Brother    Diabetes Mother    Heart disease Mother    Hypertension Mother, Father    Stroke Mother        Tobacco Use    Smoking status: Never Smoker    Smokeless tobacco: Never Used   Substance and Sexual Activity    Alcohol use: No    Drug use: No    Sexual activity: Not on file     Review of Systems   Constitutional: Positive for chills, fatigue and fever.   HENT: Negative for  congestion, ear discharge, ear pain, rhinorrhea, sinus pressure and sore throat.    Eyes: Negative for photophobia and discharge.   Respiratory: Positive for cough. Negative for chest tightness, shortness of breath, wheezing and stridor.    Cardiovascular: Negative for chest pain, palpitations and leg swelling.   Gastrointestinal: Negative for abdominal distention, abdominal pain, constipation, diarrhea, nausea and vomiting.   Genitourinary: Negative for dysuria, flank pain, frequency, hematuria and urgency.   Musculoskeletal: Positive for myalgias. Negative for arthralgias, joint swelling and neck stiffness.   Skin: Negative for pallor and rash.   Neurological: Positive for headaches. Negative for dizziness, weakness and light-headedness.   Psychiatric/Behavioral: Negative for confusion and decreased concentration.       Objective:     Vital Signs (Most Recent):  Temp: (!) 101.5 °F (38.6 °C) (06/11/20 2031)  Pulse: 91 (06/11/20 2023)  Resp: (!) 22 (06/11/20 2023)  BP: (!) 159/86 (06/11/20 2023)  SpO2: (!) 94 % (06/11/20 2023) Vital Signs (24h Range):  Temp:  [99.8 °F (37.7 °C)-101.5 °F (38.6 °C)] 101.5 °F (38.6 °C)  Pulse:  [] 91  Resp:  [15-22] 22  SpO2:  [93 %-94 %] 94 %  BP: (126-159)/(72-86) 159/86     Weight: 104 kg (229 lb 4.5 oz)  Body mass index is 30.25 kg/m².    Physical Exam   Constitutional: He is oriented to person, place, and time. He appears well-developed and well-nourished.   He appears anxious.   HENT:   Head: Normocephalic and atraumatic.   Mouth/Throat: Oropharynx is clear and moist. No oropharyngeal exudate.   Eyes: Pupils are equal, round, and reactive to light. Conjunctivae and EOM are normal. No scleral icterus.   Neck: Normal range of motion. Neck supple. No tracheal deviation present.   Cardiovascular: Normal rate, regular rhythm, normal heart sounds and intact distal pulses. Exam reveals no friction rub.   No murmur heard.  Pulmonary/Chest: No stridor. He has no wheezes.    Tachypneic and some work of breathing but patient attributes it to wearing a mask.   Lung sounds diminished in right lung base.   Abdominal: Soft. Bowel sounds are normal. He exhibits no distension. There is no tenderness.   Musculoskeletal: Normal range of motion. He exhibits no edema or tenderness.   Neurological: He is alert and oriented to person, place, and time.   Skin: Skin is warm and dry. Capillary refill takes less than 2 seconds. He is not diaphoretic.   Psychiatric: He has a normal mood and affect. His behavior is normal.         CRANIAL NERVES     CN III, IV, VI   Pupils are equal, round, and reactive to light.  Extraocular motions are normal.       Significant Labs:   CBC:   Recent Labs   Lab 06/11/20  1641   WBC 12.05   HGB 13.5*   HCT 39.6*        CMP:   Recent Labs   Lab 06/11/20  1641   *   K 3.7   CL 93*   CO2 28   *   BUN 14   CREATININE 1.2   CALCIUM 9.5   PROT 7.7   ALBUMIN 3.4*   BILITOT 0.9   ALKPHOS 94   AST 44*   ALT 43   ANIONGAP 10   EGFRNONAA >60.0       Urine Studies:   Recent Labs   Lab 06/11/20  1642   COLORU Yellow   APPEARANCEUA Clear   PHUR 5.0   SPECGRAV 1.025   PROTEINUA 2+*   GLUCUA Negative   KETONESU Negative   BILIRUBINUA Negative   OCCULTUA 1+*   NITRITE Negative   LEUKOCYTESUR Negative   RBCUA 4   WBCUA 2   BACTERIA Rare   SQUAMEPITHEL 0   HYALINECASTS 0       Significant Imaging:     CXR 6/11 - Consolidation in the right lung base, consistent with pneumonia.    Assessment/Plan:     Pneumonia of right lower lobe due to infectious organism  Patient was admitted 6/11 for fever, cough, and myalgias. Temperature at presentation 100.4 and O2 sat 95% on RA. ED workup revealed WBC 12 and consolidation in right lung base on CXR. Flu and repeat rapid Covid test negative. Started on antibiotics for presumed CAP.   - Continue CTX and azithromycin.  - Tylenol PRN for fevers and myalgias  - Sputum culture  - Blood culture  - Monitor O2 sat and  CBC    Hypertension  - Continue home amlodipine 10mg    Human immunodeficiency virus (HIV) disease  - Repeat CD4 (last CD4 650 in 1/2020)  - Repeat RNA (viral load undetectable)  - Continue home Genvoya        Active Diagnoses:    Diagnosis Date Noted POA    PRINCIPAL PROBLEM:  Pneumonia of right lower lobe due to infectious organism [J18.1] 06/11/2020 Yes    Hypertension [I10] 11/15/2018 Yes    Human immunodeficiency virus (HIV) disease [B20] 12/20/2012 Yes      Problems Resolved During this Admission:     VTE Risk Mitigation (From admission, onward)         Ordered     IP VTE LOW RISK PATIENT  Once      06/11/20 1902                  Genoveva Kennedy MS4  Department of Hospital Medicine   [facility]

## 2020-06-12 NOTE — MEDICAL/APP STUDENT
"      [facility] Hospital Medicine  Progress Note    Patient Name: Simón Fong  MRN: [id number]  Patient Class: OP- Observation   Admission Date: 6/11/2020  Length of Stay: 0 days  Attending Physician: Phillip Madera MD  Primary Care Provider: Parisa Fowler MD    [facility] Medicine Team: [facility]OSP MED 3 Genoveva Kennedy MS4    Subjective:     Principal Problem:Pneumonia of right lower lobe due to infectious organism    HPI: Mr. Simón Fong is a 59 y/o man with PMHx of HTN and HIV (CD4 650 in 1/2020, viral load undetectable) who presents with 5 days of intermittent fevers up to 102.9 and 3 days of worsening productive cough. Patient reports feeling feverish and developing myalgias the night of 6/7. He has been alternating between tylenol and ibuprofen every 3 hours for his fevers with only some relief. He has associated chills and "shakes". His cough is productive of clear sputum with some brown/dark red streaks or specks. He tried taking Robitussin DM but felt it made his cough worse. Patient reports decreased appetite and increased water intake (1 gallon per day). He denies SOB, N/V, diarrhea, sore throat, recent sick contacts, and recent travel. He denies chest pain, palpitations, and lightheadedness. He presented to Urgent Care twice on 6/8 and 6/9 where he tested negative for Covid x2.     Patient follows up with his PCP regularly for his HIV and reports being compliant with his Genvoya. His viral load has been undetectable for 35 years. He reports receiving Prevnar vaccine in 4/2014 but missed the following one last year.     Overview/Hospital Course: Patient was admitted on 6/11 for RLL pneumonia in the setting of HIV. He was started on azithromycin and ceftriaxone for presumed CAP.     Interval History: Patient continued to have fevers up to 102.3 overnight despite tylenol and CTX/AZT so antibiotics broadened to Vanc and Zosyn. Repeat Covid testing also done due to worry of decreasing O2 " sats (down to 92%), but remains negative. Patient feels overall better. His only complaint is his persistent cough, which he states was at its worst last night even with Tessalon perles. Otherwise, he is eating, urinating, and ambulating well. Denies any SOB, CP, palpitations, or lightheadedness.         Review of Systems   Constitutional: Positive for fever. Negative for activity change, appetite change and chills.   HENT: Negative for sore throat and trouble swallowing.    Eyes: Negative for photophobia.   Respiratory: Positive for cough. Negative for choking, chest tightness, shortness of breath and wheezing.    Cardiovascular: Negative for chest pain, palpitations and leg swelling.   Gastrointestinal: Negative for abdominal distention, abdominal pain, diarrhea, nausea and vomiting.   Genitourinary: Negative for dysuria and hematuria.   Musculoskeletal: Negative for arthralgias and neck stiffness.   Skin: Negative for color change and rash.   Neurological: Negative for dizziness, light-headedness and headaches.   Psychiatric/Behavioral: Negative for agitation and confusion.        Objective:     Vital Signs (Most Recent):  Temp: (!) 102.6 °F (39.2 °C) (06/12/20 1233)  Pulse: 93 (06/12/20 1233)  Resp: 18 (06/12/20 1233)  BP: (!) 172/79 (06/12/20 1233)  SpO2: 95 % (06/12/20 1233) Vital Signs (24h Range):  Temp:  [99.8 °F (37.7 °C)-102.6 °F (39.2 °C)] 102.6 °F (39.2 °C)  Pulse:  [74-93] 93  Resp:  [18-22] 18  SpO2:  [92 %-97 %] 95 %  BP: (115-172)/(62-86) 172/79     Weight: 104 kg (229 lb 4.5 oz)  Body mass index is 30.25 kg/m².  No intake or output data in the 24 hours ending 06/12/20 1513      Physical Exam   Constitutional: He is oriented to person, place, and time. He appears well-developed and well-nourished. Appears comfortable.   HENT:   Head: Normocephalic and atraumatic.   Mouth/Throat: Oropharynx is clear and moist. No oropharyngeal exudate.   Eyes: Pupils are equal, round, and reactive to light.  Conjunctivae and EOM are normal. No scleral icterus.   Neck: Normal range of motion. Neck supple. No tracheal deviation present.   Cardiovascular: Normal rate, regular rhythm, normal heart sounds and intact distal pulses. Exam reveals no friction rub.   No murmur heard.  Pulmonary/Chest: No stridor. He has no wheezes. No respiratory distress or increased work of breathing. Good O2 sat on RA.   Lung sounds diminished in right lung base.   Abdominal: Soft. Bowel sounds are normal. He exhibits no distension. There is no tenderness.   Musculoskeletal: Normal range of motion. He exhibits no edema or tenderness.   Neurological: He is alert and oriented to person, place, and time.   Skin: Skin is warm and dry. Capillary refill takes less than 2 seconds. He is not diaphoretic.   Psychiatric: He has a normal mood and affect. His behavior is normal.     Significant Labs:   Blood Culture:   Recent Labs   Lab 06/11/20  1627 06/11/20  1642 06/12/20  0438   LABBLOO No Growth to date  No Growth to date No Growth to date  No Growth to date No Growth to date  No Growth to date     CBC:   Recent Labs   Lab 06/11/20  1641 06/12/20  0438   WBC 12.05 12.38   HGB 13.5* 12.3*   HCT 39.6* 36.4*    210     CMP:   Recent Labs   Lab 06/11/20  1641 06/12/20  0438   * 131*   K 3.7 3.4*   CL 93* 95   CO2 28 27   * 141*   BUN 14 13   CREATININE 1.2 1.0   CALCIUM 9.5 9.1   PROT 7.7  --    ALBUMIN 3.4*  --    BILITOT 0.9  --    ALKPHOS 94  --    AST 44*  --    ALT 43  --    ANIONGAP 10 9   EGFRNONAA >60.0 >60.0     Lactic Acid:   Recent Labs   Lab 06/12/20  0438   LACTATE 1.3     Respiratory Culture:   Recent Labs   Lab 06/12/20  1434   GSRESP <10 epithelial cells per low power field.  Rare WBC's  Few Gram positive cocci  Few Gram negative rods       Ferritin 706  ESR 83  D-dimer 1.81      Procalcitonin 0.84    Significant Imaging:     CXR 6/11 - Consolidation in the right lung base, consistent with  pneumonia.    EKG 6/11 - NSR    Assessment/Plan:      Pneumonia of right lower lobe due to infectious organism  Patient was admitted 6/11 for fever, cough, and myalgias. Temperature at presentation 100.4 and O2 sat 95% on RA. ED workup revealed WBC 12 and consolidation in right lung base on CXR. Flu and repeat rapid Covid test negative. Started on antibiotics for presumed CAP, which were broadened to Vancomycin and Zosyn overnight on 6/11.   - Discontinue Vancomycin and Zosyn  - Continue CTX and azithromycin as it is still likely CAP  - Tylenol PRN for fevers and myalgias  - F/U Sputum culture  - Blood culture NGTD  - Monitor O2 sat (>/92%) and CBC  - Tessalon perles and Codeine syrup PRN for cough     Human immunodeficiency virus (HIV) disease  - Repeat CD4 167 (was 650 in Jan 2020)   - Patient is upset about this news and would like another repeat CD4   - Possible resistance?   - Considering starting patient on PJP prophylaxis. Due to sulfa allergy, would be Atovaquone.  - Repeat RNA pending  - Consult ID  - Continue home Genvoya    Hypertension  - Continue home amlodipine 10mg       Active Diagnoses:    Diagnosis Date Noted POA    PRINCIPAL PROBLEM:  Pneumonia of right lower lobe due to infectious organism [J18.1] 06/11/2020 Yes    Hypertension [I10] 11/15/2018 Yes    Human immunodeficiency virus (HIV) disease [B20] 12/20/2012 Yes      Problems Resolved During this Admission:     VTE Risk Mitigation (From admission, onward)         Ordered     IP VTE LOW RISK PATIENT  Once      06/11/20 1902                   Genoveva Kennedy MS4  Department of Hospital Medicine   [facility]

## 2020-06-12 NOTE — PLAN OF CARE
Pt resting in bed in Nad. 02 sats remain stable on 2l. Coughs frequently bringing up blood tinged sputum. IV antibiotics running. Temp still elevated despite tylenol. Pt ambulates to bathroom. No safety issues this shift

## 2020-06-12 NOTE — SUBJECTIVE & OBJECTIVE
Interval History: Pt was retested for COVID overnight because it seemed like pt had increased O2 requirement & had fever spikes. However, covid very unlikely & pt did not have increased O2 requirements.     Review of Systems   Constitutional: Positive for appetite change and fever. Negative for chills and diaphoresis.   Respiratory: Positive for cough. Negative for shortness of breath and wheezing.    Cardiovascular: Negative for chest pain and leg swelling.   Gastrointestinal: Negative for abdominal distention, abdominal pain, diarrhea and nausea.   Musculoskeletal: Negative for myalgias (mostly in arms b/l).   Skin: Negative for pallor and rash.   Neurological: Negative for weakness and light-headedness.   Hematological: Does not bruise/bleed easily.     Objective:     Vital Signs (Most Recent):  Temp: 98.6 °F (37 °C) (06/12/20 1608)  Pulse: 93 (06/12/20 1233)  Resp: 18 (06/12/20 1233)  BP: (!) 172/79 (06/12/20 1233)  SpO2: 95 % (06/12/20 1233) Vital Signs (24h Range):  Temp:  [98.6 °F (37 °C)-102.6 °F (39.2 °C)] 98.6 °F (37 °C)  Pulse:  [74-93] 93  Resp:  [18-22] 18  SpO2:  [92 %-97 %] 95 %  BP: (115-172)/(62-86) 172/79     Weight: 104 kg (229 lb 4.5 oz)  Body mass index is 30.25 kg/m².  No intake or output data in the 24 hours ending 06/12/20 1642   Physical Exam   Constitutional: He is oriented to person, place, and time. He appears well-developed and well-nourished.   Appears anxious   HENT:   Head: Normocephalic and atraumatic.   Neck: Normal range of motion. Neck supple.   Cardiovascular: Normal rate and regular rhythm.   No murmur heard.  Pulmonary/Chest: Effort normal and breath sounds normal. No respiratory distress. He has no wheezes. He has no rales.   Abdominal: Soft. Bowel sounds are normal. He exhibits no distension. There is no tenderness.   Musculoskeletal: He exhibits no edema or tenderness.   Lymphadenopathy:     He has no cervical adenopathy.   Neurological: He is alert and oriented to person,  place, and time.   Skin: Skin is warm and dry.

## 2020-06-12 NOTE — HPI
Pt is 60yoM w/ PMH HTN & HIV (CD4 650) who presents w/ 5dy intermittent fever & 2dys productive cough. Pt says he was feeling well until 6/7 when he started having fevers & myalgias. He went to urgent care twice where he tested negative for covid twice & they told him to alternate tylenol & ibuprofen that pt says provided only temporary relief. Pt says he's been drinking up to 1 gallon water daily for the last week & had decreased appetite. Pt denies night sweats, chest pain, SOB, diarrhea, LE swelling.     EKG showed normal sinus rhythm. CXR showed large RLL consolidation.   In ED: WBC 12, Na 131, Glu 159, T 100.4, O2 sat 95% RA, /76. Pt received 1 dose azithromycin & rocephin.

## 2020-06-12 NOTE — H&P
[facility] Hospital Medicine  History & Physical    Patient Name: Simón Fong  MRN: [id number]  Admission Date: 6/11/2020  Attending Physician: Phillip Madera MD   Primary Care Provider: Parisa Fowler MD    Hospital Medicine Team: [facility]OSP MED 3 Christiane Murray MD     Patient information was obtained from patient and ER records.     Subjective:     Principal Problem:Pneumonia of right lower lobe due to infectious organism    Chief Complaint:   Chief Complaint   Patient presents with    Fever     COVID negative on Monday and Tuesday        HPI: Pt is 60yoM w/ PMH HTN & HIV (CD4 650) who presents w/ 5dy intermittent fever & 2dys productive cough. Pt says he was feeling well until 6/7 when he started having fevers & myalgias. He went to urgent care twice where he tested negative for covid twice & they told him to alternate tylenol & ibuprofen that pt says provided only temporary relief. Pt says he's been drinking up to 1 gallon water daily for the last week & had decreased appetite. Pt denies night sweats, chest pain, SOB, diarrhea, LE swelling.     EKG showed normal sinus rhythm. CXR showed large RLL consolidation.   In ED: WBC 12, Na 131, Glu 159, T 100.4, O2 sat 95% RA, /76. Pt received 1 dose azithromycin & rocephin.     Past Medical History:   Diagnosis Date    HIV infection     Hypertension        Past Surgical History:   Procedure Laterality Date    PROSTATE BIOPSY         Review of patient's allergies indicates:   Allergen Reactions    Sulfa (sulfonamide antibiotics)        No current facility-administered medications on file prior to encounter.      Current Outpatient Medications on File Prior to Encounter   Medication Sig    acetaminophen (TYLENOL) 500 MG tablet Take 1,000 mg by mouth every 6 (six) hours as needed for Pain.    amLODIPine (NORVASC) 10 MG tablet Take 1 tablet (10 mg total) by mouth once daily.    ascorbic acid, vitamin C, (VITAMIN C) 1000 MG tablet  Take 1,000 mg by mouth once daily.     biotin 1 mg Cap Take by mouth.    creatine, bulk, 100 % Powd by Misc.(Non-Drug; Combo Route) route.    elviteg-cob-emtri-tenof ALAFEN (GENVOYA) 996-949-399-10 mg Tab Take 1 tablet by mouth once daily.    fish oil-omega-3 fatty acids 300-1,000 mg capsule Take by mouth once daily.    ibuprofen (ADVIL,MOTRIN) 400 MG tablet Take 400 mg by mouth every 6 (six) hours as needed for Other.    leucine-isoleucine-valine (NEOKE BCAA4) 82 gram-328 kcal/100 gram Powd Take by mouth.    glucosamine-chondroitin 500-400 mg tablet Take 1 tablet by mouth 3 (three) times daily.    [DISCONTINUED] ciclopirox (PENLAC) 8 % Soln Apply daily to affected nail. Must remove and restart weekly.    [DISCONTINUED] dutasteride (AVODART) 0.5 mg capsule Take 1 capsule (0.5 mg total) by mouth once daily.    [DISCONTINUED] fluocinonide (LIDEX) 0.05 % gel Apply topically 2 (two) times daily.    [DISCONTINUED] selenium sulfide 2.5 % Lotn Use on scalp qd or less for 3-5 minute soaks.  Watch for skin irritation and if so, use less often or for less time.     Family History     Problem Relation (Age of Onset)    Cancer Sister, Brother    Diabetes Mother    Heart disease Mother    Hypertension Mother, Father    Stroke Mother        Tobacco Use    Smoking status: Never Smoker    Smokeless tobacco: Never Used   Substance and Sexual Activity    Alcohol use: No    Drug use: No    Sexual activity: Not on file     Review of Systems   Constitutional: Positive for appetite change, chills and fever. Negative for diaphoresis.   Respiratory: Positive for cough. Negative for shortness of breath and wheezing.    Cardiovascular: Negative for chest pain and leg swelling.   Gastrointestinal: Negative for abdominal distention, abdominal pain, diarrhea and nausea.   Musculoskeletal: Positive for myalgias (mostly in arms b/l).   Skin: Negative for pallor and rash.   Neurological: Negative for weakness and light-headedness.    Hematological: Does not bruise/bleed easily.     Objective:     Vital Signs (Most Recent):  Temp: 100.2 °F (37.9 °C) (06/11/20 1910)  Pulse: 88 (06/11/20 1816)  Resp: 18 (06/11/20 1816)  BP: (!) 144/76 (06/11/20 1816)  SpO2: (!) 93 % (06/11/20 1816) Vital Signs (24h Range):  Temp:  [99.8 °F (37.7 °C)-100.4 °F (38 °C)] 100.2 °F (37.9 °C)  Pulse:  [] 88  Resp:  [15-18] 18  SpO2:  [93 %-94 %] 93 %  BP: (126-148)/(72-76) 144/76     Weight: 90.7 kg (200 lb)  Body mass index is 26.39 kg/m².    Physical Exam   Constitutional: He is oriented to person, place, and time. He appears well-developed and well-nourished.   Appears anxious   HENT:   Head: Normocephalic and atraumatic.   Neck: Normal range of motion. Neck supple.   Cardiovascular: Normal rate and regular rhythm.   No murmur heard.  Pulmonary/Chest: Effort normal and breath sounds normal. No respiratory distress. He has no wheezes. He has no rales.   Abdominal: Soft. Bowel sounds are normal. He exhibits no distension. There is no tenderness.   Musculoskeletal: He exhibits no edema or tenderness.   Lymphadenopathy:     He has no cervical adenopathy.   Neurological: He is alert and oriented to person, place, and time.   Skin: Skin is warm and dry.            Assessment/Plan:     * Pneumonia of right lower lobe due to infectious organism  Pt admitted 6/11 for fever, cough & myalgias. CXR showed RLL consolidation. WBC 12, T 100.4. Pt O2 sat 95% RA. Covid neg X3. Flu neg. Pt started on azithromycin & rocephin in ED.     Plan:  - continue rocephin & azithromycin  - tylenol PRN myalgias  - f/u sputum culture  - daily CBC          Hypertension  Continue home amlodipine      Human immunodeficiency virus (HIV) disease  CD4 in January 2020 was 650. Viral load undetectable. Home med is Genvoya & pt says he is compliant w/ his meds.     Plan:  - f/u repeat CD4 & RNA  - continue home Genvoya        VTE Risk Mitigation (From admission, onward)         Ordered     IP VTE LOW  RISK PATIENT  Once      06/11/20 9902                   Christiane Murray MD  Department of Hospital Medicine   [facility]

## 2020-06-13 VITALS
RESPIRATION RATE: 20 BRPM | HEIGHT: 73 IN | BODY MASS INDEX: 30.38 KG/M2 | WEIGHT: 229.25 LBS | HEART RATE: 80 BPM | SYSTOLIC BLOOD PRESSURE: 122 MMHG | DIASTOLIC BLOOD PRESSURE: 69 MMHG | TEMPERATURE: 99 F | OXYGEN SATURATION: 95 %

## 2020-06-13 LAB
ANION GAP SERPL CALC-SCNC: 11 MMOL/L (ref 8–16)
BASOPHILS # BLD AUTO: 0.03 K/UL (ref 0–0.2)
BASOPHILS NFR BLD: 0.3 % (ref 0–1.9)
BUN SERPL-MCNC: 11 MG/DL (ref 6–20)
CALCIUM SERPL-MCNC: 8.7 MG/DL (ref 8.7–10.5)
CHLORIDE SERPL-SCNC: 96 MMOL/L (ref 95–110)
CO2 SERPL-SCNC: 27 MMOL/L (ref 23–29)
CREAT SERPL-MCNC: 0.9 MG/DL (ref 0.5–1.4)
DIFFERENTIAL METHOD: ABNORMAL
EOSINOPHIL # BLD AUTO: 0 K/UL (ref 0–0.5)
EOSINOPHIL NFR BLD: 0.2 % (ref 0–8)
ERYTHROCYTE [DISTWIDTH] IN BLOOD BY AUTOMATED COUNT: 12.8 % (ref 11.5–14.5)
EST. GFR  (AFRICAN AMERICAN): >60 ML/MIN/1.73 M^2
EST. GFR  (NON AFRICAN AMERICAN): >60 ML/MIN/1.73 M^2
GLUCOSE SERPL-MCNC: 138 MG/DL (ref 70–110)
HCT VFR BLD AUTO: 35.5 % (ref 40–54)
HGB BLD-MCNC: 11.8 G/DL (ref 14–18)
IMM GRANULOCYTES # BLD AUTO: 0.14 K/UL (ref 0–0.04)
IMM GRANULOCYTES NFR BLD AUTO: 1.4 % (ref 0–0.5)
LYMPHOCYTES # BLD AUTO: 1.4 K/UL (ref 1–4.8)
LYMPHOCYTES NFR BLD: 13.7 % (ref 18–48)
MAGNESIUM SERPL-MCNC: 2.1 MG/DL (ref 1.6–2.6)
MCH RBC QN AUTO: 29 PG (ref 27–31)
MCHC RBC AUTO-ENTMCNC: 33.2 G/DL (ref 32–36)
MCV RBC AUTO: 87 FL (ref 82–98)
MONOCYTES # BLD AUTO: 1.2 K/UL (ref 0.3–1)
MONOCYTES NFR BLD: 11.8 % (ref 4–15)
NEUTROPHILS # BLD AUTO: 7.3 K/UL (ref 1.8–7.7)
NEUTROPHILS NFR BLD: 72.6 % (ref 38–73)
NRBC BLD-RTO: 0 /100 WBC
PHOSPHATE SERPL-MCNC: 3.2 MG/DL (ref 2.7–4.5)
PLATELET # BLD AUTO: 222 K/UL (ref 150–350)
PMV BLD AUTO: 10.1 FL (ref 9.2–12.9)
POTASSIUM SERPL-SCNC: 3.7 MMOL/L (ref 3.5–5.1)
RBC # BLD AUTO: 4.07 M/UL (ref 4.6–6.2)
SODIUM SERPL-SCNC: 134 MMOL/L (ref 136–145)
WBC # BLD AUTO: 10.03 K/UL (ref 3.9–12.7)

## 2020-06-13 PROCEDURE — G0378 HOSPITAL OBSERVATION PER HR: HCPCS

## 2020-06-13 PROCEDURE — 85025 COMPLETE CBC W/AUTO DIFF WBC: CPT

## 2020-06-13 PROCEDURE — 83735 ASSAY OF MAGNESIUM: CPT

## 2020-06-13 PROCEDURE — 80048 BASIC METABOLIC PNL TOTAL CA: CPT

## 2020-06-13 PROCEDURE — 84100 ASSAY OF PHOSPHORUS: CPT

## 2020-06-13 PROCEDURE — 36415 COLL VENOUS BLD VENIPUNCTURE: CPT

## 2020-06-13 PROCEDURE — 63600175 PHARM REV CODE 636 W HCPCS: Performed by: STUDENT IN AN ORGANIZED HEALTH CARE EDUCATION/TRAINING PROGRAM

## 2020-06-13 PROCEDURE — 25000003 PHARM REV CODE 250: Performed by: STUDENT IN AN ORGANIZED HEALTH CARE EDUCATION/TRAINING PROGRAM

## 2020-06-13 PROCEDURE — 99217 PR OBSERVATION CARE DISCHARGE: ICD-10-PCS | Mod: ,,, | Performed by: HOSPITALIST

## 2020-06-13 PROCEDURE — 99214 OFFICE O/P EST MOD 30 MIN: CPT | Mod: ,,, | Performed by: INTERNAL MEDICINE

## 2020-06-13 PROCEDURE — 96376 TX/PRO/DX INJ SAME DRUG ADON: CPT

## 2020-06-13 PROCEDURE — 99214 PR OFFICE/OUTPT VISIT, EST, LEVL IV, 30-39 MIN: ICD-10-PCS | Mod: ,,, | Performed by: INTERNAL MEDICINE

## 2020-06-13 PROCEDURE — 99217 PR OBSERVATION CARE DISCHARGE: CPT | Mod: ,,, | Performed by: HOSPITALIST

## 2020-06-13 PROCEDURE — 86361 T CELL ABSOLUTE COUNT: CPT

## 2020-06-13 RX ORDER — POLYETHYLENE GLYCOL 3350 17 G/17G
17 POWDER, FOR SOLUTION ORAL ONCE
Status: COMPLETED | OUTPATIENT
Start: 2020-06-13 | End: 2020-06-13

## 2020-06-13 RX ORDER — LEVOFLOXACIN 750 MG/1
750 TABLET ORAL DAILY
Qty: 3 TABLET | Refills: 0 | Status: SHIPPED | OUTPATIENT
Start: 2020-06-13 | End: 2020-06-16

## 2020-06-13 RX ORDER — ACETAMINOPHEN AND CODEINE PHOSPHATE 300; 15 MG/1; MG/1
1 TABLET ORAL EVERY 4 HOURS PRN
Qty: 30 TABLET | Refills: 0 | Status: SHIPPED | OUTPATIENT
Start: 2020-06-13 | End: 2020-06-18

## 2020-06-13 RX ADMIN — ACETAMINOPHEN AND CODEINE PHOSPHATE 5 ML: 120; 12 SOLUTION ORAL at 11:06

## 2020-06-13 RX ADMIN — POLYETHYLENE GLYCOL 3350 17 G: 17 POWDER, FOR SOLUTION ORAL at 01:06

## 2020-06-13 RX ADMIN — ACETAMINOPHEN AND CODEINE PHOSPHATE 5 ML: 120; 12 SOLUTION ORAL at 02:06

## 2020-06-13 RX ADMIN — DOCUSATE SODIUM 50 MG AND SENNOSIDES 8.6 MG 1 TABLET: 8.6; 5 TABLET, FILM COATED ORAL at 09:06

## 2020-06-13 RX ADMIN — ACETAMINOPHEN 500 MG: 500 TABLET ORAL at 09:06

## 2020-06-13 RX ADMIN — ACETAMINOPHEN 1000 MG: 500 TABLET ORAL at 12:06

## 2020-06-13 RX ADMIN — ACETAMINOPHEN AND CODEINE PHOSPHATE 5 ML: 120; 12 SOLUTION ORAL at 01:06

## 2020-06-13 RX ADMIN — AZITHROMYCIN MONOHYDRATE 500 MG: 500 INJECTION, POWDER, LYOPHILIZED, FOR SOLUTION INTRAVENOUS at 01:06

## 2020-06-13 RX ADMIN — GUAIFENESIN 600 MG: 600 TABLET, EXTENDED RELEASE ORAL at 09:06

## 2020-06-13 RX ADMIN — AMLODIPINE BESYLATE 10 MG: 10 TABLET ORAL at 09:06

## 2020-06-13 NOTE — ASSESSMENT & PLAN NOTE
60yoM w/ PMH HTN & HIV (CD4 167 here) who presents w/ 5dy intermittent fever & 2dys productive cough and was admitted and treated for CAP with improvement.    -of note during acute illness (such as CAP) the absolute CD4 count can be artificially low and is an unreliable marker for immunosuppression   -in this situation look at that CD4 percent  -his is current 29% which is decrease from before but far above the cut off of 14% which is the equivalent of absolute CD4 less than 200  -unlikely that he is truly immunosuppressed   -would not adjust his ART or start ppx  -his HIV VL should be low, if it is not you should call me back  -he should follow up with Dr. Acosta soon for HIV  -ID will sign off

## 2020-06-13 NOTE — PLAN OF CARE
Pt resting in bed in NAD. Pt states he feels much better and well rested this morning. VSS and afebrile this morning. No safety issues this shift

## 2020-06-13 NOTE — CONSULTS
[facility]  Infectious Disease  Consult Note    Patient Name: Simón Fong  MRN: [id number]  Admission Date: 6/11/2020  Hospital Length of Stay: 0 days  Attending Physician: Phillip Madera MD  Primary Care Provider: Parisa Fowler MD         Inpatient consult to Infectious Diseases  Consult performed by: DARSHANA Romero Jr.  Consult ordered by: Christy Cherry MD          Consult done - see ID noted dated 6/13 from Dr. Fernandez.      DARSHANA Tovar  Infectious Disease  [facility]

## 2020-06-13 NOTE — HPI
60 yoM w/ PMH HTN & HIV (CD4 650 in Jan 2020 but 167 now) who presented w/ 5dy intermittent fever & 2dys productive cough. Pt says he was feeling well until 6/7 when he started having fevers & myalgias. He went to urgent care twice where he tested negative for covid twice & they told him to alternate tylenol & ibuprofen that pt says provided only temporary relief. Pt says he's been drinking up to 1 gallon water daily for the last week & had decreased appetite. Pt denies night sweats, chest pain, SOB, diarrhea, LE swelling. He tested negative for COVID. CXR with RLL consolidation. Treated with ctx and azithro. He takes genovya and has no missed any doses

## 2020-06-13 NOTE — MEDICAL/APP STUDENT
"      [facility] Hospital Medicine  Progress Note    Patient Name: Simón Fong  MRN: [id number]  Patient Class: OP- Observation   Admission Date: 6/11/2020  Length of Stay: 0 days  Attending Physician: Phillip Madera MD  Primary Care Provider: Parisa Fowler MD    [facility] Medicine Team: [facility] MED 3 Genoveva Kennedy MS4    Subjective:     Principal Problem:Pneumonia of right lower lobe due to infectious organism    HPI: Mr. Simón Fong is a 59 y/o man with PMHx of HTN and HIV (CD4 650 in 1/2020, viral load undetectable) who presents with 5 days of intermittent fevers up to 102.9 and 3 days of worsening productive cough. Patient reports feeling feverish and developing myalgias the night of 6/7. He has been alternating between tylenol and ibuprofen every 3 hours for his fevers with only some relief. He has associated chills and "shakes". His cough is productive of clear sputum with some brown/dark red streaks or specks. He tried taking Robitussin DM but felt it made his cough worse. Patient reports decreased appetite and increased water intake (1 gallon per day). He denies SOB, N/V, diarrhea, sore throat, recent sick contacts, and recent travel. He denies chest pain, palpitations, and lightheadedness. He presented to Urgent Care twice on 6/8 and 6/9 where he tested negative for Covid x2.     Patient follows up with his PCP regularly for his HIV and reports being compliant with his Genvoya. His viral load has been undetectable for 35 years. He reports receiving Prevnar vaccine in 4/2014 but missed the following one last year.     Overview/Hospital Course: Patient was admitted on 6/11 for RLL pneumonia in the setting of HIV. He was started on azithromycin and ceftriaxone for presumed CAP. Antibiotics were broadened to Vancomycin and Zosyn for 1 day due to concern about increasing O2 demand. However, CAP still most likely so antibiotics de-escalated back down to CTX and AZT. Repeat CD4 167, down " from 650 in January 2020. Viral load still pending.     Interval History: Patient had a fever overnight which responded to tylenol. His cough is well-controlled with the acetaminophen-codeine, and he was able to sleep through the night. He feels overall very well and is eating, urinating, and ambulating well. Denies any SOB, CP, palpitations, or lightheadedness.         Review of Systems   Constitutional: Positive for fever. Negative for activity change, appetite change and chills.   HENT: Negative for sore throat and trouble swallowing.    Eyes: Negative for photophobia.   Respiratory: Positive for cough. Negative for choking, chest tightness, shortness of breath and wheezing.    Cardiovascular: Negative for chest pain, palpitations and leg swelling.   Gastrointestinal: Negative for abdominal distention, abdominal pain, diarrhea, nausea and vomiting.   Genitourinary: Negative for dysuria and hematuria.   Musculoskeletal: Negative for arthralgias and neck stiffness.   Skin: Negative for color change and rash.   Neurological: Negative for dizziness, light-headedness and headaches.   Psychiatric/Behavioral: Negative for agitation and confusion.        Objective:     Vital Signs (Most Recent):  Temp: 99.3 °F (37.4 °C) (06/13/20 1104)  Pulse: 80 (06/13/20 1104)  Resp: 20 (06/13/20 1104)  BP: 122/69 (06/13/20 1104)  SpO2: 95 % (06/13/20 1104) Vital Signs (24h Range):  Temp:  [98.1 °F (36.7 °C)-102.6 °F (39.2 °C)] 99.3 °F (37.4 °C)  Pulse:  [67-93] 80  Resp:  [18-20] 20  SpO2:  [93 %-96 %] 95 %  BP: (122-172)/(66-79) 122/69     Weight: 104 kg (229 lb 4.5 oz)  Body mass index is 30.25 kg/m².  No intake or output data in the 24 hours ending 06/13/20 1224      Physical Exam   Constitutional: He is oriented to person, place, and time. He appears well-developed and well-nourished. Appears comfortable.   HENT:   Head: Normocephalic and atraumatic.   Mouth/Throat: Oropharynx is clear and moist. No oropharyngeal exudate.   Eyes:  Pupils are equal, round, and reactive to light. Conjunctivae and EOM are normal. No scleral icterus.   Neck: Normal range of motion. Neck supple. No tracheal deviation present.   Cardiovascular: Normal rate, regular rhythm, normal heart sounds and intact distal pulses. Exam reveals no friction rub.   No murmur heard.  Pulmonary/Chest: No stridor. He has no wheezes. No respiratory distress or increased work of breathing. Good O2 sat on RA.   Lung sounds improving in RLL.   Abdominal: Soft. Bowel sounds are normal. He exhibits no distension. There is no tenderness.   Musculoskeletal: Normal range of motion. He exhibits no edema or tenderness.   Neurological: He is alert and oriented to person, place, and time.   Skin: Skin is warm and dry. Capillary refill takes less than 2 seconds. He is not diaphoretic.   Psychiatric: He has a normal mood and affect. His behavior is normal.     Significant Labs:   Blood Culture:   Recent Labs   Lab 06/11/20  1627 06/11/20  1642 06/12/20  0438   LABBLOO No Growth to date  No Growth to date No Growth to date  No Growth to date No Growth to date  No Growth to date  No Growth to date  No Growth to date     CBC:   Recent Labs   Lab 06/11/20  1641 06/12/20  0438 06/13/20  0339   WBC 12.05 12.38 10.03   HGB 13.5* 12.3* 11.8*   HCT 39.6* 36.4* 35.5*    210 222     CMP:   Recent Labs   Lab 06/11/20  1641 06/12/20  0438 06/13/20  0339   * 131* 134*   K 3.7 3.4* 3.7   CL 93* 95 96   CO2 28 27 27   * 141* 138*   BUN 14 13 11   CREATININE 1.2 1.0 0.9   CALCIUM 9.5 9.1 8.7   PROT 7.7  --   --    ALBUMIN 3.4*  --   --    BILITOT 0.9  --   --    ALKPHOS 94  --   --    AST 44*  --   --    ALT 43  --   --    ANIONGAP 10 9 11   EGFRNONAA >60.0 >60.0 >60.0     Lactic Acid:   Recent Labs   Lab 06/12/20  0438   LACTATE 1.3     Respiratory Culture:   Recent Labs   Lab 06/12/20  1434   GSRESP <10 epithelial cells per low power field.  Rare WBC's  Few Gram positive cocci  Few  Gram negative rods       Significant Imaging:     CXR 6/11 - Consolidation in the right lung base, consistent with pneumonia.    EKG 6/11 - NSR    CXR 6/12 - Persistent consolidation in RLL.    Assessment/Plan:      Pneumonia of right lower lobe due to infectious organism  Patient was admitted 6/11 for fever, cough, and myalgias. Temperature at presentation 100.4 and O2 sat 95% on RA. ED workup revealed WBC 12 and consolidation in right lung base on CXR. Flu and repeat rapid Covid test negative. Started on antibiotics for presumed CAP, which were broadened to Vancomycin and Zosyn overnight on 6/11.     - Continue CTX and azithromycin as it is still likely CAP     - Tylenol PRN for fevers and myalgias  - F/U Sputum culture  - Blood culture NGTD  - Monitor O2 sat (>/92%) and CBC  - Tessalon perles and Codeine syrup PRN for cough     Human immunodeficiency virus (HIV) disease  - Repeat CD4 167 (was 650 in Jan 2020)   - Patient is upset about this news and would like another repeat CD4   - Possible resistance?   - Considering starting patient on PJP prophylaxis. Due to sulfa allergy, would be Atovaquone.  - Repeat RNA pending  - Consult ID  - Continue home Genvoya    Hypertension  - Continue home amlodipine 10mg       Active Diagnoses:    Diagnosis Date Noted POA    PRINCIPAL PROBLEM:  Pneumonia of right lower lobe due to infectious organism [J18.1] 06/11/2020 Yes    Hypertension [I10] 11/15/2018 Yes    Human immunodeficiency virus (HIV) disease [B20] 12/20/2012 Yes      Problems Resolved During this Admission:     VTE Risk Mitigation (From admission, onward)         Ordered     IP VTE LOW RISK PATIENT  Once      06/11/20 1902                   Genoveva Kennedy MS4  Department of Hospital Medicine   [facility]

## 2020-06-13 NOTE — SUBJECTIVE & OBJECTIVE
Past Medical History:   Diagnosis Date    HIV infection     Hypertension        Past Surgical History:   Procedure Laterality Date    PROSTATE BIOPSY         Review of patient's allergies indicates:   Allergen Reactions    Sulfa (sulfonamide antibiotics)        Medications:  Medications Prior to Admission   Medication Sig    amLODIPine (NORVASC) 10 MG tablet Take 1 tablet (10 mg total) by mouth once daily.    elviteg-cob-emtri-tenof ALAFEN (GENVOYA) 822-488-100-10 mg Tab Take 1 tablet by mouth once daily.    acetaminophen (TYLENOL) 500 MG tablet Take 1,000 mg by mouth every 6 (six) hours as needed for Pain.    ascorbic acid, vitamin C, (VITAMIN C) 1000 MG tablet Take 1,000 mg by mouth once daily.     biotin 1 mg Cap Take by mouth.    creatine, bulk, 100 % Powd by Misc.(Non-Drug; Combo Route) route.    fish oil-omega-3 fatty acids 300-1,000 mg capsule Take by mouth once daily.    glucosamine-chondroitin 500-400 mg tablet Take 1 tablet by mouth 3 (three) times daily.    ibuprofen (ADVIL,MOTRIN) 400 MG tablet Take 400 mg by mouth every 6 (six) hours as needed for Other.    leucine-isoleucine-valine (NEOKE BCAA4) 82 gram-328 kcal/100 gram Powd Take by mouth.     Antibiotics (From admission, onward)    Start     Stop Route Frequency Ordered    06/12/20 1800  cefTRIAXone injection 1 g      -- IV Every 24 hours (non-standard times) 06/12/20 1133    06/12/20 1200  azithromycin 500 mg in dextrose 5 % 250 mL IVPB (ready to mix system)      -- IV Every 24 hours (non-standard times) 06/11/20 1902        Antifungals (From admission, onward)    None        Antivirals (From admission, onward)        Stop Route Frequency     elviteg-cob-emtri-tenof ALAFEN      -- Oral Daily           Immunization History   Administered Date(s) Administered    Influenza - Quadrivalent 10/26/2015    Influenza - Quadrivalent - PF (6 months and older) 10/23/2006, 09/10/2009, 11/01/2017, 10/28/2018, 10/29/2019    Meningococcal Conjugate  (MCV4P) 01/30/2017, 01/07/2019    Pneumococcal Conjugate - 13 Valent 04/17/2014, 04/17/2014    Pneumococcal Polysaccharide - 23 Valent 12/19/2012    Tdap 05/02/2016    Zoster Recombinant 05/14/2018, 07/11/2018       Family History     Problem Relation (Age of Onset)    Cancer Sister, Brother    Diabetes Mother    Heart disease Mother    Hypertension Mother, Father    Stroke Mother        Social History     Socioeconomic History    Marital status: Single     Spouse name: Not on file    Number of children: Not on file    Years of education: Not on file    Highest education level: Not on file   Occupational History    Not on file   Social Needs    Financial resource strain: Not on file    Food insecurity     Worry: Not on file     Inability: Not on file    Transportation needs     Medical: Not on file     Non-medical: Not on file   Tobacco Use    Smoking status: Never Smoker    Smokeless tobacco: Never Used   Substance and Sexual Activity    Alcohol use: No    Drug use: No    Sexual activity: Not on file   Lifestyle    Physical activity     Days per week: Not on file     Minutes per session: Not on file    Stress: Not on file   Relationships    Social connections     Talks on phone: Not on file     Gets together: Not on file     Attends [employer] service: Not on file     Active member of club or organization: Not on file     Attends meetings of clubs or organizations: Not on file     Relationship status: Not on file   Other Topics Concern    Not on file   Social History Narrative    Not on file     Review of Systems   Constitutional: Positive for appetite change and fever. Negative for chills and diaphoresis.   Respiratory: Positive for cough. Negative for shortness of breath and wheezing.    Cardiovascular: Negative for chest pain and leg swelling.   Gastrointestinal: Negative for abdominal distention, abdominal pain, diarrhea and nausea.   Musculoskeletal: Negative for myalgias (mostly in arms  b/l).   Skin: Negative for pallor and rash.   Neurological: Negative for weakness and light-headedness.   Hematological: Does not bruise/bleed easily.     Objective:     Vital Signs (Most Recent):  Temp: 99.3 °F (37.4 °C) (06/13/20 1104)  Pulse: 80 (06/13/20 1104)  Resp: 20 (06/13/20 1104)  BP: 122/69 (06/13/20 1104)  SpO2: 95 % (06/13/20 1104) Vital Signs (24h Range):  Temp:  [98.1 °F (36.7 °C)-102.6 °F (39.2 °C)] 99.3 °F (37.4 °C)  Pulse:  [67-93] 80  Resp:  [18-20] 20  SpO2:  [93 %-96 %] 95 %  BP: (122-172)/(66-79) 122/69     Weight: 104 kg (229 lb 4.5 oz)  Body mass index is 30.25 kg/m².    Estimated Creatinine Clearance: 110.5 mL/min (based on SCr of 0.9 mg/dL).    Physical Exam  Constitutional:       Appearance: He is well-developed and well-nourished.      Comments: Appears anxious   HENT:      Head: Normocephalic and atraumatic.   Neck:      Musculoskeletal: Normal range of motion and neck supple.   Cardiovascular:      Rate and Rhythm: Normal rate and regular rhythm.      Heart sounds: No murmur.   Pulmonary:      Effort: Pulmonary effort is normal. No respiratory distress.      Breath sounds: Normal breath sounds. No wheezing or rales.   Abdominal:      General: Bowel sounds are normal. There is no distension.      Palpations: Abdomen is soft.      Tenderness: There is no abdominal tenderness.   Musculoskeletal:         General: No tenderness or edema.   Lymphadenopathy:      Cervical: No cervical adenopathy.   Skin:     General: Skin is warm and dry.   Neurological:      Mental Status: He is alert and oriented to person, place, and time.         Significant Labs: All pertinent labs within the past 24 hours have been reviewed.    Significant Imaging: I have reviewed all pertinent imaging results/findings within the past 24 hours.

## 2020-06-13 NOTE — DISCHARGE SUMMARY
"[facility] Hospital Medicine  Discharge Summary      Patient Name: Simón Fong  MRN: [id number]  Admission Date: 6/11/2020  Hospital Length of Stay: 0 days  Discharge Date and Time:  06/13/2020 2:52 PM  Attending Physician: Phillip Madera MD   Discharging Provider: Christy Cherry MD  Primary Care Provider: Parisa Fowler MD  [facility] Medicine Team: [facility] 3 Christy Cherry MD    HPI:   Pt is 60yoM w/ PMH HTN & HIV (CD4 650) who presents w/ 5dy intermittent fever & 2dys productive cough. Pt says he was feeling well until 6/7 when he started having fevers & myalgias. He went to urgent care twice where he tested negative for covid twice & they told him to alternate tylenol & ibuprofen that pt says provided only temporary relief. Pt says he's been drinking up to 1 gallon water daily for the last week & had decreased appetite. Pt denies night sweats, chest pain, SOB, diarrhea, LE swelling.     EKG showed normal sinus rhythm. CXR showed large RLL consolidation.   In ED: WBC 12, Na 131, Glu 159, T 100.4, O2 sat 95% RA, /76. Pt received 1 dose azithromycin & rocephin.     * No surgery found *      Hospital Course:   Pt admitted 6/11 for RLL pna. Pt started on azithromycin & ceftriaxone for CAP. CD4 on 6/11 was 167. Consulted ID. Per ID, "CD4 count can be artificially low and is an unreliable marker for immunosuppression  during illness. current 29% which is decrease from before but far above the cut off of 14% which is the equivalent of absolute CD4 less than 200." Fever curve and cough improving. Oxygen weaned to RA. Discharged home w/ tylenol and codeine and total 5 day course of levaquin. F/u w/ Dr. Fowler in clinic.        Vitals:    06/13/20 0032 06/13/20 0410 06/13/20 0722 06/13/20 1104   BP: 126/66 130/76 (!) 142/78 122/69   BP Location:   Left arm Left arm   Patient Position:   Lying Sitting   Pulse: 84 67 74 80   Resp: 18 18 20 20   Temp: (!) 101.6 °F (38.7 °C) 98.1 °F (36.7 " °C) 99.4 °F (37.4 °C) 99.3 °F (37.4 °C)   TempSrc:   Oral Oral   SpO2: (!) 93% 95% 96% 95%   Weight:       Height:         Physical Exam   Constitutional: He is oriented to person, place, and time. He appears well-developed and well-nourished.   HENT:   Head: Normocephalic and atraumatic.   Neck: Normal range of motion. Neck supple.   Cardiovascular: Normal rate and regular rhythm.   No murmur heard.  Pulmonary/Chest: Effort normal. No respiratory distress. He has no wheezes. He has no rales. Breath sounds improved in RLL.  Abdominal: Soft. Bowel sounds are normal. He exhibits no distension. There is no tenderness.   Musculoskeletal: He exhibits no edema or tenderness.   Lymphadenopathy:     He has no cervical adenopathy.   Neurological: He is alert and oriented to person, place, and time.   Skin: Skin is warm and dry.     Consults:   Consults (From admission, onward)        Status Ordering Provider     Inpatient consult to Infectious Diseases  Once     Provider:  (Not yet assigned)    MAURICE Shields          Final Active Diagnoses:    Diagnosis Date Noted POA    PRINCIPAL PROBLEM:  Pneumonia of right lower lobe due to infectious organism [J18.1] 06/11/2020 Yes    Hypertension [I10] 11/15/2018 Yes    Human immunodeficiency virus (HIV) disease [B20] 12/20/2012 Yes      Problems Resolved During this Admission:       Discharged Condition: stable    Disposition: Home or Self Care    Follow Up:    Patient Instructions:      Ambulatory referral/consult to Infectious Disease   Standing Status: Future   Referral Priority: Routine Referral Type: Consultation   Referral Reason: Specialty Services Required   Referred to Provider: OTIS CANTRELL Requested Specialty: Infectious Diseases   Number of Visits Requested: 1     Notify your health care provider if you experience any of the following:  difficulty breathing or increased cough     Notify your health care provider if you experience any of the following:   persistent dizziness, light-headedness, or visual disturbances       Significant Diagnostic Studies:   Recent Results (from the past 24 hour(s))   Basic Metabolic Panel (BMP)    Collection Time: 06/13/20  3:39 AM   Result Value Ref Range    Sodium 134 (L) 136 - 145 mmol/L    Potassium 3.7 3.5 - 5.1 mmol/L    Chloride 96 95 - 110 mmol/L    CO2 27 23 - 29 mmol/L    Glucose 138 (H) 70 - 110 mg/dL    BUN, Bld 11 6 - 20 mg/dL    Creatinine 0.9 0.5 - 1.4 mg/dL    Calcium 8.7 8.7 - 10.5 mg/dL    Anion Gap 11 8 - 16 mmol/L    eGFR if African American >60.0 >60 mL/min/1.73 m^2    eGFR if non African American >60.0 >60 mL/min/1.73 m^2   Magnesium    Collection Time: 06/13/20  3:39 AM   Result Value Ref Range    Magnesium 2.1 1.6 - 2.6 mg/dL   Phosphorus    Collection Time: 06/13/20  3:39 AM   Result Value Ref Range    Phosphorus 3.2 2.7 - 4.5 mg/dL   CBC with Automated Differential    Collection Time: 06/13/20  3:39 AM   Result Value Ref Range    WBC 10.03 3.90 - 12.70 K/uL    RBC 4.07 (L) 4.60 - 6.20 M/uL    Hemoglobin 11.8 (L) 14.0 - 18.0 g/dL    Hematocrit 35.5 (L) 40.0 - 54.0 %    Mean Corpuscular Volume 87 82 - 98 fL    Mean Corpuscular Hemoglobin 29.0 27.0 - 31.0 pg    Mean Corpuscular Hemoglobin Conc 33.2 32.0 - 36.0 g/dL    RDW 12.8 11.5 - 14.5 %    Platelets 222 150 - 350 K/uL    MPV 10.1 9.2 - 12.9 fL    Immature Granulocytes 1.4 (H) 0.0 - 0.5 %    Gran # (ANC) 7.3 1.8 - 7.7 K/uL    Immature Grans (Abs) 0.14 (H) 0.00 - 0.04 K/uL    Lymph # 1.4 1.0 - 4.8 K/uL    Mono # 1.2 (H) 0.3 - 1.0 K/uL    Eos # 0.0 0.0 - 0.5 K/uL    Baso # 0.03 0.00 - 0.20 K/uL    nRBC 0 0 /100 WBC    Gran% 72.6 38.0 - 73.0 %    Lymph% 13.7 (L) 18.0 - 48.0 %    Mono% 11.8 4.0 - 15.0 %    Eosinophil% 0.2 0.0 - 8.0 %    Basophil% 0.3 0.0 - 1.9 %    Differential Method Automated          Pending Diagnostic Studies:     Procedure Component Value Units Date/Time    CD4 T-[place] Cells [[id number]] Collected: 06/13/20 0739    Order Status: Sent Lab  Status: In process Updated: 06/13/20 0857    Specimen: Blood     HIV RNA, quantitative, PCR [[id number]] Collected: 06/11/20 1641    Order Status: Sent Lab Status: In process Updated: 06/11/20 1720    Specimen: Blood          Medications:  Reconciled Home Medications:      Medication List      START taking these medications    acetaminophen-codeine 300-15mg 300-15 mg per tablet  Commonly known as: TYLENOL #2  Take 1 tablet by mouth every 4 (four) hours as needed for Pain.     levoFLOXacin 750 MG tablet  Commonly known as: LEVAQUIN  Take 1 tablet (750 mg total) by mouth once daily. for 3 days        CONTINUE taking these medications    acetaminophen 500 MG tablet  Commonly known as: TYLENOL  Take 1,000 mg by mouth every 6 (six) hours as needed for Pain.     amLODIPine 10 MG tablet  Commonly known as: NORVASC  Take 1 tablet (10 mg total) by mouth once daily.     biotin 1 mg Cap  Take by mouth.     creatine (bulk) 100 % Powd  by Misc.(Non-Drug; Combo Route) route.     elviteg-cob-emtri-tenof ALAFEN 750-320-110-10 mg Tab  Commonly known as: GENVOYA  Take 1 tablet by mouth once daily.     fish oil-omega-3 fatty acids 300-1,000 mg capsule  Take by mouth once daily.     glucosamine-chondroitin 500-400 mg tablet  Take 1 tablet by mouth 3 (three) times daily.     ibuprofen 400 MG tablet  Commonly known as: ADVIL,MOTRIN  Take 400 mg by mouth every 6 (six) hours as needed for Other.     NEOKE BCAA4 82 gram-328 kcal/100 gram Powd  Generic drug: leucine-isoleucine-valine  Take by mouth.     VITAMIN C 1000 MG tablet  Generic drug: ascorbic acid (vitamin C)  Take 1,000 mg by mouth once daily.            Indwelling Lines/Drains at time of discharge:   Lines/Drains/Airways     None                 Time spent on the discharge of patient: 40 minutes  Patient was seen and examined on the date of discharge and determined to be suitable for discharge.         Christy Cherry MD  Department of Hospital Medicine  [facility]  [place]

## 2020-06-13 NOTE — NURSING
Patient given discharge instructions. IV removed and bandage applied. Patient awaiting transportation out of facility.

## 2020-06-13 NOTE — CONSULTS
[facility]  Infectious Disease  Consult Note    Patient Name: Simón Fong  MRN: [id number]  Admission Date: 6/11/2020  Hospital Length of Stay: 0 days  Attending Physician: Phillip Madera MD  Primary Care Provider: Parisa Fowler MD     Isolation Status: No active isolations    Patient information was obtained from patient and past medical records.      Consults  Assessment/Plan:     Human immunodeficiency virus (HIV) disease  60yoM w/ PMH HTN & HIV (CD4 167 here) who presents w/ 5dy intermittent fever & 2dys productive cough and was admitted and treated for CAP with improvement.    -of note during acute illness (such as CAP) the absolute CD4 count can be artificially low and is an unreliable marker for immunosuppression   -in this situation look at that CD4 percent  -his is current 29% which is decrease from before but far above the cut off of 14% which is the equivalent of absolute CD4 less than 200  -unlikely that he is truly immunosuppressed   -would not adjust his ART or start ppx  -his HIV VL should be low, if it is not you should call me back  -he should follow up with Dr. Acosta soon for HIV  -ID will sign off        Thank you for your consult. I will sign off. Please contact us if you have any additional questions.    Ilan Fernandez MD  Infectious Disease  [facility]    Subjective:     Principal Problem: Pneumonia of right lower lobe due to infectious organism    HPI: 60 yoM w/ PMH HTN & HIV (CD4 650 in Jan 2020 but 167 now) who presented w/ 5dy intermittent fever & 2dys productive cough. Pt says he was feeling well until 6/7 when he started having fevers & myalgias. He went to urgent care twice where he tested negative for covid twice & they told him to alternate tylenol & ibuprofen that pt says provided only temporary relief. Pt says he's been drinking up to 1 gallon water daily for the last week & had decreased appetite. Pt denies night sweats, chest pain,  SOB, diarrhea, LE swelling. He tested negative for COVID. CXR with RLL consolidation. Treated with ctx and azithro. He takes genovya and has no missed any doses    Past Medical History:   Diagnosis Date    HIV infection     Hypertension        Past Surgical History:   Procedure Laterality Date    PROSTATE BIOPSY         Review of patient's allergies indicates:   Allergen Reactions    Sulfa (sulfonamide antibiotics)        Medications:  Medications Prior to Admission   Medication Sig    amLODIPine (NORVASC) 10 MG tablet Take 1 tablet (10 mg total) by mouth once daily.    elviteg-cob-emtri-tenof ALAFEN (GENVOYA) [phone] mg Tab Take 1 tablet by mouth once daily.    acetaminophen (TYLENOL) 500 MG tablet Take 1,000 mg by mouth every 6 (six) hours as needed for Pain.    ascorbic acid, vitamin C, (VITAMIN C) 1000 MG tablet Take 1,000 mg by mouth once daily.     biotin 1 mg Cap Take by mouth.    creatine, bulk, 100 % Powd by Misc.(Non-Drug; Combo Route) route.    fish oil-omega-3 fatty acids 300-1,000 mg capsule Take by mouth once daily.    glucosamine-chondroitin 500-400 mg tablet Take 1 tablet by mouth 3 (three) times daily.    ibuprofen (ADVIL,MOTRIN) 400 MG tablet Take 400 mg by mouth every 6 (six) hours as needed for Other.    leucine-isoleucine-valine (NEOKE BCAA4) 82 gram-328 kcal/100 gram Powd Take by mouth.     Antibiotics (From admission, onward)    Start     Stop Route Frequency Ordered    06/12/20 1800  cefTRIAXone injection 1 g      -- IV Every 24 hours (non-standard times) 06/12/20 1133    06/12/20 1200  azithromycin 500 mg in dextrose 5 % 250 mL IVPB (ready to mix system)      -- IV Every 24 hours (non-standard times) 06/11/20 1902        Antifungals (From admission, onward)    None        Antivirals (From admission, onward)        Stop Route Frequency     elviteg-cob-emtri-tenof ALAFEN      -- Oral Daily           Immunization History   Administered Date(s) Administered    Influenza -  Quadrivalent 10/26/2015    Influenza - Quadrivalent - PF (6 months and older) 10/23/2006, 09/10/2009, 11/01/2017, 10/28/2018, 10/29/2019    Meningococcal Conjugate (MCV4P) 01/30/2017, 01/07/2019    Pneumococcal Conjugate - 13 Valent 04/17/2014, 04/17/2014    Pneumococcal Polysaccharide - 23 Valent 12/19/2012    Tdap 05/02/2016    Zoster Recombinant 05/14/2018, 07/11/2018       Family History     Problem Relation (Age of Onset)    Cancer Sister, Brother    Diabetes Mother    Heart disease Mother    Hypertension Mother, Father    Stroke Mother        Social History     Socioeconomic History    Marital status: Single     Spouse name: Not on file    Number of children: Not on file    Years of education: Not on file    Highest education level: Not on file   Occupational History    Not on file   Social Needs    Financial resource strain: Not on file    Food insecurity     Worry: Not on file     Inability: Not on file    Transportation needs     Medical: Not on file     Non-medical: Not on file   Tobacco Use    Smoking status: Never Smoker    Smokeless tobacco: Never Used   Substance and Sexual Activity    Alcohol use: No    Drug use: No    Sexual activity: Not on file   Lifestyle    Physical activity     Days per week: Not on file     Minutes per session: Not on file    Stress: Not on file   Relationships    Social connections     Talks on phone: Not on file     Gets together: Not on file     Attends Restorationism service: Not on file     Active member of club or organization: Not on file     Attends meetings of clubs or organizations: Not on file     Relationship status: Not on file   Other Topics Concern    Not on file   Social History Narrative    Not on file     Review of Systems   Constitutional: Positive for appetite change and fever. Negative for chills and diaphoresis.   Respiratory: Positive for cough. Negative for shortness of breath and wheezing.    Cardiovascular: Negative for chest pain  and leg swelling.   Gastrointestinal: Negative for abdominal distention, abdominal pain, diarrhea and nausea.   Musculoskeletal: Negative for myalgias (mostly in arms b/l).   Skin: Negative for pallor and rash.   Neurological: Negative for weakness and light-headedness.   Hematological: Does not bruise/bleed easily.     Objective:     Vital Signs (Most Recent):  Temp: 99.3 °F (37.4 °C) (06/13/20 1104)  Pulse: 80 (06/13/20 1104)  Resp: 20 (06/13/20 1104)  BP: 122/69 (06/13/20 1104)  SpO2: 95 % (06/13/20 1104) Vital Signs (24h Range):  Temp:  [98.1 °F (36.7 °C)-102.6 °F (39.2 °C)] 99.3 °F (37.4 °C)  Pulse:  [67-93] 80  Resp:  [18-20] 20  SpO2:  [93 %-96 %] 95 %  BP: (122-172)/(66-79) 122/69     Weight: 104 kg (229 lb 4.5 oz)  Body mass index is 30.25 kg/m².    Estimated Creatinine Clearance: 110.5 mL/min (based on SCr of 0.9 mg/dL).    Physical Exam  Constitutional:       Appearance: He is well-developed and well-nourished.      Comments: Appears anxious   HENT:      Head: Normocephalic and atraumatic.   Neck:      Musculoskeletal: Normal range of motion and neck supple.   Cardiovascular:      Rate and Rhythm: Normal rate and regular rhythm.      Heart sounds: No murmur.   Pulmonary:      Effort: Pulmonary effort is normal. No respiratory distress.      Breath sounds: Normal breath sounds. No wheezing or rales.   Abdominal:      General: Bowel sounds are normal. There is no distension.      Palpations: Abdomen is soft.      Tenderness: There is no abdominal tenderness.   Musculoskeletal:         General: No tenderness or edema.   Lymphadenopathy:      Cervical: No cervical adenopathy.   Skin:     General: Skin is warm and dry.   Neurological:      Mental Status: He is alert and oriented to person, place, and time.         Significant Labs: All pertinent labs within the past 24 hours have been reviewed.    Significant Imaging: I have reviewed all pertinent imaging results/findings within the past 24  hours.

## 2020-06-15 LAB
BACTERIA SPEC AEROBE CULT: NORMAL
BACTERIA SPEC AEROBE CULT: NORMAL
GRAM STN SPEC: NORMAL
HIV UQ DATE RECEIVED: NORMAL
HIV UQ DATE REPORTED: NORMAL
HIV1 RNA # SERPL NAA+PROBE: <40 COPIES/ML
HIV1 RNA SERPL NAA+PROBE-LOG#: <1.6 LOG (10) COPIES/ML
HIV1 RNA SERPL QL NAA+PROBE: NOT DETECTED

## 2020-06-16 LAB
BACTERIA BLD CULT: NORMAL
BACTERIA BLD CULT: NORMAL
CD3+CD4+ CELLS # BLD: 443 CELLS/UL (ref 300–1400)
CD3+CD4+ CELLS NFR BLD: 32.9 % (ref 28–57)

## 2020-06-17 LAB
BACTERIA BLD CULT: NORMAL
BACTERIA BLD CULT: NORMAL

## 2020-06-23 DIAGNOSIS — Z12.11 SPECIAL SCREENING FOR MALIGNANT NEOPLASMS, COLON: Primary | ICD-10-CM

## 2020-06-23 DIAGNOSIS — Z01.818 PRE-OP TESTING: ICD-10-CM

## 2020-06-23 RX ORDER — POLYETHYLENE GLYCOL 3350, SODIUM SULFATE ANHYDROUS, SODIUM BICARBONATE, SODIUM CHLORIDE, POTASSIUM CHLORIDE 236; 22.74; 6.74; 5.86; 2.97 G/4L; G/4L; G/4L; G/4L; G/4L
4 POWDER, FOR SOLUTION ORAL ONCE
Qty: 4000 ML | Refills: 0 | Status: SHIPPED | OUTPATIENT
Start: 2020-06-23 | End: 2020-06-23

## 2020-07-07 DIAGNOSIS — Z21 HIV POSITIVE: ICD-10-CM

## 2020-07-08 RX ORDER — ELVITEGRAVIR, COBICISTAT, EMTRICITABINE, AND TENOFOVIR ALAFENAMIDE 150; 150; 200; 10 MG/1; MG/1; MG/1; MG/1
1 TABLET ORAL DAILY
Qty: 90 TABLET | Refills: 3 | Status: SHIPPED | OUTPATIENT
Start: 2020-07-08 | End: 2021-08-16

## 2020-07-09 ENCOUNTER — LAB VISIT (OUTPATIENT)
Dept: LAB | Facility: OTHER | Age: 60
End: 2020-07-09
Attending: INTERNAL MEDICINE
Payer: COMMERCIAL

## 2020-07-09 DIAGNOSIS — Z21 HIV POSITIVE: ICD-10-CM

## 2020-07-09 LAB
ALBUMIN SERPL BCP-MCNC: 4.2 G/DL (ref 3.5–5.2)
ALP SERPL-CCNC: 87 U/L (ref 55–135)
ALT SERPL W/O P-5'-P-CCNC: 17 U/L (ref 10–44)
ANION GAP SERPL CALC-SCNC: 11 MMOL/L (ref 8–16)
AST SERPL-CCNC: 22 U/L (ref 10–40)
BASOPHILS # BLD AUTO: 0.02 K/UL (ref 0–0.2)
BASOPHILS NFR BLD: 0.7 % (ref 0–1.9)
BILIRUB SERPL-MCNC: 0.4 MG/DL (ref 0.1–1)
BUN SERPL-MCNC: 14 MG/DL (ref 6–20)
CALCIUM SERPL-MCNC: 9.1 MG/DL (ref 8.7–10.5)
CHLORIDE SERPL-SCNC: 106 MMOL/L (ref 95–110)
CHOLEST SERPL-MCNC: 198 MG/DL (ref 120–199)
CHOLEST/HDLC SERPL: 3.2 {RATIO} (ref 2–5)
CO2 SERPL-SCNC: 24 MMOL/L (ref 23–29)
CREAT SERPL-MCNC: 0.9 MG/DL (ref 0.5–1.4)
DIFFERENTIAL METHOD: ABNORMAL
EOSINOPHIL # BLD AUTO: 0.1 K/UL (ref 0–0.5)
EOSINOPHIL NFR BLD: 1.7 % (ref 0–8)
ERYTHROCYTE [DISTWIDTH] IN BLOOD BY AUTOMATED COUNT: 12.9 % (ref 11.5–14.5)
EST. GFR  (AFRICAN AMERICAN): >60 ML/MIN/1.73 M^2
EST. GFR  (NON AFRICAN AMERICAN): >60 ML/MIN/1.73 M^2
GLUCOSE SERPL-MCNC: 84 MG/DL (ref 70–110)
HCT VFR BLD AUTO: 41.5 % (ref 40–54)
HDLC SERPL-MCNC: 62 MG/DL (ref 40–75)
HDLC SERPL: 31.3 % (ref 20–50)
HGB BLD-MCNC: 13.7 G/DL (ref 14–18)
IMM GRANULOCYTES # BLD AUTO: 0 K/UL (ref 0–0.04)
IMM GRANULOCYTES NFR BLD AUTO: 0 % (ref 0–0.5)
LDLC SERPL CALC-MCNC: 126 MG/DL (ref 63–159)
LYMPHOCYTES # BLD AUTO: 1.7 K/UL (ref 1–4.8)
LYMPHOCYTES NFR BLD: 56.7 % (ref 18–48)
MCH RBC QN AUTO: 29.7 PG (ref 27–31)
MCHC RBC AUTO-ENTMCNC: 33 G/DL (ref 32–36)
MCV RBC AUTO: 90 FL (ref 82–98)
MONOCYTES # BLD AUTO: 0.4 K/UL (ref 0.3–1)
MONOCYTES NFR BLD: 13 % (ref 4–15)
NEUTROPHILS # BLD AUTO: 0.8 K/UL (ref 1.8–7.7)
NEUTROPHILS NFR BLD: 27.9 % (ref 38–73)
NONHDLC SERPL-MCNC: 136 MG/DL
NRBC BLD-RTO: 0 /100 WBC
PLATELET # BLD AUTO: 217 K/UL (ref 150–350)
PLATELET BLD QL SMEAR: ABNORMAL
PMV BLD AUTO: 10.2 FL (ref 9.2–12.9)
POTASSIUM SERPL-SCNC: 3.9 MMOL/L (ref 3.5–5.1)
PROT SERPL-MCNC: 7.4 G/DL (ref 6–8.4)
RBC # BLD AUTO: 4.61 M/UL (ref 4.6–6.2)
SODIUM SERPL-SCNC: 141 MMOL/L (ref 136–145)
TRIGL SERPL-MCNC: 50 MG/DL (ref 30–150)
WBC # BLD AUTO: 3 K/UL (ref 3.9–12.7)

## 2020-07-09 PROCEDURE — 80053 COMPREHEN METABOLIC PANEL: CPT

## 2020-07-09 PROCEDURE — 80061 LIPID PANEL: CPT

## 2020-07-09 PROCEDURE — 85025 COMPLETE CBC W/AUTO DIFF WBC: CPT

## 2020-07-09 PROCEDURE — 36415 COLL VENOUS BLD VENIPUNCTURE: CPT

## 2020-07-09 PROCEDURE — 86361 T CELL ABSOLUTE COUNT: CPT

## 2020-07-09 PROCEDURE — 87536 HIV-1 QUANT&REVRSE TRNSCRPJ: CPT

## 2020-07-10 LAB
CD3+CD4+ CELLS # BLD: 625 CELLS/UL (ref 300–1400)
CD3+CD4+ CELLS NFR BLD: 34.6 % (ref 28–57)

## 2020-07-20 ENCOUNTER — PATIENT MESSAGE (OUTPATIENT)
Dept: UROLOGY | Facility: CLINIC | Age: 60
End: 2020-07-20

## 2020-07-20 DIAGNOSIS — R97.20 ELEVATED PSA: Primary | ICD-10-CM

## 2020-07-23 ENCOUNTER — TELEPHONE (OUTPATIENT)
Dept: INFECTIOUS DISEASES | Facility: CLINIC | Age: 60
End: 2020-07-23

## 2020-07-23 ENCOUNTER — LAB VISIT (OUTPATIENT)
Dept: LAB | Facility: HOSPITAL | Age: 60
End: 2020-07-23
Payer: COMMERCIAL

## 2020-07-23 ENCOUNTER — CLINICAL SUPPORT (OUTPATIENT)
Dept: INFECTIOUS DISEASES | Facility: CLINIC | Age: 60
End: 2020-07-23
Payer: COMMERCIAL

## 2020-07-23 ENCOUNTER — TELEPHONE (OUTPATIENT)
Dept: ORTHOPEDICS | Facility: CLINIC | Age: 60
End: 2020-07-23

## 2020-07-23 ENCOUNTER — OFFICE VISIT (OUTPATIENT)
Dept: INFECTIOUS DISEASES | Facility: CLINIC | Age: 60
End: 2020-07-23
Payer: COMMERCIAL

## 2020-07-23 VITALS
TEMPERATURE: 98 F | WEIGHT: 219.56 LBS | SYSTOLIC BLOOD PRESSURE: 129 MMHG | HEART RATE: 76 BPM | HEIGHT: 74 IN | BODY MASS INDEX: 28.18 KG/M2 | DIASTOLIC BLOOD PRESSURE: 85 MMHG

## 2020-07-23 DIAGNOSIS — B20 HUMAN IMMUNODEFICIENCY VIRUS (HIV) DISEASE: Primary | ICD-10-CM

## 2020-07-23 DIAGNOSIS — R30.0 DYSURIA: Primary | ICD-10-CM

## 2020-07-23 DIAGNOSIS — R97.20 ELEVATED PSA: ICD-10-CM

## 2020-07-23 DIAGNOSIS — M54.16 LUMBAR RADICULOPATHY: ICD-10-CM

## 2020-07-23 DIAGNOSIS — B20 HUMAN IMMUNODEFICIENCY VIRUS (HIV) DISEASE: ICD-10-CM

## 2020-07-23 PROCEDURE — 99999 PR PBB SHADOW E&M-EST. PATIENT-LVL V: ICD-10-PCS | Mod: PBBFAC,,, | Performed by: INTERNAL MEDICINE

## 2020-07-23 PROCEDURE — 99999 PR PBB SHADOW E&M-EST. PATIENT-LVL I: ICD-10-PCS | Mod: PBBFAC,,,

## 2020-07-23 PROCEDURE — 90732 PNEUMOCOCCAL POLYSACCHARIDE VACCINE 23-VALENT =>2YO SQ IM: ICD-10-PCS | Mod: S$GLB,,, | Performed by: INTERNAL MEDICINE

## 2020-07-23 PROCEDURE — 90471 IMMUNIZATION ADMIN: CPT | Mod: S$GLB,,, | Performed by: INTERNAL MEDICINE

## 2020-07-23 PROCEDURE — 90471 PNEUMOCOCCAL POLYSACCHARIDE VACCINE 23-VALENT =>2YO SQ IM: ICD-10-PCS | Mod: S$GLB,,, | Performed by: INTERNAL MEDICINE

## 2020-07-23 PROCEDURE — 99999 PR PBB SHADOW E&M-EST. PATIENT-LVL I: CPT | Mod: PBBFAC,,,

## 2020-07-23 PROCEDURE — 84154 ASSAY OF PSA FREE: CPT

## 2020-07-23 PROCEDURE — 99214 OFFICE O/P EST MOD 30 MIN: CPT | Mod: 25,S$GLB,, | Performed by: INTERNAL MEDICINE

## 2020-07-23 PROCEDURE — 36415 COLL VENOUS BLD VENIPUNCTURE: CPT

## 2020-07-23 PROCEDURE — 99214 PR OFFICE/OUTPT VISIT, EST, LEVL IV, 30-39 MIN: ICD-10-PCS | Mod: 25,S$GLB,, | Performed by: INTERNAL MEDICINE

## 2020-07-23 PROCEDURE — 99999 PR PBB SHADOW E&M-EST. PATIENT-LVL V: CPT | Mod: PBBFAC,,, | Performed by: INTERNAL MEDICINE

## 2020-07-23 PROCEDURE — 90732 PPSV23 VACC 2 YRS+ SUBQ/IM: CPT | Mod: S$GLB,,, | Performed by: INTERNAL MEDICINE

## 2020-07-23 PROCEDURE — 84153 ASSAY OF PSA TOTAL: CPT

## 2020-07-23 NOTE — PROGRESS NOTES
Subjective:      Patient ID: Simón Fong is a 60 y.o. male.    Chief Complaint:Follow-up (HIV)      History of Present Illness    Presents for HIV follow up.    Summary:   The patient has been seen by us for a number of years and in the past, he was on   Combivir and ritonavir.  He did have a drug holiday and he asked about that   once again and he has been discouraged from doing such.    He was switched to Truvada and boosted Reyataz; and then Stribild and Genvoya now.      Highly adherent with medications; no problems reported.      Routine examinations:  1. Vitamin D supplements daily.  2. Colonoscopy due 2018 which would be 7 years from the last.  3. Last anal pap 2012; and 2015 and 2018.  No dysplasia.  Family history:  two brothers were diagnosed with prostate cancer - the older one at 64 and a younger one also.  4. Vaccines updated - pneumovax.    Prostate cancer: biopsy 8/13/2018 - prostate adenocarcinoma; followed by Tammy Wright.    Blood pressure elevated here;   Will follow up at home and bring BP machine to next apt.     Since last seen, patient was admitted to the hospital with peumonia; recovered fully and tested negative for COVID.   Doing well now.       Review of Systems   Constitution: Negative for chills, decreased appetite, fever, malaise/fatigue, night sweats, weight gain and weight loss.   HENT: Negative for congestion, ear pain, hearing loss, hoarse voice, sore throat and tinnitus.    Eyes: Negative for blurred vision, redness and visual disturbance.   Cardiovascular: Negative for chest pain, leg swelling and palpitations.   Respiratory: Negative for cough, hemoptysis, shortness of breath and sputum production.    Hematologic/Lymphatic: Negative for adenopathy. Does not bruise/bleed easily.   Skin: Negative for dry skin, itching, rash and suspicious lesions.   Musculoskeletal: Negative for back pain, joint pain, myalgias and neck pain.   Gastrointestinal: Negative for abdominal pain,  constipation, diarrhea, heartburn, nausea and vomiting.   Genitourinary: Negative for dysuria, flank pain, frequency, hematuria, hesitancy and urgency.   Neurological: Negative for dizziness, headaches, numbness, paresthesias and weakness.   Psychiatric/Behavioral: Negative for depression and memory loss. The patient does not have insomnia and is not nervous/anxious.      Objective:   Physical Exam   Constitutional: He is oriented to person, place, and time. He appears well-developed and well-nourished.   HENT:   Head: Normocephalic and atraumatic.   Mouth/Throat: Oropharynx is clear and moist.   Eyes: Pupils are equal, round, and reactive to light. Conjunctivae and EOM are normal.   Neck: Normal range of motion. Neck supple. No thyromegaly present.   Cardiovascular: Normal rate, regular rhythm and normal heart sounds.   No murmur heard.  Pulmonary/Chest: Effort normal and breath sounds normal. He has no wheezes. He has no rales.   Abdominal: Soft. Bowel sounds are normal. He exhibits no mass. There is no tenderness. There is no rebound.   Musculoskeletal: Normal range of motion.   Lymphadenopathy:     He has no cervical adenopathy.   Neurological: He is alert and oriented to person, place, and time.   Skin: Skin is warm and dry.   Psychiatric: He has a normal mood and affect. His behavior is normal.   Vitals reviewed.    Assessment:       1. Dysuria    2. Human immunodeficiency virus (HIV) disease    3. Lumbar radiculopathy          Plan:       1. Update vaccines: pneumovax.  2. Colonoscopy.   3. Follow up with urology for prostate cancer.  4. RTC 6 months with lab work prior.

## 2020-07-23 NOTE — TELEPHONE ENCOUNTER
[10:52 AM] Matilde Meza, patient Simón Fong mrn [id number] just called to cancel his appt with Dr. Arredondo, but hung up before completing the cancelation.

## 2020-07-23 NOTE — TELEPHONE ENCOUNTER
Returned call to patient, no answer. LM asking for a return call to confirm he wanted to cancel appointment for today.     ----- Message from Matilde Tapia sent at 7/23/2020 10:52 AM CDT -----  Regarding: appointment  Contact: pt @[phone]  Pt called to cancel his appt with Dr. Arredondo, but hung up before completing the cancelation. Please call.

## 2020-07-24 LAB
-2 PROPSA (PHI): 12.8 PG/ML
FREE PSA (PHI): 0.9 NG/ML
PROSTATE HEALTH INDEX VALUE (PHI): 41.7
PSA FREE MFR SERPL: 10 %
PSA SERPL-MCNC: 8.6 NG/ML

## 2020-07-28 ENCOUNTER — LAB VISIT (OUTPATIENT)
Dept: URGENT CARE | Facility: CLINIC | Age: 60
End: 2020-07-28
Payer: COMMERCIAL

## 2020-07-28 DIAGNOSIS — Z01.818 PRE-OP TESTING: ICD-10-CM

## 2020-07-28 PROCEDURE — U0003 INFECTIOUS AGENT DETECTION BY NUCLEIC ACID (DNA OR RNA); SEVERE ACUTE RESPIRATORY SYNDROME CORONAVIRUS 2 (SARS-COV-2) (CORONAVIRUS DISEASE [COVID-19]), AMPLIFIED PROBE TECHNIQUE, MAKING USE OF HIGH THROUGHPUT TECHNOLOGIES AS DESCRIBED BY CMS-2020-01-R: HCPCS

## 2020-07-28 NOTE — PROGRESS NOTES
Subjective:       Patient ID: Simón Fong is a 60 y.o. male.    Vitals:  vitals were not taken for this visit.     Chief Complaint: COVID-19 Concerns    Pt is here for pre-op COVID swab.  Asymptomatic.      ROS    Objective:      Physical Exam      Assessment:       1. Pre-op testing        Plan:         Pre-op testing  -     COVID-19 Routine Screening

## 2020-07-29 LAB — SARS-COV-2 RNA RESP QL NAA+PROBE: NOT DETECTED

## 2020-07-31 ENCOUNTER — ANESTHESIA EVENT (OUTPATIENT)
Dept: ENDOSCOPY | Facility: HOSPITAL | Age: 60
End: 2020-07-31
Payer: COMMERCIAL

## 2020-07-31 ENCOUNTER — HOSPITAL ENCOUNTER (OUTPATIENT)
Facility: HOSPITAL | Age: 60
Discharge: HOME | End: 2020-07-31
Attending: COLON & RECTAL SURGERY | Admitting: COLON & RECTAL SURGERY
Payer: COMMERCIAL

## 2020-07-31 ENCOUNTER — ANESTHESIA (OUTPATIENT)
Dept: ENDOSCOPY | Facility: HOSPITAL | Age: 60
End: 2020-07-31
Payer: COMMERCIAL

## 2020-07-31 VITALS
WEIGHT: 210 LBS | DIASTOLIC BLOOD PRESSURE: 71 MMHG | TEMPERATURE: 98 F | SYSTOLIC BLOOD PRESSURE: 126 MMHG | BODY MASS INDEX: 26.95 KG/M2 | RESPIRATION RATE: 18 BRPM | HEIGHT: 74 IN | OXYGEN SATURATION: 100 % | HEART RATE: 49 BPM

## 2020-07-31 DIAGNOSIS — Z12.11 SCREENING FOR COLON CANCER: Primary | ICD-10-CM

## 2020-07-31 PROCEDURE — E9220 PRA ENDO ANESTHESIA: ICD-10-PCS | Mod: 33,,, | Performed by: NURSE ANESTHETIST, CERTIFIED REGISTERED

## 2020-07-31 PROCEDURE — 37000009 HC ANESTHESIA EA ADD 15 MINS: Performed by: COLON & RECTAL SURGERY

## 2020-07-31 PROCEDURE — 27201089 HC SNARE, DISP (ANY): Performed by: COLON & RECTAL SURGERY

## 2020-07-31 PROCEDURE — 88305 TISSUE EXAM BY PATHOLOGIST: ICD-10-PCS | Mod: 26,,, | Performed by: PATHOLOGY

## 2020-07-31 PROCEDURE — 37000008 HC ANESTHESIA 1ST 15 MINUTES: Performed by: COLON & RECTAL SURGERY

## 2020-07-31 PROCEDURE — 88305 TISSUE EXAM BY PATHOLOGIST: CPT | Mod: 26,,, | Performed by: PATHOLOGY

## 2020-07-31 PROCEDURE — 63600175 PHARM REV CODE 636 W HCPCS: Performed by: NURSE ANESTHETIST, CERTIFIED REGISTERED

## 2020-07-31 PROCEDURE — E9220 PRA ENDO ANESTHESIA: HCPCS | Mod: 33,,, | Performed by: NURSE ANESTHETIST, CERTIFIED REGISTERED

## 2020-07-31 PROCEDURE — 25000003 PHARM REV CODE 250: Performed by: COLON & RECTAL SURGERY

## 2020-07-31 PROCEDURE — 45385 PR COLONOSCOPY,REMV LESN,SNARE: ICD-10-PCS | Mod: 33,,, | Performed by: COLON & RECTAL SURGERY

## 2020-07-31 PROCEDURE — 45385 COLONOSCOPY W/LESION REMOVAL: CPT | Performed by: COLON & RECTAL SURGERY

## 2020-07-31 PROCEDURE — 88305 TISSUE EXAM BY PATHOLOGIST: CPT | Performed by: PATHOLOGY

## 2020-07-31 PROCEDURE — 45385 COLONOSCOPY W/LESION REMOVAL: CPT | Mod: 33,,, | Performed by: COLON & RECTAL SURGERY

## 2020-07-31 RX ORDER — PROPOFOL 10 MG/ML
VIAL (ML) INTRAVENOUS
Status: DISCONTINUED | OUTPATIENT
Start: 2020-07-31 | End: 2020-07-31

## 2020-07-31 RX ORDER — PROPOFOL 10 MG/ML
VIAL (ML) INTRAVENOUS CONTINUOUS PRN
Status: DISCONTINUED | OUTPATIENT
Start: 2020-07-31 | End: 2020-07-31

## 2020-07-31 RX ORDER — LIDOCAINE HCL/PF 100 MG/5ML
SYRINGE (ML) INTRAVENOUS
Status: DISCONTINUED | OUTPATIENT
Start: 2020-07-31 | End: 2020-07-31

## 2020-07-31 RX ORDER — SODIUM CHLORIDE 9 MG/ML
INJECTION, SOLUTION INTRAVENOUS CONTINUOUS
Status: DISCONTINUED | OUTPATIENT
Start: 2020-07-31 | End: 2020-07-31 | Stop reason: HOSPADM

## 2020-07-31 RX ADMIN — SODIUM CHLORIDE: 0.9 INJECTION, SOLUTION INTRAVENOUS at 08:07

## 2020-07-31 RX ADMIN — PROPOFOL 150 MCG/KG/MIN: 10 INJECTION, EMULSION INTRAVENOUS at 09:07

## 2020-07-31 RX ADMIN — PROPOFOL 80 MG: 10 INJECTION, EMULSION INTRAVENOUS at 09:07

## 2020-07-31 RX ADMIN — Medication 100 MG: at 09:07

## 2020-07-31 NOTE — [employer] PATIENT INSTRUCTIONS
Discharge Summary/Instructions after an Endoscopic Procedure  Patient Name: Simón Fong  Patient MRN: [id number]  Patient YOB: 1960 Friday, July 31, 2020  Tree Mendiola MD  RESTRICTIONS:  During your procedure today, you received medications for sedation.  These   medications may affect your judgment, balance and coordination.  Therefore,   for 24 hours, you have the following restrictions:   - DO NOT drive a car, operate machinery, make legal/financial decisions,   sign important papers or drink alcohol.    ACTIVITY:  Today: no heavy lifting, straining or running due to procedural   sedation/anesthesia.  The following day: return to full activity including work.  DIET:  Eat and drink normally unless instructed otherwise.     TREATMENT FOR COMMON SIDE EFFECTS:  - Mild abdominal pain, nausea, belching, bloating or excessive gas:  rest,   eat lightly and use a heating pad.  - Sore Throat: treat with throat lozenges and/or gargle with warm salt   water.  - Because air was used during the procedure, expelling large amounts of air   from your rectum or belching is normal.  - If a bowel prep was taken, you may not have a bowel movement for 1-3 days.    This is normal.  SYMPTOMS TO WATCH FOR AND REPORT TO YOUR PHYSICIAN:  1. Abdominal pain or bloating, other than gas cramps.  2. Chest pain.  3. Back pain.  4. Signs of infection such as: chills or fever occurring within 24 hours   after the procedure.  5. Rectal bleeding, which would show as bright red, maroon, or black stools.   (A tablespoon of blood from the rectum is not serious, especially if   hemorrhoids are present.)  6. Vomiting.  7. Weakness or dizziness.  GO DIRECTLY TO THE NEAREST EMERGENCY ROOM IF YOU HAVE ANY OF THE FOLLOWING:      Difficulty breathing              Chills and/or fever over 101 F   Persistent vomiting and/or vomiting blood   Severe abdominal pain   Severe chest pain   Black, tarry stools   Bleeding- more than one  tablespoon   Any other symptom or condition that you feel may need urgent attention  Your doctor recommends these additional instructions:  If any biopsies were taken, your doctors clinic will contact you in 1 to 2   weeks with any results.  - Discharge patient to home (ambulatory).   - Resume previous diet.   - Continue present medications.   - Await pathology results.   - Repeat colonoscopy in 5 years for surveillance.  For questions, problems or results please call your physician - Tree Mendiola MD at Work:  [phone].  [facility], EMERGENCY ROOM PHONE NUMBER: [phone]  IF A COMPLICATION OR EMERGENCY SITUATION ARISES AND YOU ARE UNABLE TO REACH   YOUR PHYSICIAN - GO DIRECTLY TO THE EMERGENCY ROOM.  Tree Mendiola MD  7/31/2020 9:27:50 AM  This report has been verified and signed electronically.  [employer]

## 2020-07-31 NOTE — TRANSFER OF CARE
"Anesthesia Transfer of Care Note    Patient: Simón Fong    Procedure(s) Performed: Procedure(s) (LRB):  COLONOSCOPY (N/A)    Patient location: PACU    Anesthesia Type: general    Transport from OR: Transported from OR on room air with adequate spontaneous ventilation    Post pain: adequate analgesia    Post assessment: no apparent anesthetic complications and tolerated procedure well    Post vital signs: stable    Level of consciousness: awake, alert and oriented    Nausea/Vomiting: no nausea/vomiting    Complications: none    Transfer of care protocol was followed      Last vitals:   Visit Vitals  /85 (BP Location: Left arm, Patient Position: Sitting)   Pulse 66   Resp 16   Ht 6' 2" (1.88 m)   Wt 95.3 kg (210 lb)   SpO2 100%   BMI 26.96 kg/m²     "

## 2020-07-31 NOTE — H&P
COLONOSCOPY HISTORY AND PHYSICAL EXAM    Procedure : Colonoscopy      INDICATIONS: asymptomatic screening exam    Family Hx of CRC: no    Last Colonoscopy:  2011  Findings: none       Past Medical History:   Diagnosis Date    HIV infection     Hypertension      Sedation Problems: NO  Family History   Problem Relation Age of Onset    Diabetes Mother     Heart disease Mother     Hypertension Mother     Stroke Mother     Hypertension Father     Cancer Sister     Cancer Brother      Fam Hx of Sedation Problems: NO  Social History     Socioeconomic History    Marital status: Single     Spouse name: Not on file    Number of children: Not on file    Years of education: Not on file    Highest education level: Not on file   Occupational History    Not on file   Social Needs    Financial resource strain: Not on file    Food insecurity     Worry: Not on file     Inability: Not on file    Transportation needs     Medical: Not on file     Non-medical: Not on file   Tobacco Use    Smoking status: Never Smoker    Smokeless tobacco: Never Used   Substance and Sexual Activity    Alcohol use: No    Drug use: No    Sexual activity: Not on file   Lifestyle    Physical activity     Days per week: Not on file     Minutes per session: Not on file    Stress: Not on file   Relationships    Social connections     Talks on phone: Not on file     Gets together: Not on file     Attends Judaism service: Not on file     Active member of club or organization: Not on file     Attends meetings of clubs or organizations: Not on file     Relationship status: Not on file   Other Topics Concern    Not on file   Social History Narrative    Not on file       Review of Systems - Negative except   Respiratory ROS: no dyspnea  Cardiovascular ROS: no exertional chest pain  Gastrointestinal ROS: NO abdominal discomfort,  NO rectal bleeding  Musculoskeletal ROS: no muscular pain  Neurological ROS: no recent stroke    Physical  Exam:  /85 (BP Location: Left arm, Patient Position: Sitting)   Pulse 66   Resp 16   SpO2 100%   General: no distress  Head: normocephalic  Mallampati Score   Neck: supple, symmetrical, trachea midline  Lungs:  clear to auscultation bilaterally and normal respiratory effort  Heart: regular rate and rhythm and no murmur  Abdomen: soft, non-tender non-distented; bowel sounds normal; no masses,  no organomegaly  Extremities: no cyanosis or edema, or clubbing    ASA:  II    PLAN  COLONOSCOPY.    SedationPlan :MAC    The details of the procedure, the possible need for biopsy or polypectomy and the potential risks including bleeding, perforation, missed polyps were discussed in detail.

## 2020-07-31 NOTE — DISCHARGE INSTRUCTIONS
Colonoscopy     A camera attached to a flexible tube with a viewing lens is used to take video pictures.     Colonoscopy is a test to view the inside of your lower digestive tract (colon and rectum). Sometimes it can show the last part of the small intestine (ileum). During the test, small pieces of tissue may be removed for testing. This is called a biopsy. Small growths, such as polyps, may also be removed.   Why is colonoscopy done?  The test is done to help look for colon cancer. And it can help find the source of abdominal pain, bleeding, and changes in bowel habits. It may be needed once a year, depending on factors such as your:  · Age  · Health history  · Family health history  · Symptoms  · Results from any prior colonoscopy  Risks and possible complications  These include:  · Bleeding               · A puncture or tear in the colon   · Risks of anesthesia  · A cancer lesion not being seen  Getting ready   To prepare for the test:  · Talk with your healthcare provider about the risks of the test (see below). Also ask your healthcare provider about alternatives to the test.  · Tell your healthcare provider about any medicines you take. Also tell him or her about any health conditions you may have.  · Make sure your rectum and colon are empty for the test. Follow the diet and bowel prep instructions exactly. If you dont, the test may need to be rescheduled.  · Plan for a friend or family member to drive you home after the test.     Colonoscopy provides an inside view of the entire colon.     You may discuss the results with your doctor right away or at a future visit.  During the test   The test is usually done in the hospital on an outpatient basis. This means you go home the same day. The procedure takes about 30 minutes. During that time:  · You are given relaxing (sedating) medicine through an IV line. You may be drowsy, or fully asleep.  · The healthcare provider will first give you a physical exam to  check for anal and rectal problems.  · Then the anus is lubricated and the scope inserted.  · If you are awake, you may have a feeling similar to needing to have a bowel movement. You may also feel pressure as air is pumped into the colon. Its OK to pass gas during the procedure.  · Biopsy, polyp removal, or other treatments may be done during the test.  After the test   You may have gas right after the test. It can help to try to pass it to help prevent later bloating. Your healthcare provider may discuss the results with you right away. Or you may need to schedule a follow-up visit to talk about the results. After the test, you can go back to your normal eating and other activities. You may be tired from the sedation and need to rest for a few hours.  Date Last Reviewed: 11/1/2016 © 2000-2017 [employer]. [address] [place] [address], [place] [address]. All rights reserved. This information is not intended as a substitute for professional medical care. Always follow your healthcare professional's instructions.

## 2020-07-31 NOTE — ANESTHESIA POSTPROCEDURE EVALUATION
Anesthesia Post Evaluation    Patient: Simón Fong    Procedure(s) Performed: Procedure(s) (LRB):  COLONOSCOPY (N/A)    Final Anesthesia Type: general    Patient location during evaluation: PACU  Patient participation: Yes- Able to Participate  Level of consciousness: awake and alert and oriented  Post-procedure vital signs: reviewed and stable  Pain management: adequate  Airway patency: patent    PONV status at discharge: No PONV  Anesthetic complications: no      Cardiovascular status: blood pressure returned to baseline  Respiratory status: unassisted, spontaneous ventilation and room air  Hydration status: euvolemic  Follow-up not needed.          Vitals Value Taken Time   /71 07/31/20 0958   Temp 36.6 °C (97.9 °F) 07/31/20 0928   Pulse 49 07/31/20 0958   Resp 18 07/31/20 0958   SpO2 100 % 07/31/20 0958         Event Time   Out of Recovery 10:18:55         Pain/Destinee Score: Destinee Score: 10 (7/31/2020  9:58 AM)

## 2020-08-04 LAB
FINAL PATHOLOGIC DIAGNOSIS: NORMAL
GROSS: NORMAL

## 2020-08-13 ENCOUNTER — OFFICE VISIT (OUTPATIENT)
Dept: UROLOGY | Facility: CLINIC | Age: 60
End: 2020-08-13
Payer: COMMERCIAL

## 2020-08-13 VITALS
WEIGHT: 214.81 LBS | SYSTOLIC BLOOD PRESSURE: 123 MMHG | HEIGHT: 74 IN | DIASTOLIC BLOOD PRESSURE: 83 MMHG | BODY MASS INDEX: 27.57 KG/M2 | HEART RATE: 78 BPM

## 2020-08-13 DIAGNOSIS — N13.8 BPH WITH URINARY OBSTRUCTION: ICD-10-CM

## 2020-08-13 DIAGNOSIS — R97.20 ELEVATED PSA: ICD-10-CM

## 2020-08-13 DIAGNOSIS — C61 PROSTATE CANCER: Primary | ICD-10-CM

## 2020-08-13 DIAGNOSIS — R97.20 RISING PSA LEVEL: ICD-10-CM

## 2020-08-13 DIAGNOSIS — N40.1 BPH WITH URINARY OBSTRUCTION: ICD-10-CM

## 2020-08-13 PROCEDURE — 99214 OFFICE O/P EST MOD 30 MIN: CPT | Mod: S$GLB,,, | Performed by: NURSE PRACTITIONER

## 2020-08-13 PROCEDURE — 99999 PR PBB SHADOW E&M-EST. PATIENT-LVL IV: ICD-10-PCS | Mod: PBBFAC,,, | Performed by: NURSE PRACTITIONER

## 2020-08-13 PROCEDURE — 99214 PR OFFICE/OUTPT VISIT, EST, LEVL IV, 30-39 MIN: ICD-10-PCS | Mod: S$GLB,,, | Performed by: NURSE PRACTITIONER

## 2020-08-13 PROCEDURE — 99999 PR PBB SHADOW E&M-EST. PATIENT-LVL IV: CPT | Mod: PBBFAC,,, | Performed by: NURSE PRACTITIONER

## 2020-08-13 NOTE — PROGRESS NOTES
Subjective:       Patient ID: Simón Fong is a 60 y.o. male.    Chief Complaint: Prostate Cancer (Active surveillance)    Simón Fong is a 60 y.o. male who returns today in follow-up for management of a low-volume, low-risk prostate cancer on active surveillance since 8/2018.  The patient presented with an elevated PSA and a family history of prostate cancer.   He underwent a MRI fusion prostate needle biopsy on 08/13/2018 which showed 2 positive cores with Alcides 6 disease, less than 5% on each positive core.  His PSA was 7.5 at the time with a prostate volume of 47. 3cc    He was had been seen in clinic with Dr. Richardson 01/16/2019.  Last office visit was 08/06/2019      He is here today for his 6 month visit for active surveillance of prostate cancer.  He voices no complaints.  No issues with urination or ED  No abdominal pain or discomfort.     He had questions regarding his surveillance, PSA results, & treatment; he had interest in Laser Focal Therapy with MRI-guided laser ablation but he now realizes he is not a candidate.   He also concerned that his rise in PSA can be from BPH and not just prostate cancer.   No one has addressed his BPH.  He does report nocturia x 2.         PSA                  8.6 (H)             07/23/2020   (PHI was 41.7)       PSA                  7.3 (H)             01/06/2020       PSA                  6.4 (H)             08/22/2019       PSA                      8.2 (H)             08/02/2019                    PSA                      6.5 (H)             12/21/2018                 PSA                      7.5 (H)             04/30/2018                 PSA                      4.6 (H)             07/26/2017                 PSA                      3.5                 04/17/2014                 PSA                      3.48                12/19/2012                 PSA                      2.97                02/29/2012                 PSA                      2.3                  04/15/2010                 PSA                      2.4                 01/08/2009                 PSA                      0.8                 09/24/2007                 PSA                      1.1                 05/17/2006                                 Past Medical History:  No date: HIV infection    History reviewed. No pertinent surgical history.    Review of patient's family history indicates:  Problem: Diabetes      Relation: Mother          Age of Onset: (Not Specified)  Problem: Heart disease      Relation: Mother          Age of Onset: (Not Specified)  Problem: Hypertension      Relation: Mother          Age of Onset: (Not Specified)  Problem: Stroke      Relation: Mother          Age of Onset: (Not Specified)  Problem: Hypertension      Relation: Father          Age of Onset: (Not Specified)  Problem: Cancer      Relation: Sister          Age of Onset: (Not Specified)  Problem: Cancer      Relation: Brother          Age of Onset: (Not Specified)      Social History    Socioeconomic History      Marital status: Single      Spouse name: Not on file      Number of children: Not on file      Years of education: Not on file      Highest education level: Not on file    Occupational History      Not on file    Social Needs      Financial resource strain: Not on file      Food insecurity:        Worry: Not on file        Inability: Not on file      Transportation needs:        Medical: Not on file        Non-medical: Not on file    Tobacco Use      Smoking status: Never Smoker      Smokeless tobacco: Never Used    Substance and Sexual Activity      Alcohol use: No      Drug use: No      Sexual activity: Not on file    Lifestyle      Physical activity:        Days per week: Not on file        Minutes per session: Not on file      Stress: Not on file    Relationships      Social connections:        Talks on phone: Not on file        Gets together: Not on file        Attends Yarsani service: Not on file         Active member of club or organization: Not on file        Attends meetings of clubs or organizations: Not on file        Relationship status: Not on file    Other Topics      Concerns:        Not on file    Social History Narrative      Not on file      Allergies:  Sulfa (sulfonamide antibiotics)    Medications:  Current Outpatient Medications:   amLODIPine (NORVASC) 10 MG tablet, Take 1 tablet (10 mg total) by mouth once daily., Disp: 90 tablet, Rfl: 3  ascorbic acid, vitamin C, (VITAMIN C) 1000 MG tablet, Take 1,000 mg by mouth once daily., Disp: , Rfl:   biotin 1 mg Cap, Take by mouth., Disp: , Rfl:   ciclopirox (PENLAC) 8 % Soln, Apply daily to affected nail. Must remove and restart weekly., Disp: 1 Bottle, Rfl: 5  creatine, bulk, 100 % Powd, by Misc.(Non-Drug; Combo Route) route., Disp: , Rfl:   elviteg-cob-emtri-tenof ALAFEN (GENVOYA) [phone] mg Tab, Take 1 tablet by mouth once daily., Disp: 90 tablet, Rfl: 3  fish oil-omega-3 fatty acids 300-1,000 mg capsule, Take by mouth once daily., Disp: , Rfl:   glucosamine-chondroitin 500-400 mg tablet, Take 1 tablet by mouth 3 (three) times daily., Disp: , Rfl:   leucine-isoleucine-valine (NEOKE BCAA4) 82 gram-328 kcal/100 gram Powd, Take by mouth., Disp: , Rfl:   selenium sulfide 2.5 % Lotn, Use on scalp qd or less for 3-5 minute soaks.  Watch for skin irritation and if so, use less often or for less time., Disp: 118 mL, Rfl: 2  dutasteride (AVODART) 0.5 mg capsule, Take 1 capsule (0.5 mg total) by mouth once daily., Disp: 90 capsule, Rfl: 3  fluocinonide (LIDEX) 0.05 % gel, Apply topically 2 (two) times daily., Disp: 15 g, Rfl: 1             Review of Systems   Constitutional: Negative for activity change, appetite change, chills and fever.   HENT: Negative for facial swelling and trouble swallowing.    Eyes: Negative for visual disturbance.   Respiratory: Negative for chest tightness and shortness of breath.    Cardiovascular: Negative for chest  pain and palpitations.   Gastrointestinal: Negative.  Negative for abdominal pain, constipation, diarrhea, nausea and vomiting.   Genitourinary: Positive for nocturia. Negative for difficulty urinating, dysuria, flank pain, hematuria, penile pain, penile swelling, scrotal swelling and testicular pain.        FOS varies; nocturia 1-2x  No issues with ED   Musculoskeletal: Negative for back pain, gait problem, myalgias and neck stiffness.   Skin: Negative for rash.   Neurological: Negative for dizziness and speech difficulty.   Hematological: Does not bruise/bleed easily.   Psychiatric/Behavioral: Negative for behavioral problems.       Objective:      Physical Exam   Nursing note and vitals reviewed.  Constitutional: He is oriented to person, place, and time. He appears well-developed. He is cooperative.  Non-toxic appearance.   HENT:   Head: Normocephalic and atraumatic.   Right Ear: External ear normal.   Left Ear: External ear normal.   Nose: Nose normal.   Eyes: Conjunctivae and lids are normal. No scleral icterus.   Neck: Trachea normal, normal range of motion and full passive range of motion without pain. Neck supple. No JVD present. No tracheal deviation present.   Cardiovascular: Normal rate, S1 normal and S2 normal.    Pulmonary/Chest: Effort normal. No respiratory distress. He exhibits no tenderness.   Abdominal: Soft. Normal appearance. There is no abdominal tenderness.   Genitourinary:    Testes and penis normal.   Prostate is enlarged.   Musculoskeletal: Normal range of motion.   Neurological: He is alert and oriented to person, place, and time.   Skin: Skin is warm and dry.     Psychiatric: His behavior is normal. Judgment and thought content normal.       Assessment:       1. Prostate cancer    2. Rising PSA level    3. Elevated PSA        Plan:         I spent 25 minutes with the patient of which more than half was spent in direct consultation with the patient in regards to our treatment and plan.     Education and recommendations of today's plan of care including home remedies.  We discussed his PSA  Results; PHI, past imaging.  Recommend MRI of prostate but wants to hold off right now; states has been an 8 before.  We discussed his LUTS and contributory factors; discussed alpha blocker but not wanting to start.  Diet modifications; decreasing his PM fluids.  Will send him some information on new prostate cancer treatments offered here.  PSA in 6 months

## 2020-08-13 NOTE — PATIENT INSTRUCTIONS
PSA                  8.6 (H)             07/23/2020   (PHI was 41.7)       PSA                  7.3 (H)             01/06/2020       PSA                  6.4 (H)             08/22/2019       PSA                      8.2 (H)             08/02/2019                    PSA                      6.5 (H)             12/21/2018                 PSA                      7.5 (H)             04/30/2018                 PSA                      4.6 (H)             07/26/2017                 PSA                      3.5                 04/17/2014                 PSA                      3.48                12/19/2012                 PSA                      2.97                02/29/2012                 PSA                      2.3                 04/15/2010                 PSA                      2.4                 01/08/2009                 PSA                      0.8                 09/24/2007                 PSA                      1.1                 05/17/2006                                 What Is Prostate Cancer?    Cancer is when cells in the body change and grow out of control. Cancer cells can form lumps of tissue called tumors. Cancer that starts in the prostate is called prostate cancer. It can grow and spread beyond the prostate. Cancer that spreads is harder to treat.  Understanding the prostate  The prostate is a gland in men about the size and shape of a walnut. It surrounds the upper part of the urethra. This is the tube that carries urine from the bladder. The prostate makes some of the fluid thats part of semen. During orgasm, semen leaves the body through the urethra.  When prostate cancer forms  As a man ages, the cells of his prostate may change to form tumors or other growths. The types of growths include:  · Noncancerous growths. As a man ages, the prostate may grow larger. This is called benign prostatic hyperplasia (BPH). With BPH, extra prostate tissue often squeezes the urethra, causing  symptoms such as trouble urinating. But BPH is not cancer and does not lead to cancer.  · Atypical cells. Sometimes prostate cells dont look like normal (typical) prostate cells. One type of abnormal growth is called prostatic intraepithelial neoplasia or PIN. Although PIN cells are not cancer cells, they may be a sign that cancer is likely to form.  · Cancer. When abnormal prostate cells grow out of control and start to invade other tissues, they are called cancer cells. These cells may or may not lead to symptoms. Some tumors can be felt during a physical exam, and some cant. Prostate cancer may grow into nearby organs or spread to nearby lymph nodes. Lymph nodes are small organs around the body that are part of the immune system. In some cases, the cancer spreads to bones or organs in distant parts of the body. This is called metastasis.  Diagnosing prostate cancer  Prostate cancer may not cause symptoms at first. Urinary problems are often not a sign of cancer, but of another condition, such as BPH. To find out if you have prostate cancer, your healthcare provider must examine you and order tests. The tests help confirm a diagnosis of cancer. They also help give more information about a cancerous tumor. Tests might include:  · Prostate specific antigen (PSA) testing. PSA is a chemical made by prostate tissue. The amount of PSA in the blood (PSA level) is tested to check a mans risk for prostate cancer. In general, a high or rising PSA level may mean an increased cancer risk. A PSA test by itself cannot show if a man has prostate cancer. PSA testing is also used to check the success of cancer treatments.  · Core needle biopsy. This test is done to determine if a man has prostate cancer. A hollow needle is used to take small pieces of tissue from the prostate. This helps give more information about the cells. Before the test, pain medicine may be given to prevent pain. During the test, a small probe is inserted  into the rectum. The probe sends an image of the prostate to a video monitor. With this image as a guide, the healthcare provider uses a thin, hollow needle to remove tiny tissue samples from the prostate. These are sent to a lab where they are looked at for cancer cells.  Date Last Reviewed: 5/1/2017 © 2000-2017 [employer]. [address] [place] [address]. All rights reserved. This information is not intended as a substitute for professional medical care. Always follow your healthcare professional's instructions.        Prostate Needle Biopsy    Prostate needle biopsy is a test to look for prostate cancer. During the test, a thin, hollow needle is used to take small samples of tissue from the prostate. The samples are then tested in a lab.  Getting ready for the procedure  Prepare as you have been told. In addition to the following:  · Tell your healthcare provider about all medicines you take. This includes herbs and other supplements. It also includes any blood thinners, such as warfarin, clopidogrel, or daily aspirin. You may need to stop taking some or all of them before the procedure.  · You may be told to use a laxative, enemas, or both before the biopsy. This is to empty the colon and rectum of stool. Follow the instructions you are given.  · Your healthcare provider may prescribe antibiotics before the procedure. If so, take these as directed.  Risks and possible complications   All procedures have risks. The risks of this procedure include:  · Discomfort in the prostate area  · Infection in the urinary tract or prostate  · Infection in the bloodstream  · Rectal or urinary bleeding   The day of the procedure  The procedure is done in a healthcare providers office or a hospital. It takes about 45 minutes. You will be able to go home the same day. Transrectal ultrasound is often used during the procedure. This test uses sound waves to make images on a computer screen. The images  help the healthcare provider insert the needle in the correct place. During the biopsy:  · If ultrasound will be used, you may be asked to drink water to fill your bladder.    · You may lie on your side on an exam table.   · The ultrasound transducer, which is about the size of a finger, is lubricated. It is then inserted into the rectum. This will feel like a prostate exam. The transducer is moved until the prostate can be seen in the ultrasound images.    · To numb the biopsy area, local anesthetics may be injected. You might also be given medicine to make you sleepy.  · Using the ultrasound images as a guide, the biopsy needle is inserted. It may be inserted through the rectum or through the skin between the scrotum and the anus (perineum).  · The needle is used to take tissue samples from the prostate. About 12 samples are taken from different areas of the prostate. These samples are sent to a lab to be tested for cancer.  After the biopsy  At first you may feel a little lightheaded, especially if you had medicine to make you sleepy. You can lie on the table until you feel able to stand. You can go home once you are feeling better. You can go back to your normal activities. You may have some blood in your urine or stool that day. This is normal. You may also notice blood in your semen for weeks after the biopsy. This is normal and not dangerous. Your healthcare provider can tell you more about what to expect.  Follow-up care  You will see your healthcare provider for a follow-up visit. Depending on the biopsy results, you may be scheduled for more tests. If signs of cancer are found, you and your healthcare provider can discuss options for further testing.  When to call your healthcare provider  Call your healthcare provider if you have any of the following:  · Blood clots in your urine  · Bloody diarrhea  · Blood in the urine or stool that doesnt go away after 48 hours  · Chest pain or trouble breathing (call  [phone])  · Chills  · Feeling of weakness  · Fever of 100.4°F (38°C) or higher, or as directed by your healthcare provider  · Inability to urinate   Date Last Reviewed: 5/1/2017 © 2000-2017 [employer]. [address] [place] [address]. All rights reserved. This information is not intended as a substitute for professional medical care. Always follow your healthcare professional's instructions.        Prostate Biopsy    Cancer occurs when abnormal cells form a tumor. A tumor is a lump of cells that grow uncontrolled. Initial tests that may indicate cancer of the prostate include a digital rectal exam, a PSA (prostate specific antigen blood test), ultrasound, and others. A core needle biopsy will be done if your healthcare provider thinks you have prostate cancer. A thin needle is used to remove small samples of prostate tissue. Usually multiple biopsies are taken. These samples are checked for cancer.  Taking tissue samples  A biopsy takes about 15 to 20 minutes. You may be given an enema or suppository before the biopsy to clear the bowels. Antibiotics are given at least an hour before the biopsy. During the procedure:  · You will be given antibiotics to prevent infection.  · You may be given a sedative, local pain killer, or pain medicine.  · A small, ultrasound  probe is put into the rectum as you lie on your side. A picture of your prostate can then be seen on a monitor. This is called a transrectal ultrasound (TRUS).  · Your healthcare provider will use the TRUS picture as a guide. He or she will use a thin needle to remove tiny tissue samples from some sites in the prostate.  · These tissue samples are sent to the pathology department. They are looked at under a microscope so a diagnosis can be made.   Risks and complications of core needle biopsy  · Infection  · Blood in urine, stool, or semen  · Pain  · The biopsy misses the tumor   Home care  You may have had the biopsy done through your  rectum, your urethra, or through the skin between your scrotum and rectum. Your healthcare provider will tell you what to do after the biopsy. These instructions are based on your health condition, the type of biopsy, and your providers practices.  · Your provider may give you pain medicine such as acetaminophen or ibuprofen for discomfort or pain. Follow your providers instructions for taking these medicines. Dont take aspirin or ibuprofen after the procedure. If you have a chronic liver or kidney disease, talk with your provider before taking these medicines. Also talk with your provider if youve had a stomach ulcer or gastrointestinal bleeding. Let your provider know all the medicines you currently take.  · You may need to take antibiotics for 1 to 2 days. This will help prevent an infection. Signs of an infection include chills, pain, or fever.  · You may be told to drink 8 ounces of water every 30 minutes for 2 hours. This will help ease any discomfort. You can also take a warm bath or put a warm, damp washcloth over your urethra to help ease the pain.  · You may see minor bleeding after the procedure. This is normal and usually needs no treatment. You may see blood in your urine or semen (rust color). You may also have light bleeding from your rectum if you have hemorrhoids.  · Your provider will tell you when you can go back to your normal activities. This includes sex, exercise, and straining physically. Discuss these with your provider.  When to seek medical advice  Call your healthcare provider right away if any of these occur:  · You arent able to urinate, or see that you have less flow of urine  · Chills and fever of 100.4°F (38°C)    · Severe pain  · Signs of an infection that is getting worse. These include worsening pain, pain in your side under the rib cage or in the low back, or foul-smelling urine.  · Blood clots or bright red blood in your stool or urine  · You feel confused or very  tired  Date Last Reviewed: 1/1/2017  © 4174-6772 [employer]. [address] [place] [address], [place] [address]. All rights reserved. This information is not intended as a substitute for professional medical care. Always follow your healthcare professional's instructions.

## 2020-10-02 ENCOUNTER — CLINICAL SUPPORT (OUTPATIENT)
Dept: URGENT CARE | Facility: CLINIC | Age: 60
End: 2020-10-02
Payer: COMMERCIAL

## 2020-10-02 DIAGNOSIS — Z92.29 IMMUNIZATIONS UP TO DATE: Primary | ICD-10-CM

## 2020-10-02 PROCEDURE — 90686 FLU VACCINE (QUAD) GREATER THAN OR EQUAL TO 3YO PRESERVATIVE FREE IM: ICD-10-PCS | Mod: S$GLB,,, | Performed by: PHYSICIAN ASSISTANT

## 2020-10-02 PROCEDURE — 90471 IMMUNIZATION ADMIN: CPT | Mod: S$GLB,,, | Performed by: PHYSICIAN ASSISTANT

## 2020-10-02 PROCEDURE — 90471 FLU VACCINE (QUAD) GREATER THAN OR EQUAL TO 3YO PRESERVATIVE FREE IM: ICD-10-PCS | Mod: S$GLB,,, | Performed by: PHYSICIAN ASSISTANT

## 2020-10-02 PROCEDURE — 90686 IIV4 VACC NO PRSV 0.5 ML IM: CPT | Mod: S$GLB,,, | Performed by: PHYSICIAN ASSISTANT

## 2020-12-13 ENCOUNTER — PATIENT MESSAGE (OUTPATIENT)
Dept: INFECTIOUS DISEASES | Facility: CLINIC | Age: 60
End: 2020-12-13

## 2020-12-16 ENCOUNTER — RESEARCH ENCOUNTER (OUTPATIENT)
Dept: RESEARCH | Facility: CLINIC | Age: 60
End: 2020-12-16
Payer: COMMERCIAL

## 2020-12-16 VITALS — WEIGHT: 221.25 LBS | HEIGHT: 72 IN | BODY MASS INDEX: 29.97 KG/M2

## 2020-12-16 DIAGNOSIS — Z00.6 RESEARCH SUBJECT: Primary | ICD-10-CM

## 2020-12-16 PROCEDURE — 99499 NO LOS: ICD-10-PCS | Mod: S$GLB,,, | Performed by: INTERNAL MEDICINE

## 2020-12-16 PROCEDURE — 99499 UNLISTED E&M SERVICE: CPT | Mod: S$GLB,,, | Performed by: INTERNAL MEDICINE

## 2020-12-16 NOTE — PROGRESS NOTES
[id number] - [id number]   Status Enrolled   Start Date 12/16/20   Coordinator Dana Felipe; Brooke Briceño; Wandy Marshall; Lisa Treadwell; Rubia An; Jan Lieberman; Leslie Souza; Rosmery Paniagua; Nurys Costa; Jodi South; Larry Cueto; Jorge Hauser; Gemma Laurent; Courtney Hicks; José Miguel Davidson; Imani Dorsey; Candida Leon; Milena Mayfield; Miguel Llanos; Marline Pierre; Rima Kirby; Sheron Garcia; Grace Valdes; Abbi Pruitt; Concepcion Wick; Lottie Puri; Lisa Treadwell; Leslie Souza; Ramya Akins       End of Visit:  End of visit procedures completed by Sheron Garcia. Patient has completed Screening/Vaccination visit (Day 1) and vaccination has been administered. Post-vaccination administration observation of at least 15 minutes has occurred. I have asked patient if they have experienced any Adverse Events during this visit, to which they have said no. Any disclosed AEs will be added to the log.    Visit 3 has been scheduled for 1/12/2021 at 8:30 AM. I have expressed importance of returning on scheduling visit date and time.     The patient has a copy of the signed Informed Consent. [employer] (Token #[id number]) has been dispensed to the patient with instruction on how to use card. Patient is made aware that it can take up to 24 hrs before payment will be made available on card. [employer] Token number has been recorded in enrollment log.     Patient has been added to [employer].    Patient has been sent home with a thermometer, pulse-oximeter, MA-COV form, nasal swab kit, and saliva recipients.     Patient has been advised to contact site if he experiences any major changes in health or has any study related questions.

## 2020-12-16 NOTE — PROGRESS NOTES
Screening/Vaccination Visit (Day 1)   Sponsor: [employer]   Study: A Randomized, Double-blind, Placebo-controlled Phase 3 Study to Assess the Efficacy and Safety of Ad26.COV2.S for the Prevention of SARS-CoV-2-mediated COVID-19 in Adults Aged 18 Years and Older   IRB/Protocol #: 2020.275/ [id number]; Phase 3?   Principle Investigator/Sub-Investigator: ALL [facility]  Site Number: [id number]  Location: [facility]  Subject Number: [id number]    Did the Day 1 Visit Occur at Screening visit?: Yes  Type of Contact: Site Visit   Subject's Status: Continuing    Informed Consent:   Consenting was completed in private area using the most current IRB approved version of the ICF by myself and patient was given ample time to review consent prior to execution of ICF.    Prior to the Informed Consent (IC) being signed, or any study protocol required data collection, testing, procedure, or intervention being performed, the following was done and/or discussed:?    Patient was given a copy of the IC for review??    Purpose of the study and qualifications to participate??    Study design, follow up schedule, and tests or procedures done at each visit?    Confidentiality and HIPAA Authorization for Release of Medical Records for the research trial/ subject's rights/research related injury?    Risk, Benefits, Alternative Treatments, Compensation and Costs?    Participation in the research trial is voluntary and patient may withdraw at anytime?    Contact information for study related questions?     Patient verbalizes understanding of the above: Yes?   Contact information for CRC and PI given to patient: Yes?   Patient able to adequately summarize: the purpose of the study, the risks associated with the study, and all procedures, testing, and follow-ups associated with the study: Yes?     Simón Ajit signed the IRB approved version of informed consent form for the [employer]  research study.? Each page of the consent was reviewed with the patient and patient's family) and all questions answered satisfactorily. The patient signed the e-consent form and received a copy of same (email containing consent sent to patient). The original consent was scanned into electronic medical records (EPIC).    Time of Consent Execution: 8:59 AM   Prescreening No.: [id number]    I have provided the participant with a thermometer, pulse-oximeter, MA-COV form, nasal swab kit, and saliva recipients. Participant has been trained and educated on use of these study materials. Patient reports understanding on how/when to use study materials.    Vital Signs:  Vital signs were performed prior to blood draws and preceded by 5 or more minutes of rest in a quiet setting without distractions.  Were vital signs performed?: yes  Vitals obtained by: Sheron Garcia  Date of collection: 12/16/2020   Time of collection: 9:15 AM   Position: Sitting  Pulse: 75 beats/min  Systolic BP: 142 mmHg  Diastolic BP: 79 mmHg  Respiratory Rate: 14 breaths/min  All vitals were within normal limits, except for SBP. [facility] has classified this as NOT clinically significant.     Body Temperature:  Was body temperature collected?: yes  Collected by: Sheron Garcia  Date of collection: 12/16/2020   Time of collection: 9:21 AM   Temperature: 98.2 F  Temperature Anatomical Location: Oral    Oxygen Saturation:  Was Oxygen Saturation collected?: yes  Collected by: Sheron Garcia  Date of collection: 12/16/2020   Time of collection: 9:21 AM   Oxygen Saturation: 99 %  Subject's condition of oxygen saturation measurement: room air  Oxygen Saturation is within normal limits.    Body Weight and Height:  Was body weight and height collected?: yes  Collected by: Sheron Garcia  Date of collection: 12/16/2020   Height:184 cm  Weight: 100.35 kg  BMI: 29.64 kg/m2    Pregnancy Test:  Was the sample collected?: N/A  Sample collected by: N/A  Date of  collection: N/A  Time of collection: N/A  Specimen Type: N/A  Pregnancy Test Result: N/A    Inclusion/Exclusion Criteria:  Inclusion Criteria (all must be YES to qualify):  1. Criterion modified per Amendment 1:  1.1 Participant must provide consent indicating that he or she understands the purpose, procedures and potential risks and benefits of the study, and is willing to participate in the study.   yes  2. Participant is willing and able to adhere to the prohibitions and restrictions specified in this protocol.   yes  3. Stages 1a and 1b: Participant is ?18 to <60 years of age on the day of signing the ICF.   N/A  Stages 2a and 2b: Participant is ?60 years of age on the day of signing the ICF.  Yes  4. Criterion modified per Amendment 1:  4.1 Criterion modified per Amendment 2:  4.2 Stages 1a and 2a: In the investigator's clinical judgement, participant must be either in good or stable health, including a BMI <30 kg/m2.     Participants may have underlying illnesses (not associated with increased risk of progression to severe COVID-19, as specified in Exclusion Criterion 15), as long as their symptoms and signs are stable and well-controlled. If participants are on medication for a condition not part of the comorbidities listed in Exclusion Criterion 15, the medication dose cannot have been increased within 12 weeks preceding vaccination and expected to remain stable for the duration of the study. Participants will be included on the basis of relevant medical history and BMI measurement at screening.   N/A    Stages 1b and 2b: In the investigator's clinical judgement, participant may have a stable and well-controlled comorbidity associated with an increased risk of progression to severe COVID-19 as specified in Exclusion Criterion 15 (eg, stable/well-controlled HIV infection)*. If participants are on medication for a comorbidity associated with an increased risk of progression to severe COVID-19, the medication  dose cannot have been increased within 12 weeks preceding vaccination and must be expected to remain stable for the duration of the study. Participants will be included on the basis of relevant medical history and BMI measurement at screening.   * Stable/well-controlled HIV infection includes:  a. CD4 cell count ?300 cells/?L.  b. HIV viral load <50 copies/mL.  c. Participant must be on a stable anti-retroviral treatment (ART) for 6 months (unless the change is due to tolerability, in which case the regimen can be for only the previous 3 months; changes in formulation are allowed) and the participant must be willing to continue his/her ART throughout the study as directed by his/her local physician.    NOTE: Participants with ongoing and progressive comorbidities associated with HIV infection will be excluded but comorbidities associated with HIV infection that have been clinically stable for the past 6 months are not an exclusion criterion.     Laboratory methods for confirming a diagnosis of HIV infection are: Any evidence (historic or current) from medical records, such as ALDA with confirmation with Western Blot or PCR, or of a detectable viral load (country-specific regulatory approved tests). A laboratory result within 6 months of screening does not need to be repeated.     If a potential participant does not have HIV viral load and CD4 cell count data in his/her medical records, they will be instructed to go to their local health care provider and obtain the necessary data for potential entry into the trial.   Yes  5. Criterion modified per Amendment 1:  5.1 Contraceptive (birth control) use should be consistent with local regulations regarding the acceptable methods of contraception for those participating in clinical studies.  Before randomization, participants must be either  a. Not of childbearing potential  b. Of childbearing potential and practicing an acceptable effective method of contraception and  agrees to remain on such a method of contraception from providing consent until 3 months after administration of study vaccine. Use of hormonal contraception should start at least 28 days before the administration of study vaccine. The investigator should evaluate the potential for contraceptive method failure (eg, noncompliance, recently initiated) in relationship to the vaccination. Acceptable effective methods for this study include:  1. hormonal contraception:  i. combined (estrogen and progestogen containing) hormonal contraception associated with inhibition of ovulation (oral, intravaginal, or transdermal)  ii. progestogen-only hormonal contraception associated with inhibition of ovulation (oral, injectable, or implantable)  2. intrauterine device;  3. intrauterine hormone-releasing system;  4. bilateral tubal occlusion/ligation procedure;  5. vasectomized partner (the vasectomized partner should be the sole partner for that participant);  6. sexual abstinence*.  *Sexual abstinence is considered an effective method only if defined as refraining from heterosexual intercourse from providing consent until 3 months after receiving study vaccine. The reliability of sexual abstinence needs to be evaluated in relation to the duration of the study and the preferred and usual lifestyle of the participant.  yes  6. All participants of childbearing potential must:  d. Have a negative highly sensitive urine pregnancy test at screening  e. Have a negative highly sensitive urine pregnancy test on the day of and prior to study vaccine administration.  N/A  7. Participant agrees to not donate bone marrow, blood, and blood products from the study vaccine administration until 3 months after receiving the study vaccine.  yes  8. Must be willing to provide verifiable identification, has means to be contacted and to contact the investigator during the study.    Must be able to read, understand, and complete questionnaires in the  eCOA (ie, the COVID-19 signs and symptoms surveillance question, the e-Diary, and the electronic patient-reported outcomes (ePROs).  yes    Exclusion Criteria (all must be NO to qualify):  1. Participant has a clinically significant acute illness (this does not include minor illnesses such as diarrhea or mild upper respiratory tract infection) or temperature ?38.0ºC (100.4°F) within 24 hours prior to the planned study vaccination; randomization at a later date is permitted at the discretion of the investigator and after consultation with the sponsor.  no  2. Participant has a known or suspected allergy or history of anaphylaxis or other serious adverse reactions to vaccines or their excipients (including specifically the excipients of the study vaccine; refer to the IB).  no  3. Criterion modified per Amendment 1:  3.1 Criterion modified per Amendment 2:  3.2 Participant has abnormal function of the immune system resulting from:  a. Clinical conditions (eg, autoimmune disease or potential immune mediated disease or known or suspected immunodeficiency) expected to have an impact on the immune response of the study vaccine. Participants with clinical conditions stable under non-immunomodulator treatment (eg, autoimmune thyroiditis, autoimmune inflammatory rheumatic disease such as rheumatoid arthritis) may be enrolled at the discretion of the investigator. Non-immunomodulator treatment is allowed as well as steroids at a non-immunosuppressive dose or route of administration.  b. Chronic or recurrent use of systemic corticosteroids within 6 months before administration of study vaccine and during the study. A substantially immunosuppressive steroid dose is considered to be ?2 weeks of daily receipt of 20 mg of prednisone or equivalent.  Note: Ocular, topical or inhaled steroids are allowed.  c. Administration of antineoplastic and immunomodulating agents or radiotherapy within 6 months before administration of study  vaccine and during the study.  no  4. Participant received treatment with Ig in the 3 months or blood products in the 4 months before the planned administration of the study vaccine or has any plans to receive such treatment during the study.  no  5. Participant received or plans to receive:  f. Licensed live attenuated vaccines - within 28 days before or after planned administration of study vaccine.  g. Other licensed (not live) vaccines - within 14 days before or after planned administration of study vaccine.  no  6. Participant previously received a coronavirus vaccine.  no  7. Criterion modified per Amendment 1:  7.1 Criterion modified per Amendment 2:  7.2 Participant received an investigational drug (including investigational drugs for prophylaxis of COVID-19) or used an invasive investigational medical device within 30 days or received investigational immunoglobulin or monoclonal antibodies within 3 months, or received convalescent serum for COVID-19 treatment within 4 months or received an investigational vaccine (including investigational Adenoviral-vectored vaccines) within 6 months before the planned administration of the study vaccine or is currently enrolled or plans to participate in another investigational study during the course of this study. See also Section 6.8.    Note: Participation in an observational clinical study is allowed at the investigator's discretion; please notify the sponsor (or medical monitor) of this decision.    Efforts will be made to ensure inclusion of participants who have not been previously enrolled in coronavirus studies and to prevent participants from subsequently enrolling in other coronavirus studies during their participation in this study.     The use of any coronavirus vaccine (licensed or investigational) other than Ad26.COV2.S is disallowed at any time prior to vaccination (see also Exclusion Criterion 6) and during the study, except under the conditions  described in Section 6.6.  no  8. Criterion modified per Amendment 1:   8.1 Participant is pregnant or planning to become pregnant within 3 months after study vaccine administration.   N/A  9. Participant has a history of an underlying clinically significant acute or chronic medical condition or physical examination findings for which, in the opinion of the investigator, participation would not be in the best interest of the participant (eg, compromise the wellbeing) or that could prevent, limit, or confound the protocol-specified assessments.  no  10. Participant has a contraindication to IM injections and blood draws, eg, bleeding disorders.  no  11. Criterion deleted per Amendment 1:  12. Criterion modified per Amendment 1:  12.1 Participant has had major psychiatric illness which in the investigator's opinion would compromise the participant's safety or compliance with the study procedures.  no  13. Participant cannot communicate reliably with the investigator.  no  14. Participant who, in the opinion of the investigator, is unlikely to adhere to the requirements of the study, or is unlikely to complete the full course of vaccination and observation.  no  15. Criterion modified per Amendment 1:  15.1 Criterion modified per Amendment 2:   15.2 Stages 1a and 2a:    Participants with comorbidities that are or might be associated with an increased risk of progression to severe COVID-19, ie, participants with moderate to severe asthma; chronic lung diseases such as chronic obstructive pulmonary disease (COPD) (including emphysema and chronic bronchitis), idiopathic pulmonary fibrosis and cystic fibrosis; diabetes (including type 1 or type 2); serious heart conditions, including heart failure, coronary artery disease, congenital heart disease, cardiomyopathies, and pulmonary hypertension; moderate to severe high blood pressure; obesity (body mass index [BMI] ?30 kg/m2); chronic liver disease, including cirrhosis;  sickle cell disease; thalassemia; cerebrovascular disease; neurologic conditions (dementia); end stage renal disease; organ transplantation; cancer; HIV infection and other immunodeficiencies; hepatitis B infection; sleep apnea.   Participants with a history of or current Parkinson's disease; seizures; ischemic strokes; intracranial hemorrhage; encephalopathy and meningoencephalitis.  N/A  16. Stages 1a and 2a: Participant has a history of malignancy within 1 year before screening (exceptions are squamous and basal cell carcinomas of the skin and carcinoma in situ of the cervix, or other malignancies with minimal risk of recurrence).  N/A  17. Criterion Modified per Amendment 2:  17.1 Participant has a history of acute polyneuropathy (eg, Guillain-Barré syndrome).  no  18. Stages 1a and 2a: Participant had surgery requiring hospitalization (defined as inpatient stay for longer than 24 hours or overnight stay), within 12 weeks before vaccination, or will not have fully recovered from surgery requiring hospitalization, or has surgery requiring hospitalization planned during the time the participant is expected to participate in the study or within 6 months after study vaccine administration.  N/A  19. Stages 1a and 2a: Participant has chronic active hepatitis B or hepatitis C infection per medical history.  N/A      Were all eligibility criteria met?: yes  If no, enter information about main criteria not satisfied/met: N/A    I have discussed with the patient requirement of acceptable methods of contraception for those participating in this study. If hormonal contraceptive, patient has started 28 days prior to study vaccination and agrees to continue use 3 months post vaccination. If other method of contraceptive, patient agrees to continue use up to 3 months post vaccination. If patient is male or of non-child bearing potential, this is not applicable. Their method of contraceptive is N/A. The subject is male..    I  have discussed with the patient the lifestyle considerations they must be willing and able to adhere to during the study, including: prohibited and restricted therapy during the study, agreement to follow all requirements that must be met during the study as noted in the inclusion and exclusion criteria (eg, contraceptive requirements), and agreement to follow requirements for the electronic completion of the COVID-19 signs and symptoms surveillance questions in the electronic clinical outcome assessment (eCOA). They have agreed to these terms and will continue in the study.    Inclusion/Exclusion were reviewed with the patient by myself and confirmed by PI/Sub-I [facility].      Demographic:  What was the date of the signature on informed consent?: 12/16/2020   What is the protocol date the subject consented to at study start?: 29 OCT 2020  Age: 60 y.o.  Sex: Male  If female or intersex, childbearing potential: N/A  Ethnicity: Not  or   Race: Black or   If subject was re-screened, specify below: N/A   Previous Subject ID: N/A   Studyhub ID: N/A    Profession:   Are you currently working?: No  What type of work do you do?: N/A    Medical Conditions of Interest:   Immunocompromised [place] from blood or bone marrow transplant, immune deficiencies, HIV, use of corticosteroids, or use of other immune weakening medications      General Medical History:  I have reviewed and confirmed all allergies with the patient.    Has the subject experience any past and/ or concomitant diseases?: yes    Medical History Condition/Event Log below:  1. Medical Term Verbatim: hypertension   Start Date: un/unk/2018   Ongoing?: yes   End Date: N/A   Toxicity Grade: Grade 1 (Mild); Symptoms causing no or minimal interference with usual social and functional activities  2. Medical Term Verbatim: HIV   Start Date: un/unk/1995   Ongoing?: yes   End Date: N/A   Toxicity Grade: Grade 1 (Mild); Symptoms causing  no or minimal interference with usual social and functional activities  3. Medical Term Verbatim: elevated PSA   Start Date: un/unk/2017   Ongoing?: yes    End Date: N/A    Toxicity Grade: Grade 1 (Mild); Symptoms causing no or minimal interference with usual social and functional activities      Preplanned Surgeries/Procedures:   Are there any planned/scheduled surgeries/procedures which require hospitalization during the course of this trial?: no    Pre-Study Therapy:  Does patient have any pre-study therapies?: yes    Prestudy therapies only collected for participants with relevant comorbidities and aged ?60 years. For these participants, all prestudy therapies (excluding vitamins, herbal supplements; non-pharmacologic therapies such as electrical stimulation, acupuncture, special diets, and exercise regimen) administered up to 30 days before vaccination must be recorded at screening.    Pre-Study Therapy Log Below:  1.  Medication or Therapy Name: amlodipine  Indication: Medical History ( line number and term: 1 hypertension)  Dose: 10  Dose unit: Milligram  Dose Form: Tablet  Route: Oral  Frequency: Daily  Start Date: un/unk/2017  If Start Date is entirely or partially unknown or equal to the vaccination date:    Was the medication/therapy taken prior to vaccination?: Yes  Ongoing?: yes  End Date: N/A    2.   Medication or Therapy Name: genyova  Indication: Medical History ( line number and term: 2 HIV)  Dose: [phone]  Dose unit: Milligram  Dose Form: Tablet  Route: Oral  Frequency: Daily  Start Date: un/unk/2015  If Start Date is entirely or partially unknown or equal to the vaccination date:    Was the medication/therapy taken prior to vaccination?: Yes  Ongoing?: yes  End Date: N/A    Substance Use (Tobacco/Nicotine):  Does the subject have ANY history of Tobacco and/or Nicotine use?: no  Indicate usage of the below substances:   1. Tobacco Cigarettes: Never   2. Tobacco Cigars: Never   3. Tobacco  Pipes: Never   4. Smokeless Tobacco: Never   5. Nicotine Patches/Gum: Never   6. Nicotine E-cigarettes or equivalent: Never    [facility] Questionnaire:  1. Medical Consultations:  In the last 3 months, how many times have you had medical consultations?      No Yes Type of contact (personal consultation/telemedicine If yes, specify the number of visits Indicate a reason for each visit   General Practitioner/Nurse  Practitioner x       Internal Medicine/Medical Outpatient Department x       Other Specialist (Please specify):  x       Other (e.g., Physiotherapy, Pharmacist for a consultation; Please specify):  x         2. Professional home care:  Please indicate the need for professional care at home in the last 3 months.      No Yes Type of contact (personal consultation/telemedicine If yes, specify the number of visits Indicate a reason for each visit   General Practitioner x       Nurse/ Nurse Practitioner x       Internal Medicine/Medical Outpatient Department x       Other Specialist (Please specify):  x       Other (e.g., Physiotherapy, Pharmacist for a consultation; Please specify):  x       Supplemental oxygen x         3. Hospital Services:   In the last 3 months, did you visit the hospital?: no      No Yes If yes, specify the number of visits/admission If yes, specify length of each stay/use (days) Indicate a reason for each hospital visit   Emergency Department* x       Short-term hospital visit (<24 hours admission) x         Hospitalization in general paz#:   1.  x       Hospitalization in intensive/critical care x       Mechanical ventilation use x          *Please count Emergency Department visits only if the visit did not result in a hospital admission.  #Please capture type of paz and length of stay in each paz.    4. Institutional care admission(s) other than hospital:   Has there been any need for admission for care in a long-term facility, in the last 3 months?: no     No Yes If yes, specify the  number of admissions If yes, specify length of each stay (days) Indicate a reason for each institutional care admission   Long-term facilities x       Rehabilitation facility x       Supplemental oxygen x           Baseline and Longitudinal Characteristics for Risk Factor Analysis:  Did the participant consent to respond to questions regarding work, home and social situation?: yes  Are you a student?: no  If Yes - Are you likely to return to school in person in 2020?: N/A  Are you retired?: yes  How often do you go in person to your main workplace (other than work-from-home)?: N/A  Does your main workplace have social distancing measures in place?: N/A  Is your main workplace cleaned on a regular basis?: N/A  Do people in your main workplace use personal protection equipment (such as masks)?: N/A  How do you get to work?: N/A  On a typical day, how many people do you interact with in person at work?: N/A  On a typical day, how many people do you interact with in person outside of work?: No one    Living Situation:  Do you live in any of the following?: Single family home  How many people do you live with (other than yourself?: 0   Total people under 18 years of age: 0   Total people between 18-64 years of age: 0   Total people over 65 years of age: 0  Are any of the people you live with expected to return to school in person in 2020?: N/A    Community Interactions:  In the last 2 weeks, have you attended any gatherings with more than 10 people? (e.g., Restoration, party, concert, wedding, , demonstration or other event).: No  If yes, approximately how many people were at the largest gathering?: N/A  Was the gathering an indoor or outdoor event?: N/A  How frequently do you have visitors in your residence including people completing work inside?: N/A  Over the past month, have you been in close contact with anyone that tested positive for COVID-19?: No  If yes, is this person someone that you live with?: Not  applicable/Don't want to tell

## 2020-12-16 NOTE — PROGRESS NOTES
No study found    Randomization:  Date of randomization: 16Dec2020  Time of randomization: 09:33  Randomization done by: Abbi Pruitt    Subset Selection:  Is the subject enrolled to the Immunogenicity Subset?: no  Is the subject enrolled to the Safety Subset?: no    Nasal Swab:  Was the sample collected?: yes  Sample collected by: Abbi Pruitt  Date of collection: 16Dec2020  Time of collection: 09:56  Method of SARS-CoV-2 detection: Molecular Diagnostic Test  Method of Collection: Nasal Mid-turbinate Swab  Was the sample taken from both nostrils?: yes  Accession Number: [id number]    SARS-CoV-2 Serology:  Was the sample collected?: No -- Not required at this site per protocol  Sample collected by: N/A  Date of collection: N/A  Time of collection: N/A  Method of SARS-CoV-2 detection: Serological test for SARS-CoV-2-specific antibodies  SARS-CoV-2 Ig Type Tested: N/A  mL collected: N/A  Accession Number: N/A    At this time, the patient has downloaded the eCOA application onto their e[employer]/ or cellular device. The Participant has been trained and educated on the eCOA and express understanding on how to use this application. They understand requirement to complete/conduct all eCOA assessments prior to to any tests, procedures, or other consultations. Participant also understands that if they are unable to complete the eCOA independently, a study staff member or caretaker can collect the information on their behalf.    SIC Questionnaire:   Patient has completed all SIC questionnaires in the eCOA application, including body temperature measured by study staff: Sheron Garcia    Participant has been trained and educated on use of MA-COV form, thermometer, pulse-oximeter, nasal swab kit, and saliva recipients. Patient reports understanding on how/when to use study materials.    Pre-Vaccination Symptoms:  I have checked participant for acute illness (this does not include minor illnesses, such as diarrhea or mild  upper respiratory tract infection), body temperature ?38.0°C/100.4°F, and any illness which may interfere with reactogenicity/Day 0-7 safety assessments per investigator. Patient has not had any of these events occur within 24 hours prior to the planned vaccination.

## 2020-12-16 NOTE — PROGRESS NOTES
SARS-CoV-2 Serology:  Was the sample collected?: No -- Not required at this site per protocol  Sample collected by: N/A  Date of collection: N/A  Time of collection: N/A  Method of SARS-CoV-2 detection: Serological test for SARS-CoV-2-specific antibodies  SARS-CoV-2 Ig Type Tested: N/A  mL collected: N/A  Accession Number: N/A    Blood Sample Collection for Biomarker (RNA seq):  Was the sample collected?: no - Unable to Obtain    Blood Sample Collection for Humoral Immunogenicity:   Was the sample collected?: yes  Sample collected by: Larry Cueto  Date of collection: [id number]  Time of collection: 10:35  Specimen Type: Serum  mL collected: 10 (NON-immuno subset)  Accession Number: [id number]

## 2020-12-16 NOTE — PROGRESS NOTES
Study Drug Administration:  Was the dose administered?: yes  Administered by: Larry Cueto  Date administered: 16DEC2020  Time administered: 10:38  Was the total amount administered?: yes  Dose: 0.5 mL  Formulation: Injectable  Route: Intramuscular  Anatomical location of the administration: upper left arm  Is participant breastfeeding at the time of vaccination?: no

## 2020-12-16 NOTE — PROGRESS NOTES
Post-Vaccination Observation:  Patient has been closely observed for at least 15 minutes post-vaccination to monitor for the development of acute reactions.  Observation done by: Larry COY Administration Time: 10:38  Completed Observation Time: 10:53    On-site Systemic:   Symptom: Fatigue  Symptom: Not Present  Toxicity Grade: N/A  Relationship: Not Related  Is this a Serious Adverse Event?: N/A   If yes, enter corresponding AE log line, start date, and term: N/A   Symptom: Headache  Symptom: Not Present  Toxicity Grade: N/A  Relationship: N/A  Is this a Serious Adverse Event?: N/A   If yes, enter corresponding AE log line, start date, and term: N/A   Symptom: Nausea  Symptom: Not Present  Toxicity Grade: N/A  Relationship: N/A  Is this a Serious Adverse Event?: N/A   If yes, enter corresponding AE log line, start date, and term: N/A   Symptom: Myalgia  Symptom: Not Present  Toxicity Grade: N/A  Relationship: N/A  Is this a Serious Adverse Event?: N/A   If yes, enter corresponding AE log line, start date, and term: N/A    On-site Administration:  Administration Site: upper left arm   Symptom: Erythema  Symptom: Not Present  Diameter: N/A  Toxicity Grade: N/A  Relationship: N/A  Is this a Serious Adverse Event?: N/A  If yes, enter corresponding AE log line, start date, and term: N/A   Symptom: Swelling  Symptom: Not Present  Diameter: N/A  Toxicity Grade: N/A  Relationship: Not Related  Is this a Serious Adverse Event?: N/A  If yes, enter corresponding AE log line, start date, and term: N/A   Symptom: Pain/Tenderness  Symptom: Not Present  Diameter: N/A  Toxicity Grade: N/A  Relationship: N/A  Is this a Serious Adverse Event?: N/A  If yes, enter corresponding AE log line, start date, and term: N/A

## 2021-01-12 ENCOUNTER — RESEARCH ENCOUNTER (OUTPATIENT)
Dept: RESEARCH | Facility: CLINIC | Age: 61
End: 2021-01-12
Payer: COMMERCIAL

## 2021-01-15 ENCOUNTER — TELEPHONE (OUTPATIENT)
Dept: INFECTIOUS DISEASES | Facility: CLINIC | Age: 61
End: 2021-01-15

## 2021-01-15 DIAGNOSIS — B20 HUMAN IMMUNODEFICIENCY VIRUS (HIV) DISEASE: Primary | ICD-10-CM

## 2021-01-22 ENCOUNTER — TELEPHONE (OUTPATIENT)
Dept: INFECTIOUS DISEASES | Facility: CLINIC | Age: 61
End: 2021-01-22

## 2021-01-22 DIAGNOSIS — E78.5 HYPERLIPIDEMIA, UNSPECIFIED HYPERLIPIDEMIA TYPE: Primary | ICD-10-CM

## 2021-01-25 ENCOUNTER — PATIENT MESSAGE (OUTPATIENT)
Dept: INFECTIOUS DISEASES | Facility: CLINIC | Age: 61
End: 2021-01-25

## 2021-01-26 ENCOUNTER — LAB VISIT (OUTPATIENT)
Dept: LAB | Facility: HOSPITAL | Age: 61
End: 2021-01-26
Attending: INTERNAL MEDICINE
Payer: COMMERCIAL

## 2021-01-26 DIAGNOSIS — B20 HUMAN IMMUNODEFICIENCY VIRUS (HIV) DISEASE: ICD-10-CM

## 2021-01-26 DIAGNOSIS — E78.5 HYPERLIPIDEMIA, UNSPECIFIED HYPERLIPIDEMIA TYPE: ICD-10-CM

## 2021-01-26 LAB
ALBUMIN SERPL BCP-MCNC: 4.5 G/DL (ref 3.5–5.2)
ALP SERPL-CCNC: 72 U/L (ref 55–135)
ALT SERPL W/O P-5'-P-CCNC: 31 U/L (ref 10–44)
ANION GAP SERPL CALC-SCNC: 10 MMOL/L (ref 8–16)
AST SERPL-CCNC: 31 U/L (ref 10–40)
BASOPHILS # BLD AUTO: 0.03 K/UL (ref 0–0.2)
BASOPHILS NFR BLD: 0.8 % (ref 0–1.9)
BILIRUB SERPL-MCNC: 0.5 MG/DL (ref 0.1–1)
BUN SERPL-MCNC: 18 MG/DL (ref 6–20)
CALCIUM SERPL-MCNC: 9.6 MG/DL (ref 8.7–10.5)
CD3+CD4+ CELLS # BLD: 682 CELLS/UL (ref 300–1400)
CD3+CD4+ CELLS NFR BLD: 33 % (ref 28–57)
CHLORIDE SERPL-SCNC: 106 MMOL/L (ref 95–110)
CHOLEST SERPL-MCNC: 183 MG/DL (ref 120–199)
CHOLEST/HDLC SERPL: 2.8 {RATIO} (ref 2–5)
CO2 SERPL-SCNC: 27 MMOL/L (ref 23–29)
CREAT SERPL-MCNC: 1 MG/DL (ref 0.5–1.4)
DIFFERENTIAL METHOD: ABNORMAL
EOSINOPHIL # BLD AUTO: 0 K/UL (ref 0–0.5)
EOSINOPHIL NFR BLD: 1 % (ref 0–8)
ERYTHROCYTE [DISTWIDTH] IN BLOOD BY AUTOMATED COUNT: 12.4 % (ref 11.5–14.5)
EST. GFR  (AFRICAN AMERICAN): >60 ML/MIN/1.73 M^2
EST. GFR  (NON AFRICAN AMERICAN): >60 ML/MIN/1.73 M^2
GLUCOSE SERPL-MCNC: 101 MG/DL (ref 70–110)
HCT VFR BLD AUTO: 46.2 % (ref 40–54)
HDLC SERPL-MCNC: 65 MG/DL (ref 40–75)
HDLC SERPL: 35.5 % (ref 20–50)
HGB BLD-MCNC: 14.6 G/DL (ref 14–18)
IMM GRANULOCYTES # BLD AUTO: 0.01 K/UL (ref 0–0.04)
IMM GRANULOCYTES NFR BLD AUTO: 0.3 % (ref 0–0.5)
LDLC SERPL CALC-MCNC: 106 MG/DL (ref 63–159)
LYMPHOCYTES # BLD AUTO: 1.9 K/UL (ref 1–4.8)
LYMPHOCYTES NFR BLD: 48.7 % (ref 18–48)
MCH RBC QN AUTO: 30 PG (ref 27–31)
MCHC RBC AUTO-ENTMCNC: 31.6 G/DL (ref 32–36)
MCV RBC AUTO: 95 FL (ref 82–98)
MONOCYTES # BLD AUTO: 0.5 K/UL (ref 0.3–1)
MONOCYTES NFR BLD: 12 % (ref 4–15)
NEUTROPHILS # BLD AUTO: 1.4 K/UL (ref 1.8–7.7)
NEUTROPHILS NFR BLD: 37.2 % (ref 38–73)
NONHDLC SERPL-MCNC: 118 MG/DL
NRBC BLD-RTO: 0 /100 WBC
PLATELET # BLD AUTO: 252 K/UL (ref 150–350)
PMV BLD AUTO: 9.7 FL (ref 9.2–12.9)
POTASSIUM SERPL-SCNC: 4.5 MMOL/L (ref 3.5–5.1)
PROT SERPL-MCNC: 8 G/DL (ref 6–8.4)
RBC # BLD AUTO: 4.87 M/UL (ref 4.6–6.2)
SODIUM SERPL-SCNC: 143 MMOL/L (ref 136–145)
TRIGL SERPL-MCNC: 60 MG/DL (ref 30–150)
WBC # BLD AUTO: 3.82 K/UL (ref 3.9–12.7)

## 2021-01-26 PROCEDURE — 86361 T CELL ABSOLUTE COUNT: CPT

## 2021-01-26 PROCEDURE — 80061 LIPID PANEL: CPT

## 2021-01-26 PROCEDURE — 85025 COMPLETE CBC W/AUTO DIFF WBC: CPT

## 2021-01-26 PROCEDURE — 80053 COMPREHEN METABOLIC PANEL: CPT

## 2021-01-26 PROCEDURE — 87536 HIV-1 QUANT&REVRSE TRNSCRPJ: CPT

## 2021-01-26 PROCEDURE — 36415 COLL VENOUS BLD VENIPUNCTURE: CPT

## 2021-02-19 ENCOUNTER — TELEPHONE (OUTPATIENT)
Dept: RESEARCH | Facility: HOSPITAL | Age: 61
End: 2021-02-19

## 2021-02-19 ENCOUNTER — PATIENT MESSAGE (OUTPATIENT)
Dept: INFECTIOUS DISEASES | Facility: CLINIC | Age: 61
End: 2021-02-19

## 2021-02-23 ENCOUNTER — RESEARCH ENCOUNTER (OUTPATIENT)
Dept: RESEARCH | Facility: CLINIC | Age: 61
End: 2021-02-23
Payer: COMMERCIAL

## 2021-04-12 ENCOUNTER — DOCUMENTATION ONLY (OUTPATIENT)
Dept: RESEARCH | Facility: OTHER | Age: 61
End: 2021-04-12

## 2021-04-19 DIAGNOSIS — M25.562 LEFT KNEE PAIN, UNSPECIFIED CHRONICITY: Primary | ICD-10-CM

## 2021-04-20 ENCOUNTER — HOSPITAL ENCOUNTER (OUTPATIENT)
Dept: RADIOLOGY | Facility: HOSPITAL | Age: 61
Discharge: HOME | End: 2021-04-20
Attending: ORTHOPAEDIC SURGERY
Payer: COMMERCIAL

## 2021-04-20 ENCOUNTER — OFFICE VISIT (OUTPATIENT)
Dept: ORTHOPEDICS | Facility: CLINIC | Age: 61
End: 2021-04-20
Payer: COMMERCIAL

## 2021-04-20 VITALS — HEIGHT: 73 IN | BODY MASS INDEX: 29.33 KG/M2 | WEIGHT: 221.31 LBS

## 2021-04-20 DIAGNOSIS — M76.52 PATELLAR TENDONITIS OF LEFT KNEE: Primary | ICD-10-CM

## 2021-04-20 DIAGNOSIS — M25.562 LEFT KNEE PAIN, UNSPECIFIED CHRONICITY: ICD-10-CM

## 2021-04-20 PROCEDURE — 73564 X-RAY EXAM KNEE 4 OR MORE: CPT | Mod: 26,LT,, | Performed by: RADIOLOGY

## 2021-04-20 PROCEDURE — 99203 PR OFFICE/OUTPT VISIT, NEW, LEVL III, 30-44 MIN: ICD-10-PCS | Mod: S$GLB,,, | Performed by: ORTHOPAEDIC SURGERY

## 2021-04-20 PROCEDURE — 99999 PR PBB SHADOW E&M-EST. PATIENT-LVL III: CPT | Mod: PBBFAC,,, | Performed by: ORTHOPAEDIC SURGERY

## 2021-04-20 PROCEDURE — 73562 XR KNEE ORTHO LEFT WITH FLEXION: ICD-10-PCS | Mod: 26,RT,, | Performed by: RADIOLOGY

## 2021-04-20 PROCEDURE — 99999 PR PBB SHADOW E&M-EST. PATIENT-LVL III: ICD-10-PCS | Mod: PBBFAC,,, | Performed by: ORTHOPAEDIC SURGERY

## 2021-04-20 PROCEDURE — 73564 X-RAY EXAM KNEE 4 OR MORE: CPT | Mod: TC,LT

## 2021-04-20 PROCEDURE — 73564 XR KNEE ORTHO LEFT WITH FLEXION: ICD-10-PCS | Mod: 26,LT,, | Performed by: RADIOLOGY

## 2021-04-20 PROCEDURE — 73562 X-RAY EXAM OF KNEE 3: CPT | Mod: 26,RT,, | Performed by: RADIOLOGY

## 2021-04-20 PROCEDURE — 99203 OFFICE O/P NEW LOW 30 MIN: CPT | Mod: S$GLB,,, | Performed by: ORTHOPAEDIC SURGERY

## 2021-05-20 ENCOUNTER — RESEARCH ENCOUNTER (OUTPATIENT)
Dept: RESEARCH | Facility: CLINIC | Age: 61
End: 2021-05-20
Payer: COMMERCIAL

## 2021-06-15 ENCOUNTER — LAB VISIT (OUTPATIENT)
Dept: LAB | Facility: HOSPITAL | Age: 61
End: 2021-06-15
Payer: COMMERCIAL

## 2021-06-15 DIAGNOSIS — R97.20 RISING PSA LEVEL: ICD-10-CM

## 2021-06-15 DIAGNOSIS — N13.8 BPH WITH URINARY OBSTRUCTION: ICD-10-CM

## 2021-06-15 DIAGNOSIS — R97.20 ELEVATED PSA: ICD-10-CM

## 2021-06-15 DIAGNOSIS — C61 PROSTATE CANCER: ICD-10-CM

## 2021-06-15 DIAGNOSIS — N40.1 BPH WITH URINARY OBSTRUCTION: ICD-10-CM

## 2021-06-15 LAB — COMPLEXED PSA SERPL-MCNC: 9.6 NG/ML (ref 0–4)

## 2021-06-15 PROCEDURE — 84153 ASSAY OF PSA TOTAL: CPT | Performed by: NURSE PRACTITIONER

## 2021-06-15 PROCEDURE — 36415 COLL VENOUS BLD VENIPUNCTURE: CPT | Performed by: NURSE PRACTITIONER

## 2021-06-17 ENCOUNTER — OFFICE VISIT (OUTPATIENT)
Dept: UROLOGY | Facility: CLINIC | Age: 61
End: 2021-06-17
Payer: COMMERCIAL

## 2021-06-17 VITALS
HEIGHT: 73 IN | WEIGHT: 220 LBS | HEART RATE: 64 BPM | SYSTOLIC BLOOD PRESSURE: 143 MMHG | DIASTOLIC BLOOD PRESSURE: 86 MMHG | BODY MASS INDEX: 29.16 KG/M2

## 2021-06-17 DIAGNOSIS — R97.20 ELEVATED PSA: ICD-10-CM

## 2021-06-17 DIAGNOSIS — N13.8 BPH WITH URINARY OBSTRUCTION: ICD-10-CM

## 2021-06-17 DIAGNOSIS — C61 PROSTATE CANCER: Primary | ICD-10-CM

## 2021-06-17 DIAGNOSIS — N40.1 BPH WITH URINARY OBSTRUCTION: ICD-10-CM

## 2021-06-17 DIAGNOSIS — R97.20 RISING PSA LEVEL: ICD-10-CM

## 2021-06-17 LAB
BILIRUB SERPL-MCNC: NORMAL MG/DL
BLOOD URINE, POC: NORMAL
CLARITY, POC UA: CLEAR
COLOR, POC UA: YELLOW
GLUCOSE UR QL STRIP: NORMAL
KETONES UR QL STRIP: NORMAL
LEUKOCYTE ESTERASE URINE, POC: NORMAL
NITRITE, POC UA: NORMAL
PH, POC UA: 5
PROTEIN, POC: NORMAL
SPECIFIC GRAVITY, POC UA: 1.02
UROBILINOGEN, POC UA: NORMAL

## 2021-06-17 PROCEDURE — 99999 PR PBB SHADOW E&M-EST. PATIENT-LVL IV: CPT | Mod: PBBFAC,,, | Performed by: NURSE PRACTITIONER

## 2021-06-17 PROCEDURE — 99214 PR OFFICE/OUTPT VISIT, EST, LEVL IV, 30-39 MIN: ICD-10-PCS | Mod: 25,S$GLB,, | Performed by: NURSE PRACTITIONER

## 2021-06-17 PROCEDURE — 99999 PR PBB SHADOW E&M-EST. PATIENT-LVL IV: ICD-10-PCS | Mod: PBBFAC,,, | Performed by: NURSE PRACTITIONER

## 2021-06-17 PROCEDURE — 99214 OFFICE O/P EST MOD 30 MIN: CPT | Mod: 25,S$GLB,, | Performed by: NURSE PRACTITIONER

## 2021-06-17 PROCEDURE — 81002 URINALYSIS NONAUTO W/O SCOPE: CPT | Mod: S$GLB,,, | Performed by: NURSE PRACTITIONER

## 2021-06-17 PROCEDURE — 81002 POCT URINE DIPSTICK WITHOUT MICROSCOPE: ICD-10-PCS | Mod: S$GLB,,, | Performed by: NURSE PRACTITIONER

## 2021-07-09 ENCOUNTER — TELEPHONE (OUTPATIENT)
Dept: INFECTIOUS DISEASES | Facility: CLINIC | Age: 61
End: 2021-07-09

## 2021-07-19 ENCOUNTER — PATIENT MESSAGE (OUTPATIENT)
Dept: UROLOGY | Facility: CLINIC | Age: 61
End: 2021-07-19

## 2021-07-21 ENCOUNTER — PATIENT MESSAGE (OUTPATIENT)
Dept: UROLOGY | Facility: CLINIC | Age: 61
End: 2021-07-21

## 2021-07-21 ENCOUNTER — HOSPITAL ENCOUNTER (OUTPATIENT)
Dept: RADIOLOGY | Facility: HOSPITAL | Age: 61
Discharge: HOME | End: 2021-07-21
Attending: NURSE PRACTITIONER
Payer: COMMERCIAL

## 2021-07-21 DIAGNOSIS — R97.20 ELEVATED PSA: Primary | ICD-10-CM

## 2021-07-21 DIAGNOSIS — R97.20 ELEVATED PSA: ICD-10-CM

## 2021-07-21 DIAGNOSIS — C61 PROSTATE CANCER: ICD-10-CM

## 2021-07-21 DIAGNOSIS — R97.20 RISING PSA LEVEL: ICD-10-CM

## 2021-07-21 PROCEDURE — 72197 MRI PROSTATE W W/O CONTRAST: ICD-10-PCS | Mod: 26,,, | Performed by: RADIOLOGY

## 2021-07-21 PROCEDURE — 72197 MRI PELVIS W/O & W/DYE: CPT | Mod: TC

## 2021-07-21 PROCEDURE — 72197 MRI PELVIS W/O & W/DYE: CPT | Mod: 26,,, | Performed by: RADIOLOGY

## 2021-07-21 PROCEDURE — 25500020 PHARM REV CODE 255: Performed by: NURSE PRACTITIONER

## 2021-07-21 PROCEDURE — A9585 GADOBUTROL INJECTION: HCPCS | Performed by: NURSE PRACTITIONER

## 2021-07-21 RX ORDER — GADOBUTROL 604.72 MG/ML
10 INJECTION INTRAVENOUS
Status: COMPLETED | OUTPATIENT
Start: 2021-07-21 | End: 2021-07-21

## 2021-07-21 RX ADMIN — GADOBUTROL 10 ML: 604.72 INJECTION INTRAVENOUS at 08:07

## 2021-07-27 ENCOUNTER — PATIENT MESSAGE (OUTPATIENT)
Dept: INFECTIOUS DISEASES | Facility: CLINIC | Age: 61
End: 2021-07-27

## 2021-08-04 ENCOUNTER — RESEARCH ENCOUNTER (OUTPATIENT)
Dept: RESEARCH | Facility: CLINIC | Age: 61
End: 2021-08-04
Payer: COMMERCIAL

## 2021-08-09 ENCOUNTER — LAB VISIT (OUTPATIENT)
Dept: LAB | Facility: HOSPITAL | Age: 61
End: 2021-08-09
Attending: INTERNAL MEDICINE
Payer: COMMERCIAL

## 2021-08-09 DIAGNOSIS — B20 HUMAN IMMUNODEFICIENCY VIRUS (HIV) DISEASE: ICD-10-CM

## 2021-08-09 LAB
ALBUMIN SERPL BCP-MCNC: 4.3 G/DL (ref 3.5–5.2)
ALP SERPL-CCNC: 59 U/L (ref 55–135)
ALT SERPL W/O P-5'-P-CCNC: 19 U/L (ref 10–44)
ANION GAP SERPL CALC-SCNC: 10 MMOL/L (ref 8–16)
AST SERPL-CCNC: 24 U/L (ref 10–40)
BASOPHILS # BLD AUTO: 0.02 K/UL (ref 0–0.2)
BASOPHILS NFR BLD: 0.6 % (ref 0–1.9)
BILIRUB SERPL-MCNC: 0.4 MG/DL (ref 0.1–1)
BUN SERPL-MCNC: 13 MG/DL (ref 8–23)
CALCIUM SERPL-MCNC: 9.6 MG/DL (ref 8.7–10.5)
CD3+CD4+ CELLS # BLD: 571 CELLS/UL (ref 300–1400)
CD3+CD4+ CELLS NFR BLD: 39.8 % (ref 28–57)
CHLORIDE SERPL-SCNC: 105 MMOL/L (ref 95–110)
CO2 SERPL-SCNC: 24 MMOL/L (ref 23–29)
CREAT SERPL-MCNC: 0.9 MG/DL (ref 0.5–1.4)
DIFFERENTIAL METHOD: ABNORMAL
EOSINOPHIL # BLD AUTO: 0 K/UL (ref 0–0.5)
EOSINOPHIL NFR BLD: 0.6 % (ref 0–8)
ERYTHROCYTE [DISTWIDTH] IN BLOOD BY AUTOMATED COUNT: 12.4 % (ref 11.5–14.5)
EST. GFR  (AFRICAN AMERICAN): >60 ML/MIN/1.73 M^2
EST. GFR  (NON AFRICAN AMERICAN): >60 ML/MIN/1.73 M^2
GLUCOSE SERPL-MCNC: 99 MG/DL (ref 70–110)
HCT VFR BLD AUTO: 40.7 % (ref 40–54)
HGB BLD-MCNC: 13.3 G/DL (ref 14–18)
IMM GRANULOCYTES # BLD AUTO: 0.01 K/UL (ref 0–0.04)
IMM GRANULOCYTES NFR BLD AUTO: 0.3 % (ref 0–0.5)
LYMPHOCYTES # BLD AUTO: 1.4 K/UL (ref 1–4.8)
LYMPHOCYTES NFR BLD: 38.1 % (ref 18–48)
MCH RBC QN AUTO: 29.2 PG (ref 27–31)
MCHC RBC AUTO-ENTMCNC: 32.7 G/DL (ref 32–36)
MCV RBC AUTO: 89 FL (ref 82–98)
MONOCYTES # BLD AUTO: 0.3 K/UL (ref 0.3–1)
MONOCYTES NFR BLD: 8.6 % (ref 4–15)
NEUTROPHILS # BLD AUTO: 1.9 K/UL (ref 1.8–7.7)
NEUTROPHILS NFR BLD: 51.8 % (ref 38–73)
NRBC BLD-RTO: 0 /100 WBC
PLATELET # BLD AUTO: 248 K/UL (ref 150–450)
PMV BLD AUTO: 9.9 FL (ref 9.2–12.9)
POTASSIUM SERPL-SCNC: 4.2 MMOL/L (ref 3.5–5.1)
PROT SERPL-MCNC: 7.5 G/DL (ref 6–8.4)
RBC # BLD AUTO: 4.56 M/UL (ref 4.6–6.2)
SODIUM SERPL-SCNC: 139 MMOL/L (ref 136–145)
WBC # BLD AUTO: 3.6 K/UL (ref 3.9–12.7)

## 2021-08-09 PROCEDURE — 85025 COMPLETE CBC W/AUTO DIFF WBC: CPT | Performed by: INTERNAL MEDICINE

## 2021-08-09 PROCEDURE — 80053 COMPREHEN METABOLIC PANEL: CPT | Performed by: INTERNAL MEDICINE

## 2021-08-09 PROCEDURE — 86361 T CELL ABSOLUTE COUNT: CPT | Performed by: INTERNAL MEDICINE

## 2021-08-09 PROCEDURE — 36415 COLL VENOUS BLD VENIPUNCTURE: CPT | Performed by: INTERNAL MEDICINE

## 2021-08-09 PROCEDURE — 87536 HIV-1 QUANT&REVRSE TRNSCRPJ: CPT | Performed by: INTERNAL MEDICINE

## 2021-08-10 LAB — HIV1 RNA # PLAS NAA DL=20: NORMAL COPIES/ML

## 2021-08-17 ENCOUNTER — TELEPHONE (OUTPATIENT)
Dept: RESEARCH | Facility: OTHER | Age: 61
End: 2021-08-17

## 2021-08-19 ENCOUNTER — PATIENT MESSAGE (OUTPATIENT)
Dept: INFECTIOUS DISEASES | Facility: CLINIC | Age: 61
End: 2021-08-19

## 2021-08-20 ENCOUNTER — PATIENT MESSAGE (OUTPATIENT)
Dept: UROLOGY | Facility: CLINIC | Age: 61
End: 2021-08-20

## 2021-08-23 ENCOUNTER — PATIENT MESSAGE (OUTPATIENT)
Dept: INFECTIOUS DISEASES | Facility: CLINIC | Age: 61
End: 2021-08-23

## 2021-08-23 ENCOUNTER — OFFICE VISIT (OUTPATIENT)
Dept: INFECTIOUS DISEASES | Facility: CLINIC | Age: 61
End: 2021-08-23
Payer: COMMERCIAL

## 2021-08-23 VITALS
SYSTOLIC BLOOD PRESSURE: 139 MMHG | DIASTOLIC BLOOD PRESSURE: 92 MMHG | TEMPERATURE: 98 F | BODY MASS INDEX: 29.08 KG/M2 | WEIGHT: 219.38 LBS | HEIGHT: 73 IN | HEART RATE: 71 BPM

## 2021-08-23 DIAGNOSIS — Z80.42 FAMILY HX OF PROSTATE CANCER: ICD-10-CM

## 2021-08-23 DIAGNOSIS — Z80.42 FAMILY HISTORY OF PROSTATE CANCER: ICD-10-CM

## 2021-08-23 DIAGNOSIS — I10 ESSENTIAL HYPERTENSION: Primary | ICD-10-CM

## 2021-08-23 DIAGNOSIS — B20 HUMAN IMMUNODEFICIENCY VIRUS (HIV) DISEASE: ICD-10-CM

## 2021-08-23 DIAGNOSIS — R97.20 ELEVATED PSA: ICD-10-CM

## 2021-08-23 PROCEDURE — 99214 OFFICE O/P EST MOD 30 MIN: CPT | Mod: S$GLB,,, | Performed by: INTERNAL MEDICINE

## 2021-08-23 PROCEDURE — 99999 PR PBB SHADOW E&M-EST. PATIENT-LVL IV: CPT | Mod: PBBFAC,,, | Performed by: INTERNAL MEDICINE

## 2021-08-23 PROCEDURE — 99214 PR OFFICE/OUTPT VISIT, EST, LEVL IV, 30-39 MIN: ICD-10-PCS | Mod: S$GLB,,, | Performed by: INTERNAL MEDICINE

## 2021-08-23 PROCEDURE — 99999 PR PBB SHADOW E&M-EST. PATIENT-LVL IV: ICD-10-PCS | Mod: PBBFAC,,, | Performed by: INTERNAL MEDICINE

## 2021-08-23 NOTE — PROGRESS NOTES
Simón Fong  1960        Subjective     Chief Complaint: Follow-up    History of Present Illness:  Mr. Simón Fong is a 61 y.o. male.    Presents for HIV follow up.     Summary:   The patient has been seen by us for a number of years and in the past, he was on  Combivir and ritonavir in past.  He did ask about a drug holiday in the past but states never stopped his meds.  He was switched to Truvada and boosted Reyataz; and then Stribild and Genvoya now.       Highly adherent with medications; no problems reported. Unsure if wanting to change off Genvoya.    Prostate cancer: biopsy 8/13/2018 - prostate adenocarcinoma; followed by Tammy Wright. MRI done in 7/2021 with improvement. Repeat bx still recommended by Urology however pt not wanting to repeat, rather would do repeat PSA. His brother with prostate cancer recent sx last week pancreatic cancer. Asking for surg onc reccs.      Blood pressure elevated here 139 systolic. Asking about Terazosin. Drinking pre-workout and water in afternoons.     Review of Systems   Constitutional: Negative for chills, fever and malaise/fatigue.   HENT: Negative for sore throat.    Respiratory: Negative for cough and shortness of breath.    Cardiovascular: Negative for palpitations and leg swelling.   Gastrointestinal: Negative for abdominal pain, diarrhea and nausea.   Genitourinary: Positive for frequency. Negative for dysuria.   Neurological: Negative for dizziness and headaches.        PAST HISTORY:     Past Medical History:   Diagnosis Date    HIV infection     Hypertension        Past Surgical History:   Procedure Laterality Date    COLONOSCOPY      COLONOSCOPY N/A 7/31/2020    Procedure: COLONOSCOPY;  Surgeon: Tree Mendiola MD;  Location: [address];  Service: Endoscopy;  Laterality: N/A;  covid 7/28-[facility]    PROSTATE BIOPSY         Family History   Problem Relation Age of Onset    Diabetes Mother     Heart disease Mother      "Hypertension Mother     Stroke Mother     Hypertension Father     Cancer Sister     Cancer Brother          MEDICATIONS & ALLERGIES:     Current Outpatient Medications on File Prior to Visit   Medication Sig    acetaminophen (TYLENOL) 500 MG tablet Take 1,000 mg by mouth every 6 (six) hours as needed for Pain.    amLODIPine (NORVASC) 10 MG tablet TAKE 1 TABLET ONCE DAILY    ascorbic acid, vitamin C, (VITAMIN C) 1000 MG tablet Take 1,000 mg by mouth once daily.     biotin 1 mg Cap Take by mouth.    creatine, bulk, 100 % Powd by Misc.(Non-Drug; Combo Route) route.    fish oil-omega-3 fatty acids 300-1,000 mg capsule Take by mouth once daily.    GENVOYA [phone] mg Tab TAKE 1 TABLET ONCE DAILY    glucosamine-chondroitin 500-400 mg tablet Take 1 tablet by mouth 3 (three) times daily.    leucine-isoleucine-valine (NEOKE BCAA4) 82 gram-328 kcal/100 gram Powd Take by mouth.      No current facility-administered medications on file prior to visit.       Review of patient's allergies indicates:   Allergen Reactions    Sulfa (sulfonamide antibiotics)        OBJECTIVE:     Vital Signs:  Vitals:    08/23/21 1501   BP: (!) 139/92   BP Location: Left arm   Patient Position: Sitting   Pulse: 71   Temp: 98.1 °F (36.7 °C)   TempSrc: Oral   Weight: 99.5 kg (219 lb 5.7 oz)   Height: 6' 1" (1.854 m)       Body mass index is 28.94 kg/m².     Physical Exam:  Physical Exam  Vitals and nursing note reviewed.   Constitutional:       General: He is not in acute distress.     Appearance: Normal appearance. He is not ill-appearing, toxic-appearing or diaphoretic.   HENT:      Head: Normocephalic and atraumatic.   Eyes:      General: No scleral icterus.     Extraocular Movements: Extraocular movements intact.      Conjunctiva/sclera: Conjunctivae normal.   Pulmonary:      Effort: Pulmonary effort is normal. No respiratory distress.   Musculoskeletal:         General: No swelling or signs of injury. Normal range of motion.    "   Cervical back: Normal range of motion.   Skin:     General: Skin is warm and dry.   Neurological:      General: No focal deficit present.      Mental Status: He is alert and oriented to person, place, and time.   Psychiatric:         Mood and Affect: Mood and affect normal.         Behavior: Behavior normal.            Laboratory  Lab Results   Component Value Date    WBC 3.60 (L) 08/09/2021    HGB 13.3 (L) 08/09/2021    HCT 40.7 08/09/2021    MCV 89 08/09/2021     08/09/2021     Lab Results   Component Value Date    GLU 99 08/09/2021     08/09/2021    K 4.2 08/09/2021     08/09/2021    CO2 24 08/09/2021    BUN 13 08/09/2021    CREATININE 0.9 08/09/2021    CALCIUM 9.6 08/09/2021    MG 2.1 06/13/2020     Lab Results   Component Value Date    INR 1.2 02/08/2012           ASSESSMENT & PLAN:   Mr. Simón Fong is a 61 y.o. male who was seen today in clinic for follow-up    Diagnoses and all orders for this visit:    Essential hypertension   -  BP Elevated in clinic today. On amlodipine 10 mg.   -  Will have patient check BP at home and send me logs, may need additional BP medication. Likely can try combo pill with Amlodipine.     Human immunodeficiency virus (HIV) disease   -  Discussed possibility of switching Genvoya to Dovato. He will think about it and let me know. Can discuss more at next visit.     Elevated PSA   -  Advised pursuing prostate bx per Urology recommendations.    Family history of prostate cancer  Family hx of prostate cancer              -  Brother recent dx with prostate cancer, trying to establish with Surg Onc here at [facility].    Covid Booster in December (8m post 2nd dose)        Tawana Goldman MD  Internal Medicine PGY-3    I have reviewed and concur with the resident's history, physical, assessment, and plan.  I have personally interviewed and examined the patient at bedside.    Patient doing well overall. Will let us know about changing medications on next visit.    Parisa  MD Janeth

## 2021-08-26 ENCOUNTER — PATIENT MESSAGE (OUTPATIENT)
Dept: INFECTIOUS DISEASES | Facility: CLINIC | Age: 61
End: 2021-08-26

## 2021-10-06 ENCOUNTER — TELEPHONE (OUTPATIENT)
Dept: INFECTIOUS DISEASES | Facility: CLINIC | Age: 61
End: 2021-10-06

## 2021-10-06 DIAGNOSIS — B20 HUMAN IMMUNODEFICIENCY VIRUS (HIV) DISEASE: Primary | ICD-10-CM

## 2021-10-07 ENCOUNTER — CLINICAL SUPPORT (OUTPATIENT)
Dept: INFECTIOUS DISEASES | Facility: CLINIC | Age: 61
End: 2021-10-07
Payer: COMMERCIAL

## 2021-10-07 PROCEDURE — 90471 IMMUNIZATION ADMIN: CPT | Mod: S$GLB,,, | Performed by: INTERNAL MEDICINE

## 2021-10-07 PROCEDURE — 90471 FLU VACCINE - QUADRIVALENT - ADJUVANTED: ICD-10-PCS | Mod: S$GLB,,, | Performed by: INTERNAL MEDICINE

## 2021-10-07 PROCEDURE — 90694 FLU VACCINE - QUADRIVALENT - ADJUVANTED: ICD-10-PCS | Mod: S$GLB,,, | Performed by: INTERNAL MEDICINE

## 2021-10-07 PROCEDURE — 90694 VACC AIIV4 NO PRSRV 0.5ML IM: CPT | Mod: S$GLB,,, | Performed by: INTERNAL MEDICINE

## 2021-10-29 ENCOUNTER — CLINICAL SUPPORT (OUTPATIENT)
Dept: URGENT CARE | Facility: CLINIC | Age: 61
End: 2021-10-29
Payer: COMMERCIAL

## 2021-10-29 DIAGNOSIS — Z11.59 SCREENING FOR VIRAL DISEASE: Primary | ICD-10-CM

## 2021-10-29 LAB
CTP QC/QA: YES
SARS-COV-2 RDRP RESP QL NAA+PROBE: NEGATIVE

## 2021-10-29 PROCEDURE — U0002: ICD-10-PCS | Mod: QW,S$GLB,,

## 2021-10-29 PROCEDURE — U0002 COVID-19 LAB TEST NON-CDC: HCPCS | Mod: QW,S$GLB,,

## 2021-11-05 ENCOUNTER — TELEPHONE (OUTPATIENT)
Dept: RESEARCH | Facility: OTHER | Age: 61
End: 2021-11-05
Payer: COMMERCIAL

## 2021-12-01 ENCOUNTER — RESEARCH ENCOUNTER (OUTPATIENT)
Dept: RESEARCH | Facility: CLINIC | Age: 61
End: 2021-12-01
Payer: COMMERCIAL

## 2021-12-30 ENCOUNTER — RESEARCH ENCOUNTER (OUTPATIENT)
Dept: RESEARCH | Facility: CLINIC | Age: 61
End: 2021-12-30
Payer: COMMERCIAL

## 2022-01-10 ENCOUNTER — TELEPHONE (OUTPATIENT)
Dept: INFECTIOUS DISEASES | Facility: CLINIC | Age: 62
End: 2022-01-10
Payer: COMMERCIAL

## 2022-01-10 ENCOUNTER — PATIENT MESSAGE (OUTPATIENT)
Dept: INFECTIOUS DISEASES | Facility: CLINIC | Age: 62
End: 2022-01-10
Payer: COMMERCIAL

## 2022-01-10 DIAGNOSIS — B20 HUMAN IMMUNODEFICIENCY VIRUS (HIV) DISEASE: Primary | ICD-10-CM

## 2022-01-18 ENCOUNTER — LAB VISIT (OUTPATIENT)
Dept: LAB | Facility: HOSPITAL | Age: 62
End: 2022-01-18
Payer: COMMERCIAL

## 2022-01-18 DIAGNOSIS — B20 HUMAN IMMUNODEFICIENCY VIRUS (HIV) DISEASE: ICD-10-CM

## 2022-01-18 LAB
ALBUMIN SERPL BCP-MCNC: 4.4 G/DL (ref 3.5–5.2)
ALP SERPL-CCNC: 75 U/L (ref 55–135)
ALT SERPL W/O P-5'-P-CCNC: 24 U/L (ref 10–44)
ANION GAP SERPL CALC-SCNC: 10 MMOL/L (ref 8–16)
AST SERPL-CCNC: 28 U/L (ref 10–40)
BASOPHILS # BLD AUTO: 0.01 K/UL (ref 0–0.2)
BASOPHILS NFR BLD: 0.3 % (ref 0–1.9)
BILIRUB SERPL-MCNC: 0.4 MG/DL (ref 0.1–1)
BUN SERPL-MCNC: 12 MG/DL (ref 8–23)
CALCIUM SERPL-MCNC: 9.9 MG/DL (ref 8.7–10.5)
CHLORIDE SERPL-SCNC: 105 MMOL/L (ref 95–110)
CO2 SERPL-SCNC: 25 MMOL/L (ref 23–29)
CREAT SERPL-MCNC: 1 MG/DL (ref 0.5–1.4)
DIFFERENTIAL METHOD: ABNORMAL
EOSINOPHIL # BLD AUTO: 0.1 K/UL (ref 0–0.5)
EOSINOPHIL NFR BLD: 1.3 % (ref 0–8)
ERYTHROCYTE [DISTWIDTH] IN BLOOD BY AUTOMATED COUNT: 12.6 % (ref 11.5–14.5)
EST. GFR  (AFRICAN AMERICAN): >60 ML/MIN/1.73 M^2
EST. GFR  (NON AFRICAN AMERICAN): >60 ML/MIN/1.73 M^2
GLUCOSE SERPL-MCNC: 97 MG/DL (ref 70–110)
HCT VFR BLD AUTO: 45.1 % (ref 40–54)
HGB BLD-MCNC: 14.9 G/DL (ref 14–18)
IMM GRANULOCYTES # BLD AUTO: 0.01 K/UL (ref 0–0.04)
IMM GRANULOCYTES NFR BLD AUTO: 0.3 % (ref 0–0.5)
LYMPHOCYTES # BLD AUTO: 1.8 K/UL (ref 1–4.8)
LYMPHOCYTES NFR BLD: 44.8 % (ref 18–48)
MCH RBC QN AUTO: 29.7 PG (ref 27–31)
MCHC RBC AUTO-ENTMCNC: 33 G/DL (ref 32–36)
MCV RBC AUTO: 90 FL (ref 82–98)
MONOCYTES # BLD AUTO: 0.5 K/UL (ref 0.3–1)
MONOCYTES NFR BLD: 11.6 % (ref 4–15)
NEUTROPHILS # BLD AUTO: 1.7 K/UL (ref 1.8–7.7)
NEUTROPHILS NFR BLD: 41.7 % (ref 38–73)
NRBC BLD-RTO: 0 /100 WBC
PLATELET # BLD AUTO: 239 K/UL (ref 150–450)
PMV BLD AUTO: 10.3 FL (ref 9.2–12.9)
POTASSIUM SERPL-SCNC: 4.7 MMOL/L (ref 3.5–5.1)
PROT SERPL-MCNC: 7.5 G/DL (ref 6–8.4)
RBC # BLD AUTO: 5.01 M/UL (ref 4.6–6.2)
SODIUM SERPL-SCNC: 140 MMOL/L (ref 136–145)
WBC # BLD AUTO: 3.97 K/UL (ref 3.9–12.7)

## 2022-01-18 PROCEDURE — 85025 COMPLETE CBC W/AUTO DIFF WBC: CPT | Performed by: INTERNAL MEDICINE

## 2022-01-18 PROCEDURE — 80053 COMPREHEN METABOLIC PANEL: CPT | Performed by: INTERNAL MEDICINE

## 2022-01-18 PROCEDURE — 86361 T CELL ABSOLUTE COUNT: CPT | Performed by: INTERNAL MEDICINE

## 2022-01-18 PROCEDURE — 87536 HIV-1 QUANT&REVRSE TRNSCRPJ: CPT | Performed by: INTERNAL MEDICINE

## 2022-01-19 ENCOUNTER — OFFICE VISIT (OUTPATIENT)
Dept: URGENT CARE | Facility: CLINIC | Age: 62
End: 2022-01-19
Payer: COMMERCIAL

## 2022-01-19 VITALS
RESPIRATION RATE: 18 BRPM | HEART RATE: 75 BPM | HEIGHT: 73 IN | SYSTOLIC BLOOD PRESSURE: 135 MMHG | OXYGEN SATURATION: 100 % | BODY MASS INDEX: 27.83 KG/M2 | DIASTOLIC BLOOD PRESSURE: 79 MMHG | TEMPERATURE: 99 F | WEIGHT: 210 LBS

## 2022-01-19 DIAGNOSIS — R51.9 SINUS HEADACHE: ICD-10-CM

## 2022-01-19 DIAGNOSIS — Z11.59 ENCOUNTER FOR SCREENING FOR OTHER VIRAL DISEASES: Primary | ICD-10-CM

## 2022-01-19 LAB
CD3+CD4+ CELLS # BLD: 578 CELLS/UL (ref 300–1400)
CD3+CD4+ CELLS NFR BLD: 31.5 % (ref 28–57)
CTP QC/QA: YES
SARS-COV-2 RDRP RESP QL NAA+PROBE: NEGATIVE

## 2022-01-19 PROCEDURE — 99214 PR OFFICE/OUTPT VISIT, EST, LEVL IV, 30-39 MIN: ICD-10-PCS | Mod: S$GLB,,, | Performed by: INTERNAL MEDICINE

## 2022-01-19 PROCEDURE — U0002 COVID-19 LAB TEST NON-CDC: HCPCS | Mod: QW,S$GLB,, | Performed by: INTERNAL MEDICINE

## 2022-01-19 PROCEDURE — 99214 OFFICE O/P EST MOD 30 MIN: CPT | Mod: S$GLB,,, | Performed by: INTERNAL MEDICINE

## 2022-01-19 PROCEDURE — U0002: ICD-10-PCS | Mod: QW,S$GLB,, | Performed by: INTERNAL MEDICINE

## 2022-01-19 RX ORDER — FLUTICASONE PROPIONATE 50 MCG
1 SPRAY, SUSPENSION (ML) NASAL DAILY
Qty: 11.1 ML | Refills: 0 | Status: SHIPPED | OUTPATIENT
Start: 2022-01-19 | End: 2022-02-09

## 2022-01-19 RX ORDER — CETIRIZINE HYDROCHLORIDE 10 MG/1
10 TABLET ORAL DAILY
Qty: 30 TABLET | Refills: 0 | Status: SHIPPED | OUTPATIENT
Start: 2022-01-19 | End: 2023-02-13

## 2022-01-19 NOTE — PATIENT INSTRUCTIONS
Patient Education       Sinus Headache Discharge Instructions   About this topic   A sinus headache is caused by infected sinuses. Sinuses are small air spaces in the head behind the nose, eyes, and cheeks. They lead into the nose. They allow fluid, called mucus, to drain. They can swell due to an allergy or an infection. When they swell, fluid gets trapped and it can get infected. This causes pressure in your head. If the pressure is very bad you will feel pain. If you move suddenly or lean forward the pain often gets worse. The pain is dull and throbs. You may feel it in the top of your face. You may also ache and have a yellow-green drainage from your nose. Your doctor may order drugs to stop the infection and to help with swelling.           What care is needed at home?   · Ask your doctor what you need to do when you go home. Make sure you ask questions if you do not understand what the doctor says. This way you will know what you need to do.  · Use a salt water or saline rinse in your nose. Talk to your doctor about how often you should do this.  · Hold a warm cloth to your sinuses for a few minutes. Then, hold a cold cloth to your sinuses for 30 seconds. Repeat a few times a day.  · Take a hot bath or shower. Breathe in steam from a bowl of hot water or hot shower. This moist air can help the headache.  · Drink 6 to 8 glasses of water each day.  · Avoid beer, wine, and mixed drinks (alcohol). This can make the nose and sinuses swell.  · Do not smoke and avoid areas where people smoke.  What follow-up care is needed?   Your doctor may ask you to make visits to the office to check on your progress. Be sure to keep these visits.  What drugs may be needed?   The doctor may order drugs to:  · Relieve headache  · Help with pain and swelling  · Treat an allergy  · Moisten your nose  · Stop an infection  Will physical activity be limited?   You may be more comfortable if you do not lean forward or lie down. Try to  sleep with your head and shoulders propped on a few pillows. Talk to your doctor about the right amount of activity for you.  What can be done to prevent this health problem?   · Avoid fumes, smoke, dust, and other allergens. These can bring on your headache.  · Look for treatment if you have a cold or an infection. This can keep you from getting sinusitis.  When do I need to call the doctor?   · Signs of infection. These include a fever of 100.4°F (38°C) or higher, chills, very bad sore throat, ear or sinus pain, cough, more sputum or change in color of sputum.  · Sinus headache lasts for more than a week  · The drugs your doctor gave you do not work  · You are not feeling better in 2 to 3 days or you are feeling worse  Teach Back: Helping You Understand   The Teach Back Method helps you understand the information we are giving you. The idea is simple. After talking with the staff, tell them in your own words what you were just told. This helps to make sure the staff has covered each thing clearly. It also helps to explain things that may have been a bit confusing. Before going home, make sure you are able to do these:  · I can tell you about my condition.  · I can tell you what may help ease my pain.  · I can tell you what I will do if my headache lasts for more than a week.  Where can I learn more?   [employer]  https://americanmigrainefoundation.org/understanding-migraine/sinus-headaches/   [facility] Choices  http://www.nhs.uk/conditions/sinus-headache/Pages/Introduction.aspx   Last Reviewed Date   2018-10-19  Consumer Information Use and Disclaimer   This information is not specific medical advice and does not replace information you receive from your health care provider. This is only a brief summary of general information. It does NOT include all information about conditions, illnesses, injuries, tests, procedures, treatments, therapies, discharge instructions or life-style choices that may apply  to you. You must talk with your health care provider for complete information about your health and treatment options. This information should not be used to decide whether or not to accept your health care providers advice, instructions or recommendations. Only your health care provider has the knowledge and training to provide advice that is right for you.  Copyright   Copyright © 2021 [employer] and its affiliates and/or licensors. All rights reserved.

## 2022-01-19 NOTE — PROGRESS NOTES
"Subjective:       Patient ID: Simón Fong is a 61 y.o. male.    Vitals:  height is 6' 1" (1.854 m) and weight is 95.3 kg (210 lb).     Chief Complaint: URI    Patient has had 1 weak of sinus pressure and is also concern with elevated blood pressure(for the past 4days).    URI   This is a new problem. The current episode started in the past 7 days. There has been no fever. Associated symptoms include congestion and sinus pain. Pertinent negatives include no chest pain or headaches. He has tried nothing for the symptoms.       HENT: Positive for congestion and sinus pain.    Cardiovascular: Negative for chest pain, leg swelling, palpitations and sob on exertion.   Eyes: Negative for eye redness, vision loss, double vision and blurred vision.   Skin: Negative for erythema.   Neurological: Negative for light-headedness, passing out, facial drooping, headaches, history of migraines, altered mental status, loss of consciousness, numbness and tingling.   Psychiatric/Behavioral: Negative for altered mental status.       Objective:      Physical Exam   Constitutional: He is oriented to person, place, and time. He appears well-developed and well-nourished.  Non-toxic appearance. He does not appear ill. No distress.   HENT:   Head: Normocephalic and atraumatic.   Ears:   Right Ear: Tympanic membrane, external ear and ear canal normal.   Left Ear: Tympanic membrane, external ear and ear canal normal.   Nose: Congestion present.      Comments: Mucosal edema present in nose. No sinus tenderness.   Mouth/Throat: Oropharynx is clear and moist. Mucous membranes are moist. No uvula swelling. No oropharyngeal exudate, posterior oropharyngeal edema, posterior oropharyngeal erythema or tonsillar abscesses. Tonsils are 1+ on the right. Tonsils are 1+ on the left. No tonsillar exudate.   Eyes: Conjunctivae and EOM are normal. Pupils are equal, round, and reactive to light. Right eye exhibits no discharge. Left eye exhibits no discharge. " No scleral icterus.      extraocular movement intact   Neck: Neck supple.   Cardiovascular: Normal rate, regular rhythm, normal heart sounds and intact distal pulses.   No murmur heard.Exam reveals no gallop and no friction rub.   Pulmonary/Chest: Effort normal and breath sounds normal. No tachypnea. No respiratory distress. He has no wheezes. He has no rales.   Abdominal: He exhibits no distension.   Musculoskeletal:         General: No edema.      Right lower leg: No edema.      Left lower leg: No edema.   Lymphadenopathy:     He has no cervical adenopathy.   Neurological: no focal deficit. He is alert, oriented to person, place, and time and at baseline. He has normal motor skills, normal sensation and intact cranial nerves. He displays no weakness and facial symmetry. No cranial nerve deficit or sensory deficit. Coordination normal. Coordination and gait normal.   Skin: Skin is warm, dry, not diaphoretic and no rash. Capillary refill takes less than 2 seconds. No erythema   Psychiatric: He has a normal mood and affect. His behavior is normal.   Nursing note and vitals reviewed.    Results for orders placed or performed in visit on 01/19/22   POCT COVID-19 Rapid Screening   Result Value Ref Range    POC Rapid COVID Negative Negative     Acceptable Yes            Assessment:       1. Encounter for screening for other viral diseases    2. Sinus headache          Plan:         Encounter for screening for other viral diseases  -     POCT COVID-19 Rapid Screening    Sinus headache  -     fluticasone propionate (FLONASE) 50 mcg/actuation nasal spray; 1 spray (50 mcg total) by Each Nostril route once daily.  Dispense: 11.1 mL; Refill: 0  -     cetirizine (ZYRTEC) 10 MG tablet; Take 1 tablet (10 mg total) by mouth once daily.  Dispense: 30 tablet; Refill: 0

## 2022-01-20 LAB — HIV1 RNA # PLAS NAA DL=20: NORMAL COPIES/ML

## 2022-02-09 ENCOUNTER — OFFICE VISIT (OUTPATIENT)
Dept: INFECTIOUS DISEASES | Facility: CLINIC | Age: 62
End: 2022-02-09
Payer: COMMERCIAL

## 2022-02-09 ENCOUNTER — RESEARCH ENCOUNTER (OUTPATIENT)
Dept: RESEARCH | Facility: CLINIC | Age: 62
End: 2022-02-09
Payer: COMMERCIAL

## 2022-02-09 VITALS
SYSTOLIC BLOOD PRESSURE: 141 MMHG | HEIGHT: 73 IN | TEMPERATURE: 98 F | WEIGHT: 222.88 LBS | DIASTOLIC BLOOD PRESSURE: 88 MMHG | BODY MASS INDEX: 29.54 KG/M2 | HEART RATE: 81 BPM

## 2022-02-09 DIAGNOSIS — B20 HUMAN IMMUNODEFICIENCY VIRUS (HIV) DISEASE: Primary | ICD-10-CM

## 2022-02-09 DIAGNOSIS — I10 PRIMARY HYPERTENSION: ICD-10-CM

## 2022-02-09 DIAGNOSIS — Z71.85 VACCINE COUNSELING: ICD-10-CM

## 2022-02-09 DIAGNOSIS — Z11.3 SCREEN FOR STD (SEXUALLY TRANSMITTED DISEASE): ICD-10-CM

## 2022-02-09 PROCEDURE — 99215 PR OFFICE/OUTPT VISIT, EST, LEVL V, 40-54 MIN: ICD-10-PCS | Mod: S$GLB,,, | Performed by: INTERNAL MEDICINE

## 2022-02-09 PROCEDURE — 87491 CHLMYD TRACH DNA AMP PROBE: CPT | Performed by: INTERNAL MEDICINE

## 2022-02-09 PROCEDURE — 99999 PR PBB SHADOW E&M-EST. PATIENT-LVL III: CPT | Mod: PBBFAC,,, | Performed by: INTERNAL MEDICINE

## 2022-02-09 PROCEDURE — 87491 CHLMYD TRACH DNA AMP PROBE: CPT | Mod: 59 | Performed by: INTERNAL MEDICINE

## 2022-02-09 PROCEDURE — 99999 PR PBB SHADOW E&M-EST. PATIENT-LVL III: ICD-10-PCS | Mod: PBBFAC,,, | Performed by: INTERNAL MEDICINE

## 2022-02-09 PROCEDURE — 99215 OFFICE O/P EST HI 40 MIN: CPT | Mod: S$GLB,,, | Performed by: INTERNAL MEDICINE

## 2022-02-09 RX ORDER — DOLUTEGRAVIR SODIUM AND LAMIVUDINE 50; 300 MG/1; MG/1
1 TABLET, FILM COATED ORAL DAILY
Qty: 30 TABLET | Refills: 5 | Status: SHIPPED | OUTPATIENT
Start: 2022-02-09 | End: 2022-08-23 | Stop reason: SDUPTHER

## 2022-02-09 NOTE — PROGRESS NOTES
INFECTIOUS DISEASE CLINIC  02/09/2022 12:41 PM    Subjective:      Chief Complaint:   Chief Complaint   Patient presents with    HIV Positive/AIDS       History of Present Illness:    Patient Simón Fong is a 61 y.o. male with h/o HIV on genvoya and prostate cancer who presents today for routine HIV follow-up. Last seen by Dr Acosta 8/2021. Interested in switching to dovato, which she previously discussed. Reports 100% compliance with genvoya. Denies side effects.  Interested in referral to primary care. Has log of bp and says it fluctuates from sbp 100-140 throughout day.     HIV was diagnosed 30 years ago- .    Prior antiretroviral regimens include truvada/reyataz, stribild, genvoya    Patient is sexually active with men,     Review of Symptoms:  Constitutional: Denies fevers, chills, or weakness.  ENT: Denies dysphagia, nasal discharge, ear pain or discharge.  Cardiovascular: Denies chest pain, palpitations, orthopnea, or claudication.  Respiratory: Denies shortness of breath, cough, hemoptysis, or wheezing.  GI: Denies nausea/vomitting, hematochezia, melena, abd pain, or changes in appetite.  : Denies dysuria, incontinence, or hematuria.  Musculoskeletal: Denies joint pain or myalgias.  Skin/breast: Denies rashes, lumps, lesions, or discharge.  Neurologic: Denies headache, dizziness, vertigo, or paresthesias.    Past Medical History:   Diagnosis Date    HIV infection     Hypertension        Past Surgical History:   Procedure Laterality Date    COLONOSCOPY      COLONOSCOPY N/A 7/31/2020    Procedure: COLONOSCOPY;  Surgeon: Tree Mendiola MD;  Location: [address];  Service: Endoscopy;  Laterality: N/A;  covid 7/28-[facility]    PROSTATE BIOPSY         Family History   Problem Relation Age of Onset    Diabetes Mother     Heart disease Mother     Hypertension Mother     Stroke Mother     Hypertension Father     Cancer Sister     Cancer Brother        Social History  "    Socioeconomic History    Marital status: Single   Tobacco Use    Smoking status: Never Smoker    Smokeless tobacco: Never Used   Substance and Sexual Activity    Alcohol use: No    Drug use: No       Review of patient's allergies indicates:   Allergen Reactions    Sulfa (sulfonamide antibiotics)        Medications:  Current Outpatient Medications on File Prior to Visit   Medication Sig Dispense Refill    acetaminophen (TYLENOL) 500 MG tablet Take 1,000 mg by mouth every 6 (six) hours as needed for Pain.      amLODIPine (NORVASC) 10 MG tablet TAKE 1 TABLET ONCE DAILY 90 tablet 3    ascorbic acid, vitamin C, (VITAMIN C) 1000 MG tablet Take 1,000 mg by mouth once daily.       biotin 1 mg Cap Take by mouth.      cetirizine (ZYRTEC) 10 MG tablet Take 1 tablet (10 mg total) by mouth once daily. 30 tablet 0    creatine, bulk, 100 % Powd by Misc.(Non-Drug; Combo Route) route.      fish oil-omega-3 fatty acids 300-1,000 mg capsule Take by mouth once daily.      fluticasone propionate (FLONASE) 50 mcg/actuation nasal spray 1 spray (50 mcg total) by Each Nostril route once daily. 11.1 mL 0    GENVOYA [id number] mg Tab TAKE 1 TABLET ONCE DAILY 90 tablet 3    glucosamine-chondroitin 500-400 mg tablet Take 1 tablet by mouth 3 (three) times daily.      leucine-isoleucine-valine (NEOKE BCAA4) 82 gram-328 kcal/100 gram Powd Take by mouth.        No current facility-administered medications on file prior to visit.           Objective:   VS (24h):   Vitals:    02/09/22 1238   Temp: 98.2 °F (36.8 °C)   BP (!) 141/88   Pulse 81   Temp 98.2 °F (36.8 °C) (Oral)   Ht 6' 1" (1.854 m)   Wt 101.1 kg (222 lb 14.2 oz)   BMI 29.41 kg/m²     General: Afebrile, alert, comfortable, no acute distress.   HEENT: ANNAMARIA. EOMI, no scleral icterus. No sinus tenderness.  Pulmonary: Non labored,clear to auscultation A/P/L.   Cardiac: normal S1 & S2 w/o rubs/murmurs/gallops.   Abdominal: Non-tender, non-distended.  Extremities: " Moves all extremities x 4. No peripheral edema.   Skin: No jaundice, rashes, or visible lesions.   Neurological:  Alert and oriented x 4.     Labs:  CD4 % Aragon T Cell   Date Value Ref Range Status   01/18/2022 31.5 28 - 57 % Final   08/09/2021 39.8 28 - 57 % Final   01/26/2021 33.0 28 - 57 % Final     Absolute CD4   Date Value Ref Range Status   01/18/2022 578 300 - 1400 cells/ul Final     Comment:     This test was developed and its performance characteristics   determined by [facility].    It has not been cleared or approved by the U.S. Food and Drug   Administration. The FDA has determined that such clearance or   approval is not necessary.  This test is used for clinical purposes.    It should not be regarded as investigational or for research.  This   laboratory is certified under CLIA-88 as qualified to perform high   complexity clinical laboratory testing.     08/09/2021 571 300 - 1400 cells/ul Final     Comment:     This test was developed and its performance characteristics   determined by [facility].    It has not been cleared or approved by the U.S. Food and Drug   Administration. The FDA has determined that such clearance or   approval is not necessary.  This test is used for clinical purposes.    It should not be regarded as investigational or for research.  This   laboratory is certified under CLIA-88 as qualified to perform high   complexity clinical laboratory testing.     01/26/2021 682 300 - 1400 cells/ul Final     Comment:     This test was developed and its performance characteristics   determined by [facility].    It has not been cleared or approved by the U.S. Food and Drug   Administration. The FDA has determined that such clearance or   approval is not necessary.  This test is used for clinical purposes.    It should not be regarded as investigational or for research.   This   laboratory is certified under CLIA-88 as qualified to perform high   complexity clinical laboratory testing.       HIV-1 RNA   Date Value Ref Range Status   08/09/2017 Not detected Not detected Final   01/13/2017 Not detected Not detected Final   04/22/2016 Not detected Not detected Final     HIV-1 RNA, Quantitative   Date Value Ref Range Status   01/18/2022 Undetected Undetected copies/mL Final     Comment:     Result in log copies/mL is Undetected.    -------------------ADDITIONAL INFORMATION-------------------  The quantification range of this assay is 20 to 10,000,000   copies/mL (1.30 log to 7.00 log copies/mL). Testing was   performed using the sushma HIV-1 test (Roche [employer]   [employer] Inc[employer] with the sushma 6800 System.  This test has been modified from the 's   instructions. Its performance characteristics were   determined by [facility] in a manner consistent with CLIA   requirements. This test has not been cleared or approved by   the U.S. Food and Drug Administration.    Test Performed by:  [facility] [place] [address]  : Tree Chowdhury M.D. Ph.D.; CLIA# [id number]     08/09/2021 Undetected Undetected copies/mL Final     Comment:     Result in log copies/mL is Undetected.    -------------------ADDITIONAL INFORMATION-------------------  The quantification range of this assay is 20 to 10,000,000   copies/mL (1.30 log to 7.00 log copies/mL). Testing was   performed using the sushma HIV-1 test (Roche [employer]   System[employer] with the sushma 6800 System.  This test has been modified from the 's   instructions. Its performance characteristics were   determined by [facility] in a manner consistent with CLIA   requirements. This test has not been cleared or approved by   the U.S. Food and Drug Administration.    Test Performed by:  [facility] - [address] [place] [address]  : Tree Chowdhury M.D. Ph.D.; [facility]# [id number]       HIV 1 RNA Ultra   Date Value Ref Range Status   01/26/2021 <40 <40 Copies/mL Final   07/09/2020 <40 <40 Copies/mL Final   06/11/2020 <40 <40 Copies/mL Final       Glucose   Date Value Ref Range Status   01/18/2022 97 70 - 110 mg/dL Final   08/09/2021 99 70 - 110 mg/dL Final   01/26/2021 101 70 - 110 mg/dL Final     Calcium   Date Value Ref Range Status   01/18/2022 9.9 8.7 - 10.5 mg/dL Final   08/09/2021 9.6 8.7 - 10.5 mg/dL Final   01/26/2021 9.6 8.7 - 10.5 mg/dL Final     Albumin   Date Value Ref Range Status   01/18/2022 4.4 3.5 - 5.2 g/dL Final   08/09/2021 4.3 3.5 - 5.2 g/dL Final   01/26/2021 4.5 3.5 - 5.2 g/dL Final     Total Protein   Date Value Ref Range Status   01/18/2022 7.5 6.0 - 8.4 g/dL Final   08/09/2021 7.5 6.0 - 8.4 g/dL Final   01/26/2021 8.0 6.0 - 8.4 g/dL Final     Sodium   Date Value Ref Range Status   01/18/2022 140 136 - 145 mmol/L Final   08/09/2021 139 136 - 145 mmol/L Final   01/26/2021 143 136 - 145 mmol/L Final     Potassium   Date Value Ref Range Status   01/18/2022 4.7 3.5 - 5.1 mmol/L Final   08/09/2021 4.2 3.5 - 5.1 mmol/L Final   01/26/2021 4.5 3.5 - 5.1 mmol/L Final     CO2   Date Value Ref Range Status   01/18/2022 25 23 - 29 mmol/L Final   08/09/2021 24 23 - 29 mmol/L Final   01/26/2021 27 23 - 29 mmol/L Final     Chloride   Date Value Ref Range Status   01/18/2022 105 95 - 110 mmol/L Final   08/09/2021 105 95 - 110 mmol/L Final   01/26/2021 106 95 - 110 mmol/L Final     BUN   Date Value Ref Range Status   01/18/2022 12 8 - 23 mg/dL Final   08/09/2021 13 8 - 23 mg/dL Final   01/26/2021 18 6 - 20 mg/dL Final     Creatinine   Date Value Ref Range Status   01/18/2022 1.0 0.5 - 1.4 mg/dL Final   08/09/2021 0.9 0.5 - 1.4 mg/dL Final   01/26/2021 1.0 0.5 - 1.4 mg/dL Final     Alkaline Phosphatase   Date Value Ref Range Status   01/18/2022 75 55 - 135 U/L Final   08/09/2021 59 55 - 135 U/L  Final   01/26/2021 72 55 - 135 U/L Final     ALT   Date Value Ref Range Status   01/18/2022 24 10 - 44 U/L Final   08/09/2021 19 10 - 44 U/L Final   01/26/2021 31 10 - 44 U/L Final     AST   Date Value Ref Range Status   01/18/2022 28 10 - 40 U/L Final   08/09/2021 24 10 - 40 U/L Final   01/26/2021 31 10 - 40 U/L Final     Total Bilirubin   Date Value Ref Range Status   01/18/2022 0.4 0.1 - 1.0 mg/dL Final     Comment:     For infants and newborns, interpretation of results should be based  on gestational age, weight and in agreement with clinical  observations.    Premature Infant recommended reference ranges:  Up to 24 hours.............<8.0 mg/dL  Up to 48 hours............<12.0 mg/dL  3-5 days..................<15.0 mg/dL  6-29 days.................<15.0 mg/dL     08/09/2021 0.4 0.1 - 1.0 mg/dL Final     Comment:     For infants and newborns, interpretation of results should be based  on gestational age, weight and in agreement with clinical  observations.    Premature Infant recommended reference ranges:  Up to 24 hours.............<8.0 mg/dL  Up to 48 hours............<12.0 mg/dL  3-5 days..................<15.0 mg/dL  6-29 days.................<15.0 mg/dL     01/26/2021 0.5 0.1 - 1.0 mg/dL Final     Comment:     For infants and newborns, interpretation of results should be based  on gestational age, weight and in agreement with clinical  observations.    Premature Infant recommended reference ranges:  Up to 24 hours.............<8.0 mg/dL  Up to 48 hours............<12.0 mg/dL  3-5 days..................<15.0 mg/dL  6-29 days.................<15.0 mg/dL       WBC   Date Value Ref Range Status   01/18/2022 3.97 3.90 - 12.70 K/uL Final   08/09/2021 3.60 (L) 3.90 - 12.70 K/uL Final   01/26/2021 3.82 (L) 3.90 - 12.70 K/uL Final     Hemoglobin   Date Value Ref Range Status   01/18/2022 14.9 14.0 - 18.0 g/dL Final   08/09/2021 13.3 (L) 14.0 - 18.0 g/dL Final   01/26/2021 14.6 14.0 - 18.0 g/dL Final     Hematocrit    Date Value Ref Range Status   01/18/2022 45.1 40.0 - 54.0 % Final   08/09/2021 40.7 40.0 - 54.0 % Final   01/26/2021 46.2 40.0 - 54.0 % Final     MCV   Date Value Ref Range Status   01/18/2022 90 82 - 98 fL Final   08/09/2021 89 82 - 98 fL Final   01/26/2021 95 82 - 98 fL Final     Platelets   Date Value Ref Range Status   01/18/2022 239 150 - 450 K/uL Final   08/09/2021 248 150 - 450 K/uL Final   01/26/2021 252 150 - 350 K/uL Final     Lab Results   Component Value Date    CHOL 183 01/26/2021    CHOL 198 07/09/2020    CHOL 157 01/06/2020     Lab Results   Component Value Date    HDL 65 01/26/2021    HDL 62 07/09/2020    HDL 60 01/06/2020     Lab Results   Component Value Date    LDLCALC 106.0 01/26/2021    LDLCALC 126.0 07/09/2020    LDLCALC 86.4 01/06/2020     Lab Results   Component Value Date    TRIG 60 01/26/2021    TRIG 50 07/09/2020    TRIG 53 01/06/2020     Lab Results   Component Value Date    CHOLHDL 35.5 01/26/2021    CHOLHDL 31.3 07/09/2020    CHOLHDL 38.2 01/06/2020       HIV: No components found for: HIV 1/2 AG/AB  Hepatitis C IgG: No components found for: HEPATITIS C  Syphilis:   RPR   Date Value Ref Range Status   08/09/2017 Reactive (A) Non-reactive Final   02/29/2012 Reactive 1:2 (A) Non-Reactive Final   02/09/2012 Reactive 1:2 (A) Non-Reactive Final       Hepatitis A IgG: No components found for: HEPATITIS A IGG  Hepatitis Bc IgG: No components found for: HEPATITIS B CORE IGG  Hepatitis Bs IgG:  Quantiferon: No results found for: QUANTIFERON  Toxoplasmosis: No results found for: TOXOPLASMA  VZV IgG: No components found for: VARICELLA IGG    No components found for: SEDIMENTATION RATE  No components found for: C-REACTIVE PROTEIN      Microbiology x 7d:   Microbiology Results (last 7 days)     ** No results found for the last 168 hours. **          Immunization History   Administered Date(s) Administered    COVID-19, MRNA, LN-S, PF (MODERNA FULL 0.5 ML DOSE) 02/19/2021, 03/19/2021, 10/23/2021     Influenza (FLUAD) - Quadrivalent - Adjuvanted - PF *Preferred* (65+) 10/07/2021    Influenza - Quadrivalent 10/26/2015    Influenza - Quadrivalent - PF *Preferred* (6 months and older) 10/23/2006, 09/10/2009, 11/01/2017, 10/28/2018, 10/29/2019, 10/02/2020    Meningococcal Conjugate (MCV4P) 01/30/2017, 01/07/2019    Pneumococcal Conjugate - 13 Valent 04/17/2014, 04/17/2014    Pneumococcal Polysaccharide - 23 Valent 12/19/2012, 07/23/2020    Tdap 05/02/2016    Zoster Recombinant 05/14/2018, 07/11/2018       Assessment:     HIV, well controlled  Immunization counseling  Health maintanence  htn  Elevated psa  H/o prostate cancer    Plan:       # HIV--- well controlled on genvoya. No h/o drug resistance. Switch to dovato 2/2 side effect profile/ to avoid ashvin booster. Check VL 1 month after switching    HIV-1 RNA   Date Value Ref Range Status   08/09/2017 Not detected Not detected Final   01/13/2017 Not detected Not detected Final   04/22/2016 Not detected Not detected Final     HIV-1 RNA, Quantitative   Date Value Ref Range Status   01/18/2022 Undetected Undetected copies/mL Final     Comment:     Result in log copies/mL is Undetected.    -------------------ADDITIONAL INFORMATION-------------------  The quantification range of this assay is 20 to 10,000,000   copies/mL (1.30 log to 7.00 log copies/mL). Testing was   performed using the sushma HIV-1 test (Roche [employer]   [employer] with the sushma 6800 System.  This test has been modified from the 's   instructions. Its performance characteristics were   determined by [facility] in a manner consistent with CLIA   requirements. This test has not been cleared or approved by   the U.S. Food and Drug Administration.    Test Performed by:  [facility]  [address] [place] [address]  : Tree Chowdhury M.D. Ph.D.; CLIA# [id number]     08/09/2021 Undetected Undetected copies/mL Final      Comment:     Result in log copies/mL is Undetected.    -------------------ADDITIONAL INFORMATION-------------------  The quantification range of this assay is 20 to 10,000,000   copies/mL (1.30 log to 7.00 log copies/mL). Testing was   performed using the sushma HIV-1 test (Roche [employer]   [employer] with the sushma Fashfix0 System.  This test has been modified from the 's   instructions. Its performance characteristics were   determined by [facility] in a manner consistent with CLIA   requirements. This test has not been cleared or approved by   the U.S. Food and Drug Administration.    Test Performed by:  [facility] [place] [address]  : Tree Chowdhury M.D. Ph.D.; CLIA# [id number]           The patient has been counseled regarding the importance of compliance with every dose of HIV medications. Possible side effects have been reviewed and the patients questions have been answered.    STI Screening:  RPR   RPR   Date Value Ref Range Status   08/09/2017 Reactive (A) Non-reactive Final   02/29/2012 Reactive 1:2 (A) Non-Reactive Final   02/09/2012 Reactive 1:2 (A) Non-Reactive Final     FTA-Abs - syphilis screening now. H/o treatment with 1:2 serofast state  HCV Ab -order   Chlamydia and gonorrhea- g/c oral/rectal screening today    # HTN- refer to primary care. Encouraged bp log. Continue norvasc. Will likely need second agent, but wants to wait to see what bp is after stopping ashvin booster (likely increasing the norvasc dose)    Vaccinations: reviewed       RTC 4 months      Jennifer Purvis MD, MPH  Infectious Disease      Orders Placed This Encounter   Procedures    HIV RNA, Quantitative, PCR     Standing Status:   Future     Standing Expiration Date:   2/9/2023    Comprehensive Metabolic Panel     Standing Status:   Future     Standing Expiration Date:   2/9/2023    RPR     Standing Status:   Future     Standing  Expiration Date:   4/10/2023     Order Specific Question:   Release to patient     Answer:   Immediate    Hepatitis C Antibody     Standing Status:   Future     Standing Expiration Date:   4/10/2023     Order Specific Question:   Release to patient     Answer:   Immediate    C.trach/N.gonor AMP RNA, (Non-Genital)     Gonorrhea PCR - oral    C.trach/N.gonor AMP RNA, (Non-Genital)

## 2022-02-09 NOTE — PROGRESS NOTES
[facility]dle Day 72 Booster Visit   Sponsor: [employer]   Study: A Randomized, Double-blind, Placebo-controlled Phase 3 Study to Assess the Efficacy and Safety of Ad26.COV2.S for the Prevention of SARS-CoV-2-mediated COVID-19 in Adults Aged 18 Years and Older   IRB/Protocol #: 2020.275/ [id number]; Phase 3?   Principle Investigator/Sub-Investigator: ALL Cornell  Site Number: [id number]  Location: [facility]  Subject Number: [id number]    Does subject need to be re consented at this time? no     Concomitant Medications:  Has patient had a change in concomitant medications since prior Visit?: no     Adverse Events:  Has patient experienced any adverse events since prior Visit?: no    Body Temperature:  Was body temperature collected?: yes  Collected by: Jorge Hauser  Date of collection:09-FEB-2022  Time of collection: 09:43am  Temperature: 97.9 F  Temperature Anatomical Location: Oral    Blood Sample Collection for Humoral Immunogenicity:   Was the sample collected?: Yes  Sample collected by: Candida Leon  Date of collection: 09-FEB-2022  Time of collection: 09:52am  Specimen Type: Serum  mL collected: 10 (NON-immuno subset) (15 for homologous subset and immuno subset only)  Accession Number: [id number]    Next Visit:  Was Month 18 scheduled? yes   If yes, when? 26-APR-2022 @ 09:30am    I have expressed importance of returning on scheduling visit date and time. Patient has had funds added to their ClinCard and visit was updated in [employer].

## 2022-02-11 PROBLEM — J18.9 PNEUMONIA OF RIGHT LOWER LOBE DUE TO INFECTIOUS ORGANISM: Status: RESOLVED | Noted: 2020-06-11 | Resolved: 2022-02-11

## 2022-02-11 LAB
C TRACH RRNA SPEC QL NAA+PROBE: NEGATIVE
N GONORRHOEA RRNA SPEC QL NAA+PROBE: NEGATIVE
N.GONORROHEAE, AMP RNA SOURCE: NORMAL
SPECIMEN SOURCE: NORMAL

## 2022-02-12 LAB
C TRACH RRNA SPEC QL NAA+PROBE: NORMAL
N GONORRHOEA RRNA SPEC QL NAA+PROBE: NORMAL
N.GONORROHEAE, AMP RNA SOURCE: NORMAL
SPECIMEN SOURCE: NORMAL

## 2022-02-14 ENCOUNTER — PATIENT MESSAGE (OUTPATIENT)
Dept: INFECTIOUS DISEASES | Facility: CLINIC | Age: 62
End: 2022-02-14
Payer: COMMERCIAL

## 2022-02-16 ENCOUNTER — PATIENT MESSAGE (OUTPATIENT)
Dept: INFECTIOUS DISEASES | Facility: CLINIC | Age: 62
End: 2022-02-16
Payer: COMMERCIAL

## 2022-02-16 DIAGNOSIS — I10 ESSENTIAL HYPERTENSION: ICD-10-CM

## 2022-02-16 DIAGNOSIS — B20 HUMAN IMMUNODEFICIENCY VIRUS (HIV) DISEASE: Primary | ICD-10-CM

## 2022-02-16 NOTE — TELEPHONE ENCOUNTER
Dr Purvis,  Please place a referral to [facility]. You may have to choose the selections to override if Mr Fong doesn't meet all of the criteria.    Thanks,  KJ

## 2022-02-21 ENCOUNTER — TELEPHONE (OUTPATIENT)
Dept: PRIMARY CARE CLINIC | Facility: CLINIC | Age: 62
End: 2022-02-21
Payer: COMMERCIAL

## 2022-02-22 NOTE — TELEPHONE ENCOUNTER
Scheduled pt for 3/23/2022, called pt, no answer, left message for call back. Also, sent portal message to pt.   Mailed appt letter

## 2022-02-25 ENCOUNTER — TELEPHONE (OUTPATIENT)
Dept: RESEARCH | Facility: OTHER | Age: 62
End: 2022-02-25
Payer: COMMERCIAL

## 2022-02-25 NOTE — TELEPHONE ENCOUNTER
Date of call: 25FEB2022  Sponsor: [employer]   Study: A Randomized, Double-blind, Placebo-controlled Phase 3 Study to Assess the Efficacy and Safety of Ad26.COV2.S for the Prevention of SARS-CoV-2-mediated COVID-19 in Adults Aged 18 Years and Older   IRB/Protocol #: 2020.275/ [id number]; Phase 3?   Principle Investigator/Sub-Investigator: ALL Cornell  Site Number: [id number]  Location: [facility]  Subject Number: [id number]     Called pt. LM on VM with my name and phone number regarding pt's wellbeing and e[employer] Study Hub completion.

## 2022-03-23 ENCOUNTER — OFFICE VISIT (OUTPATIENT)
Dept: PRIMARY CARE CLINIC | Facility: CLINIC | Age: 62
End: 2022-03-23
Payer: COMMERCIAL

## 2022-03-23 ENCOUNTER — PATIENT MESSAGE (OUTPATIENT)
Dept: PRIMARY CARE CLINIC | Facility: CLINIC | Age: 62
End: 2022-03-23

## 2022-03-23 VITALS
DIASTOLIC BLOOD PRESSURE: 82 MMHG | TEMPERATURE: 98 F | OXYGEN SATURATION: 98 % | HEART RATE: 70 BPM | SYSTOLIC BLOOD PRESSURE: 128 MMHG | WEIGHT: 218.31 LBS | HEIGHT: 73 IN | BODY MASS INDEX: 28.93 KG/M2

## 2022-03-23 DIAGNOSIS — C61 PROSTATE CANCER: ICD-10-CM

## 2022-03-23 DIAGNOSIS — Z13.220 SCREENING CHOLESTEROL LEVEL: ICD-10-CM

## 2022-03-23 DIAGNOSIS — I10 ESSENTIAL HYPERTENSION: ICD-10-CM

## 2022-03-23 DIAGNOSIS — Z13.9 SCREENING DUE: ICD-10-CM

## 2022-03-23 DIAGNOSIS — Z13.29 THYROID DISORDER SCREENING: ICD-10-CM

## 2022-03-23 DIAGNOSIS — I10 HYPERTENSION, UNSPECIFIED TYPE: ICD-10-CM

## 2022-03-23 DIAGNOSIS — R97.20 ELEVATED PSA: Primary | ICD-10-CM

## 2022-03-23 DIAGNOSIS — B20 HUMAN IMMUNODEFICIENCY VIRUS (HIV) DISEASE: ICD-10-CM

## 2022-03-23 PROCEDURE — 99215 PR OFFICE/OUTPT VISIT, EST, LEVL V, 40-54 MIN: ICD-10-PCS | Mod: S$GLB,,, | Performed by: INTERNAL MEDICINE

## 2022-03-23 PROCEDURE — 99215 OFFICE O/P EST HI 40 MIN: CPT | Mod: S$GLB,,, | Performed by: INTERNAL MEDICINE

## 2022-03-23 NOTE — PATIENT INSTRUCTIONS
TODAY:  - labs on Monday  - follow instructions for BP check on the sheet  - message me with the readings in 1-2 weeks    NEXT:  - bring your home BP cuff

## 2022-03-23 NOTE — ASSESSMENT & PLAN NOTE
Low-volume, low-risk prostate cancer on active surveillance since 8/2018. He presented with an elevated PSA and a family history of prostate cancer. He underwent a MRI fusion prostate needle biopsy on 08/13/2018 which showed 2 positive cores with Cardwell 6 disease, less than 5% on each positive core. His PSA was 7.5 at the time with a prostate volume of 47. 3cc. He had been seen in clinic with Dr. Richardson 01/16/2019. Last office visit was 08/13/2020. At the time he chose not to go ahead with MRI for restaging. He does not want to have another biopsy. Followed by Tammy Wright for active surveillance of prostate cancer.   · Overdue for PSA monitoring  · Needs F/U with Tammy Wright  · Patient prefers to schedule this at his covenience

## 2022-03-23 NOTE — PROGRESS NOTES
Primary Care Provider Appointment - [facility]  SHARED NOTE: Rose Junior (MS4), Dr Barone (Attending)    Subjective:      Patient ID: Simón Fong is a 61 y.o. male with h/o HIV, prostate adenocarcinoma, HTN here to establish care    Chief Complaint: Establish Care    Prior to this visit, patient's last encounter with PCP was with Parisa Fowler on 2021. He was referred to the [facility] clinic by Dr. Jennifer Purvis to establish a PCP and BP management. Was seen by Dr. Fowler as both  ID specialist and PCP provider. She is no longer with with [facility]. Dr. Jennifer Purvis is now pt's new ID specialist and managing his HIV.     He is overall well and healthy. He is retired from work since 2017, was previously a  with [employer]. He lives in [place]. He lives alone, never , no kids. Comes from a large family of 9 siblings. Both parents are now .      HIV  Well-controlled HIV on genvoya. Followed by Dr Acosta and last seen on 2021. HIV was diagnosed 30 years ago. Patient is sexually active with men. Prior antiretroviral regimens include truvada/reyataz, stribild, genvoya. No h/o drug resistance. 100 % compliant on genvoya and denies side effects. Will be switching from genvoya to dovato 2/2 side effect profile/ to avoid ashvin booster. Will check VL 1 month after switching. Been on dovato, no complaints. Will have blood work to see VL             Prostate Cancer  Patient has a low-volume, low-risk prostate cancer on active surveillance since 2018. He presented with an elevated PSA and a family history of prostate cancer. He underwent a MRI fusion prostate needle biopsy on 2018 which showed 2 positive cores with [place] 6 disease, less than 5% on each positive core. His PSA was 7.5 at the time with a prostate volume of 47. 3cc. He had been seen in clinic with Dr. Richardson 2019. Last office visit was 2020. At the time he chose not to go ahead with  "MRI for restaging. He does not want to have another biopsy. Followed by Tammy Wright for active surveillance of prostate cancer. He voices no complaints. No issues with urination or ED No abdominal pain or discomfort.  He does report nocturia x 2; before had been 1 time. MRI done in 7/2021 with improvement. Repeat bx still recommended by Urology however pt not wanting to repeat, rather would do repeat PSA. Will schedule repeat MRI soon and based on results will either opt for biopsy if targets found or active surveillance if no targets.    HTN  He has log of bp readings. On avg BP ranged from 130-150/80-90. He his currently on norvasc 10 mg and would like better blood pressure control. He exercises twice a day. Drinking half a gallon of water daily . No processed foods, red meats, cuts back on added salt. Mostly eats home cooked meals, seldomly eats out. Discussed proper technique for measuring blood pressure .                  Past Surgical History:   Procedure Laterality Date    COLONOSCOPY      COLONOSCOPY N/A 7/31/2020    Procedure: COLONOSCOPY;  Surgeon: Tree Mendiola MD;  Location: [address];  Service: Endoscopy;  Laterality: N/A;  covid 7/28-[facility]    PROSTATE BIOPSY         Past Medical History:   Diagnosis Date    HIV infection     Hypertension        Social History     Socioeconomic History    Marital status: Single   Tobacco Use    Smoking status: Never Smoker    Smokeless tobacco: Never Used   Substance and Sexual Activity    Alcohol use: No    Drug use: No       Review of Systems   Eyes: Negative for visual disturbance.   Respiratory: Negative for chest tightness and shortness of breath.    Cardiovascular: Negative for chest pain, palpitations and leg swelling.   Neurological: Negative for dizziness, weakness, light-headedness and headaches.   All other systems reviewed and are negative.      Objective:   /82   Pulse 70   Temp 97.7 °F (36.5 °C)   Ht 6' 1" " (1.854 m)   Wt 99 kg (218 lb 4.8 oz)   SpO2 98%   BMI 28.80 kg/m²     Physical Exam  Constitutional:       Appearance: Normal appearance.   HENT:      Head: Normocephalic and atraumatic.      Nose: Nose normal.      Mouth/Throat:      Mouth: Mucous membranes are moist.      Pharynx: Oropharynx is clear.   Eyes:      Extraocular Movements: Extraocular movements intact.      Conjunctiva/sclera: Conjunctivae normal.      Pupils: Pupils are equal, round, and reactive to light.   Cardiovascular:      Rate and Rhythm: Normal rate and regular rhythm.      Pulses: Normal pulses.      Heart sounds: Normal heart sounds.   Pulmonary:      Effort: Pulmonary effort is normal.      Breath sounds: Normal breath sounds.   Abdominal:      General: Abdomen is flat. Bowel sounds are normal.      Palpations: Abdomen is soft.   Musculoskeletal:         General: Normal range of motion.      Cervical back: Normal range of motion and neck supple.   Skin:     General: Skin is warm.      Capillary Refill: Capillary refill takes less than 2 seconds.   Neurological:      General: No focal deficit present.      Mental Status: He is alert and oriented to person, place, and time. Mental status is at baseline.   Psychiatric:         Mood and Affect: Mood normal.         Behavior: Behavior normal.         Thought Content: Thought content normal.         Judgment: Judgment normal.             Lab Results   Component Value Date    WBC 3.97 01/18/2022    HGB 14.9 01/18/2022    HCT 45.1 01/18/2022     01/18/2022    CHOL 183 01/26/2021    TRIG 60 01/26/2021    HDL 65 01/26/2021    ALT 24 01/18/2022    AST 28 01/18/2022     01/18/2022    K 4.7 01/18/2022     01/18/2022    CREATININE 1.0 01/18/2022    BUN 12 01/18/2022    CO2 25 01/18/2022    TSH 1.854 02/09/2012    PSA 6.5 (H) 12/21/2018    INR 1.2 02/08/2012       Current Outpatient Medications on File Prior to Visit   Medication Sig Dispense Refill    acetaminophen (TYLENOL) 500  MG tablet Take 1,000 mg by mouth every 6 (six) hours as needed for Pain.      amLODIPine (NORVASC) 10 MG tablet TAKE 1 TABLET ONCE DAILY 90 tablet 3    ascorbic acid, vitamin C, (VITAMIN C) 1000 MG tablet Take 1,000 mg by mouth once daily.       biotin 1 mg Cap Take by mouth.      cetirizine (ZYRTEC) 10 MG tablet Take 1 tablet (10 mg total) by mouth once daily. 30 tablet 0    creatine, bulk, 100 % Powd by Misc.(Non-Drug; Combo Route) route.      dolutegravir-lamivudine (DOVATO)  mg Tab Take 1 tablet by mouth once daily. 30 tablet 5    fish oil-omega-3 fatty acids 300-1,000 mg capsule Take by mouth once daily.      glucosamine-chondroitin 500-400 mg tablet Take 1 tablet by mouth 3 (three) times daily.      leucine-isoleucine-valine (NEOKE BCAA4) 82 gram-328 kcal/100 gram Powd Take by mouth.        No current facility-administered medications on file prior to visit.         Assessment:   61 y.o. male with multiple co-morbid illnesses here to follow-up with PCP and continue work-up of chronic issues notably HIV, HTN, prostate cancer.     Plan:     Problem List Items Addressed This Visit        Cardiac/Vascular    Hypertension     BP readings high normal. Once BP rechecked under ideal circumstances, it normalized  · Advised to continue amlodipine 10mg  · Given handout on BP monitoring technique   · Calibrate home BP cuff at next appt              Renal/    Elevated PSA - Primary    Relevant Orders    PROSTATE SPECIFIC ANTIGEN, DIAGNOSTIC       ID    Human immunodeficiency virus (HIV) disease     Followed by ID (Dr Purvis). HIV was diagnosed 30 years ago. Prior antiretroviral regimens include truvada/reyataz, stribild, genvoya. No h/o drug resistance. 100 % compliant on genvoya and denies side effects. Will be switching from genvoya to dovato 2/2 side effect profile/ to avoid ashvin booster.   · Continue ID follow-up  · Supportive preventative care with PCP              Oncology    Prostate cancer      Low-volume, low-risk prostate cancer on active surveillance since 8/2018. He presented with an elevated PSA and a family history of prostate cancer. He underwent a MRI fusion prostate needle biopsy on 08/13/2018 which showed 2 positive cores with [place] 6 disease, less than 5% on each positive core. His PSA was 7.5 at the time with a prostate volume of 47. 3cc. He had been seen in clinic with Dr. Richardson 01/16/2019. Last office visit was 08/13/2020. At the time he chose not to go ahead with MRI for restaging. He does not want to have another biopsy. Followed by Tammy Wright for active surveillance of prostate cancer.   · Overdue for PSA monitoring  · Needs F/U with Tammy Wright  · Patient prefers to schedule this at his covenience             Other Visit Diagnoses     Essential hypertension        Relevant Orders    Comprehensive Metabolic Panel    Screening cholesterol level        Relevant Orders    Lipid Panel    Screening due        Relevant Orders    Hemoglobin A1C    Thyroid disorder screening        Relevant Orders    TSH          Health Maintenance       Date Due Completion Date    Pneumococcal Vaccines (Age 0-64) (4 of 4 - PPSV23) 07/23/2025 7/23/2020    Colorectal Cancer Screening 07/31/2025 7/31/2020    Lipid Panel 01/26/2026 1/26/2021    TETANUS VACCINE 05/02/2026 5/2/2016          Future Appointments   Date Time Provider Department Center   3/25/2022  8:20 AM LAB, APPOINTMENT [facility]IL[facility] LAB IM Gonzales Pantoja PCW   4/26/2022  9:30 AM MERI BURNETT [facility] ID RES Gonzales Pantoja         Follow up ROUTINE. Total clinical care time was 60 min, issues addressed include establish care.    Rose Junior MS4    Jimena Barone MD/MPH  [facility]  [phone] spectralink

## 2022-03-23 NOTE — Clinical Note
- ROUTINE F/U F2F - labs changed to Monday (needs CMP added and change time to earliest possible, like 6:15AM) - please don't mail him anything, he checks his Jeb

## 2022-03-23 NOTE — ASSESSMENT & PLAN NOTE
Followed by ID (Dr Purvis). HIV was diagnosed 30 years ago. Prior antiretroviral regimens include truvada/reyataz, stribild, genvoya. No h/o drug resistance. 100 % compliant on genvoya and denies side effects. Will be switching from genvoya to dovato 2/2 side effect profile/ to avoid ashvin booster.   · Continue ID follow-up  · Supportive preventative care with PCP

## 2022-03-23 NOTE — ASSESSMENT & PLAN NOTE
BP readings high normal. Once BP rechecked under ideal circumstances, it normalized  · Advised to continue amlodipine 10mg  · Given handout on BP monitoring technique   · Calibrate home BP cuff at next appt

## 2022-03-24 NOTE — TELEPHONE ENCOUNTER
Pt scheduled for Monday, 3/28/2022, explained that he can come for his labs @ [phone]. Pt verbalized understanding.

## 2022-03-28 ENCOUNTER — LAB VISIT (OUTPATIENT)
Dept: LAB | Facility: HOSPITAL | Age: 62
End: 2022-03-28
Attending: INTERNAL MEDICINE
Payer: COMMERCIAL

## 2022-03-28 ENCOUNTER — PATIENT MESSAGE (OUTPATIENT)
Dept: UROLOGY | Facility: CLINIC | Age: 62
End: 2022-03-28
Payer: COMMERCIAL

## 2022-03-28 ENCOUNTER — PATIENT MESSAGE (OUTPATIENT)
Dept: PRIMARY CARE CLINIC | Facility: CLINIC | Age: 62
End: 2022-03-28
Payer: COMMERCIAL

## 2022-03-28 DIAGNOSIS — R97.20 ELEVATED PSA: ICD-10-CM

## 2022-03-28 DIAGNOSIS — Z13.9 SCREENING DUE: ICD-10-CM

## 2022-03-28 DIAGNOSIS — R97.20 RISING PSA LEVEL: ICD-10-CM

## 2022-03-28 DIAGNOSIS — R97.20 ELEVATED PSA, BETWEEN 10 AND LESS THAN 20 NG/ML: ICD-10-CM

## 2022-03-28 DIAGNOSIS — Z13.29 THYROID DISORDER SCREENING: ICD-10-CM

## 2022-03-28 DIAGNOSIS — C61 PROSTATE CANCER: Primary | ICD-10-CM

## 2022-03-28 DIAGNOSIS — I10 ESSENTIAL HYPERTENSION: ICD-10-CM

## 2022-03-28 DIAGNOSIS — Z13.220 SCREENING CHOLESTEROL LEVEL: ICD-10-CM

## 2022-03-28 LAB
ALBUMIN SERPL BCP-MCNC: 4.1 G/DL (ref 3.5–5.2)
ALP SERPL-CCNC: 59 U/L (ref 55–135)
ALT SERPL W/O P-5'-P-CCNC: 24 U/L (ref 10–44)
ANION GAP SERPL CALC-SCNC: 8 MMOL/L (ref 8–16)
AST SERPL-CCNC: 27 U/L (ref 10–40)
BILIRUB SERPL-MCNC: 0.4 MG/DL (ref 0.1–1)
BUN SERPL-MCNC: 16 MG/DL (ref 8–23)
CALCIUM SERPL-MCNC: 9.9 MG/DL (ref 8.7–10.5)
CHLORIDE SERPL-SCNC: 105 MMOL/L (ref 95–110)
CHOLEST SERPL-MCNC: 163 MG/DL (ref 120–199)
CHOLEST/HDLC SERPL: 2.7 {RATIO} (ref 2–5)
CO2 SERPL-SCNC: 28 MMOL/L (ref 23–29)
COMPLEXED PSA SERPL-MCNC: 13.3 NG/ML (ref 0–4)
CREAT SERPL-MCNC: 1.2 MG/DL (ref 0.5–1.4)
EST. GFR  (AFRICAN AMERICAN): >60 ML/MIN/1.73 M^2
EST. GFR  (NON AFRICAN AMERICAN): >60 ML/MIN/1.73 M^2
ESTIMATED AVG GLUCOSE: 117 MG/DL (ref 68–131)
GLUCOSE SERPL-MCNC: 101 MG/DL (ref 70–110)
HBA1C MFR BLD: 5.7 % (ref 4–5.6)
HDLC SERPL-MCNC: 61 MG/DL (ref 40–75)
HDLC SERPL: 37.4 % (ref 20–50)
LDLC SERPL CALC-MCNC: 91.4 MG/DL (ref 63–159)
NONHDLC SERPL-MCNC: 102 MG/DL
POTASSIUM SERPL-SCNC: 4.6 MMOL/L (ref 3.5–5.1)
PROT SERPL-MCNC: 7.5 G/DL (ref 6–8.4)
SODIUM SERPL-SCNC: 141 MMOL/L (ref 136–145)
TRIGL SERPL-MCNC: 53 MG/DL (ref 30–150)
TSH SERPL DL<=0.005 MIU/L-ACNC: 2.61 UIU/ML (ref 0.4–4)

## 2022-03-28 PROCEDURE — 83036 HEMOGLOBIN GLYCOSYLATED A1C: CPT | Performed by: INTERNAL MEDICINE

## 2022-03-28 PROCEDURE — 36415 COLL VENOUS BLD VENIPUNCTURE: CPT | Performed by: INTERNAL MEDICINE

## 2022-03-28 PROCEDURE — 84153 ASSAY OF PSA TOTAL: CPT | Performed by: INTERNAL MEDICINE

## 2022-03-28 PROCEDURE — 80061 LIPID PANEL: CPT | Performed by: INTERNAL MEDICINE

## 2022-03-28 PROCEDURE — 84443 ASSAY THYROID STIM HORMONE: CPT | Performed by: INTERNAL MEDICINE

## 2022-03-28 PROCEDURE — 80053 COMPREHEN METABOLIC PANEL: CPT | Performed by: INTERNAL MEDICINE

## 2022-03-29 NOTE — TELEPHONE ENCOUNTER
Patient with Prostate Cancer on AS  Recent PSA rising; now >10 (13)  Will get MRI, recheck PSA  He is requesting Prolaris on last biopsy 2018

## 2022-03-31 DIAGNOSIS — R97.20 RISING PSA LEVEL: ICD-10-CM

## 2022-03-31 DIAGNOSIS — C61 PROSTATE CANCER: Primary | ICD-10-CM

## 2022-04-04 ENCOUNTER — LAB VISIT (OUTPATIENT)
Dept: LAB | Facility: HOSPITAL | Age: 62
End: 2022-04-04
Attending: INTERNAL MEDICINE
Payer: COMMERCIAL

## 2022-04-04 DIAGNOSIS — B20 HUMAN IMMUNODEFICIENCY VIRUS (HIV) DISEASE: ICD-10-CM

## 2022-04-04 LAB
ALBUMIN SERPL BCP-MCNC: 4.4 G/DL (ref 3.5–5.2)
ALP SERPL-CCNC: 67 U/L (ref 55–135)
ALT SERPL W/O P-5'-P-CCNC: 24 U/L (ref 10–44)
ANION GAP SERPL CALC-SCNC: 9 MMOL/L (ref 8–16)
AST SERPL-CCNC: 27 U/L (ref 10–40)
BASOPHILS # BLD AUTO: 0.02 K/UL (ref 0–0.2)
BASOPHILS NFR BLD: 0.4 % (ref 0–1.9)
BILIRUB SERPL-MCNC: 0.3 MG/DL (ref 0.1–1)
BUN SERPL-MCNC: 18 MG/DL (ref 8–23)
CALCIUM SERPL-MCNC: 10.1 MG/DL (ref 8.7–10.5)
CHLORIDE SERPL-SCNC: 104 MMOL/L (ref 95–110)
CO2 SERPL-SCNC: 30 MMOL/L (ref 23–29)
CREAT SERPL-MCNC: 1.1 MG/DL (ref 0.5–1.4)
DIFFERENTIAL METHOD: NORMAL
EOSINOPHIL # BLD AUTO: 0 K/UL (ref 0–0.5)
EOSINOPHIL NFR BLD: 0.9 % (ref 0–8)
ERYTHROCYTE [DISTWIDTH] IN BLOOD BY AUTOMATED COUNT: 12.6 % (ref 11.5–14.5)
EST. GFR  (AFRICAN AMERICAN): >60 ML/MIN/1.73 M^2
EST. GFR  (NON AFRICAN AMERICAN): >60 ML/MIN/1.73 M^2
GLUCOSE SERPL-MCNC: 94 MG/DL (ref 70–110)
HCT VFR BLD AUTO: 44.8 % (ref 40–54)
HCV AB SERPL QL IA: NEGATIVE
HGB BLD-MCNC: 14.7 G/DL (ref 14–18)
IMM GRANULOCYTES # BLD AUTO: 0.01 K/UL (ref 0–0.04)
IMM GRANULOCYTES NFR BLD AUTO: 0.2 % (ref 0–0.5)
LYMPHOCYTES # BLD AUTO: 1.6 K/UL (ref 1–4.8)
LYMPHOCYTES NFR BLD: 35.6 % (ref 18–48)
MCH RBC QN AUTO: 29.9 PG (ref 27–31)
MCHC RBC AUTO-ENTMCNC: 32.8 G/DL (ref 32–36)
MCV RBC AUTO: 91 FL (ref 82–98)
MONOCYTES # BLD AUTO: 0.4 K/UL (ref 0.3–1)
MONOCYTES NFR BLD: 8.8 % (ref 4–15)
NEUTROPHILS # BLD AUTO: 2.4 K/UL (ref 1.8–7.7)
NEUTROPHILS NFR BLD: 54.1 % (ref 38–73)
NRBC BLD-RTO: 0 /100 WBC
PLATELET # BLD AUTO: 261 K/UL (ref 150–450)
PMV BLD AUTO: 10.4 FL (ref 9.2–12.9)
POTASSIUM SERPL-SCNC: 4.5 MMOL/L (ref 3.5–5.1)
PROT SERPL-MCNC: 7.9 G/DL (ref 6–8.4)
RBC # BLD AUTO: 4.92 M/UL (ref 4.6–6.2)
SODIUM SERPL-SCNC: 143 MMOL/L (ref 136–145)
WBC # BLD AUTO: 4.52 K/UL (ref 3.9–12.7)

## 2022-04-04 PROCEDURE — 86361 T CELL ABSOLUTE COUNT: CPT | Performed by: INTERNAL MEDICINE

## 2022-04-04 PROCEDURE — 80053 COMPREHEN METABOLIC PANEL: CPT | Performed by: INTERNAL MEDICINE

## 2022-04-04 PROCEDURE — 85025 COMPLETE CBC W/AUTO DIFF WBC: CPT | Performed by: INTERNAL MEDICINE

## 2022-04-04 PROCEDURE — 36415 COLL VENOUS BLD VENIPUNCTURE: CPT | Performed by: INTERNAL MEDICINE

## 2022-04-04 PROCEDURE — 86780 TREPONEMA PALLIDUM: CPT | Performed by: INTERNAL MEDICINE

## 2022-04-04 PROCEDURE — 86803 HEPATITIS C AB TEST: CPT | Performed by: INTERNAL MEDICINE

## 2022-04-04 PROCEDURE — 87536 HIV-1 QUANT&REVRSE TRNSCRPJ: CPT | Performed by: INTERNAL MEDICINE

## 2022-04-04 PROCEDURE — 86593 SYPHILIS TEST NON-TREP QUANT: CPT | Performed by: INTERNAL MEDICINE

## 2022-04-04 PROCEDURE — 86592 SYPHILIS TEST NON-TREP QUAL: CPT | Performed by: INTERNAL MEDICINE

## 2022-04-05 LAB
CD3+CD4+ CELLS # BLD: 745 CELLS/UL (ref 300–1400)
CD3+CD4+ CELLS NFR BLD: 37.9 % (ref 28–57)
RPR SER QL: REACTIVE
RPR SER-TITR: ABNORMAL {TITER}

## 2022-04-06 ENCOUNTER — PATIENT MESSAGE (OUTPATIENT)
Dept: INFECTIOUS DISEASES | Facility: CLINIC | Age: 62
End: 2022-04-06
Payer: COMMERCIAL

## 2022-04-06 ENCOUNTER — TELEPHONE (OUTPATIENT)
Dept: INFECTIOUS DISEASES | Facility: HOSPITAL | Age: 62
End: 2022-04-06
Payer: COMMERCIAL

## 2022-04-06 LAB
HIV1 RNA # PLAS NAA DL=20: NORMAL COPIES/ML
T PALLIDUM AB SER QL IF: REACTIVE

## 2022-04-06 NOTE — TELEPHONE ENCOUNTER
Spoke with patient about RPR. Has been treated for syphilis in past. 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 If increases to 1:4 in future, would require re-treatment

## 2022-04-07 ENCOUNTER — PATIENT MESSAGE (OUTPATIENT)
Dept: UROLOGY | Facility: CLINIC | Age: 62
End: 2022-04-07
Payer: COMMERCIAL

## 2022-04-07 ENCOUNTER — PATIENT MESSAGE (OUTPATIENT)
Dept: PRIMARY CARE CLINIC | Facility: CLINIC | Age: 62
End: 2022-04-07
Payer: COMMERCIAL

## 2022-04-24 ENCOUNTER — PATIENT MESSAGE (OUTPATIENT)
Dept: PRIMARY CARE CLINIC | Facility: CLINIC | Age: 62
End: 2022-04-24
Payer: COMMERCIAL

## 2022-04-26 ENCOUNTER — RESEARCH ENCOUNTER (OUTPATIENT)
Dept: RESEARCH | Facility: CLINIC | Age: 62
End: 2022-04-26
Payer: COMMERCIAL

## 2022-04-26 ENCOUNTER — TELEPHONE (OUTPATIENT)
Dept: UROLOGY | Facility: CLINIC | Age: 62
End: 2022-04-26
Payer: COMMERCIAL

## 2022-04-26 NOTE — TELEPHONE ENCOUNTER
Caller: cordell (Today, 11:31 AM)  [phone]  ext#[phone]   please call [facility] concerning missing information would like to know the status waiting on a call back thanks.   Returned call to genetic lab, transferred to several agents, then phone was disconnected.

## 2022-04-26 NOTE — PROGRESS NOTES
Bola Month 18  Sponsor: [facility]   Study: A Randomized, Double-blind, Placebo-controlled Phase 3 Study to Assess the Efficacy and Safety of Ad26.COV2.S for the Prevention of SARS-CoV-2-mediated COVID-19 in Adults Aged 18 Years and Older   IRB/Protocol #: 2020.275/ [id number]; Phase 3?   Principle Investigator/Sub-Investigator: ALL [employer]  Site Number: [id number]  Location: [facility]  Subject Number: [id number]    Does subject need to be re consented at this time? yes   Informed Consent:     Is this a reconsent?: yes  Consenting was completed in private area using the most current IRB approved version of the ICF dated 23-FEB-2022  by myself and patient was given ample time to review consent prior to execution of ICF.    Prior to the Informed Consent (IC) being signed, or any study protocol required data collection, testing, procedure, or intervention being performed, the following was done and/or discussed:?    Patient was given a copy of the IC for review??    Purpose of the study and qualifications to participate??    Study design, follow up schedule, and tests or procedures done at each visit?    Confidentiality and HIPAA Authorization for Release of Medical Records for the research trial/ subject's rights/research related injury?    Risk, Benefits, Alternative Treatments, Compensation and Costs?    Participation in the research trial is voluntary and patient may withdraw at anytime?    Contact information for study related questions?     Patient verbalizes understanding of the above: Yes?   Contact information for CRC and PI given to patient: Yes?   Patient able to adequately summarize: the purpose of the study, the risks associated with the study, and all procedures, testing, and follow-ups associated with the study: Yes?     Simón Fong signed the IRB approved version of informed consent form for the [facility] research study.? Each page of  the consent was reviewed with the patient and patient's family) and all questions answered satisfactorily. The patient signed the paper consent form and received a copy of same (copy of informed consent made and provided to patient). The original consent was scanned into electronic medical records (EPIC) if yes, use .jjI to complete documentation.    Concomitant Medications:  Has patient had a change in concomitant medications since prior Visit?: no    Adverse Events:  Has patient experienced any adverse events since prior Visit?: no    Has the subject required any medical encounters other than those mandated per trial protocol?: yes    If yes, complete data below:  Collection Date: [unfilled]  Start Date: 13-APR-2022  End Date: 13-APR-2022  Reason for medical encounter: Other: COVID-19 Test before Cruise  Type of medical encounter: Medical Practitioner Office: A consultation with the physician/ other medical practitioner at the office of the health care professional    If Hospital Outpatient Department, Laboratory Department, Medical Practitioner Office or Home Care is selected, specify frequency of visits: Once    If Hospital Outpatient Department is selected, specify type of practitioner: General Practitioner     Start Date: 23-MAR-2022  End Date: 23-MAR-2022  Reason for medical encounter: Other: Establish Care with new Primary Care Provider  Type of medical encounter: Medical Practitioner Office: A consultation with the physician/ other medical practitioner at the office of the health care professional    If Hospital Outpatient Department, Laboratory Department, Medical Practitioner Office or Home Care is selected, specify frequency of visits: Once    If Hospital Outpatient Department is selected, specify type of practitioner: General Practitioner    Start Date: 09-FEB-2022  End Date: 09-FEB-2022  Reason for medical encounter: Other: Annual Check-up Visit  Type of medical encounter: Medical Practitioner Office: A  consultation with the physician/ other medical practitioner at the office of the health care professional    If Hospital Outpatient Department, Laboratory Department, Medical Practitioner Office or Home Care is selected, specify frequency of visits: Once    If Hospital Outpatient Department is selected, specify type of practitioner: General Practitioner      Body Temperature:  Was body temperature collected?: yes  Collected by: Jorge Hauser  Date of collection: 26-APR-2022  Time of collection: 9:44am  Temperature: 98.1 F  Temperature Anatomical Location: Oral      Blood Sample Collection for Humoral Immunogenicity:   Was the sample collected?: Yes  Sample collected by: Candida Leon  Date of collection: 26-APR-2022  Time of collection: 9:54am  Specimen Type: Serum  mL collected: 10 (NON-immuno subset) (15 for homologous subset and immuno subset only)  Accession Number: [id number]      Next Visit:  Was Year 2 scheduled? yes   If yes, when? 02-NOV-2022 @ 9:30am      I have expressed importance of returning on scheduling visit date and time. Patient has had funds added to their ClinCard and visit was updated in [employer].   Subject was instructed to complete eDiary twice weekly for the remainder of the study.

## 2022-04-28 ENCOUNTER — PATIENT MESSAGE (OUTPATIENT)
Dept: UROLOGY | Facility: CLINIC | Age: 62
End: 2022-04-28
Payer: COMMERCIAL

## 2022-05-11 ENCOUNTER — PATIENT MESSAGE (OUTPATIENT)
Dept: UROLOGY | Facility: CLINIC | Age: 62
End: 2022-05-11
Payer: COMMERCIAL

## 2022-05-12 ENCOUNTER — PATIENT MESSAGE (OUTPATIENT)
Dept: UROLOGY | Facility: CLINIC | Age: 62
End: 2022-05-12
Payer: COMMERCIAL

## 2022-05-12 ENCOUNTER — HOSPITAL ENCOUNTER (OUTPATIENT)
Dept: RADIOLOGY | Facility: HOSPITAL | Age: 62
Discharge: HOME | End: 2022-05-12
Attending: NURSE PRACTITIONER
Payer: COMMERCIAL

## 2022-05-12 DIAGNOSIS — R97.20 RISING PSA LEVEL: ICD-10-CM

## 2022-05-12 DIAGNOSIS — C61 PROSTATE CANCER: ICD-10-CM

## 2022-05-12 DIAGNOSIS — R97.20 ELEVATED PSA, BETWEEN 10 AND LESS THAN 20 NG/ML: ICD-10-CM

## 2022-05-12 PROCEDURE — A9585 GADOBUTROL INJECTION: HCPCS | Performed by: NURSE PRACTITIONER

## 2022-05-12 PROCEDURE — 72197 MRI PELVIS W/O & W/DYE: CPT | Mod: 26,,, | Performed by: RADIOLOGY

## 2022-05-12 PROCEDURE — 25500020 PHARM REV CODE 255: Performed by: NURSE PRACTITIONER

## 2022-05-12 PROCEDURE — 72197 MRI PROSTATE W W/O CONTRAST: ICD-10-PCS | Mod: 26,,, | Performed by: RADIOLOGY

## 2022-05-12 PROCEDURE — 72197 MRI PELVIS W/O & W/DYE: CPT | Mod: TC

## 2022-05-12 RX ORDER — GADOBUTROL 604.72 MG/ML
10 INJECTION INTRAVENOUS
Status: COMPLETED | OUTPATIENT
Start: 2022-05-12 | End: 2022-05-12

## 2022-05-12 RX ADMIN — GADOBUTROL 10 ML: 604.72 INJECTION INTRAVENOUS at 09:05

## 2022-05-13 ENCOUNTER — OFFICE VISIT (OUTPATIENT)
Dept: UROLOGY | Facility: CLINIC | Age: 62
End: 2022-05-13
Payer: COMMERCIAL

## 2022-05-13 ENCOUNTER — TELEPHONE (OUTPATIENT)
Dept: PRIMARY CARE CLINIC | Facility: CLINIC | Age: 62
End: 2022-05-13
Payer: COMMERCIAL

## 2022-05-13 ENCOUNTER — PATIENT MESSAGE (OUTPATIENT)
Dept: UROLOGY | Facility: CLINIC | Age: 62
End: 2022-05-13

## 2022-05-13 VITALS
DIASTOLIC BLOOD PRESSURE: 86 MMHG | BODY MASS INDEX: 29.03 KG/M2 | HEIGHT: 73 IN | SYSTOLIC BLOOD PRESSURE: 138 MMHG | HEART RATE: 62 BPM | WEIGHT: 219 LBS

## 2022-05-13 DIAGNOSIS — C61 PROSTATE CANCER: Primary | ICD-10-CM

## 2022-05-13 DIAGNOSIS — R97.20 ELEVATED PSA, BETWEEN 10 AND LESS THAN 20 NG/ML: ICD-10-CM

## 2022-05-13 DIAGNOSIS — N13.8 BPH WITH URINARY OBSTRUCTION: ICD-10-CM

## 2022-05-13 DIAGNOSIS — N40.1 BPH WITH URINARY OBSTRUCTION: ICD-10-CM

## 2022-05-13 DIAGNOSIS — R97.20 RISING PSA LEVEL: ICD-10-CM

## 2022-05-13 PROCEDURE — 99215 PR OFFICE/OUTPT VISIT, EST, LEVL V, 40-54 MIN: ICD-10-PCS | Mod: S$GLB,,, | Performed by: NURSE PRACTITIONER

## 2022-05-13 PROCEDURE — 81002 URINALYSIS NONAUTO W/O SCOPE: CPT | Mod: S$GLB,,, | Performed by: NURSE PRACTITIONER

## 2022-05-13 PROCEDURE — 81002 POCT URINE DIPSTICK WITHOUT MICROSCOPE: ICD-10-PCS | Mod: S$GLB,,, | Performed by: NURSE PRACTITIONER

## 2022-05-13 PROCEDURE — 99999 PR PBB SHADOW E&M-EST. PATIENT-LVL IV: ICD-10-PCS | Mod: PBBFAC,,, | Performed by: NURSE PRACTITIONER

## 2022-05-13 PROCEDURE — 99215 OFFICE O/P EST HI 40 MIN: CPT | Mod: S$GLB,,, | Performed by: NURSE PRACTITIONER

## 2022-05-13 PROCEDURE — 99999 PR PBB SHADOW E&M-EST. PATIENT-LVL IV: CPT | Mod: PBBFAC,,, | Performed by: NURSE PRACTITIONER

## 2022-05-13 RX ORDER — CIPROFLOXACIN 500 MG/1
500 TABLET ORAL ONCE
Qty: 1 TABLET | Refills: 0 | Status: SHIPPED | OUTPATIENT
Start: 2022-06-07 | End: 2022-06-07

## 2022-05-13 NOTE — TELEPHONE ENCOUNTER
----- Message from Jimena Barone MD sent at 3/23/2022 12:10 PM CDT -----  - ROUTINE F/U F2F  - labs changed to Monday (needs CMP added and change time to earliest possible, like 6:15AM)  - please don't mail him anything, he checks his Jeb

## 2022-05-13 NOTE — PROGRESS NOTES
CHIEF COMPLAINT:    Simón Fong is a 62 y.o. male presents today for Prostate Cancer.    HISTORY OF PRESENTING ILLINESS:    Simón Fong is a 62 y.o. male with Prostate Cancer on Active Surveillance since 2018 with MRI's.    Has not wanted another bx.   He was a patient of Dr. Richardson.   Initial diagnosis was 08/13/2018 with UroNav Bx; 2 positive cores with [place] 6 disease, less than 5% on each positive core.  His PSA was 7.5 at the time with a prostate volume of 47. 3cc  (+) family history of Prostate Cancer.     05/09/2022 Prolaris Biopsy Report.      He is here today for his follow up visit for active surveillance of prostate cancer.   Last clinic visit was 06/17/2021  Latest PSA was 13.3 on 03/28/2022 05/12/2022 MRI of Prostate  -PI RADS 3: Right [place] Posterior peripheral zone laterally   -PSA was 13.3; Prostate was 55.4cc    He voices no complaints.  Does not want to repeat PSA.   No issues with urination or ED  No abdominal pain or discomfort.       07/20/2018: MRI of Prostate  -PI RADS 3:Midline region: Mid zone: Posterior peripheral zone  -PSA was 7.5; Prostate was 47.4cc    07/21/2021: MRI of Prostate  -midline posterior peripheral zone is less conspicuous on the current study compared to prior MR July 2018  -PSA was 9.6; Prostate was 56cc                REVIEW OF SYSTEMS:  Review of Systems   Constitutional: Negative.  Negative for chills and fever.   Eyes: Negative for double vision.   Respiratory: Negative for cough and shortness of breath.    Cardiovascular: Negative for chest pain and palpitations.   Gastrointestinal: Negative for nausea and vomiting.   Genitourinary: Positive for frequency. Negative for dysuria, flank pain and hematuria.        He does report nocturia; back to his normal;    Neurological: Negative for dizziness.         PATIENT HISTORY:    Past Medical History:   Diagnosis Date    HIV infection     Hypertension        Past Surgical History:   Procedure Laterality Date     COLONOSCOPY      COLONOSCOPY N/A 7/31/2020    Procedure: COLONOSCOPY;  Surgeon: Tree Mendiola MD;  Location: [facility] ([address] [place] [address]);  Service: Endoscopy;  Laterality: N/A;  covid 7/28-[facility]    PROSTATE BIOPSY         Family History   Problem Relation Age of Onset    Diabetes Mother     Heart disease Mother     Hypertension Mother     Stroke Mother     Hypertension Father     Cancer Sister     Cancer Brother        Social History     Socioeconomic History    Marital status: Single   Tobacco Use    Smoking status: Never Smoker    Smokeless tobacco: Never Used   Substance and Sexual Activity    Alcohol use: No    Drug use: No       Allergies:  Sulfa (sulfonamide antibiotics)    Medications:    Current Outpatient Medications:     acetaminophen (TYLENOL) 500 MG tablet, Take 1,000 mg by mouth every 6 (six) hours as needed for Pain., Disp: , Rfl:     amLODIPine (NORVASC) 10 MG tablet, TAKE 1 TABLET ONCE DAILY, Disp: 90 tablet, Rfl: 3    ascorbic acid, vitamin C, (VITAMIN C) 1000 MG tablet, Take 1,000 mg by mouth once daily. , Disp: , Rfl:     biotin 1 mg Cap, Take by mouth., Disp: , Rfl:     cetirizine (ZYRTEC) 10 MG tablet, Take 1 tablet (10 mg total) by mouth once daily., Disp: 30 tablet, Rfl: 0    [START ON 6/7/2022] ciprofloxacin HCl (CIPRO) 500 MG tablet, Take 1 tablet (500 mg total) by mouth once. Take the morning of the biopsy. for 1 dose, Disp: 1 tablet, Rfl: 0    creatine, bulk, 100 % Powd, by Misc.(Non-Drug; Combo Route) route., Disp: , Rfl:     dolutegravir-lamivudine (DOVATO)  mg Tab, Take 1 tablet by mouth once daily., Disp: 30 tablet, Rfl: 5    fish oil-omega-3 fatty acids 300-1,000 mg capsule, Take by mouth once daily., Disp: , Rfl:     glucosamine-chondroitin 500-400 mg tablet, Take 1 tablet by mouth 3 (three) times daily., Disp: , Rfl:     leucine-isoleucine-valine (NEOKE BCAA4) 82 gram-328 kcal/100 gram Powd, Take by mouth. , Disp: , Rfl:     [START ON  6/7/2022] sodium phosphates (FLEET) 19-7 gram/118 mL Enem, Place 1 enema rectally once. Use morning of biopsy. for 1 dose, Disp: 1 enema, Rfl: 0    PHYSICAL EXAMINATION:  Physical Exam  Vitals and nursing note reviewed.   Constitutional:       General: He is awake.      Appearance: Normal appearance.   HENT:      Head: Normocephalic.      Right Ear: External ear normal.      Left Ear: External ear normal.      Nose: Nose normal.   Cardiovascular:      Rate and Rhythm: Normal rate.   Pulmonary:      Effort: Pulmonary effort is normal. No respiratory distress.   Abdominal:      Tenderness: There is no abdominal tenderness. There is no right CVA tenderness or left CVA tenderness.   Genitourinary:     Penis: Normal.       Testes: Normal.      Prostate: Enlarged. Not tender and no nodules present.      Rectum: Normal.      Comments: Difficult to fully assess 2/2 resistance.  No apparent nodules on apex    Musculoskeletal:         General: Normal range of motion.      Cervical back: Normal range of motion.   Skin:     General: Skin is warm and dry.   Neurological:      General: No focal deficit present.      Mental Status: He is alert and oriented to person, place, and time.   Psychiatric:         Mood and Affect: Mood normal.         Behavior: Behavior is cooperative.           LABS:      In office UA today was clear of active infection.       Lab Results   Component Value Date    PSA 6.5 (H) 12/21/2018    PSA 7.5 (H) 04/30/2018    PSA 4.6 (H) 07/26/2017    PSADIAG 13.3 (H) 03/28/2022    PSADIAG 9.6 (H) 06/15/2021    PSADIAG 7.3 (H) 01/06/2020    PSATOTAL 6.4 (H) 08/22/2019             IMPRESSION:    Encounter Diagnoses   Name Primary?    Prostate cancer Yes    Rising PSA level     Elevated PSA, between 10 and less than 20 ng/ml     BPH with urinary obstruction          Assessment:       1. Prostate cancer    2. Rising PSA level    3. Elevated PSA, between 10 and less than 20 ng/ml    4. BPH with urinary obstruction         Plan:         I spent 40 minutes with the patient of which more than half was spent in direct consultation with the patient in regards to our treatment and plan.  We addressed the office findings and recent labs and Prolaris Report  Education and recommendations of today's plan of care including home remedies and needed follow up with PCP.   We discussed his previous MRI and PSA's; concerns with rising PSA.   Has not wanted to repeat bx due to the previous experience.  But has agreed to bx not PSA  Reviewed the process/expectations  Prep meds given  We discussed the LUTS and possible contributory factors.   Diet modifications; reducing bladder irritants; healthy diet; benefits of regular exercise.

## 2022-05-26 ENCOUNTER — PATIENT MESSAGE (OUTPATIENT)
Dept: PRIMARY CARE CLINIC | Facility: CLINIC | Age: 62
End: 2022-05-26
Payer: COMMERCIAL

## 2022-05-26 ENCOUNTER — PATIENT MESSAGE (OUTPATIENT)
Dept: UROLOGY | Facility: CLINIC | Age: 62
End: 2022-05-26
Payer: COMMERCIAL

## 2022-05-27 NOTE — TELEPHONE ENCOUNTER
Day                 Time               BP         Pulse    20-May         12:53 PM        116/74         58    21-May         06:50 AM         112/80         58  21-May         12:13 PM         115/77         60    22-May         7:46 AM            119/88         59  22-May         7:59 AM            109/79         52    23-May         7:55 AM             129/82         59    24-May         6:13 AM             119/82         62    25-May         2:24 PM              108/72        62    26-May         6:16 AM              118/83         63  26-May         1:42 PM              116/79         68

## 2022-06-03 ENCOUNTER — LAB VISIT (OUTPATIENT)
Dept: LAB | Facility: HOSPITAL | Age: 62
End: 2022-06-03
Payer: COMMERCIAL

## 2022-06-03 DIAGNOSIS — R97.20 ELEVATED PSA, BETWEEN 10 AND LESS THAN 20 NG/ML: ICD-10-CM

## 2022-06-03 DIAGNOSIS — R97.20 RISING PSA LEVEL: ICD-10-CM

## 2022-06-03 DIAGNOSIS — C61 PROSTATE CANCER: ICD-10-CM

## 2022-06-03 LAB — COMPLEXED PSA SERPL-MCNC: 12.2 NG/ML (ref 0–4)

## 2022-06-03 PROCEDURE — 84153 ASSAY OF PSA TOTAL: CPT | Performed by: NURSE PRACTITIONER

## 2022-06-03 PROCEDURE — 36415 COLL VENOUS BLD VENIPUNCTURE: CPT | Performed by: NURSE PRACTITIONER

## 2022-06-07 ENCOUNTER — PROCEDURE VISIT (OUTPATIENT)
Dept: UROLOGY | Facility: CLINIC | Age: 62
End: 2022-06-07
Payer: COMMERCIAL

## 2022-06-07 VITALS
DIASTOLIC BLOOD PRESSURE: 71 MMHG | WEIGHT: 218.25 LBS | HEIGHT: 73 IN | HEART RATE: 56 BPM | TEMPERATURE: 98 F | SYSTOLIC BLOOD PRESSURE: 150 MMHG | RESPIRATION RATE: 16 BRPM | BODY MASS INDEX: 28.93 KG/M2

## 2022-06-07 DIAGNOSIS — R97.20 ELEVATED PSA, BETWEEN 10 AND LESS THAN 20 NG/ML: ICD-10-CM

## 2022-06-07 DIAGNOSIS — N13.8 BPH WITH URINARY OBSTRUCTION: ICD-10-CM

## 2022-06-07 DIAGNOSIS — R97.20 RISING PSA LEVEL: ICD-10-CM

## 2022-06-07 DIAGNOSIS — C61 PROSTATE CANCER: Primary | ICD-10-CM

## 2022-06-07 DIAGNOSIS — N40.1 BPH WITH URINARY OBSTRUCTION: ICD-10-CM

## 2022-06-07 PROCEDURE — 96372 PR INJECTION,THERAP/PROPH/DIAG2ST, IM OR SUBCUT: ICD-10-PCS | Mod: 59,S$GLB,, | Performed by: UROLOGY

## 2022-06-07 PROCEDURE — 76872 PR US TRANSRECTAL: ICD-10-PCS | Mod: 26,S$GLB,, | Performed by: UROLOGY

## 2022-06-07 PROCEDURE — 55700 PR BIOPSY OF PROSTATE,NEEDLE/PUNCH: ICD-10-PCS | Mod: S$GLB,,, | Performed by: UROLOGY

## 2022-06-07 PROCEDURE — 88305 TISSUE EXAM BY PATHOLOGIST: ICD-10-PCS | Mod: 26,,, | Performed by: PATHOLOGY

## 2022-06-07 PROCEDURE — 55700 PR BIOPSY OF PROSTATE,NEEDLE/PUNCH: CPT | Mod: S$GLB,,, | Performed by: UROLOGY

## 2022-06-07 PROCEDURE — 88305 TISSUE EXAM BY PATHOLOGIST: CPT | Mod: 26,,, | Performed by: PATHOLOGY

## 2022-06-07 PROCEDURE — 96372 THER/PROPH/DIAG INJ SC/IM: CPT | Mod: 59,S$GLB,, | Performed by: UROLOGY

## 2022-06-07 PROCEDURE — 76872 US TRANSRECTAL: CPT | Mod: 26,S$GLB,, | Performed by: UROLOGY

## 2022-06-07 PROCEDURE — 88305 TISSUE EXAM BY PATHOLOGIST: CPT | Mod: 59 | Performed by: PATHOLOGY

## 2022-06-07 RX ORDER — LIDOCAINE HYDROCHLORIDE 10 MG/ML
20 INJECTION INFILTRATION; PERINEURAL
Status: COMPLETED | OUTPATIENT
Start: 2022-06-07 | End: 2022-06-07

## 2022-06-07 RX ORDER — LIDOCAINE HYDROCHLORIDE 20 MG/ML
JELLY TOPICAL
Status: COMPLETED | OUTPATIENT
Start: 2022-06-07 | End: 2022-06-07

## 2022-06-07 RX ORDER — CEFTRIAXONE 1 G/1
1 INJECTION, POWDER, FOR SOLUTION INTRAMUSCULAR; INTRAVENOUS
Status: COMPLETED | OUTPATIENT
Start: 2022-06-07 | End: 2022-06-07

## 2022-06-07 RX ADMIN — LIDOCAINE HYDROCHLORIDE 20 ML: 10 INJECTION INFILTRATION; PERINEURAL at 09:06

## 2022-06-07 RX ADMIN — CEFTRIAXONE 1 G: 1 INJECTION, POWDER, FOR SOLUTION INTRAMUSCULAR; INTRAVENOUS at 09:06

## 2022-06-07 RX ADMIN — LIDOCAINE HYDROCHLORIDE: 20 JELLY TOPICAL at 09:06

## 2022-06-07 NOTE — PROCEDURES
"Transrectal Ultrasound w/ Biopsy    Date/Time: 6/7/2022 9:45 AM  Performed by: Murtaza Hesnon MD  Authorized by: Tammy Wright NP     Time out: Immediately prior to procedure a "time out" was called to verify the correct patient, procedure, equipment, support staff and site/side marked as required.    Indications: Prostate Cancer    Preparation: Patient was prepped and draped in usual sterile fashion    Position:  Left lateral  Anesthesia:  Pudendal nerve block  Patient sedated: No    Lesions:: Yes         Type:  Mixed hypo- and hyperechoic  Left Base Biopsies: 2  Left Mid Biopsies: 2  Left [place] Biopsies: 2  Right Base Biopsies: 2  Right Mid Biopsies: 2  Right [place] Biopsies: 4  Transitional zone: No    Total Biopsies:  14    Patient tolerance:  Patient tolerated the procedure well with no immediate complications     CRISTOBAL #1- right apex PIRADS 3 lesion- hypoechoic on US, calcified area near also sampled       The risks and benefits of the procedure were explained to the patient and consent was obtained.  The patient laid in the lateral decubitus position.  10cc of lidocaine jelly was used for local analgesia in the rectum.  The ultrasound probe was advanced into the rectum. The prostate was visualized.  There was was not a median lobe.    20cc of 1% lidocaine without epi was used for a prostatic nerve block.    At this point, a sweep of the prostate was performed from base to apex and the transverse plane using the ultrasound and URONAV platform.  Landmarks were then labeled with anterior, posterior, right, left, base and apex were labeled in the axial as well as sagittal planes.  Segmentation was then performed and manual adjustments were made as needed starting in the sagittal plane followed by the axial plane.  At this point, alignment of the ultrasound with MRI images was ensured using the toggle between ultrasound and MRI.       At this point elastic deformation was computed.  Our images were satisfactory.     "

## 2022-06-07 NOTE — PATIENT INSTRUCTIONS
What to Expect After a Prostate Biopsy    You may have mild bleeding from the rectum or urine for about 1 week to 1 month, or in your ejaculate for several months. This bleeding is normal and expected, and it will stop. You may have mild discomfort in your rectal or urethral area for 24-48 hours.    You cannot do any strenuous lifting, straining, or exercising for 24 hours. You may return to full activity the day after the biopsy.    You may continue to take all your regular medications after the procedure except for the blood thinners.    You may resume all blood-thinning medications once you no longer see any bleeding or whenever your physician prescribing the medication says it is all right to do so. You may take Tylenol if you have a fever and your temperature is less than 100° F or if you have some discomfort.    You will receive a call from the Urology Department at [facility] with the results of your prostate biopsy within one week.    Signs and Symptoms to Report    Call your [facility] urologist at [phone] if you develop any of the following:  Temperature greater than 101°  F  Inability to urinate  A large amount of bleeding from the rectum or in the urine  Persistent or severe pain    After hours or on weekends, you may reach a urology resident on call at this number: [phone].

## 2022-06-10 LAB
FINAL PATHOLOGIC DIAGNOSIS: NORMAL
GROSS: NORMAL
Lab: NORMAL

## 2022-06-14 ENCOUNTER — PATIENT MESSAGE (OUTPATIENT)
Dept: UROLOGY | Facility: CLINIC | Age: 62
End: 2022-06-14
Payer: COMMERCIAL

## 2022-06-27 NOTE — PROGRESS NOTES
Clinic Note  6/27/2022      Subjective:         Chief Complaint:   VANESSA Fong is a 62 y.o. male with a history of prostate cancer diagnosed in 2018.  Initial PSA 7.5. Uronav biopsy in 2018- Alcides 6 right middle and right base 1/2 cores each, <5%. Overall 2/12 cores positive.  More recent information below.    FH -positive  PSA - 12.2  AJCC Stage - T1C  STAR CAP stage- IB  Volume - 55 ccs  MRI - 5/12/2022- 0.9 cm PI-RADS 3 lesion RAPZ. Negative extra prostatic extension, NVBI (neurovascular bundle involvement), SVI (seminal vesicle involvement), nodes.  Biopsy - 6/7/2022-UroNav- [place] 6 (GG1) left apex 2/2 60%, right apex (target) 1/4 60%, right middle 2/2 90%, right base 1/1 10%. Overall 4/6 sites, 6/14 cores.  ANRDES Score - 3 intermediate  NCCN - favorable intermediate  Germline testing -discussed, indicated  Somatic testing - Prolaris score- 3.2    STAR CAP:        Lab Results   Component Value Date    PSA 6.5 (H) 12/21/2018    PSA 7.5 (H) 04/30/2018    PSA 4.6 (H) 07/26/2017    PSA 3.5 04/17/2014    PSA 3.48 12/19/2012    PSA 2.97 02/29/2012    PSA 2.3 04/15/2010    PSA 2.4 01/08/2009    PSA 0.8 09/24/2007    PSA 1.1 05/17/2006    PSADIAG 12.2 (H) 06/03/2022    PSADIAG 13.3 (H) 03/28/2022    PSADIAG 9.6 (H) 06/15/2021    PSADIAG 7.3 (H) 01/06/2020    PSADIAG 8.2 (H) 08/02/2019    PSATOTAL 6.4 (H) 08/22/2019    PSAFREE 0.64 08/22/2019    PSAFREEPCT 10.00 08/22/2019      Past Medical History:   Diagnosis Date    Elevated PSA     HIV infection     Hypertension      Family History   Problem Relation Age of Onset    Diabetes Mother     Heart disease Mother     Hypertension Mother     Stroke Mother     Hypertension Father     Cancer Sister     Cancer Brother      Social History     Socioeconomic History    Marital status: Single   Tobacco Use    Smoking status: Never Smoker    Smokeless tobacco: Never Used   Substance and Sexual Activity    Alcohol use: No    Drug use: No     Past Surgical  "History:   Procedure Laterality Date    COLONOSCOPY      COLONOSCOPY N/A 7/31/2020    Procedure: COLONOSCOPY;  Surgeon: Tree Mendiola MD;  Location: [facility] ([address] [place] [address]);  Service: Endoscopy;  Laterality: N/A;  covid 7/28-[facility]    PROSTATE BIOPSY       Patient Active Problem List   Diagnosis    Human immunodeficiency virus (HIV) disease    Family history of prostate cancer    SI joint dislocation    Chronic left-sided low back pain with sciatica    Lumbar radiculopathy    Sacroiliac joint dysfunction of left side    Hypertension    Screening for colon cancer    Prostate cancer     Review of Systems      Objective:      There were no vitals taken for this visit.  Estimated body mass index is 28.8 kg/m² as calculated from the following:    Height as of 6/7/22: 6' 1" (1.854 m).    Weight as of 6/7/22: 99 kg (218 lb 4.1 oz).  Physical Exam      Assessment and Plan:           Problem List Items Addressed This Visit     Prostate cancer - Primary    Overview     Low-volume, low-risk prostate cancer on active surveillance since 8/2018. He presented with an elevated PSA and a family history of prostate cancer. He underwent a MRI fusion prostate needle biopsy on 08/13/2018 which showed 2 positive cores with Alcides 6 disease, less than 5% on each positive core. His PSA was 7.5 at the time with a prostate volume of 47. 3cc. He had been seen in clinic with Dr. Richardson 01/16/2019. Last office visit was 08/13/2020. At the time he chose not to go ahead with MRI for restaging. He does not want to have another biopsy. Followed by Tammy Wright for active surveillance of prostate cancer.                  Follow up:   Today's visit was spent almost entirely on counseling. We reviewed his diagnosis, stage, grade, risk group, and prognosis. We discussed [place] (NCCN) and ANDRES risk stratification  We discussed the concept of low risk, intermediate risk, and high risk disease. We also reviewed the NCCN " treatment nomogram.We discussed the different treatment options including active surveillance (as well as the surveillance protocol), prostate brachytherapy, EBRT, SBRT (stereotactic body radiation therapy),cryotherapy, HIFU and both open and robotic prostatectomy.We also discussed the advantages, disadvantages, risks and benefits, as well as complications of each option. Regarding radiation therapy we discussed treatment planning, the different techniques, short and long term complications. These included radiation cystitis, radiation proctitis, and impotence. We discussed success, failure, and salvage therapeutic options.Also discussed the use of SpaceOAR for brachytherapy and EBRT and fiducials for EBRT.   We discussed surgical therapy in depth including preoperative preparation, surgical technique (including bladder neck and nerve-sparing techniques), postoperative recuperation and recovery, and short and long term complications including UTI, bleeding, blood clots,catheter dislodgement, etc. We discussed the risks of reoperation, incontinence, impotence, and recurrence. We discussed preop and postop Kegels, post op penile rehab, and treatment options for incontinence and impotence. We discussed rates of cancer free survival and recurrence, as well as salvage therapeutic options. We discussed the possible  indications for adjuvant radiation therapy.  Patient very focused on continuing AS, understands risk of progression.  6 months with PSA, MRI.    Donnie Samuel

## 2022-06-28 ENCOUNTER — OFFICE VISIT (OUTPATIENT)
Dept: UROLOGY | Facility: CLINIC | Age: 62
End: 2022-06-28
Payer: COMMERCIAL

## 2022-06-28 VITALS
SYSTOLIC BLOOD PRESSURE: 131 MMHG | HEIGHT: 74 IN | WEIGHT: 221.56 LBS | DIASTOLIC BLOOD PRESSURE: 73 MMHG | HEART RATE: 59 BPM | BODY MASS INDEX: 28.43 KG/M2

## 2022-06-28 DIAGNOSIS — C61 PROSTATE CANCER: Primary | ICD-10-CM

## 2022-06-28 DIAGNOSIS — B20 HUMAN IMMUNODEFICIENCY VIRUS (HIV) DISEASE: ICD-10-CM

## 2022-06-28 PROCEDURE — 99999 PR PBB SHADOW E&M-EST. PATIENT-LVL III: CPT | Mod: PBBFAC,,, | Performed by: UROLOGY

## 2022-06-28 PROCEDURE — 99214 PR OFFICE/OUTPT VISIT, EST, LEVL IV, 30-39 MIN: ICD-10-PCS | Mod: S$GLB,,, | Performed by: UROLOGY

## 2022-06-28 PROCEDURE — 99214 OFFICE O/P EST MOD 30 MIN: CPT | Mod: S$GLB,,, | Performed by: UROLOGY

## 2022-06-28 PROCEDURE — 99999 PR PBB SHADOW E&M-EST. PATIENT-LVL III: ICD-10-PCS | Mod: PBBFAC,,, | Performed by: UROLOGY

## 2022-08-04 ENCOUNTER — PATIENT MESSAGE (OUTPATIENT)
Dept: INFECTIOUS DISEASES | Facility: CLINIC | Age: 62
End: 2022-08-04
Payer: COMMERCIAL

## 2022-08-04 ENCOUNTER — TELEPHONE (OUTPATIENT)
Dept: INFECTIOUS DISEASES | Facility: HOSPITAL | Age: 62
End: 2022-08-04
Payer: COMMERCIAL

## 2022-08-04 DIAGNOSIS — B20 HUMAN IMMUNODEFICIENCY VIRUS (HIV) DISEASE: Primary | ICD-10-CM

## 2022-08-04 DIAGNOSIS — R76.12 POSITIVE QUANTIFERON-TB GOLD TEST: ICD-10-CM

## 2022-08-04 NOTE — TELEPHONE ENCOUNTER
----- Message from Tree Buenrostro LPN sent at 8/2/2022  4:36 PM CDT -----  Regarding: requesting lab orders  Pt scheduled his 6 month appt (8/23/22), pt would like to have labs done prior.   Do you want to order labs or wait until day of appt?

## 2022-08-09 ENCOUNTER — IMMUNIZATION (OUTPATIENT)
Dept: INTERNAL MEDICINE | Facility: CLINIC | Age: 62
End: 2022-08-09
Payer: COMMERCIAL

## 2022-08-09 ENCOUNTER — HOSPITAL ENCOUNTER (OUTPATIENT)
Dept: RADIOLOGY | Facility: HOSPITAL | Age: 62
Discharge: HOME | End: 2022-08-09
Attending: INTERNAL MEDICINE
Payer: COMMERCIAL

## 2022-08-09 DIAGNOSIS — R76.12 POSITIVE QUANTIFERON-TB GOLD TEST: ICD-10-CM

## 2022-08-09 PROCEDURE — 71046 X-RAY EXAM CHEST 2 VIEWS: CPT | Mod: TC

## 2022-08-09 PROCEDURE — 90471 PR IMMUNIZ ADMIN,1 SINGLE/COMB VAC/TOXOID: ICD-10-PCS | Mod: S$GLB,,, | Performed by: INTERNAL MEDICINE

## 2022-08-09 PROCEDURE — 71046 X-RAY EXAM CHEST 2 VIEWS: CPT | Mod: 26,,, | Performed by: RADIOLOGY

## 2022-08-09 PROCEDURE — 90611 SMALLPOX MONKEYPOX VACCINE: ICD-10-PCS | Mod: S$GLB,,, | Performed by: INTERNAL MEDICINE

## 2022-08-09 PROCEDURE — 90611 SMALLPOX&MONKEYPOX VAC 0.5ML: CPT | Mod: S$GLB,,, | Performed by: INTERNAL MEDICINE

## 2022-08-09 PROCEDURE — 71046 XR CHEST PA AND LATERAL: ICD-10-PCS | Mod: 26,,, | Performed by: RADIOLOGY

## 2022-08-09 PROCEDURE — 90471 IMMUNIZATION ADMIN: CPT | Mod: S$GLB,,, | Performed by: INTERNAL MEDICINE

## 2022-08-22 ENCOUNTER — TELEPHONE (OUTPATIENT)
Dept: RESEARCH | Facility: OTHER | Age: 62
End: 2022-08-22
Payer: COMMERCIAL

## 2022-08-22 NOTE — TELEPHONE ENCOUNTER
Sponsor: [employer]   Study: A Randomized, Double-blind, Placebo-controlled Phase 3 Study to Assess the Efficacy and Safety of Ad26.COV2.S for the Prevention of SARS-CoV-2-mediated COVID-19 in Adults Aged 18 Years and Older   IRB/Protocol #: 2020.275/ [id number]; Phase 3?   Principle Investigator/Sub-Investigator: ALL Cornell  Site Number: [id number]  Location: [facility]  Subject Number: [id number]    Protocol Amendment 7 Contact    Site contacted participant to schedule Re-Consent Visit for Protocol Amendment 7 and to discuss significant changes to the protocol outlined in participant letter dated [id number]. Participant voiced understanding.    Participant scheduled for Re-consent Visit? yes   Re-Consent Visit scheduled for: 02-SEPT-2022

## 2022-08-23 ENCOUNTER — OFFICE VISIT (OUTPATIENT)
Dept: INFECTIOUS DISEASES | Facility: CLINIC | Age: 62
End: 2022-08-23
Payer: COMMERCIAL

## 2022-08-23 VITALS
SYSTOLIC BLOOD PRESSURE: 121 MMHG | HEART RATE: 74 BPM | BODY MASS INDEX: 27.03 KG/M2 | DIASTOLIC BLOOD PRESSURE: 85 MMHG | WEIGHT: 210.56 LBS | TEMPERATURE: 98 F

## 2022-08-23 DIAGNOSIS — B20 HUMAN IMMUNODEFICIENCY VIRUS (HIV) DISEASE: ICD-10-CM

## 2022-08-23 PROCEDURE — 99214 OFFICE O/P EST MOD 30 MIN: CPT | Mod: S$GLB,,, | Performed by: INTERNAL MEDICINE

## 2022-08-23 PROCEDURE — 99214 PR OFFICE/OUTPT VISIT, EST, LEVL IV, 30-39 MIN: ICD-10-PCS | Mod: S$GLB,,, | Performed by: INTERNAL MEDICINE

## 2022-08-23 PROCEDURE — 99999 PR PBB SHADOW E&M-EST. PATIENT-LVL III: CPT | Mod: PBBFAC,,, | Performed by: INTERNAL MEDICINE

## 2022-08-23 PROCEDURE — 99999 PR PBB SHADOW E&M-EST. PATIENT-LVL III: ICD-10-PCS | Mod: PBBFAC,,, | Performed by: INTERNAL MEDICINE

## 2022-08-23 RX ORDER — DOLUTEGRAVIR SODIUM AND LAMIVUDINE 50; 300 MG/1; MG/1
1 TABLET, FILM COATED ORAL DAILY
Qty: 30 TABLET | Refills: 5 | Status: SHIPPED | OUTPATIENT
Start: 2022-08-23 | End: 2022-09-26

## 2022-08-23 RX ORDER — ISONIAZID 300 MG/1
300 TABLET ORAL DAILY
Qty: 90 TABLET | Refills: 3 | Status: SHIPPED | OUTPATIENT
Start: 2022-08-23 | End: 2023-07-14

## 2022-08-23 RX ORDER — LANOLIN ALCOHOL/MO/W.PET/CERES
50 CREAM (GRAM) TOPICAL DAILY
Qty: 90 TABLET | Refills: 3 | Status: SHIPPED | OUTPATIENT
Start: 2022-08-23 | End: 2023-08-23

## 2022-08-23 NOTE — PROGRESS NOTES
INFECTIOUS DISEASE CLINIC  08/23/2022 11:01 AM    Subjective:      Chief Complaint:   Chief Complaint   Patient presents with    Follow-up       History of Present Illness:    Patient Simón Fong is a 62 y.o. male who presents today for hiv follow-up. Last seen 2/2022- switched from genvoya to dovato. Reports 100% compliance. Denies missed doses.     Of note, pt with positive quantiferon. Says he thinks he has been positive for 30 years (recalls going to [facility] on [address] to discus; ann-marie?) but denies recalling treatment for this before. Denies weight loss or cough or other symptoms    Component Ref Range & Units 5 yr ago   (8/9/17) 5 yr ago   (8/9/17)   NIL See text IU/mL 0.114  0.114    TB Antigen See text IU/mL 0.506  0.506    TB Antigen - Nil See Text IU/mL 0.392  0.392    Mitogen - Nil See text IU/mL >10.000  >10.000    TB Gold  Positive Abnormal   Positive Abnormal  CM       cxr-  No acute cardiopulmonary disease          Review of Symptoms:  Constitutional: Denies fevers, chills, or weakness.  ENT: Denies dysphagia, nasal discharge, ear pain or discharge.  Cardiovascular: Denies chest pain, palpitations, orthopnea, or claudication.  Respiratory: Denies shortness of breath, cough, hemoptysis, or wheezing.  GI: Denies nausea/vomitting, hematochezia, melena, abd pain, or changes in appetite.  : Denies dysuria, incontinence, or hematuria.  Musculoskeletal: Denies joint pain or myalgias.  Skin/breast: Denies rashes, lumps, lesions, or discharge.  Neurologic: Denies headache, dizziness, vertigo, or paresthesias.    Past Medical History:   Diagnosis Date    HIV infection     Hypertension        Past Surgical History:   Procedure Laterality Date    COLONOSCOPY      COLONOSCOPY N/A 7/31/2020    Procedure: COLONOSCOPY;  Surgeon: Tree Mendiola MD;  Location: [address];  Service: Endoscopy;  Laterality: N/A;  covid 7/28-[facility]    PROSTATE BIOPSY         Family History   Problem  Relation Age of Onset    Diabetes Mother     Heart disease Mother     Hypertension Mother     Stroke Mother     Hypertension Father     Cancer Sister     Cancer Brother        Social History     Socioeconomic History    Marital status: Single   Tobacco Use    Smoking status: Never Smoker    Smokeless tobacco: Never Used   Substance and Sexual Activity    Alcohol use: No    Drug use: No       Review of patient's allergies indicates:   Allergen Reactions    Sulfa (sulfonamide antibiotics)          Objective:   VS (24h):   Vitals:    08/23/22 1042   BP: 121/85   Pulse: 74   Temp: 98 °F (36.7 °C)     [unfilled]  General: Afebrile, alert, comfortable, no acute distress.   HEENT: ANNAMARIA. EOMI, no scleral icterus. No sinus tenderness.  Pulmonary: Non labored,clear to auscultation A/P/L. No wheezing, crackles, or rhonchi.  Cardiac: normal S1 & S2 w/o rubs/murmurs/gallops.   Abdominal: Non-tender, non-distended.  Extremities: Moves all extremities x 4. No peripheral edema.   Skin: No jaundice, rashes, or visible lesions.   Neurological:  Alert and oriented x 4.     Labs:  Glucose   Date Value Ref Range Status   08/09/2022 96 70 - 110 mg/dL Final   04/04/2022 94 70 - 110 mg/dL Final   03/28/2022 101 70 - 110 mg/dL Final     Calcium   Date Value Ref Range Status   08/09/2022 9.3 8.7 - 10.5 mg/dL Final   04/04/2022 10.1 8.7 - 10.5 mg/dL Final   03/28/2022 9.9 8.7 - 10.5 mg/dL Final     Albumin   Date Value Ref Range Status   04/04/2022 4.4 3.5 - 5.2 g/dL Final   03/28/2022 4.1 3.5 - 5.2 g/dL Final   01/18/2022 4.4 3.5 - 5.2 g/dL Final     Total Protein   Date Value Ref Range Status   04/04/2022 7.9 6.0 - 8.4 g/dL Final   03/28/2022 7.5 6.0 - 8.4 g/dL Final   01/18/2022 7.5 6.0 - 8.4 g/dL Final     Sodium   Date Value Ref Range Status   08/09/2022 140 136 - 145 mmol/L Final   04/04/2022 143 136 - 145 mmol/L Final   03/28/2022 141 136 - 145 mmol/L Final     Potassium   Date Value Ref Range Status   08/09/2022 4.3  3.5 - 5.1 mmol/L Final   04/04/2022 4.5 3.5 - 5.1 mmol/L Final   03/28/2022 4.6 3.5 - 5.1 mmol/L Final     CO2   Date Value Ref Range Status   08/09/2022 27 23 - 29 mmol/L Final   04/04/2022 30 (H) 23 - 29 mmol/L Final   03/28/2022 28 23 - 29 mmol/L Final     Chloride   Date Value Ref Range Status   08/09/2022 107 95 - 110 mmol/L Final   04/04/2022 104 95 - 110 mmol/L Final   03/28/2022 105 95 - 110 mmol/L Final     BUN   Date Value Ref Range Status   08/09/2022 14 8 - 23 mg/dL Final   04/04/2022 18 8 - 23 mg/dL Final   03/28/2022 16 8 - 23 mg/dL Final     Creatinine   Date Value Ref Range Status   08/09/2022 1.1 0.5 - 1.4 mg/dL Final   04/04/2022 1.1 0.5 - 1.4 mg/dL Final   03/28/2022 1.2 0.5 - 1.4 mg/dL Final     Alkaline Phosphatase   Date Value Ref Range Status   04/04/2022 67 55 - 135 U/L Final   03/28/2022 59 55 - 135 U/L Final   01/18/2022 75 55 - 135 U/L Final     ALT   Date Value Ref Range Status   04/04/2022 24 10 - 44 U/L Final   03/28/2022 24 10 - 44 U/L Final   01/18/2022 24 10 - 44 U/L Final     AST   Date Value Ref Range Status   04/04/2022 27 10 - 40 U/L Final   03/28/2022 27 10 - 40 U/L Final   01/18/2022 28 10 - 40 U/L Final     Total Bilirubin   Date Value Ref Range Status   04/04/2022 0.3 0.1 - 1.0 mg/dL Final     Comment:     For infants and newborns, interpretation of results should be based  on gestational age, weight and in agreement with clinical  observations.    Premature Infant recommended reference ranges:  Up to 24 hours.............<8.0 mg/dL  Up to 48 hours............<12.0 mg/dL  3-5 days..................<15.0 mg/dL  6-29 days.................<15.0 mg/dL     03/28/2022 0.4 0.1 - 1.0 mg/dL Final     Comment:     For infants and newborns, interpretation of results should be based  on gestational age, weight and in agreement with clinical  observations.    Premature Infant recommended reference ranges:  Up to 24 hours.............<8.0 mg/dL  Up to 48 hours............<12.0 mg/dL  3-5  days..................<15.0 mg/dL  6-29 days.................<15.0 mg/dL     01/18/2022 0.4 0.1 - 1.0 mg/dL Final     Comment:     For infants and newborns, interpretation of results should be based  on gestational age, weight and in agreement with clinical  observations.    Premature Infant recommended reference ranges:  Up to 24 hours.............<8.0 mg/dL  Up to 48 hours............<12.0 mg/dL  3-5 days..................<15.0 mg/dL  6-29 days.................<15.0 mg/dL       WBC   Date Value Ref Range Status   08/09/2022 3.41 (L) 3.90 - 12.70 K/uL Final   04/04/2022 4.52 3.90 - 12.70 K/uL Final   01/18/2022 3.97 3.90 - 12.70 K/uL Final     Hemoglobin   Date Value Ref Range Status   08/09/2022 14.1 14.0 - 18.0 g/dL Final   04/04/2022 14.7 14.0 - 18.0 g/dL Final   01/18/2022 14.9 14.0 - 18.0 g/dL Final     Hematocrit   Date Value Ref Range Status   08/09/2022 41.7 40.0 - 54.0 % Final   04/04/2022 44.8 40.0 - 54.0 % Final   01/18/2022 45.1 40.0 - 54.0 % Final     MCV   Date Value Ref Range Status   08/09/2022 91 82 - 98 fL Final   04/04/2022 91 82 - 98 fL Final   01/18/2022 90 82 - 98 fL Final     Platelets   Date Value Ref Range Status   08/09/2022 222 150 - 450 K/uL Final   04/04/2022 261 150 - 450 K/uL Final   01/18/2022 239 150 - 450 K/uL Final     Lab Results   Component Value Date    CHOL 163 03/28/2022    CHOL 183 01/26/2021    CHOL 198 07/09/2020     Lab Results   Component Value Date    HDL 61 03/28/2022    HDL 65 01/26/2021    HDL 62 07/09/2020     Lab Results   Component Value Date    LDLCALC 91.4 03/28/2022    LDLCALC 106.0 01/26/2021    LDLCALC 126.0 07/09/2020     Lab Results   Component Value Date    TRIG 53 03/28/2022    TRIG 60 01/26/2021    TRIG 50 07/09/2020     Lab Results   Component Value Date    CHOLHDL 37.4 03/28/2022    CHOLHDL 35.5 01/26/2021    CHOLHDL 31.3 07/09/2020     RPR   Date Value Ref Range Status   08/09/2022 Non-reactive Non-reactive Final   04/04/2022 Reactive (A) Non-reactive  Final   08/09/2017 Reactive (A) Non-reactive Final     No results found for: QUANTIFERON    Medications:  Current Outpatient Medications on File Prior to Visit   Medication Sig Dispense Refill    acetaminophen (TYLENOL) 500 MG tablet Take 1,000 mg by mouth every 6 (six) hours as needed for Pain.      amLODIPine (NORVASC) 10 MG tablet TAKE 1 TABLET ONCE DAILY 90 tablet 3    ascorbic acid, vitamin C, (VITAMIN C) 1000 MG tablet Take 1,000 mg by mouth once daily.       biotin 1 mg Cap Take by mouth.      creatine, bulk, 100 % Powd by Misc.(Non-Drug; Combo Route) route.      dolutegravir-lamivudine (DOVATO)  mg Tab Take 1 tablet by mouth once daily. 30 tablet 5    fish oil-omega-3 fatty acids 300-1,000 mg capsule Take by mouth once daily.      glucosamine-chondroitin 500-400 mg tablet Take 1 tablet by mouth 3 (three) times daily.      leucine-isoleucine-valine (NEOKE BCAA4) 82 gram-328 kcal/100 gram Powd Take by mouth.       cetirizine (ZYRTEC) 10 MG tablet Take 1 tablet (10 mg total) by mouth once daily. 30 tablet 0     No current facility-administered medications on file prior to visit.       Antibiotics:   Antibiotics (From admission, onward)            None          HIV: No components found for: HIV 1/2 AG/AB  Hepatitis C IgG: No components found for: HEPATITIS C  Syphilis:   RPR   Date Value Ref Range Status   08/09/2022 Non-reactive Non-reactive Final   04/04/2022 Reactive (A) Non-reactive Final   08/09/2017 Reactive (A) Non-reactive Final       Hepatitis A IgG: No components found for: HEPATITIS A IGG  Hepatitis Bc IgG: No components found for: HEPATITIS B CORE IGG  Hepatitis Bs IgG:  Quantiferon: No results found for: QUANTIFERON  VZV IgG: No components found for: VARICELLA IGG    No components found for: SEDIMENTATION RATE  No components found for: C-REACTIVE PROTEIN      Microbiology x 7d:   Microbiology Results (last 7 days)     ** No results found for the last 168 hours. **           Immunization History   Administered Date(s) Administered    COVID-19 Vaccine 02/19/2021, 03/19/2021, 10/23/2021    COVID-19, MRNA, LN-S, PF (MODERNA FULL 0.5 ML DOSE) 02/19/2021, 03/19/2021, 10/23/2021, 03/31/2022    Influenza (FLUAD) - Quadrivalent - Adjuvanted - PF *Preferred* (65+) 10/07/2021    Influenza - Quadrivalent 10/26/2015    Influenza - Quadrivalent - PF *Preferred* (6 months and older) 10/23/2006, 09/10/2009, 11/01/2017, 10/28/2018, 10/29/2019, 10/02/2020    Meningococcal Conjugate (MCV4P) 01/30/2017, 01/07/2019    Pneumococcal Conjugate - 13 Valent 04/17/2014, 04/17/2014    Pneumococcal Polysaccharide - 23 Valent 12/19/2012, 07/23/2020    Tdap 05/02/2016    Vaccinia, smallpox monkeypox vaccine live, PF 08/09/2022    Zoster Recombinant 05/14/2018, 07/11/2018         Reviewed records today as well as relevant labs, cultures, and imaging    Assessment:     HIV, well controlled  Immunization [facility]  Health maintanence  LTBI    No toxicities from antimicrobials      TST risk calculator results:   Below are the results for a patient with a Positive QFT Test, who is 62 years old, born in [place], whose BCG status is Never vaccinated or unknown, who has had no contact with active TB, and who can be characterized by:    HIV infection  Non- Black/-American  The likelihood that this is a true positive test (PPV) is: 98%    The annual risk of development of active tuberculosis disease is estimated to be 7.84%.    The cumulative risk of active tuberculosis disease, up to the age of 80, is: 100%    If treated with INH, the probability of clinically significant drug-induced hepatitis is 2.3%, and the associated probability of hospitalization related to drug-induced hepatitis is 0.6%.        Plan:     # HIV--- well controlled on dovato, VL undetectable 8/2022. Continue.     # ltbi-  No signs or symptoms of active tuberculosis. CXR unrevealing. Recommended treatment of latent  tb to decrease risk of re-activation. Reviewed tst risk calcalculator with patient to discuss risk/benefit of treatment. Prefer INH/B6 2/2 drug interactions with HARRT (note dolutegravir dosing would need to be increased to bid if rifampin used). Counseled on side effects. LTBI treatment cannot prevent the development of TB via new infection that can occur following LTBI treatment. Start inh/b6. Efficacy after 6 mo INH 41- 76% vs 9 months of INH 90-92%    Vaccines reviewed    - Will monitor drug therapy for toxicity      I have sent communication to the referring physician and/or primary care provider.     I spent a total of 32 minutes on the day of the visit. This includes face to face time and non-face to face time preparing to see the patient (eg, review of tests), obtaining and/or reviewing separately obtained history, documenting clinical information in the electronic or other health record, independently interpreting results, and communicating results to the patient/family/caregiver, or care coordination.      Jennifer Purvis MD, MPH  Infectious Disease

## 2022-08-24 ENCOUNTER — PATIENT MESSAGE (OUTPATIENT)
Dept: INFECTIOUS DISEASES | Facility: CLINIC | Age: 62
End: 2022-08-24
Payer: COMMERCIAL

## 2022-09-06 ENCOUNTER — IMMUNIZATION (OUTPATIENT)
Dept: INTERNAL MEDICINE | Facility: CLINIC | Age: 62
End: 2022-09-06
Payer: COMMERCIAL

## 2022-09-06 PROCEDURE — 90611 SMALLPOX&MONKEYPOX VAC 0.5ML: CPT | Mod: S$GLB,,, | Performed by: INTERNAL MEDICINE

## 2022-09-06 PROCEDURE — 90471 IMMUNIZATION ADMIN: CPT | Mod: S$GLB,,, | Performed by: INTERNAL MEDICINE

## 2022-09-06 PROCEDURE — 90471 PR IMMUNIZ ADMIN,1 SINGLE/COMB VAC/TOXOID: ICD-10-PCS | Mod: S$GLB,,, | Performed by: INTERNAL MEDICINE

## 2022-09-06 PROCEDURE — 90611 SMALLPOX MONKEYPOX VACCINE: ICD-10-PCS | Mod: S$GLB,,, | Performed by: INTERNAL MEDICINE

## 2022-10-17 ENCOUNTER — PATIENT MESSAGE (OUTPATIENT)
Dept: PRIMARY CARE CLINIC | Facility: CLINIC | Age: 62
End: 2022-10-17
Payer: COMMERCIAL

## 2022-10-25 ENCOUNTER — TELEPHONE (OUTPATIENT)
Dept: RESEARCH | Facility: OTHER | Age: 62
End: 2022-10-25
Payer: COMMERCIAL

## 2022-10-25 NOTE — TELEPHONE ENCOUNTER
Sponsor: [employer]   Study: A Randomized, Double-blind, Placebo-controlled Phase 3 Study to Assess the Efficacy and Safety of Ad26.COV2.S for the Prevention of SARS-CoV-2-mediated COVID-19 in Adults Aged 18 Years and Older   IRB/Protocol #: 2020.275/ [id number]; Phase 3?   Principle Investigator/Sub-Investigator: Dr. Colin Littlejohn  Site Number: [id number]  Location: [facility]  Subject Number: [id number]    Protocol Amendment 7 Contact    Site contacted participant to schedule Re-Consent Visit for Protocol Amendment 7 and to discuss significant changes to the protocol outlined in participant letter dated [id number]. Participant did not answer , so a voicemail and a call back number was left.

## 2022-10-30 DIAGNOSIS — I10 HYPERTENSION, UNSPECIFIED TYPE: ICD-10-CM

## 2022-10-31 ENCOUNTER — IMMUNIZATION (OUTPATIENT)
Dept: INTERNAL MEDICINE | Facility: CLINIC | Age: 62
End: 2022-10-31
Payer: COMMERCIAL

## 2022-10-31 ENCOUNTER — IMMUNIZATION (OUTPATIENT)
Dept: PHARMACY | Facility: CLINIC | Age: 62
End: 2022-10-31
Payer: COMMERCIAL

## 2022-10-31 ENCOUNTER — PATIENT MESSAGE (OUTPATIENT)
Dept: ADMINISTRATIVE | Facility: OTHER | Age: 62
End: 2022-10-31
Payer: COMMERCIAL

## 2022-10-31 DIAGNOSIS — Z23 NEED FOR VACCINATION: Primary | ICD-10-CM

## 2022-10-31 PROCEDURE — 90694 VACC AIIV4 NO PRSRV 0.5ML IM: CPT | Mod: S$GLB,,, | Performed by: INTERNAL MEDICINE

## 2022-10-31 PROCEDURE — 90471 IMMUNIZATION ADMIN: CPT | Mod: S$GLB,,, | Performed by: INTERNAL MEDICINE

## 2022-10-31 PROCEDURE — 90694 FLU VACCINE - QUADRIVALENT - ADJUVANTED: ICD-10-PCS | Mod: S$GLB,,, | Performed by: INTERNAL MEDICINE

## 2022-10-31 PROCEDURE — 90471 FLU VACCINE - QUADRIVALENT - ADJUVANTED: ICD-10-PCS | Mod: S$GLB,,, | Performed by: INTERNAL MEDICINE

## 2022-11-02 RX ORDER — AMLODIPINE BESYLATE 10 MG/1
TABLET ORAL
Qty: 90 TABLET | Refills: 3 | Status: SHIPPED | OUTPATIENT
Start: 2022-11-02 | End: 2023-10-10

## 2022-11-18 NOTE — LETTER
February 6, 2017      Parisa Fowler MD  [address]  [place] [address]           [facility]  [address]  [place] [address]  Phone: [phone]          Patient: Simón Fong   MR Number: [id number]   YOB: 1960   Date of Visit: 2/3/2017       Dear Dr. Parisa Fowler:    Thank you for referring Simón Fong to me for evaluation. Attached you will find relevant portions of my assessment and plan of care.    If you have questions, please do not hesitate to call me. I look forward to following Simón Fong along with you.    Sincerely,    Annie Garcia MD    Enclosure  CC:  No Recipients    If you would like to receive this communication electronically, please contact [email or web address] or [phone] to request more information on [employer] Link access.    For providers and/or their staff who would like to refer a patient to [facility], please contact us through our one-stop-shop provider referral line, [facility], at [phone].    If you feel you have received this communication in error or would no longer like to receive these types of communications, please e-mail [email or web address]         
Otitis Media, Pediatric    An ear, with close-ups of a normal ear and an ear filled with fluid.   Otitis media occurs when there is inflammation and fluid in the middle ear with signs and symptoms of an acute infection. The middle ear is a part of the ear that contains bones for hearing as well as air that helps send sounds to the brain. When infected fluid builds up in this space, it causes pressure and results in an ear infection. The eustachian tube connects the middle ear to the back of the nose (nasopharynx). It normally allows air into the middle ear and drains fluid from the middle ear. If the eustachian tube becomes blocked, fluid can build up and become infected.      What are the causes?    This condition is caused by a blockage in the eustachian tube. This can be caused by mucus or by swelling of the tube. Problems that can cause a blockage include:  •Colds and other upper respiratory infections.      •Allergies.      •Enlarged adenoids. The adenoids are areas of soft tissue located high in the back of the throat, behind the nose and the roof of the mouth. They are part of the body's defense system (immune system).      •A swelling or mass in the nasopharynx.      •Damage to the ear caused by pressure changes (barotrauma).        What increases the risk?    This condition is more likely to develop in children who are younger than 7 years old. Before age 7, the ear is shaped in a way that can cause fluid to collect in the middle ear, making it easier for bacteria or viruses to grow. Children of this age also have not yet developed the same resistance to viruses and bacteria as older children and adults.    Your child may also be more likely to develop this condition if he or she:  •Has repeated ear and sinus infections.      •Has a family history of repeated ear and sinus infections.      •Has an immune system disorder.      •Has gastroesophageal reflux.      •Has an opening in the roof of his or her mouth (cleft palate).      •Attends day care.      •Was not .      •Is exposed to tobacco smoke.      •Takes a bottle while lying down.      •Uses a pacifier.        What are the signs or symptoms?    Symptoms of this condition include:  •Ear pain.      •A fever.      •Ringing in the ear.      •Decreased hearing.      •A headache.      •Fluid leaking from the ear, if a hole has developed in the eardrum.      •Agitation and restlessness.      Children too young to speak may show other signs, such as:  •Tugging, rubbing, or holding the ear.      •Crying more than usual.      •Irritability.      •Decreased appetite.      •Sleep interruption.        How is this diagnosed?  A health care provider checks a person's ear using an otoscope. A close-up of the ear and otoscope is also shown.   This condition is diagnosed with a physical exam. During the exam, your child's health care provider will use an instrument called an otoscope to look in your child's ear. He or she will also ask about your child's symptoms.    Your child may have tests, including:  •A pneumatic otoscopy. This is a test to check the movement of the eardrum. It is done by squeezing a small amount of air into the ear.      •A tympanogram. This test uses air pressure in the ear canal to check how well the eardrum is working.        How is this treated?    This condition can go away on its own. If your child needs treatment, the exact treatment will depend on your child's age and symptoms. Treatment may include:  •Waiting 48–72 hours to see if your child's symptoms get better.      •Medicines to relieve pain. These medicines may be given by mouth or directly in the ear.      •Antibiotic medicines. These may be prescribed if your child's condition is caused by bacteria.      •A minor surgery to insert small tubes (tympanostomy tubes) into your child's eardrums. This surgery may be recommended if your child has many ear infections within several months. The tubes help drain fluid and prevent infection.        Follow these instructions at home:    •Give over-the-counter and prescription medicines only as told by your child's health care provider.      •If your child was prescribed an antibiotic medicine, give it as told by your child's health care provider. Do not stop giving the antibiotic even if your child starts to feel better.      •Keep all follow-up visits. This is important.        How is this prevented?    To reduce your child's risk of getting this condition again:  •Keep your child's vaccinations up to date.      •If your baby is younger than 6 months, feed him or her with breast milk only, if possible. Continue to breastfeed exclusively until your baby is at least 6 months old.      •Avoid exposing your child to tobacco smoke.      •Avoid giving your baby a bottle while he or she is lying down. Feed your baby in an upright position.        Contact a health care provider if:    •Your child's hearing seems to be reduced.      •Your child's symptoms do not get better, or they get worse, after 2–3 days.        Get help right away if:    •Your child who is younger than 3 months has a temperature of 100.4°F (38°C) or higher.      •Your child has a headache.      •Your child has neck pain or a stiff neck.      •Your child seems to have very little energy.      •Your child has excessive diarrhea or vomiting.      •The bone behind your child's ear (mastoid bone) is tender.      •The muscles of your child's face do not seem to move (paralysis).        Summary    •Otitis media is redness, soreness, and swelling of the middle ear. It causes symptoms such as pain, fever, irritability, and decreased hearing.      •This condition can go away on its own, but sometimes your child may need treatment.      •The exact treatment will depend on your child's age and symptoms. It may include medicines to treat pain and infection, or surgery in severe cases.      •To prevent this condition, keep your child's vaccinations up to date. For children under 6 months of age, breastfeed exclusively if possible.      This information is not intended to replace advice given to you by your health care provider. Make sure you discuss any questions you have with your health care provider.

## 2022-11-21 ENCOUNTER — OFFICE VISIT (OUTPATIENT)
Dept: SPORTS MEDICINE | Facility: CLINIC | Age: 62
End: 2022-11-21
Payer: COMMERCIAL

## 2022-11-21 ENCOUNTER — HOSPITAL ENCOUNTER (OUTPATIENT)
Dept: RADIOLOGY | Facility: HOSPITAL | Age: 62
Discharge: HOME | End: 2022-11-21
Attending: STUDENT IN AN ORGANIZED HEALTH CARE EDUCATION/TRAINING PROGRAM
Payer: COMMERCIAL

## 2022-11-21 ENCOUNTER — TELEPHONE (OUTPATIENT)
Dept: SPORTS MEDICINE | Facility: CLINIC | Age: 62
End: 2022-11-21
Payer: COMMERCIAL

## 2022-11-21 VITALS
HEIGHT: 74 IN | BODY MASS INDEX: 26.95 KG/M2 | DIASTOLIC BLOOD PRESSURE: 75 MMHG | SYSTOLIC BLOOD PRESSURE: 129 MMHG | WEIGHT: 210 LBS

## 2022-11-21 DIAGNOSIS — M25.531 RIGHT WRIST PAIN: ICD-10-CM

## 2022-11-21 DIAGNOSIS — S66.819A STRAIN OF FLEXOR TENDON OF WRIST: ICD-10-CM

## 2022-11-21 DIAGNOSIS — M25.531 RIGHT WRIST PAIN: Primary | ICD-10-CM

## 2022-11-21 PROCEDURE — 99999 PR PBB SHADOW E&M-EST. PATIENT-LVL III: ICD-10-PCS | Mod: PBBFAC,,, | Performed by: STUDENT IN AN ORGANIZED HEALTH CARE EDUCATION/TRAINING PROGRAM

## 2022-11-21 PROCEDURE — 73110 XR WRIST COMPLETE 3 VIEWS RIGHT: ICD-10-PCS | Mod: 26,RT,, | Performed by: RADIOLOGY

## 2022-11-21 PROCEDURE — L3908 WHO COCK-UP NONMOLDE PRE OTS: HCPCS | Mod: S$GLB,,, | Performed by: STUDENT IN AN ORGANIZED HEALTH CARE EDUCATION/TRAINING PROGRAM

## 2022-11-21 PROCEDURE — 73110 X-RAY EXAM OF WRIST: CPT | Mod: TC,PO,RT

## 2022-11-21 PROCEDURE — 99204 OFFICE O/P NEW MOD 45 MIN: CPT | Mod: S$GLB,,, | Performed by: STUDENT IN AN ORGANIZED HEALTH CARE EDUCATION/TRAINING PROGRAM

## 2022-11-21 PROCEDURE — L3908 PR SPLINT, WRIST AND FOREARM: ICD-10-PCS | Mod: S$GLB,,, | Performed by: STUDENT IN AN ORGANIZED HEALTH CARE EDUCATION/TRAINING PROGRAM

## 2022-11-21 PROCEDURE — 99204 PR OFFICE/OUTPT VISIT, NEW, LEVL IV, 45-59 MIN: ICD-10-PCS | Mod: S$GLB,,, | Performed by: STUDENT IN AN ORGANIZED HEALTH CARE EDUCATION/TRAINING PROGRAM

## 2022-11-21 PROCEDURE — 99999 PR PBB SHADOW E&M-EST. PATIENT-LVL III: CPT | Mod: PBBFAC,,, | Performed by: STUDENT IN AN ORGANIZED HEALTH CARE EDUCATION/TRAINING PROGRAM

## 2022-11-21 PROCEDURE — 73110 X-RAY EXAM OF WRIST: CPT | Mod: 26,RT,, | Performed by: RADIOLOGY

## 2022-11-21 RX ORDER — NAPROXEN 500 MG/1
500 TABLET ORAL 2 TIMES DAILY WITH MEALS
Qty: 60 TABLET | Refills: 1 | Status: SHIPPED | OUTPATIENT
Start: 2022-11-21

## 2022-11-21 NOTE — TELEPHONE ENCOUNTER
Called patient to inform them to arrive 15 mins early to complete x-rays prior to appointment on 11/21.    Ana Stevens MS, OTC  Clinical Assistant to Dr. Anupama Castellon

## 2022-11-21 NOTE — PATIENT INSTRUCTIONS
Take prescribed medication (NSAID) for 3 weeks.  After 3 weeks, stop medication.  If pain returns, you may continue taking medication until your follow-up. Stop medications 3 days before your follow-up visit.    Three rules of pain tolerance were explained to patient:  1.  Pain level should be distractible, should be able to participate without being focused on pain.  2.  All activities should be done with good form.  If you cannot have proper formal doing activity, activity is too advanced.  3.  If following activity pain level is increased, you may need to scale back your activity level.

## 2022-11-21 NOTE — PROGRESS NOTES
CC: right wrist pain    62 y.o. Male presents today for evaluation of his right wrist pain. Pt reports gradual onset of wrist pain about 1 month ago. Pt states he is not sure if pain is from working out or from playing tennis. Pt notes prev hx of wrist pain that typically resolved quicker. Pt reports pain is 0/10 today at rest and 5/10 with activity. Pt presents today with wrist wrap for support. Pt localizes pain to medial anterior wrist; pt denies TTP. Pt notes pain with extension, and that he does not experience pain while playing tennis. Pt denies mechanical symptoms. Pt denies numbness/tingling.     How lon month  What makes it better: wrist wrap, advil  What makes it worse: wrist extension, wrist supination  Does it radiate: no  Attempted treatments: wrist wrap, advil, TENS unit  Pain score: 0/10 rest, 5/10 activity  Any mechanical symptoms: none  Feelings of instability: none  Affecting ADLs: mild    Occupation: retired     REVIEW OF SYSTEMS:   Constitution: Patient denies fever or chills.  Eyes: Patient denies eye pain or vision changes.  HEENT: Patient denies ear pain, sore throat, or nasal discharge.  CVS: Patient denies chest pain.  Lungs: Patient denies shortness of breath or cough.  Abdomen: Patient denies any stomach pain, nausea, vomiting, or diarrhea  Skin: Patient denies skin rash or itching.    Musculoskeletal: Patient denies recent injuries or trauma.  Neuro: Patient denies any numbness or tingling in upper or lower extremities.  Psych: Patient denies any current anxiety or nervousness.    PAST MEDICAL HISTORY:   Past Medical History:   Diagnosis Date    HIV infection     Hypertension        PAST SURGICAL HISTORY:  Past Surgical History:   Procedure Laterality Date    COLONOSCOPY      COLONOSCOPY N/A 2020    Procedure: COLONOSCOPY;  Surgeon: Tree Mendiola MD;  Location: [address]);  Service: Endoscopy;  Laterality: N/A;  covid -[facility]    PROSTATE BIOPSY          FAMILY HISTORY:  Family History   Problem Relation Age of Onset    Diabetes Mother     Heart disease Mother     Hypertension Mother     Stroke Mother     Hypertension Father     Cancer Sister     Cancer Brother        SOCIAL HISTORY:  Social History     Socioeconomic History    Marital status: Single   Tobacco Use    Smoking status: Never    Smokeless tobacco: Never   Substance and Sexual Activity    Alcohol use: No    Drug use: No       MEDICATIONS:     Current Outpatient Medications:     amLODIPine (NORVASC) 10 MG tablet, TAKE 1 TABLET ONCE DAILY, Disp: 90 tablet, Rfl: 3    ascorbic acid, vitamin C, (VITAMIN C) 1000 MG tablet, Take 1,000 mg by mouth once daily. , Disp: , Rfl:     creatine, bulk, 100 % Powd, by Misc.(Non-Drug; Combo Route) route., Disp: , Rfl:     dolutegravir-lamivudine (DOVATO)  mg Tab, Take 1 tablet by mouth once daily., Disp: 90 tablet, Rfl: 5    fish oil-omega-3 fatty acids 300-1,000 mg capsule, Take by mouth once daily., Disp: , Rfl:     glucosamine-chondroitin 500-400 mg tablet, Take 1 tablet by mouth 3 (three) times daily., Disp: , Rfl:     isoniazid (NYDRAZID) 300 MG Tab, Take 1 tablet (300 mg total) by mouth once daily. One tablet daily for prevention of tuberculosis, Disp: 90 tablet, Rfl: 3    leucine-isoleucine-valine (NEOKE BCAA4) 82 gram-328 kcal/100 gram Powd, Take by mouth. , Disp: , Rfl:     pyridoxine, vitamin B6, (VITAMIN B-6) 50 MG Tab, Take 1 tablet (50 mg total) by mouth once daily., Disp: 90 tablet, Rfl: 3    acetaminophen (TYLENOL) 500 MG tablet, Take 1,000 mg by mouth every 6 (six) hours as needed for Pain., Disp: , Rfl:     biotin 1 mg Cap, Take by mouth., Disp: , Rfl:     cetirizine (ZYRTEC) 10 MG tablet, Take 1 tablet (10 mg total) by mouth once daily., Disp: 30 tablet, Rfl: 0    naproxen (NAPROSYN) 500 MG tablet, Take 1 tablet (500 mg total) by mouth 2 (two) times daily with meals., Disp: 60 tablet, Rfl: 1    ALLERGIES:   Review of patient's allergies  "indicates:   Allergen Reactions    Sulfa (sulfonamide antibiotics)         PHYSICAL EXAMINATION:  /75   Ht 6' 2" (1.88 m)   Wt 95.2 kg (209 lb 15.8 oz)   BMI 26.96 kg/m²   Vitals signs and nursing note have been reviewed.    General: In no acute distress, well developed, well nourished, no diaphoresis  Eyes: EOM full and smooth, no eye redness or discharge  HEENT: normocephalic and atraumatic, neck supple, trachea midline  Cardiovascular: no LE edema  Lungs: respirations non-labored, no conversational dyspnea   Neuro: AAOx3, CN2-12 grossly intact  Skin: No rashes, warm and dry  Psychiatric: cooperative, pleasant, mood and affect appropriate for age    Right Wrist   Inspection/Palpation:   -TTP  -Deformity  -Ecchymosis  -Thenar or hypothenar atrophy throughout hand   +Radial pulse     Sensation:    Intact to LT in all finger pads    Intact to LT in radial, ulnar, median distributions     ROM (* = with pain):   Flexion: WNL   Extension: WNL   Supination: WNL   Pronation:  WNL    Strength (* = with pain):    5/5 in finger abduction    5/5 in thumb extension    5/5 in opposition   5/5 in intrinsics   5/5  strength    5/5 in finger flexion with pain at wrist against resistance    Other:  -Tinnel's   -Phalen's   -Finkelstein's  -Fovea  -Piano Key Sign    Circ:   WWP   Pulses 2+    IMAGIN. Wrist X-ray ordered due to right wrist pain. 3 views taken today.   2. X-ray images were reviewed personally by me and then directly with patient.  3. FINDINGS: Well-circumscribed cystic changes noted in the distal aspect of the navicular bone.  Scapholunate distance is slightly widened.  No fracture or dislocation.  No bone destruction identified    4. IMPRESSION:   No acute osseous abnormalities appreciated.    ASSESSMENT:      ICD-10-CM ICD-9-CM   1. Right wrist pain  M25.531 719.43   2. Strain of flexor tendon of wrist  S66.819A 842.09         PLAN:  Based on patient history, physical exam findings, and imaging I " believe patient is dealing with a flexor tendon strain of the right wrist, specifically of the fingers.  Patient will be put in a wrist splint to be worn at night.  We will do a trial of oral anti-inflammatories as instructed.  Patient will also be given a home exercise program.  Three rules of pain were discussed with patient.  Follow-up in 6 weeks.    Future planning includes - naproxen 500 mg b.i.d., HOME EXERCISE PROGRAM (HEP): The patient was taught a homegoing physical therapy regimen for wrist flexor strain. The patient demonstrated understanding of the exercises and proper technique of their execution. This interaction took 15 minutes and included demonstration of exercise as well as counseling to encourage patient to do exercises daily.        All questions were answered to the best of my ability and all concerns were addressed at this time.    Follow up in 6 week(s) for above, or sooner if needed.      This note is dictated using the M*Modal Fluency Direct word recognition program. There are word recognition mistakes that are occasionally missed on review.

## 2022-12-05 ENCOUNTER — RESEARCH ENCOUNTER (OUTPATIENT)
Dept: RESEARCH | Facility: CLINIC | Age: 62
End: 2022-12-05
Payer: COMMERCIAL

## 2022-12-05 NOTE — PROGRESS NOTES
Sponsor: [employer]   Study: A Randomized, Double-blind, Placebo-controlled Phase 3 Study to Assess the Efficacy and Safety of Ad26.COV2.S for the Prevention of SARS-CoV-2-mediated COVID-19 in Adults Aged 18 Years and Older   IRB/Protocol #: 2020.275/ [id number]; Phase 3?   Principle Investigator/Sub-Investigator: ALL [employer]  Site Number: [id number]  Location: [facility], [place], operated by [facility]  Subject Number: [id number]    Protocol Amendment 7 - Unscheduled Re-Consent Visit/Year 2 Visit     Informed Consent:   Is this a reconsent?: yes  Consenting was completed in private area using the most current IRB approved version of the ICF dated 02-AUG-2022 by myself and patient was given ample time to review consent prior to execution of ICF.    Prior to the Informed Consent (IC) being signed, or any study protocol required data collection, testing, procedure, or intervention being performed, the following was done and/or discussed:?   Patient was given a copy of the IC for review??   Purpose of the study and qualifications to participate??   Study design, follow up schedule, and tests or procedures done at each visit?   Confidentiality and HIPAA Authorization for Release of Medical Records for the research trial/ subject's rights/research related injury?   Risk, Benefits, Alternative Treatments, Compensation and Costs?   Participation in the research trial is voluntary and patient may withdraw at anytime?   Contact information for study related questions?     Patient verbalizes understanding of the above: Yes?   Contact information for CRC and PI given to patient: Yes?   Patient able to adequately summarize: the purpose of the study, the risks associated with the study, and all procedures, testing, and follow-ups associated with the study: Yes?     Simón Fong signed the IRB approved version of informed consent form for the [employer] research study.? Each page of the consent was reviewed  with the patient and patient's family) and all questions answered satisfactorily. The patient signed the paper consent form and received a copy of same (copy of informed consent made and provided to patient). The original consent was scanned into electronic medical records (EPIC).      Next Visit:  Is subject enrolled in the immunogenicity or booster subset? no  If yes, IN-PERSON Year 2 Visit scheduled? N/A  If yes, when? N/A    If subject is not in one of the above subsets Participant understands that Year 2 visit will be conducted via telephone and site will contact subject the day the window for that visit opens? yes    Subject instructed to delete the Study Hub josephine or return provisional device to site. Subject has had funds added to their ClinCard and visit was updated in [employer].       Has the participant tested positive COVID-19 since last visit? no  If yes, date of positive test? N/A  If yes, did the participant take any medication for COVID-19? N/A        Did the subject have any ongoing AE's no  If yes, AE end date? N/A    Did the subject have a provisional device? no  Subject was instructed to return provisional device? N/A  Provisional device number: N/A    Did the subject use a personal device? yes  Subject was instructed to delete Study Hub from device? yes      Subject was informed that from today, 05-DEC-2022, forward, study participation has concluded. Subject expressed understanding and was thanked for their participation in this study.    Off Study Date: 05-DEC-2022  Off Study Reason: Protocol Defined Follow Up Completed

## 2023-01-09 ENCOUNTER — OFFICE VISIT (OUTPATIENT)
Dept: SPORTS MEDICINE | Facility: CLINIC | Age: 63
End: 2023-01-09
Payer: COMMERCIAL

## 2023-01-09 VITALS
WEIGHT: 209.88 LBS | SYSTOLIC BLOOD PRESSURE: 131 MMHG | HEIGHT: 74 IN | BODY MASS INDEX: 26.94 KG/M2 | DIASTOLIC BLOOD PRESSURE: 80 MMHG

## 2023-01-09 DIAGNOSIS — M25.531 RIGHT WRIST PAIN: ICD-10-CM

## 2023-01-09 DIAGNOSIS — S66.819A STRAIN OF FLEXOR TENDON OF WRIST: Primary | ICD-10-CM

## 2023-01-09 PROCEDURE — 99999 PR PBB SHADOW E&M-EST. PATIENT-LVL IV: ICD-10-PCS | Mod: PBBFAC,,, | Performed by: STUDENT IN AN ORGANIZED HEALTH CARE EDUCATION/TRAINING PROGRAM

## 2023-01-09 PROCEDURE — 99213 PR OFFICE/OUTPT VISIT, EST, LEVL III, 20-29 MIN: ICD-10-PCS | Mod: S$GLB,,, | Performed by: STUDENT IN AN ORGANIZED HEALTH CARE EDUCATION/TRAINING PROGRAM

## 2023-01-09 PROCEDURE — 99999 PR PBB SHADOW E&M-EST. PATIENT-LVL IV: CPT | Mod: PBBFAC,,, | Performed by: STUDENT IN AN ORGANIZED HEALTH CARE EDUCATION/TRAINING PROGRAM

## 2023-01-09 PROCEDURE — 99213 OFFICE O/P EST LOW 20 MIN: CPT | Mod: S$GLB,,, | Performed by: STUDENT IN AN ORGANIZED HEALTH CARE EDUCATION/TRAINING PROGRAM

## 2023-01-09 NOTE — PROGRESS NOTES
CC: right wrist pain    62 y.o. Male presents today for follow up evaluation of his right wrist pain. Pt reports his pain has improved, but he continues to have pain in dorsomedial wrist with pronation. Pt reports pain is 4-5 / 10 with pronation.  Pt denies mechanical symptoms. Pt denies numbness/tingling. Pt reports aching at night intermittently. Pt reports no issues on tennis court other than serving.     Attempted treatments: wrist splint (every night, then discontinued once improved); HEP for 3 weeks, naproxen  Pain score: 4-5/10 with pronation  History of trauma/injury: none since last visit  Affecting ADLs: no     REVIEW OF SYSTEMS:   Constitution: Patient denies fever or chills.  Eyes: Patient denies eye pain or vision changes.  HEENT: Patient denies ear pain, sore throat, or nasal discharge.  CVS: Patient denies chest pain.  Lungs: Patient denies shortness of breath or cough.  Skin: Patient denies skin rash or itching.    Musculoskeletal: Patient denies recent falls. See HPI.  Psych: Patient denies any current anxiety or nervousness.    PAST MEDICAL HISTORY:   Past Medical History:   Diagnosis Date    HIV infection     Hypertension        MEDICATIONS:     Current Outpatient Medications:     acetaminophen (TYLENOL) 500 MG tablet, Take 1,000 mg by mouth every 6 (six) hours as needed for Pain., Disp: , Rfl:     amLODIPine (NORVASC) 10 MG tablet, TAKE 1 TABLET ONCE DAILY, Disp: 90 tablet, Rfl: 3    ascorbic acid, vitamin C, (VITAMIN C) 1000 MG tablet, Take 1,000 mg by mouth once daily. , Disp: , Rfl:     biotin 1 mg Cap, Take by mouth., Disp: , Rfl:     creatine, bulk, 100 % Powd, by Misc.(Non-Drug; Combo Route) route., Disp: , Rfl:     fish oil-omega-3 fatty acids 300-1,000 mg capsule, Take by mouth once daily., Disp: , Rfl:     glucosamine-chondroitin 500-400 mg tablet, Take 1 tablet by mouth 3 (three) times daily., Disp: , Rfl:     isoniazid (NYDRAZID) 300 MG Tab, Take 1 tablet (300 mg total) by mouth once  "daily. One tablet daily for prevention of tuberculosis, Disp: 90 tablet, Rfl: 3    leucine-isoleucine-valine (NEOKE BCAA4) 82 gram-328 kcal/100 gram Powd, Take by mouth. , Disp: , Rfl:     naproxen (NAPROSYN) 500 MG tablet, Take 1 tablet (500 mg total) by mouth 2 (two) times daily with meals., Disp: 60 tablet, Rfl: 1    pyridoxine, vitamin B6, (VITAMIN B-6) 50 MG Tab, Take 1 tablet (50 mg total) by mouth once daily., Disp: 90 tablet, Rfl: 3    cetirizine (ZYRTEC) 10 MG tablet, Take 1 tablet (10 mg total) by mouth once daily., Disp: 30 tablet, Rfl: 0    ALLERGIES:   Review of patient's allergies indicates:   Allergen Reactions    Sulfa (sulfonamide antibiotics)         PHYSICAL EXAMINATION:  /80   Ht 6' 2" (1.88 m)   Wt 95.2 kg (209 lb 14.1 oz)   BMI 26.95 kg/m²   Vitals signs and nursing note have been reviewed.    General: In no acute distress, well developed, well nourished, no diaphoresis  Eyes: EOM full and smooth, no eye redness or discharge  HENT: normocephalic and atraumatic, neck supple, trachea midline  Cardiovascular: no LE edema  Lungs: respirations non-labored, no conversational dyspnea   Neuro: AAOx3, CN2-12 grossly intact  Skin: No rashes, warm and dry  Psychiatric: cooperative, pleasant, mood and affect appropriate for age    Right Wrist   Inspection/Palpation:   -TTP  -Deformity  -Ecchymosis  -Thenar or hypothenar atrophy throughout hand   +Radial pulse      Sensation:    Intact to LT in all finger pads    Intact to LT in radial, ulnar, median distributions      ROM (* = with pain):   Flexion: WNL   Extension: WNL   Supination: WNL   Pronation:  WNL but with pain     Strength (* = with pain):    5/5 in finger abduction    5/5 in thumb extension    5/5 in opposition   5/5 in intrinsics   5/5  strength    5/5 in finger flexion with pain at wrist against resistance     Other:  -Tinnel's   -Phalen's   -Finkelstein's  -Fovea  -Piano Key Sign     Circ:   WWP   Pulses 2+    ASSESSMENT:      " ICD-10-CM ICD-9-CM   1. Strain of flexor tendon of wrist  S66.819A 842.09         PLAN:  Patient with improvement since starting naproxen.  He took it for the a lot of 3 weeks then stop.  Patient has also start splinting.  Our plan moving forward is to resume the naproxen as needed for pain.  Patient to continue splinting at night.  We will also obtain MRI for further evaluation.  Patient will also be referred to Occupational therapy as I believe his activity level is too advanced for a home exercise program.  Follow-up after MRI.    Future planning includes - occupational therapy, right wrist MRI    All questions were answered to the best of my ability and all concerns were addressed at this time.    Follow up for above, or sooner if needed.      This note is dictated using the M*Modal Fluency Direct word recognition program. There are word recognition mistakes that are occasionally missed on review.

## 2023-01-10 ENCOUNTER — HOSPITAL ENCOUNTER (OUTPATIENT)
Dept: RADIOLOGY | Facility: HOSPITAL | Age: 63
Discharge: HOME | End: 2023-01-10
Attending: UROLOGY
Payer: COMMERCIAL

## 2023-01-10 DIAGNOSIS — C61 PROSTATE CANCER: ICD-10-CM

## 2023-01-10 PROCEDURE — 25500020 PHARM REV CODE 255: Performed by: UROLOGY

## 2023-01-10 PROCEDURE — A9585 GADOBUTROL INJECTION: HCPCS | Performed by: UROLOGY

## 2023-01-10 PROCEDURE — 72197 MRI PELVIS W/O & W/DYE: CPT | Mod: TC

## 2023-01-10 PROCEDURE — 72197 MRI PELVIS W/O & W/DYE: CPT | Mod: 26,,, | Performed by: STUDENT IN AN ORGANIZED HEALTH CARE EDUCATION/TRAINING PROGRAM

## 2023-01-10 PROCEDURE — 72197 MRI PROSTATE W W/O CONTRAST: ICD-10-PCS | Mod: 26,,, | Performed by: STUDENT IN AN ORGANIZED HEALTH CARE EDUCATION/TRAINING PROGRAM

## 2023-01-10 RX ORDER — GADOBUTROL 604.72 MG/ML
10 INJECTION INTRAVENOUS
Status: COMPLETED | OUTPATIENT
Start: 2023-01-10 | End: 2023-01-10

## 2023-01-10 RX ADMIN — GADOBUTROL 10 ML: 604.72 INJECTION INTRAVENOUS at 06:01

## 2023-01-11 ENCOUNTER — OFFICE VISIT (OUTPATIENT)
Dept: UROLOGY | Facility: CLINIC | Age: 63
End: 2023-01-11
Payer: COMMERCIAL

## 2023-01-11 VITALS
WEIGHT: 211 LBS | SYSTOLIC BLOOD PRESSURE: 132 MMHG | DIASTOLIC BLOOD PRESSURE: 80 MMHG | HEIGHT: 74 IN | HEART RATE: 56 BPM | BODY MASS INDEX: 27.08 KG/M2

## 2023-01-11 DIAGNOSIS — N13.8 BPH WITH URINARY OBSTRUCTION: ICD-10-CM

## 2023-01-11 DIAGNOSIS — R97.20 ELEVATED PSA, BETWEEN 10 AND LESS THAN 20 NG/ML: ICD-10-CM

## 2023-01-11 DIAGNOSIS — C61 PROSTATE CANCER: Primary | ICD-10-CM

## 2023-01-11 DIAGNOSIS — N40.1 BPH WITH URINARY OBSTRUCTION: ICD-10-CM

## 2023-01-11 PROCEDURE — 99999 PR PBB SHADOW E&M-EST. PATIENT-LVL III: ICD-10-PCS | Mod: PBBFAC,,, | Performed by: NURSE PRACTITIONER

## 2023-01-11 PROCEDURE — 99999 PR PBB SHADOW E&M-EST. PATIENT-LVL III: CPT | Mod: PBBFAC,,, | Performed by: NURSE PRACTITIONER

## 2023-01-11 PROCEDURE — 99214 OFFICE O/P EST MOD 30 MIN: CPT | Mod: 25,S$GLB,, | Performed by: NURSE PRACTITIONER

## 2023-01-11 PROCEDURE — 99214 PR OFFICE/OUTPT VISIT, EST, LEVL IV, 30-39 MIN: ICD-10-PCS | Mod: 25,S$GLB,, | Performed by: NURSE PRACTITIONER

## 2023-01-11 PROCEDURE — 81002 POCT URINE DIPSTICK WITHOUT MICROSCOPE: ICD-10-PCS | Mod: S$GLB,,, | Performed by: NURSE PRACTITIONER

## 2023-01-11 PROCEDURE — 81002 URINALYSIS NONAUTO W/O SCOPE: CPT | Mod: S$GLB,,, | Performed by: NURSE PRACTITIONER

## 2023-01-11 NOTE — PROGRESS NOTES
CHIEF COMPLAINT:    Simón Fong is a 62 y.o. male presents today for Prostate Cancer.     HISTORY OF PRESENTING ILLINESS:    Simón Fong is a 62 y.o. male with a history of prostate cancer diagnosed in 2018. He has been on AS since diagnosis.   Initial PSA 7.5. [facility] biopsy in 2018- [place] 6 right middle and right base 1/2 cores each, <5%. Overall 2/12 cores positive.    Last clinic visit was 06/28/2022 with Dr. Samuel.    Here today for 6 month f/u visit.  Over all doing well.   No complaints.  Already reviewed his results; wants to continue with AS; maybe repeat bx next year.   New PSA was 13.3  01/2  Had a family cruise over the holidays.  His brother has pancreatic cancer.       PROSTATE CANCER INFORMATION:     FH -positive  PSA - 12.2  AJCC Stage - T1C  STAR CAP stage- IB  Volume - 55 ccs  MRI - 5/12/2022- 0.9 cm PI-RADS 3 lesion RAPZ. Negative extra prostatic extension, NVBI (neurovascular bundle involvement), SVI (seminal vesicle involvement), nodes.  Biopsy - 6/7/2022-[facility]- [place] 6 (GG1) left apex 2/2 60%, right apex (target) 1/4 60%, right middle 2/2 90%, right base 1/1 10%. Overall 4/6 sites, 6/14 cores.  ANDRES Score - 3 intermediate  NCCN - favorable intermediate  Germline testing -discussed, indicated  Somatic testing - Prolaris score- 3.2     STAR CAP:           REVIEW OF SYSTEMS:  Review of Systems   Constitutional: Negative.  Negative for chills and fever.   Eyes:  Negative for double vision.   Respiratory:  Negative for cough and shortness of breath.    Cardiovascular:  Negative for chest pain.   Gastrointestinal:  Negative for abdominal pain, constipation, diarrhea, nausea and vomiting.   Genitourinary: Negative.  Negative for dysuria, flank pain and hematuria.        No urinary complaints     Neurological:  Negative for dizziness and seizures.   Endo/Heme/Allergies:  Negative for polydipsia.       PATIENT HISTORY:    Past Medical History:   Diagnosis Date    HIV infection     Hypertension         Past Surgical History:   Procedure Laterality Date    COLONOSCOPY      COLONOSCOPY N/A 7/31/2020    Procedure: COLONOSCOPY;  Surgeon: Tree Mendiola MD;  Location: [facility] ([address] [place] [address]);  Service: Endoscopy;  Laterality: N/A;  covid 7/28-[facility]-tb    PROSTATE BIOPSY         Family History   Problem Relation Age of Onset    Diabetes Mother     Heart disease Mother     Hypertension Mother     Stroke Mother     Hypertension Father     Cancer Sister     Cancer Brother        Social History     Socioeconomic History    Marital status: Single   Tobacco Use    Smoking status: Never    Smokeless tobacco: Never   Substance and Sexual Activity    Alcohol use: No    Drug use: No       Allergies:  Sulfa (sulfonamide antibiotics)    Medications:    Current Outpatient Medications:     acetaminophen (TYLENOL) 500 MG tablet, Take 1,000 mg by mouth every 6 (six) hours as needed for Pain., Disp: , Rfl:     amLODIPine (NORVASC) 10 MG tablet, TAKE 1 TABLET ONCE DAILY, Disp: 90 tablet, Rfl: 3    ascorbic acid, vitamin C, (VITAMIN C) 1000 MG tablet, Take 1,000 mg by mouth once daily. , Disp: , Rfl:     biotin 1 mg Cap, Take by mouth., Disp: , Rfl:     cetirizine (ZYRTEC) 10 MG tablet, Take 1 tablet (10 mg total) by mouth once daily., Disp: 30 tablet, Rfl: 0    creatine, bulk, 100 % Powd, by Misc.(Non-Drug; Combo Route) route., Disp: , Rfl:     fish oil-omega-3 fatty acids 300-1,000 mg capsule, Take by mouth once daily., Disp: , Rfl:     glucosamine-chondroitin 500-400 mg tablet, Take 1 tablet by mouth 3 (three) times daily., Disp: , Rfl:     isoniazid (NYDRAZID) 300 MG Tab, Take 1 tablet (300 mg total) by mouth once daily. One tablet daily for prevention of tuberculosis, Disp: 90 tablet, Rfl: 3    leucine-isoleucine-valine (NEOKE BCAA4) 82 gram-328 kcal/100 gram Powd, Take by mouth. , Disp: , Rfl:     naproxen (NAPROSYN) 500 MG tablet, Take 1 tablet (500 mg total) by mouth 2 (two) times daily with meals., Disp: 60 tablet,  Rfl: 1    pyridoxine, vitamin B6, (VITAMIN B-6) 50 MG Tab, Take 1 tablet (50 mg total) by mouth once daily., Disp: 90 tablet, Rfl: 3    PHYSICAL EXAMINATION:  Physical Exam  Vitals and nursing note reviewed.   Constitutional:       General: He is awake.      Appearance: Normal appearance.   HENT:      Head: Normocephalic.      Right Ear: External ear normal.      Left Ear: External ear normal.      Nose: Nose normal.   Cardiovascular:      Rate and Rhythm: Normal rate.   Pulmonary:      Effort: Pulmonary effort is normal. No respiratory distress.   Abdominal:      Tenderness: There is no abdominal tenderness. There is no right CVA tenderness or left CVA tenderness.   Genitourinary:     Comments: BRIANNA deferred    Musculoskeletal:         General: Normal range of motion.      Cervical back: Normal range of motion.   Skin:     General: Skin is warm and dry.   Neurological:      General: No focal deficit present.      Mental Status: He is alert and oriented to person, place, and time.   Psychiatric:         Mood and Affect: Mood normal.         Behavior: Behavior is cooperative.         LABS:      In office UA today was clear of active infection & blood        Lab Results   Component Value Date    PSA 6.5 (H) 12/21/2018    PSA 7.5 (H) 04/30/2018    PSA 4.6 (H) 07/26/2017    PSADIAG 13.3 (H) 01/10/2023    PSADIAG 12.2 (H) 06/03/2022    PSADIAG 13.3 (H) 03/28/2022    PSATOTAL 6.4 (H) 08/22/2019             IMPRESSION:    Encounter Diagnoses   Name Primary?    Prostate cancer Yes    Elevated PSA, between 10 and less than 20 ng/ml     BPH with urinary obstruction          Assessment:       1. Prostate cancer    2. Elevated PSA, between 10 and less than 20 ng/ml    3. BPH with urinary obstruction        Plan:         I spent 30 minutes with the patient of which more than half was spent in direct consultation with the patient in regards to our treatment and plan.  We addressed the office findings and recent labs.   Education  and recommendations of today's plan of care including home remedies and needed follow up with PCP.   We discussed the chief complaint; reviewed the LUTS and the possible contributory factors.   Reviewed PSA and MRI; again discussed expectations and treatment options.  He wants to continue AS  Recommended lifestyle modifications with proper, healthy diet, good hydration if no fluid restrictions; reducing bladder irritants.   Benefits of regular exercise.  RTC 6 months with new PSA;   Repeat imaging/bx when warranted.

## 2023-01-25 NOTE — PROGRESS NOTES
CC: right wrist pain    62 y.o. Male presents today for follow up evaluation of his right wrist pain and to review MRI results.     Attempted treatments: none since last visit  Pain score: 0/10 at rest, 4/10 with activity  History of trauma/injury: none since last visit  Affecting ADLs: no     REVIEW OF SYSTEMS:   Constitution: Patient denies fever or chills.  Eyes: Patient denies eye pain or vision changes.  HEENT: Patient denies ear pain, sore throat, or nasal discharge.  CVS: Patient denies chest pain.  Lungs: Patient denies shortness of breath or cough.  Skin: Patient denies skin rash or itching.    Musculoskeletal: Patient denies recent falls. See HPI.  Psych: Patient denies any current anxiety or nervousness.    PAST MEDICAL HISTORY:   Past Medical History:   Diagnosis Date    HIV infection     Hypertension        MEDICATIONS:     Current Outpatient Medications:     acetaminophen (TYLENOL) 500 MG tablet, Take 1,000 mg by mouth every 6 (six) hours as needed for Pain., Disp: , Rfl:     amLODIPine (NORVASC) 10 MG tablet, TAKE 1 TABLET ONCE DAILY, Disp: 90 tablet, Rfl: 3    ascorbic acid, vitamin C, (VITAMIN C) 1000 MG tablet, Take 1,000 mg by mouth once daily. , Disp: , Rfl:     biotin 1 mg Cap, Take by mouth., Disp: , Rfl:     creatine, bulk, 100 % Powd, by Misc.(Non-Drug; Combo Route) route., Disp: , Rfl:     fish oil-omega-3 fatty acids 300-1,000 mg capsule, Take by mouth once daily., Disp: , Rfl:     glucosamine-chondroitin 500-400 mg tablet, Take 1 tablet by mouth 3 (three) times daily., Disp: , Rfl:     isoniazid (NYDRAZID) 300 MG Tab, Take 1 tablet (300 mg total) by mouth once daily. One tablet daily for prevention of tuberculosis, Disp: 90 tablet, Rfl: 3    leucine-isoleucine-valine (NEOKE BCAA4) 82 gram-328 kcal/100 gram Powd, Take by mouth. , Disp: , Rfl:     naproxen (NAPROSYN) 500 MG tablet, Take 1 tablet (500 mg total) by mouth 2 (two) times daily with meals., Disp: 60 tablet, Rfl: 1    pyridoxine,  vitamin B6, (VITAMIN B-6) 50 MG Tab, Take 1 tablet (50 mg total) by mouth once daily., Disp: 90 tablet, Rfl: 3    cetirizine (ZYRTEC) 10 MG tablet, Take 1 tablet (10 mg total) by mouth once daily., Disp: 30 tablet, Rfl: 0    ALLERGIES:   Review of patient's allergies indicates:   Allergen Reactions    Sulfa (sulfonamide antibiotics)         IMAGIN. MRI ordered due to right wrist pain, taken on 23.  2. MRI images were reviewed personally by me and then directly with patient.  3. FINDINGS: Triangular fibrocartilage complex:     *Triangular fibrocartilage: There is thickening and increased intrasubstance signal of the ulnar laminae of the triangular fibrocartilage, primarily involving the styloid lamina.  No high-grade partial or full-thickness tear.  Central portion of the triangular fibrocartilage is intact.  *Meniscal homologue: Increased signal intensity possibly related to mucoid degeneration.  *Pre-styloid recess: Prominent synovitis.  *Extensor carpi ulnaris tendon: Mild thickening and increased intrasubstance signal.  Extensor retinaculum and sub-sheath appear grossly intact.  *Volar ulnolunate and ulnotriquetral ligaments are grossly preserved.  *Thickening and increased signal intensity of the dorsal radioulnar ligament.  Intrinsic ligaments:     *Scapholunate: Signal heterogeneity and irregular morphology of the dorsal, membranous and volar components of the scapholunate ligament.  *Lunotriquetral: Membranous, volar and dorsal segments appear grossly intact noting limited non arthrographic exam.  Extrinsic ligaments:     *Volar long radiolunate and radioscaphoid capitate appear intact. Dorsal long radiolunate is intact.  Tendons:     *Extensor tendons: Normal contour and signal intensity without findings to suggest stenosis or tenosynovitis.  *Flexor tendons: Normal contour and signal intensity without findings to suggest tendinosis or tenosynovitis.  Bones: There is neutral ulnar variance. No  "fractures. No avascular necrosis.     Joints/Cartilage: There is a distal radioulnar joint effusion with associated synovitis and surrounding soft tissue edema.  Trace midcarpal joint fluid most pronounced dorsally.  There is narrowing of the scapholunate interval with subchondral cystic change at the radial aspect of the lunate.  Small subchondral cysts at the distal scaphoid and 1st metacarpal base.  There is partial-thickness chondral fissuring with associated subchondral cystic change at the pisotriquetral joint.  Radiocarpal, midcarpal and common metacarpal chondral surfaces are preserved.     Miscellaneous: No ganglion cysts.  Median and ulnar nerves demonstrate normal contour and signal intensity.    4. IMPRESSION: 1. Thickening and increased signal intensity of the styloid and foveal lamina of the triangular fibrocartilage and adjacent dorsal radioulnar ligament, possibly related to a sprain.  Intact central portion of the triangular fibrocartilage.  No high-grade partial or full-thickness tear.  2. Signal heterogeneity of the meniscal homologue suggesting mucoid degeneration or inflammation noting prominent synovitis at the adjacent pre-styloid recess.  3. Mild extensor carpi ulnaris tendinosis.  4. Distal radioulnar joint effusion with synovitis.  5. Mild pisotriquetral, triscaphe and 1st CMC osteoarthritis.  6. Scapholunate osteoarthritis with signal heterogeneity of the adjacent intrinsic ligament, possibly related to mucoid degeneration but incompletely evaluated on this non arthrographic exam.  No scapholunate interval widening.    PHYSICAL EXAMINATION:  /75   Ht 6' 2" (1.88 m)   Wt 95.7 kg (210 lb 15.7 oz)   BMI 27.09 kg/m²   Vitals signs and nursing note have been reviewed.    General: In no acute distress, well developed, well nourished, no diaphoresis  Eyes: EOM full and smooth, no eye redness or discharge  HENT: normocephalic and atraumatic, neck supple, trachea midline, no nasal " discharge  Cardiovascular: no LE edema  Lungs: respirations non-labored, no conversational dyspnea   Neuro: AAOx3, CN2-12 grossly intact  Skin: No rashes, warm and dry  Psychiatric: cooperative, pleasant, mood and affect appropriate for age    ASSESSMENT:      ICD-10-CM ICD-9-CM   1. Arthritis of right wrist  M19.031 716.93   2. Extensor carpi ulnaris tendinitis  M77.8 727.05   3. Arthritis of carpometacarpal (CMC) joint of right thumb  M18.11 716.94         PLAN:  MRI review with patient at today's visit.  Patient with strain of the right wrist to include some osteoarthritis and multiple locations.  Patient will continue occupational therapy.  At this time, I do not believe there is a place for surgical referral and patient would like to avoid surgery.  Follow up should he not continue to improve.    Future planning includes - occupational therapy    All questions were answered to the best of my ability and all concerns were addressed at this time.    Follow up p.r.n.    This note is dictated using the M*Modal Fluency Direct word recognition program. There are word recognition mistakes that are occasionally missed on review.

## 2023-01-28 ENCOUNTER — HOSPITAL ENCOUNTER (OUTPATIENT)
Dept: RADIOLOGY | Facility: HOSPITAL | Age: 63
Discharge: HOME | End: 2023-01-28
Attending: STUDENT IN AN ORGANIZED HEALTH CARE EDUCATION/TRAINING PROGRAM
Payer: COMMERCIAL

## 2023-01-28 DIAGNOSIS — S66.819A STRAIN OF FLEXOR TENDON OF WRIST: ICD-10-CM

## 2023-01-28 PROCEDURE — 73221 MRI WRIST WITHOUT CONTRAST RIGHT: ICD-10-PCS | Mod: 26,RT,, | Performed by: RADIOLOGY

## 2023-01-28 PROCEDURE — 73221 MRI JOINT UPR EXTREM W/O DYE: CPT | Mod: TC,RT

## 2023-01-28 PROCEDURE — 73221 MRI JOINT UPR EXTREM W/O DYE: CPT | Mod: 26,RT,, | Performed by: RADIOLOGY

## 2023-01-30 ENCOUNTER — OFFICE VISIT (OUTPATIENT)
Dept: SPORTS MEDICINE | Facility: CLINIC | Age: 63
End: 2023-01-30
Payer: COMMERCIAL

## 2023-01-30 VITALS
SYSTOLIC BLOOD PRESSURE: 129 MMHG | DIASTOLIC BLOOD PRESSURE: 75 MMHG | BODY MASS INDEX: 27.08 KG/M2 | HEIGHT: 74 IN | WEIGHT: 211 LBS

## 2023-01-30 DIAGNOSIS — M19.031 ARTHRITIS OF RIGHT WRIST: Primary | ICD-10-CM

## 2023-01-30 DIAGNOSIS — M18.11 ARTHRITIS OF CARPOMETACARPAL (CMC) JOINT OF RIGHT THUMB: ICD-10-CM

## 2023-01-30 DIAGNOSIS — M77.8 EXTENSOR CARPI ULNARIS TENDINITIS: ICD-10-CM

## 2023-01-30 PROCEDURE — 99999 PR PBB SHADOW E&M-EST. PATIENT-LVL III: ICD-10-PCS | Mod: PBBFAC,,, | Performed by: STUDENT IN AN ORGANIZED HEALTH CARE EDUCATION/TRAINING PROGRAM

## 2023-01-30 PROCEDURE — 99214 PR OFFICE/OUTPT VISIT, EST, LEVL IV, 30-39 MIN: ICD-10-PCS | Mod: S$GLB,,, | Performed by: STUDENT IN AN ORGANIZED HEALTH CARE EDUCATION/TRAINING PROGRAM

## 2023-01-30 PROCEDURE — 99214 OFFICE O/P EST MOD 30 MIN: CPT | Mod: S$GLB,,, | Performed by: STUDENT IN AN ORGANIZED HEALTH CARE EDUCATION/TRAINING PROGRAM

## 2023-01-30 PROCEDURE — 99999 PR PBB SHADOW E&M-EST. PATIENT-LVL III: CPT | Mod: PBBFAC,,, | Performed by: STUDENT IN AN ORGANIZED HEALTH CARE EDUCATION/TRAINING PROGRAM

## 2023-02-01 ENCOUNTER — PATIENT MESSAGE (OUTPATIENT)
Dept: PRIMARY CARE CLINIC | Facility: CLINIC | Age: 63
End: 2023-02-01
Payer: COMMERCIAL

## 2023-02-03 ENCOUNTER — TELEPHONE (OUTPATIENT)
Dept: PRIMARY CARE CLINIC | Facility: CLINIC | Age: 63
End: 2023-02-03

## 2023-02-03 ENCOUNTER — PATIENT MESSAGE (OUTPATIENT)
Dept: PRIMARY CARE CLINIC | Facility: CLINIC | Age: 63
End: 2023-02-03
Payer: COMMERCIAL

## 2023-02-03 ENCOUNTER — PATIENT MESSAGE (OUTPATIENT)
Dept: INFECTIOUS DISEASES | Facility: CLINIC | Age: 63
End: 2023-02-03
Payer: COMMERCIAL

## 2023-02-03 DIAGNOSIS — Z13.220 SCREENING CHOLESTEROL LEVEL: ICD-10-CM

## 2023-02-03 DIAGNOSIS — Z13.9 SCREENING DUE: Primary | ICD-10-CM

## 2023-02-03 DIAGNOSIS — Z79.4 TYPE 2 DIABETES MELLITUS WITH DIABETIC PERIPHERAL ANGIOPATHY WITHOUT GANGRENE, WITH LONG-TERM CURRENT USE OF INSULIN: ICD-10-CM

## 2023-02-03 DIAGNOSIS — E11.51 TYPE 2 DIABETES MELLITUS WITH DIABETIC PERIPHERAL ANGIOPATHY WITHOUT GANGRENE, WITH LONG-TERM CURRENT USE OF INSULIN: ICD-10-CM

## 2023-02-03 DIAGNOSIS — I10 ESSENTIAL HYPERTENSION: ICD-10-CM

## 2023-02-07 ENCOUNTER — PATIENT MESSAGE (OUTPATIENT)
Dept: INFECTIOUS DISEASES | Facility: CLINIC | Age: 63
End: 2023-02-07
Payer: COMMERCIAL

## 2023-02-07 ENCOUNTER — LAB VISIT (OUTPATIENT)
Dept: LAB | Facility: HOSPITAL | Age: 63
End: 2023-02-07
Attending: INTERNAL MEDICINE
Payer: COMMERCIAL

## 2023-02-07 ENCOUNTER — TELEPHONE (OUTPATIENT)
Dept: INFECTIOUS DISEASES | Facility: CLINIC | Age: 63
End: 2023-02-07
Payer: COMMERCIAL

## 2023-02-07 DIAGNOSIS — Z79.4 TYPE 2 DIABETES MELLITUS WITH DIABETIC PERIPHERAL ANGIOPATHY WITHOUT GANGRENE, WITH LONG-TERM CURRENT USE OF INSULIN: ICD-10-CM

## 2023-02-07 DIAGNOSIS — Z13.220 SCREENING CHOLESTEROL LEVEL: ICD-10-CM

## 2023-02-07 DIAGNOSIS — E11.51 TYPE 2 DIABETES MELLITUS WITH DIABETIC PERIPHERAL ANGIOPATHY WITHOUT GANGRENE, WITH LONG-TERM CURRENT USE OF INSULIN: ICD-10-CM

## 2023-02-07 DIAGNOSIS — B20 HUMAN IMMUNODEFICIENCY VIRUS (HIV) DISEASE: ICD-10-CM

## 2023-02-07 DIAGNOSIS — B20 HUMAN IMMUNODEFICIENCY VIRUS (HIV) DISEASE: Primary | ICD-10-CM

## 2023-02-07 DIAGNOSIS — K75.9 HEPATITIS: ICD-10-CM

## 2023-02-07 DIAGNOSIS — I10 ESSENTIAL HYPERTENSION: ICD-10-CM

## 2023-02-07 LAB
ALBUMIN SERPL BCP-MCNC: 4.4 G/DL (ref 3.5–5.2)
ALP SERPL-CCNC: 83 U/L (ref 55–135)
ALT SERPL W/O P-5'-P-CCNC: 289 U/L (ref 10–44)
ANION GAP SERPL CALC-SCNC: 11 MMOL/L (ref 8–16)
AST SERPL-CCNC: 147 U/L (ref 10–40)
BASOPHILS # BLD AUTO: 0.01 K/UL (ref 0–0.2)
BASOPHILS NFR BLD: 0.3 % (ref 0–1.9)
BILIRUB SERPL-MCNC: 0.4 MG/DL (ref 0.1–1)
BUN SERPL-MCNC: 14 MG/DL (ref 8–23)
CALCIUM SERPL-MCNC: 9.6 MG/DL (ref 8.7–10.5)
CHLORIDE SERPL-SCNC: 105 MMOL/L (ref 95–110)
CHOLEST SERPL-MCNC: 172 MG/DL (ref 120–199)
CHOLEST/HDLC SERPL: 2.6 {RATIO} (ref 2–5)
CO2 SERPL-SCNC: 26 MMOL/L (ref 23–29)
CREAT SERPL-MCNC: 0.9 MG/DL (ref 0.5–1.4)
DIFFERENTIAL METHOD: ABNORMAL
EOSINOPHIL # BLD AUTO: 0.1 K/UL (ref 0–0.5)
EOSINOPHIL NFR BLD: 1.8 % (ref 0–8)
ERYTHROCYTE [DISTWIDTH] IN BLOOD BY AUTOMATED COUNT: 12.8 % (ref 11.5–14.5)
EST. GFR  (NO RACE VARIABLE): >60 ML/MIN/1.73 M^2
ESTIMATED AVG GLUCOSE: 108 MG/DL (ref 68–131)
GLUCOSE SERPL-MCNC: 86 MG/DL (ref 70–110)
HBA1C MFR BLD: 5.4 % (ref 4–5.6)
HCT VFR BLD AUTO: 45.6 % (ref 40–54)
HDLC SERPL-MCNC: 66 MG/DL (ref 40–75)
HDLC SERPL: 38.4 % (ref 20–50)
HGB BLD-MCNC: 14.8 G/DL (ref 14–18)
IMM GRANULOCYTES # BLD AUTO: 0.01 K/UL (ref 0–0.04)
IMM GRANULOCYTES NFR BLD AUTO: 0.3 % (ref 0–0.5)
LDLC SERPL CALC-MCNC: 98.4 MG/DL (ref 63–159)
LYMPHOCYTES # BLD AUTO: 1.8 K/UL (ref 1–4.8)
LYMPHOCYTES NFR BLD: 52.4 % (ref 18–48)
MCH RBC QN AUTO: 30.5 PG (ref 27–31)
MCHC RBC AUTO-ENTMCNC: 32.5 G/DL (ref 32–36)
MCV RBC AUTO: 94 FL (ref 82–98)
MONOCYTES # BLD AUTO: 0.4 K/UL (ref 0.3–1)
MONOCYTES NFR BLD: 11.5 % (ref 4–15)
NEUTROPHILS # BLD AUTO: 1.1 K/UL (ref 1.8–7.7)
NEUTROPHILS NFR BLD: 33.7 % (ref 38–73)
NONHDLC SERPL-MCNC: 106 MG/DL
NRBC BLD-RTO: 0 /100 WBC
PLATELET # BLD AUTO: 213 K/UL (ref 150–450)
PMV BLD AUTO: 10 FL (ref 9.2–12.9)
POTASSIUM SERPL-SCNC: 3.7 MMOL/L (ref 3.5–5.1)
PROT SERPL-MCNC: 7.7 G/DL (ref 6–8.4)
RBC # BLD AUTO: 4.86 M/UL (ref 4.6–6.2)
SODIUM SERPL-SCNC: 142 MMOL/L (ref 136–145)
TRIGL SERPL-MCNC: 38 MG/DL (ref 30–150)
WBC # BLD AUTO: 3.38 K/UL (ref 3.9–12.7)

## 2023-02-07 PROCEDURE — 83036 HEMOGLOBIN GLYCOSYLATED A1C: CPT | Performed by: INTERNAL MEDICINE

## 2023-02-07 PROCEDURE — 85025 COMPLETE CBC W/AUTO DIFF WBC: CPT | Performed by: INTERNAL MEDICINE

## 2023-02-07 PROCEDURE — 86361 T CELL ABSOLUTE COUNT: CPT | Performed by: INTERNAL MEDICINE

## 2023-02-07 PROCEDURE — 80061 LIPID PANEL: CPT | Performed by: INTERNAL MEDICINE

## 2023-02-07 PROCEDURE — 80053 COMPREHEN METABOLIC PANEL: CPT | Performed by: INTERNAL MEDICINE

## 2023-02-07 PROCEDURE — 87536 HIV-1 QUANT&REVRSE TRNSCRPJ: CPT | Performed by: INTERNAL MEDICINE

## 2023-02-07 PROCEDURE — 36415 COLL VENOUS BLD VENIPUNCTURE: CPT | Performed by: INTERNAL MEDICINE

## 2023-02-07 NOTE — TELEPHONE ENCOUNTER
See patient message re: LFT elevation on outpatient labs.     Reports sports medicine had him taking 3, 500mg naproxen. Denies tylenol use. Denies etoh      ALT> 5x normal. Asymptomatic notably is also hep  b c ab positive, but hep b an have been neg in past    Recommendations (discussed with patient on phone)  Stop INH.   Weekly CMP  Hep b labs    When lfts normalize, plan on resuming INH and monitoring lfts closely. If they rise again with INH, will discuss another ltbi regimen vs stopping

## 2023-02-08 ENCOUNTER — PATIENT MESSAGE (OUTPATIENT)
Dept: INFECTIOUS DISEASES | Facility: CLINIC | Age: 63
End: 2023-02-08
Payer: COMMERCIAL

## 2023-02-08 LAB
CD3+CD4+ CELLS # BLD: 722 CELLS/UL (ref 300–1400)
CD3+CD4+ CELLS NFR BLD: 37.7 % (ref 28–57)
HIV1 RNA # SERPL NAA+PROBE: <20 COPIES/ML
HIV1 RNA SERPL NAA+PROBE-LOG#: <1.3 LOG COPIES/ML
HIV1 RNA SERPL QL NAA+PROBE: DETECTED

## 2023-02-13 ENCOUNTER — OFFICE VISIT (OUTPATIENT)
Dept: PRIMARY CARE CLINIC | Facility: CLINIC | Age: 63
End: 2023-02-13
Payer: COMMERCIAL

## 2023-02-13 VITALS
HEIGHT: 74 IN | OXYGEN SATURATION: 100 % | SYSTOLIC BLOOD PRESSURE: 112 MMHG | TEMPERATURE: 98 F | DIASTOLIC BLOOD PRESSURE: 76 MMHG | WEIGHT: 214.81 LBS | BODY MASS INDEX: 27.57 KG/M2 | HEART RATE: 66 BPM

## 2023-02-13 DIAGNOSIS — B20 HUMAN IMMUNODEFICIENCY VIRUS (HIV) DISEASE: ICD-10-CM

## 2023-02-13 DIAGNOSIS — I10 HYPERTENSION, UNSPECIFIED TYPE: ICD-10-CM

## 2023-02-13 DIAGNOSIS — C61 PROSTATE CANCER: ICD-10-CM

## 2023-02-13 PROCEDURE — 99211 OFF/OP EST MAY X REQ PHY/QHP: CPT | Mod: S$GLB,,, | Performed by: INTERNAL MEDICINE

## 2023-02-13 PROCEDURE — 99211 PR OFFICE/OUTPT VISIT, EST, LEVL I: ICD-10-PCS | Mod: S$GLB,,, | Performed by: INTERNAL MEDICINE

## 2023-02-14 ENCOUNTER — PATIENT MESSAGE (OUTPATIENT)
Dept: INFECTIOUS DISEASES | Facility: CLINIC | Age: 63
End: 2023-02-14
Payer: COMMERCIAL

## 2023-02-14 ENCOUNTER — PATIENT MESSAGE (OUTPATIENT)
Dept: PRIMARY CARE CLINIC | Facility: CLINIC | Age: 63
End: 2023-02-14
Payer: COMMERCIAL

## 2023-02-14 NOTE — PROGRESS NOTES
"[facility]  SHARED NOTE: Lisa Murillo (MS-4), Dr Barone (Attending)    Subjective:      Patient ID: Simón Fong is a 62 y.o. male with elevated LFTs, prostate cancer, HIV, HTN      Chief Complaint: Hypertension    Prior to this visit, patient's last encounter with PCP was Visit date not found.    Pt reports annual checkup with work documents. He receives a discount on his insurance premium through his work. Happily completed his [employer] forms and faxed per instructions. Returned patient original documents.     He otherwise has stopped a few supplements and medications. Other than elevated liver enzymes at a visit with his ID doctor, he is otherwise "stable" in all his conditions or having his specialists follow. He is otherwise feeling very healthy and fit, plays tennis often.      Elevated LFTs  - Currently on HOLD by ID physician: isoniazid 300mg  - elevated LFTs at recent ID visit   - Dr Purvis (infectious diseases) has placed hold on isoniazid for blood retesting in 2 weeks to r/o isoniazid as cause of elevated LFTs   (H) 02/07/2023    (H) 02/07/2023       HTN  - Currently taking: amlodipine 10mg  - stable  - Today, BP is: 112/76      Specialty notes   - UROLOGY last seen by MARYURI Wright 1/11/23  - prostate cancer diagnosed in 2018 with rising PSA; currently under active surveillance     Care Gaps:  - none    Medications:  - naproxen 500mg  - amlodipine 10mg  - isoniazid 300mg (ON HOLD)  - pyridoxine     FU in 1 year to specifically check annual visit      4Ms for Medical Decision-Making in Older Adults    Last Completed EAWV: None    MOBILITY:  Get Up and Go:  No flowsheet data found.  Activities of Daily Living:  No flowsheet data found.  Whisper Test:  No flowsheet data found.  Disability Status:  No flowsheet data found.  Nutrition Screening:  No flowsheet data found. Screening Score: 0-7 Malnourished, 8-11 At Risk, 12-14 " "Normal    MENTATION:   Depression Patient Health Questionnaire:  No flowsheet data found.  Has Dementia Dx: No    Cognitive Function Screening:  No flowsheet data found.  Cognitive Function Screening Total - Less than 4 = Abnormal,  Greater than or equal to 4 = Normal    MEDICATIONS:  High Risk Medications:  Total Active Medications: 0  This patient does not have an active medication from one of the medication groupers.    WHAT MATTERS MOST:  Advance Care Planning   ACP Status:   Patient has had an ACP conversation  Living Will: No  Power of : No  [place]: No        Social History     Socioeconomic History    Marital status: Single   Tobacco Use    Smoking status: Never    Smokeless tobacco: Never   Substance and Sexual Activity    Alcohol use: No    Drug use: No       Review of Systems   All other systems reviewed and are negative.    Objective:   /76 (BP Location: Right arm, Patient Position: Sitting, BP Method: Large (Automatic))   Pulse 66   Temp 97.8 °F (36.6 °C) (Oral)   Ht 6' 2" (1.88 m)   Wt 97.4 kg (214 lb 13.4 oz)   SpO2 100%   BMI 27.58 kg/m²     Physical Exam  Vitals reviewed.   Constitutional:       Appearance: He is normal weight.   Cardiovascular:      Rate and Rhythm: Normal rate.   Pulmonary:      Effort: Pulmonary effort is normal.   Abdominal:      General: Abdomen is flat.   Skin:     General: Skin is warm.   Neurological:      Mental Status: He is alert.   Psychiatric:         Mood and Affect: Mood normal.           Lab Results   Component Value Date    WBC 3.38 (L) 02/07/2023    HGB 14.8 02/07/2023    HCT 45.6 02/07/2023     02/07/2023    CHOL 172 02/07/2023    TRIG 38 02/07/2023    HDL 66 02/07/2023     (H) 02/24/2023    AST 73 (H) 02/24/2023     02/24/2023    K 4.0 02/24/2023     02/24/2023    CREATININE 0.9 02/24/2023    BUN 16 02/24/2023    CO2 29 02/24/2023    TSH 2.607 03/28/2022    PSA 6.5 (H) 12/21/2018    INR 1.2 02/08/2012    HGBA1C 5.4 " 02/07/2023       Current Outpatient Medications on File Prior to Visit   Medication Sig Dispense Refill    amLODIPine (NORVASC) 10 MG tablet TAKE 1 TABLET ONCE DAILY 90 tablet 3    ascorbic acid, vitamin C, (VITAMIN C) 1000 MG tablet Take 1,000 mg by mouth once daily.       fish oil-omega-3 fatty acids 300-1,000 mg capsule Take by mouth once daily.      glucosamine-chondroitin 500-400 mg tablet Take 1 tablet by mouth 3 (three) times daily.      isoniazid (NYDRAZID) 300 MG Tab Take 1 tablet (300 mg total) by mouth once daily. One tablet daily for prevention of tuberculosis 90 tablet 3    leucine-isoleucine-valine (NEOKE BCAA4) 82 gram-328 kcal/100 gram Powd Take by mouth.       naproxen (NAPROSYN) 500 MG tablet Take 1 tablet (500 mg total) by mouth 2 (two) times daily with meals. 60 tablet 1    pyridoxine, vitamin B6, (VITAMIN B-6) 50 MG Tab Take 1 tablet (50 mg total) by mouth once daily. 90 tablet 3     No current facility-administered medications on file prior to visit.         Assessment:   62 y.o. male with multiple co-morbid illnesses here to follow-up with PCP and continue work-up of chronic issues:    Plan:     Problem List Items Addressed This Visit          Cardiac/Vascular    Hypertension     BP readings controlled on CCB  Advised to continue amlodipine 10mg  Given handout on BP monitoring technique   Calibrate home BP cuff at next appt  Is enrolled in digital HTN program            ID    Human immunodeficiency virus (HIV) disease     Followed by ID (Dr Purvis). HIV was diagnosed 30 years ago. Prior antiretroviral regimens include truvada/reyataz, stribild, genvoya. No h/o drug resistance. 100 % compliant on genvoya and denies side effects. Will be switching from genvoya to dovato 2/2 side effect profile/ to avoid COVID booster.   Continue ID follow-up with Dr Purvis            Oncology    Prostate cancer     Low-volume, low-risk prostate cancer on active surveillance since 8/2018. He presented with an  elevated PSA and a family history of prostate cancer. He underwent a MRI fusion prostate needle biopsy on 08/13/2018 which showed 2 positive cores with Carpenter 6 disease, less than 5% on each positive core. His PSA was 7.5 at the time with a prostate volume of 47. 3cc. He had been seen in clinic with Dr. Richardson 01/16/2019. Last office visit was 08/13/2020. At the time he chose not to go ahead with MRI for restaging. He does not want to have another biopsy. Followed by Tammy Wright for active surveillance of prostate cancer.   Overdue for PSA monitoring  Needs F/U with Tammy Wright  Patient prefers to schedule this at his [employer] [facility]         Date Due Completion Date    Pneumococcal Vaccines (Age 0-64) (4 - PPSV23 if available, else PCV20) 07/23/2025 7/23/2020    Colorectal Cancer Screening 07/31/2025 7/31/2020    Hemoglobin A1c (Diabetic Prevention Screening) 02/07/2026 2/7/2023    TETANUS VACCINE 05/02/2026 5/2/2016    Lipid Panel 02/07/2028 2/7/2023            Future Appointments   Date Time Provider Department Center   3/1/2023  7:10 AM LAB[facility]   3/8/2023  7:10 AM LAB[facility]   3/10/2023  2:00 PM Jennifer Purvis MD [facility] [address]   3/15/2023  7:10 AM LAB, [facility]   3/22/2023  7:10 AM LAB, [facility]   3/29/2023  7:10 AM LAB[facility]   4/5/2023  7:10 AM LAB[facility]   4/12/2023  7:10 AM LAB, [facility]   4/19/2023  7:10 AM LAB, [facility]         Follow up in about 1 year (around 2/13/2024). Total clinical care time was 60 min, issues addressed include HIV, HTN    Lisa Murillo, MS-4    Jimena Barone MD/MPH  [facility]  [phone] spectralink

## 2023-02-15 ENCOUNTER — PATIENT MESSAGE (OUTPATIENT)
Dept: INFECTIOUS DISEASES | Facility: CLINIC | Age: 63
End: 2023-02-15
Payer: COMMERCIAL

## 2023-02-15 ENCOUNTER — LAB VISIT (OUTPATIENT)
Dept: LAB | Facility: HOSPITAL | Age: 63
End: 2023-02-15
Attending: INTERNAL MEDICINE
Payer: COMMERCIAL

## 2023-02-15 DIAGNOSIS — B20 HUMAN IMMUNODEFICIENCY VIRUS (HIV) DISEASE: ICD-10-CM

## 2023-02-15 DIAGNOSIS — K75.9 HEPATITIS: ICD-10-CM

## 2023-02-15 DIAGNOSIS — R74.01 TRANSAMINITIS: Primary | ICD-10-CM

## 2023-02-15 LAB
ALBUMIN SERPL BCP-MCNC: 4.2 G/DL (ref 3.5–5.2)
ALP SERPL-CCNC: 74 U/L (ref 55–135)
ALT SERPL W/O P-5'-P-CCNC: 238 U/L (ref 10–44)
ANION GAP SERPL CALC-SCNC: 10 MMOL/L (ref 8–16)
AST SERPL-CCNC: 111 U/L (ref 10–40)
BILIRUB SERPL-MCNC: 0.4 MG/DL (ref 0.1–1)
BUN SERPL-MCNC: 12 MG/DL (ref 8–23)
CALCIUM SERPL-MCNC: 9.8 MG/DL (ref 8.7–10.5)
CHLORIDE SERPL-SCNC: 105 MMOL/L (ref 95–110)
CO2 SERPL-SCNC: 26 MMOL/L (ref 23–29)
CREAT SERPL-MCNC: 1 MG/DL (ref 0.5–1.4)
EST. GFR  (NO RACE VARIABLE): >60 ML/MIN/1.73 M^2
GLUCOSE SERPL-MCNC: 102 MG/DL (ref 70–110)
HBV SURFACE AG SERPL QL IA: NORMAL
HCV AB SERPL QL IA: NORMAL
POTASSIUM SERPL-SCNC: 4.7 MMOL/L (ref 3.5–5.1)
PROT SERPL-MCNC: 7.2 G/DL (ref 6–8.4)
SODIUM SERPL-SCNC: 141 MMOL/L (ref 136–145)

## 2023-02-15 PROCEDURE — 80053 COMPREHEN METABOLIC PANEL: CPT | Performed by: INTERNAL MEDICINE

## 2023-02-15 PROCEDURE — 87517 HEPATITIS B DNA QUANT: CPT | Performed by: INTERNAL MEDICINE

## 2023-02-15 PROCEDURE — 87340 HEPATITIS B SURFACE AG IA: CPT | Performed by: INTERNAL MEDICINE

## 2023-02-15 PROCEDURE — 86803 HEPATITIS C AB TEST: CPT | Performed by: INTERNAL MEDICINE

## 2023-02-17 LAB
HBV DNA SERPL NAA+PROBE-ACNC: <10 IU/ML
HBV DNA SERPL NAA+PROBE-LOG IU: <1 LOG (10) IU/ML
HBV DNA SERPL QL NAA+PROBE: NOT DETECTED

## 2023-02-24 ENCOUNTER — LAB VISIT (OUTPATIENT)
Dept: LAB | Facility: HOSPITAL | Age: 63
End: 2023-02-24
Attending: INTERNAL MEDICINE
Payer: COMMERCIAL

## 2023-02-24 DIAGNOSIS — B20 HUMAN IMMUNODEFICIENCY VIRUS (HIV) DISEASE: ICD-10-CM

## 2023-02-24 DIAGNOSIS — K75.9 HEPATITIS: ICD-10-CM

## 2023-02-24 LAB
ALBUMIN SERPL BCP-MCNC: 4.3 G/DL (ref 3.5–5.2)
ALP SERPL-CCNC: 65 U/L (ref 55–135)
ALT SERPL W/O P-5'-P-CCNC: 151 U/L (ref 10–44)
ANION GAP SERPL CALC-SCNC: 8 MMOL/L (ref 8–16)
AST SERPL-CCNC: 73 U/L (ref 10–40)
BILIRUB SERPL-MCNC: 0.4 MG/DL (ref 0.1–1)
BUN SERPL-MCNC: 16 MG/DL (ref 8–23)
CALCIUM SERPL-MCNC: 9.9 MG/DL (ref 8.7–10.5)
CHLORIDE SERPL-SCNC: 107 MMOL/L (ref 95–110)
CO2 SERPL-SCNC: 29 MMOL/L (ref 23–29)
CREAT SERPL-MCNC: 0.9 MG/DL (ref 0.5–1.4)
EST. GFR  (NO RACE VARIABLE): >60 ML/MIN/1.73 M^2
GLUCOSE SERPL-MCNC: 75 MG/DL (ref 70–110)
POTASSIUM SERPL-SCNC: 4 MMOL/L (ref 3.5–5.1)
PROT SERPL-MCNC: 7.5 G/DL (ref 6–8.4)
SODIUM SERPL-SCNC: 144 MMOL/L (ref 136–145)

## 2023-02-24 PROCEDURE — 36415 COLL VENOUS BLD VENIPUNCTURE: CPT | Performed by: INTERNAL MEDICINE

## 2023-02-24 PROCEDURE — 80053 COMPREHEN METABOLIC PANEL: CPT | Performed by: INTERNAL MEDICINE

## 2023-02-27 ENCOUNTER — PATIENT MESSAGE (OUTPATIENT)
Dept: PRIMARY CARE CLINIC | Facility: CLINIC | Age: 63
End: 2023-02-27
Payer: COMMERCIAL

## 2023-02-28 NOTE — ASSESSMENT & PLAN NOTE
Low-volume, low-risk prostate cancer on active surveillance since 8/2018. He presented with an elevated PSA and a family history of prostate cancer. He underwent a MRI fusion prostate needle biopsy on 08/13/2018 which showed 2 positive cores with Sturgis 6 disease, less than 5% on each positive core. His PSA was 7.5 at the time with a prostate volume of 47. 3cc. He had been seen in clinic with Dr. Richardson 01/16/2019. Last office visit was 08/13/2020. At the time he chose not to go ahead with MRI for restaging. He does not want to have another biopsy. Followed by Tammy Wright for active surveillance of prostate cancer.   · Overdue for PSA monitoring  · Needs F/U with Tammy Wright  · Patient prefers to schedule this at his covenience

## 2023-02-28 NOTE — ASSESSMENT & PLAN NOTE
Followed by ID (Dr Purvis). HIV was diagnosed 30 years ago. Prior antiretroviral regimens include truvada/reyataz, stribild, genvoya. No h/o drug resistance. 100 % compliant on genvoya and denies side effects. Will be switching from genvoya to dovato 2/2 side effect profile/ to avoid COVID booster.   · Continue ID follow-up with Dr Purvis

## 2023-02-28 NOTE — ASSESSMENT & PLAN NOTE
BP readings controlled on CCB  · Advised to continue amlodipine 10mg  · Given handout on BP monitoring technique   · Calibrate home BP cuff at next appt  · Is enrolled in digital HTN program

## 2023-03-03 ENCOUNTER — TELEPHONE (OUTPATIENT)
Dept: PRIMARY CARE CLINIC | Facility: CLINIC | Age: 63
End: 2023-03-03

## 2023-03-03 NOTE — TELEPHONE ENCOUNTER
SW called the and informed the pt that I faxed the  documentation to [employer] and that there was no number listed to call and confirm that it was received. The pt stated that he will call me back and let me know    Update: Pt stated that he received an email from [employer] stating that they received it.    CASE CLOSED

## 2023-03-08 ENCOUNTER — LAB VISIT (OUTPATIENT)
Dept: LAB | Facility: HOSPITAL | Age: 63
End: 2023-03-08
Attending: INTERNAL MEDICINE
Payer: COMMERCIAL

## 2023-03-08 DIAGNOSIS — B20 HUMAN IMMUNODEFICIENCY VIRUS (HIV) DISEASE: ICD-10-CM

## 2023-03-08 DIAGNOSIS — K75.9 HEPATITIS: ICD-10-CM

## 2023-03-08 LAB
ALBUMIN SERPL BCP-MCNC: 4.3 G/DL (ref 3.5–5.2)
ALP SERPL-CCNC: 63 U/L (ref 55–135)
ALT SERPL W/O P-5'-P-CCNC: 84 U/L (ref 10–44)
ANION GAP SERPL CALC-SCNC: 8 MMOL/L (ref 8–16)
AST SERPL-CCNC: 52 U/L (ref 10–40)
BILIRUB SERPL-MCNC: 0.5 MG/DL (ref 0.1–1)
BUN SERPL-MCNC: 13 MG/DL (ref 8–23)
CALCIUM SERPL-MCNC: 9.7 MG/DL (ref 8.7–10.5)
CHLORIDE SERPL-SCNC: 108 MMOL/L (ref 95–110)
CO2 SERPL-SCNC: 27 MMOL/L (ref 23–29)
CREAT SERPL-MCNC: 1 MG/DL (ref 0.5–1.4)
EST. GFR  (NO RACE VARIABLE): >60 ML/MIN/1.73 M^2
GLUCOSE SERPL-MCNC: 93 MG/DL (ref 70–110)
POTASSIUM SERPL-SCNC: 4.3 MMOL/L (ref 3.5–5.1)
PROT SERPL-MCNC: 7.2 G/DL (ref 6–8.4)
SODIUM SERPL-SCNC: 143 MMOL/L (ref 136–145)

## 2023-03-08 PROCEDURE — 36415 COLL VENOUS BLD VENIPUNCTURE: CPT | Performed by: INTERNAL MEDICINE

## 2023-03-08 PROCEDURE — 80053 COMPREHEN METABOLIC PANEL: CPT | Performed by: INTERNAL MEDICINE

## 2023-03-09 ENCOUNTER — PATIENT MESSAGE (OUTPATIENT)
Dept: INFECTIOUS DISEASES | Facility: CLINIC | Age: 63
End: 2023-03-09
Payer: COMMERCIAL

## 2023-03-10 ENCOUNTER — TELEPHONE (OUTPATIENT)
Dept: INFECTIOUS DISEASES | Facility: CLINIC | Age: 63
End: 2023-03-10
Payer: COMMERCIAL

## 2023-03-10 ENCOUNTER — OFFICE VISIT (OUTPATIENT)
Dept: INFECTIOUS DISEASES | Facility: CLINIC | Age: 63
End: 2023-03-10
Payer: COMMERCIAL

## 2023-03-10 VITALS
TEMPERATURE: 98 F | WEIGHT: 217.38 LBS | BODY MASS INDEX: 27.9 KG/M2 | DIASTOLIC BLOOD PRESSURE: 87 MMHG | HEIGHT: 74 IN | SYSTOLIC BLOOD PRESSURE: 124 MMHG | HEART RATE: 50 BPM

## 2023-03-10 DIAGNOSIS — R74.01 TRANSAMINITIS: Primary | ICD-10-CM

## 2023-03-10 PROCEDURE — 99214 PR OFFICE/OUTPT VISIT, EST, LEVL IV, 30-39 MIN: ICD-10-PCS | Mod: S$GLB,,, | Performed by: INTERNAL MEDICINE

## 2023-03-10 PROCEDURE — 99999 PR PBB SHADOW E&M-EST. PATIENT-LVL III: CPT | Mod: PBBFAC,,, | Performed by: INTERNAL MEDICINE

## 2023-03-10 PROCEDURE — 99214 OFFICE O/P EST MOD 30 MIN: CPT | Mod: S$GLB,,, | Performed by: INTERNAL MEDICINE

## 2023-03-10 PROCEDURE — 99999 PR PBB SHADOW E&M-EST. PATIENT-LVL III: ICD-10-PCS | Mod: PBBFAC,,, | Performed by: INTERNAL MEDICINE

## 2023-03-10 NOTE — PROGRESS NOTES
INFECTIOUS DISEASE CLINIC  03/10/2023 8:30AM    Subjective:      Chief Complaint:   Chief Complaint   Patient presents with    HIV Follow Up       History of Present Illness:    Patient Simón Fong is a 62 y.o. male who presents today for hiv follow-up. Last seen 8/2022. Stable on dovato. At last visit, started on INH+B6 for LTBI. Incitentally had elevated LFTs with outpatient labs and INH stopped 2/7. Lfts trended down with stopping inh. Denies any abdominal pain. denies tylenol use. Reports 100% compliance with dovato. Denies missed doses.         Review of Symptoms:  Constitutional: Denies fevers, chills, or weakness.  ENT: Denies dysphagia, nasal discharge, ear pain or discharge.  Cardiovascular: Denies chest pain, palpitations, orthopnea, or claudication.  Respiratory: Denies shortness of breath, cough, hemoptysis, or wheezing.  GI: Denies nausea/vomitting, hematochezia, melena, abd pain, or changes in appetite.  : Denies dysuria, incontinence, or hematuria.  Musculoskeletal: Denies joint pain or myalgias.  Skin/breast: Denies rashes, lumps, lesions, or discharge.  Neurologic: Denies headache, dizziness, vertigo, or paresthesias.    Past Medical History:   Diagnosis Date    HIV infection     Hypertension        Past Surgical History:   Procedure Laterality Date    COLONOSCOPY      COLONOSCOPY N/A 7/31/2020    Procedure: COLONOSCOPY;  Surgeon: Tree Mendiola MD;  Location: [address];  Service: Endoscopy;  Laterality: N/A;  covid 7/28-[facility]    PROSTATE BIOPSY         Family History   Problem Relation Age of Onset    Diabetes Mother     Heart disease Mother     Hypertension Mother     Stroke Mother     Hypertension Father     Cancer Sister     Cancer Brother        Social History     Socioeconomic History    Marital status: Single   Tobacco Use    Smoking status: Never    Smokeless tobacco: Never   Substance and Sexual Activity    Alcohol use: No    Drug use: No       Review of  patient's allergies indicates:   Allergen Reactions    Sulfa (sulfonamide antibiotics)          Objective:   VS (24h):   Vitals:    03/10/23 0832   BP: 124/87   Pulse: (!) 50   Temp: 98.1 °F (36.7 °C)     [unfilled]  General: Afebrile, alert, comfortable, no acute distress.   HEENT: ANNAMARIA. EOMI, no scleral icterus. No sinus tenderness.  Pulmonary: Non labored,clear to auscultation A/P/L. No wheezing, crackles, or rhonchi.  Cardiac: normal S1 & S2 w/o rubs/murmurs/gallops.   Abdominal: Non-tender, non-distended.  Extremities: Moves all extremities x 4. No peripheral edema.   Skin: No jaundice, rashes, or visible lesions.   Neurological:  Alert and oriented x 4.     Labs:    Glucose   Date Value Ref Range Status   03/08/2023 93 70 - 110 mg/dL Final   02/24/2023 75 70 - 110 mg/dL Final   02/15/2023 102 70 - 110 mg/dL Final       Calcium   Date Value Ref Range Status   03/08/2023 9.7 8.7 - 10.5 mg/dL Final   02/24/2023 9.9 8.7 - 10.5 mg/dL Final   02/15/2023 9.8 8.7 - 10.5 mg/dL Final       Albumin   Date Value Ref Range Status   03/08/2023 4.3 3.5 - 5.2 g/dL Final   02/24/2023 4.3 3.5 - 5.2 g/dL Final   02/15/2023 4.2 3.5 - 5.2 g/dL Final       Total Protein   Date Value Ref Range Status   03/08/2023 7.2 6.0 - 8.4 g/dL Final   02/24/2023 7.5 6.0 - 8.4 g/dL Final   02/15/2023 7.2 6.0 - 8.4 g/dL Final       Sodium   Date Value Ref Range Status   03/08/2023 143 136 - 145 mmol/L Final   02/24/2023 144 136 - 145 mmol/L Final   02/15/2023 141 136 - 145 mmol/L Final       Potassium   Date Value Ref Range Status   03/08/2023 4.3 3.5 - 5.1 mmol/L Final   02/24/2023 4.0 3.5 - 5.1 mmol/L Final   02/15/2023 4.7 3.5 - 5.1 mmol/L Final       CO2   Date Value Ref Range Status   03/08/2023 27 23 - 29 mmol/L Final   02/24/2023 29 23 - 29 mmol/L Final   02/15/2023 26 23 - 29 mmol/L Final       Chloride   Date Value Ref Range Status   03/08/2023 108 95 - 110 mmol/L Final   02/24/2023 107 95 - 110 mmol/L Final   02/15/2023 105 95 - 110  mmol/L Final       BUN   Date Value Ref Range Status   03/08/2023 13 8 - 23 mg/dL Final   02/24/2023 16 8 - 23 mg/dL Final   02/15/2023 12 8 - 23 mg/dL Final       Creatinine   Date Value Ref Range Status   03/08/2023 1.0 0.5 - 1.4 mg/dL Final   02/24/2023 0.9 0.5 - 1.4 mg/dL Final   02/15/2023 1.0 0.5 - 1.4 mg/dL Final       Alkaline Phosphatase   Date Value Ref Range Status   03/08/2023 63 55 - 135 U/L Final   02/24/2023 65 55 - 135 U/L Final   02/15/2023 74 55 - 135 U/L Final       ALT   Date Value Ref Range Status   03/08/2023 84 (H) 10 - 44 U/L Final   02/24/2023 151 (H) 10 - 44 U/L Final   02/15/2023 238 (H) 10 - 44 U/L Final       AST   Date Value Ref Range Status   03/08/2023 52 (H) 10 - 40 U/L Final   02/24/2023 73 (H) 10 - 40 U/L Final   02/15/2023 111 (H) 10 - 40 U/L Final       Total Bilirubin   Date Value Ref Range Status   03/08/2023 0.5 0.1 - 1.0 mg/dL Final     Comment:     For infants and newborns, interpretation of results should be based  on gestational age, weight and in agreement with clinical  observations.    Premature Infant recommended reference ranges:  Up to 24 hours.............<8.0 mg/dL  Up to 48 hours............<12.0 mg/dL  3-5 days..................<15.0 mg/dL  6-29 days.................<15.0 mg/dL     02/24/2023 0.4 0.1 - 1.0 mg/dL Final     Comment:     For infants and newborns, interpretation of results should be based  on gestational age, weight and in agreement with clinical  observations.    Premature Infant recommended reference ranges:  Up to 24 hours.............<8.0 mg/dL  Up to 48 hours............<12.0 mg/dL  3-5 days..................<15.0 mg/dL  6-29 days.................<15.0 mg/dL     02/15/2023 0.4 0.1 - 1.0 mg/dL Final     Comment:     For infants and newborns, interpretation of results should be based  on gestational age, weight and in agreement with clinical  observations.    Premature Infant recommended reference ranges:  Up to 24 hours.............<8.0 mg/dL  Up  to 48 hours............<12.0 mg/dL  3-5 days..................<15.0 mg/dL  6-29 days.................<15.0 mg/dL         WBC   Date Value Ref Range Status   02/07/2023 3.38 (L) 3.90 - 12.70 K/uL Final   08/09/2022 3.41 (L) 3.90 - 12.70 K/uL Final   04/04/2022 4.52 3.90 - 12.70 K/uL Final       Hemoglobin   Date Value Ref Range Status   02/07/2023 14.8 14.0 - 18.0 g/dL Final   08/09/2022 14.1 14.0 - 18.0 g/dL Final   04/04/2022 14.7 14.0 - 18.0 g/dL Final       Hematocrit   Date Value Ref Range Status   02/07/2023 45.6 40.0 - 54.0 % Final   08/09/2022 41.7 40.0 - 54.0 % Final   04/04/2022 44.8 40.0 - 54.0 % Final       MCV   Date Value Ref Range Status   02/07/2023 94 82 - 98 fL Final   08/09/2022 91 82 - 98 fL Final   04/04/2022 91 82 - 98 fL Final       Platelets   Date Value Ref Range Status   02/07/2023 213 150 - 450 K/uL Final   08/09/2022 222 150 - 450 K/uL Final   04/04/2022 261 150 - 450 K/uL Final       Lab Results   Component Value Date    CHOL 172 02/07/2023    CHOL 163 03/28/2022    CHOL 183 01/26/2021       Lab Results   Component Value Date    HDL 66 02/07/2023    HDL 61 03/28/2022    HDL 65 01/26/2021       Lab Results   Component Value Date    LDLCALC 98.4 02/07/2023    LDLCALC 91.4 03/28/2022    LDLCALC 106.0 01/26/2021       Lab Results   Component Value Date    TRIG 38 02/07/2023    TRIG 53 03/28/2022    TRIG 60 01/26/2021       Lab Results   Component Value Date    CHOLHDL 38.4 02/07/2023    CHOLHDL 37.4 03/28/2022    CHOLHDL 35.5 01/26/2021       RPR   Date Value Ref Range Status   08/09/2022 Non-reactive Non-reactive Final   04/04/2022 Reactive (A) Non-reactive Final   08/09/2017 Reactive (A) Non-reactive Final     No results found for: QUANTIFERON    Medications:  Current Outpatient Medications on File Prior to Visit   Medication Sig Dispense Refill    amLODIPine (NORVASC) 10 MG tablet TAKE 1 TABLET ONCE DAILY 90 tablet 3    ascorbic acid, vitamin C, (VITAMIN C) 1000 MG tablet Take 1,000 mg by  mouth once daily.       fish oil-omega-3 fatty acids 300-1,000 mg capsule Take by mouth once daily.      glucosamine-chondroitin 500-400 mg tablet Take 1 tablet by mouth 3 (three) times daily.      isoniazid (NYDRAZID) 300 MG Tab Take 1 tablet (300 mg total) by mouth once daily. One tablet daily for prevention of tuberculosis 90 tablet 3    leucine-isoleucine-valine (NEOKE BCAA4) 82 gram-328 kcal/100 gram Powd Take by mouth.       naproxen (NAPROSYN) 500 MG tablet Take 1 tablet (500 mg total) by mouth 2 (two) times daily with meals. 60 tablet 1    pyridoxine, vitamin B6, (VITAMIN B-6) 50 MG Tab Take 1 tablet (50 mg total) by mouth once daily. 90 tablet 3     No current facility-administered medications on file prior to visit.       Antibiotics:   Antibiotics (From admission, onward)      None            HIV: No components found for: HIV 1/2 AG/AB  Hepatitis C IgG: No components found for: HEPATITIS C  Syphilis:   RPR   Date Value Ref Range Status   08/09/2022 Non-reactive Non-reactive Final   04/04/2022 Reactive (A) Non-reactive Final   08/09/2017 Reactive (A) Non-reactive Final       Hepatitis A IgG: No components found for: HEPATITIS A IGG  Hepatitis Bc IgG: No components found for: HEPATITIS B CORE IGG  Hepatitis Bs IgG:  Quantiferon: No results found for: QUANTIFERON  VZV IgG: No components found for: VARICELLA IGG    No components found for: SEDIMENTATION RATE  No components found for: C-REACTIVE PROTEIN      Microbiology x 7d:   Microbiology Results (last 7 days)       ** No results found for the last 168 hours. **            Immunization History   Administered Date(s) Administered    COVID-19 Vaccine 02/19/2021, 03/19/2021, 10/23/2021    COVID-19, MRNA, LN-S, PF (MODERNA FULL 0.5 ML DOSE) 02/19/2021, 03/19/2021, 10/23/2021, 03/31/2022    COVID-19, mRNA, LNP-S, bivalent booster, PF (Moderna Omicron) 10/31/2022    Influenza (FLUAD) - Quadrivalent - Adjuvanted - PF *Preferred* (65+) 10/07/2021, 10/31/2022     Influenza - Quadrivalent 10/26/2015    Influenza - Quadrivalent - PF *Preferred* (6 months and older) 10/23/2006, 09/10/2009, 11/01/2017, 10/28/2018, 10/29/2019, 10/02/2020    Meningococcal Conjugate (MCV4P) 01/30/2017, 01/07/2019    Pneumococcal Conjugate - 13 Valent 04/17/2014, 04/17/2014    Pneumococcal Polysaccharide - 23 Valent 12/19/2012, 07/23/2020    Tdap 05/02/2016    Vaccinia, smallpox monkeypox vaccine live, PF 08/09/2022, 09/06/2022    Zoster Recombinant 05/14/2018, 07/11/2018         Reviewed records today as well as relevant labs, cultures, and imaging    Assessment:     HIV, well controlled  Immunization [facility]  Health maintanence  LTBI  Transaminitis; suspect from INH      Plan:     # HIV--- well controlled on dovato, VL undetectable. Continue.     # ltbi-  No signs or symptoms of active tuberculosis. CXR unrevealing. Completed 6 months INH, then had elevated lfts. When lfts normalize, plan on resuming INH and monitoring lfts closely. If they rise again with INH, will plan on stopping (pt reluctant to make changes to HARRT regimen and a rifamycin based ltbi regimen would at least require double dosing of the dolutegravir or switching). Efficacy after 6 mo INH 41- 76%    Vaccines reviewed    - Will monitor drug therapy for toxicity  Orders Placed This Encounter   Procedures    Comprehensive Metabolic Panel     Standing Status:   Standing     Number of Occurrences:   11     Standing Expiration Date:   5/8/2024         I have sent communication to the referring physician and/or primary care provider.     I spent a total of 30 minutes on the day of the visit. This includes face to face time and non-face to face time preparing to see the patient (eg, review of tests), obtaining and/or reviewing separately obtained history, documenting clinical information in the electronic or other health record, independently interpreting results, and communicating results to the patient/family/caregiver, or care  coordination.      Jennifer Purvis MD, MPH  Infectious Disease

## 2023-03-16 ENCOUNTER — TELEPHONE (OUTPATIENT)
Dept: PRIMARY CARE CLINIC | Facility: CLINIC | Age: 63
End: 2023-03-16
Payer: COMMERCIAL

## 2023-03-16 NOTE — TELEPHONE ENCOUNTER
----- Message from Jimena Barone MD sent at 2/28/2023  8:34 AM CST -----  Please get patient an annual appt next year

## 2023-03-16 NOTE — TELEPHONE ENCOUNTER
Pt has follow up for feb 13, 2023  but unable to schedule  right not because the schedule is not open yet .

## 2023-03-23 ENCOUNTER — PATIENT MESSAGE (OUTPATIENT)
Dept: INFECTIOUS DISEASES | Facility: CLINIC | Age: 63
End: 2023-03-23
Payer: COMMERCIAL

## 2023-03-23 ENCOUNTER — LAB VISIT (OUTPATIENT)
Dept: LAB | Facility: HOSPITAL | Age: 63
End: 2023-03-23
Attending: INTERNAL MEDICINE
Payer: COMMERCIAL

## 2023-03-23 DIAGNOSIS — R74.01 TRANSAMINITIS: ICD-10-CM

## 2023-03-23 LAB
ALBUMIN SERPL BCP-MCNC: 4.4 G/DL (ref 3.5–5.2)
ALP SERPL-CCNC: 75 U/L (ref 55–135)
ALT SERPL W/O P-5'-P-CCNC: 76 U/L (ref 10–44)
ANION GAP SERPL CALC-SCNC: 10 MMOL/L (ref 8–16)
AST SERPL-CCNC: 48 U/L (ref 10–40)
BILIRUB SERPL-MCNC: 0.4 MG/DL (ref 0.1–1)
BUN SERPL-MCNC: 18 MG/DL (ref 8–23)
CALCIUM SERPL-MCNC: 10 MG/DL (ref 8.7–10.5)
CHLORIDE SERPL-SCNC: 105 MMOL/L (ref 95–110)
CO2 SERPL-SCNC: 29 MMOL/L (ref 23–29)
CREAT SERPL-MCNC: 1.1 MG/DL (ref 0.5–1.4)
EST. GFR  (NO RACE VARIABLE): >60 ML/MIN/1.73 M^2
GLUCOSE SERPL-MCNC: 93 MG/DL (ref 70–110)
POTASSIUM SERPL-SCNC: 4.5 MMOL/L (ref 3.5–5.1)
PROT SERPL-MCNC: 7.6 G/DL (ref 6–8.4)
SODIUM SERPL-SCNC: 144 MMOL/L (ref 136–145)

## 2023-03-23 PROCEDURE — 80053 COMPREHEN METABOLIC PANEL: CPT | Performed by: INTERNAL MEDICINE

## 2023-03-23 PROCEDURE — 36415 COLL VENOUS BLD VENIPUNCTURE: CPT | Performed by: INTERNAL MEDICINE

## 2023-04-06 ENCOUNTER — LAB VISIT (OUTPATIENT)
Dept: LAB | Facility: HOSPITAL | Age: 63
End: 2023-04-06
Attending: INTERNAL MEDICINE
Payer: COMMERCIAL

## 2023-04-06 DIAGNOSIS — R74.01 TRANSAMINITIS: ICD-10-CM

## 2023-04-06 LAB
ALBUMIN SERPL BCP-MCNC: 4.2 G/DL (ref 3.5–5.2)
ALP SERPL-CCNC: 67 U/L (ref 55–135)
ALT SERPL W/O P-5'-P-CCNC: 55 U/L (ref 10–44)
ANION GAP SERPL CALC-SCNC: 9 MMOL/L (ref 8–16)
AST SERPL-CCNC: 45 U/L (ref 10–40)
BILIRUB SERPL-MCNC: 0.4 MG/DL (ref 0.1–1)
BUN SERPL-MCNC: 18 MG/DL (ref 8–23)
CALCIUM SERPL-MCNC: 9.6 MG/DL (ref 8.7–10.5)
CHLORIDE SERPL-SCNC: 106 MMOL/L (ref 95–110)
CO2 SERPL-SCNC: 27 MMOL/L (ref 23–29)
CREAT SERPL-MCNC: 1.1 MG/DL (ref 0.5–1.4)
EST. GFR  (NO RACE VARIABLE): >60 ML/MIN/1.73 M^2
GLUCOSE SERPL-MCNC: 99 MG/DL (ref 70–110)
POTASSIUM SERPL-SCNC: 4 MMOL/L (ref 3.5–5.1)
PROT SERPL-MCNC: 7.3 G/DL (ref 6–8.4)
SODIUM SERPL-SCNC: 142 MMOL/L (ref 136–145)

## 2023-04-06 PROCEDURE — 36415 COLL VENOUS BLD VENIPUNCTURE: CPT | Performed by: INTERNAL MEDICINE

## 2023-04-06 PROCEDURE — 80053 COMPREHEN METABOLIC PANEL: CPT | Performed by: INTERNAL MEDICINE

## 2023-04-12 ENCOUNTER — LAB VISIT (OUTPATIENT)
Dept: LAB | Facility: HOSPITAL | Age: 63
End: 2023-04-12
Attending: INTERNAL MEDICINE
Payer: COMMERCIAL

## 2023-04-12 ENCOUNTER — PATIENT MESSAGE (OUTPATIENT)
Dept: INFECTIOUS DISEASES | Facility: CLINIC | Age: 63
End: 2023-04-12
Payer: COMMERCIAL

## 2023-04-12 DIAGNOSIS — B20 HUMAN IMMUNODEFICIENCY VIRUS (HIV) DISEASE: ICD-10-CM

## 2023-04-12 DIAGNOSIS — K75.9 HEPATITIS: ICD-10-CM

## 2023-04-12 LAB
ALBUMIN SERPL BCP-MCNC: 4.2 G/DL (ref 3.5–5.2)
ALP SERPL-CCNC: 70 U/L (ref 55–135)
ALT SERPL W/O P-5'-P-CCNC: 54 U/L (ref 10–44)
ANION GAP SERPL CALC-SCNC: 11 MMOL/L (ref 8–16)
AST SERPL-CCNC: 52 U/L (ref 10–40)
BILIRUB SERPL-MCNC: 0.4 MG/DL (ref 0.1–1)
BUN SERPL-MCNC: 16 MG/DL (ref 8–23)
CALCIUM SERPL-MCNC: 9.6 MG/DL (ref 8.7–10.5)
CHLORIDE SERPL-SCNC: 105 MMOL/L (ref 95–110)
CO2 SERPL-SCNC: 26 MMOL/L (ref 23–29)
CREAT SERPL-MCNC: 1 MG/DL (ref 0.5–1.4)
EST. GFR  (NO RACE VARIABLE): >60 ML/MIN/1.73 M^2
GLUCOSE SERPL-MCNC: 97 MG/DL (ref 70–110)
POTASSIUM SERPL-SCNC: 4.2 MMOL/L (ref 3.5–5.1)
PROT SERPL-MCNC: 7.2 G/DL (ref 6–8.4)
SODIUM SERPL-SCNC: 142 MMOL/L (ref 136–145)

## 2023-04-12 PROCEDURE — 36415 COLL VENOUS BLD VENIPUNCTURE: CPT | Performed by: INTERNAL MEDICINE

## 2023-04-12 PROCEDURE — 80053 COMPREHEN METABOLIC PANEL: CPT | Performed by: INTERNAL MEDICINE

## 2023-04-18 ENCOUNTER — PATIENT MESSAGE (OUTPATIENT)
Dept: INFECTIOUS DISEASES | Facility: CLINIC | Age: 63
End: 2023-04-18
Payer: COMMERCIAL

## 2023-04-20 ENCOUNTER — LAB VISIT (OUTPATIENT)
Dept: LAB | Facility: HOSPITAL | Age: 63
End: 2023-04-20
Attending: INTERNAL MEDICINE
Payer: COMMERCIAL

## 2023-04-20 DIAGNOSIS — R74.01 TRANSAMINITIS: ICD-10-CM

## 2023-04-20 LAB
ALBUMIN SERPL BCP-MCNC: 4.4 G/DL (ref 3.5–5.2)
ALP SERPL-CCNC: 71 U/L (ref 55–135)
ALT SERPL W/O P-5'-P-CCNC: 62 U/L (ref 10–44)
ANION GAP SERPL CALC-SCNC: 10 MMOL/L (ref 8–16)
AST SERPL-CCNC: 47 U/L (ref 10–40)
BILIRUB SERPL-MCNC: 0.4 MG/DL (ref 0.1–1)
BUN SERPL-MCNC: 18 MG/DL (ref 8–23)
CALCIUM SERPL-MCNC: 9.9 MG/DL (ref 8.7–10.5)
CHLORIDE SERPL-SCNC: 106 MMOL/L (ref 95–110)
CO2 SERPL-SCNC: 28 MMOL/L (ref 23–29)
CREAT SERPL-MCNC: 1.1 MG/DL (ref 0.5–1.4)
EST. GFR  (NO RACE VARIABLE): >60 ML/MIN/1.73 M^2
GLUCOSE SERPL-MCNC: 86 MG/DL (ref 70–110)
POTASSIUM SERPL-SCNC: 4.3 MMOL/L (ref 3.5–5.1)
PROT SERPL-MCNC: 7.9 G/DL (ref 6–8.4)
SODIUM SERPL-SCNC: 144 MMOL/L (ref 136–145)

## 2023-04-20 PROCEDURE — 80053 COMPREHEN METABOLIC PANEL: CPT | Performed by: INTERNAL MEDICINE

## 2023-04-20 PROCEDURE — 36415 COLL VENOUS BLD VENIPUNCTURE: CPT | Performed by: INTERNAL MEDICINE

## 2023-04-29 ENCOUNTER — PATIENT MESSAGE (OUTPATIENT)
Dept: INFECTIOUS DISEASES | Facility: CLINIC | Age: 63
End: 2023-04-29
Payer: COMMERCIAL

## 2023-05-04 ENCOUNTER — LAB VISIT (OUTPATIENT)
Dept: LAB | Facility: HOSPITAL | Age: 63
End: 2023-05-04
Attending: INTERNAL MEDICINE
Payer: COMMERCIAL

## 2023-05-04 DIAGNOSIS — R74.01 TRANSAMINITIS: ICD-10-CM

## 2023-05-04 LAB
ALBUMIN SERPL BCP-MCNC: 4.3 G/DL (ref 3.5–5.2)
ALP SERPL-CCNC: 67 U/L (ref 55–135)
ALT SERPL W/O P-5'-P-CCNC: 77 U/L (ref 10–44)
ANION GAP SERPL CALC-SCNC: 6 MMOL/L (ref 8–16)
AST SERPL-CCNC: 80 U/L (ref 10–40)
BILIRUB SERPL-MCNC: 0.6 MG/DL (ref 0.1–1)
BUN SERPL-MCNC: 17 MG/DL (ref 8–23)
CALCIUM SERPL-MCNC: 9.7 MG/DL (ref 8.7–10.5)
CHLORIDE SERPL-SCNC: 107 MMOL/L (ref 95–110)
CO2 SERPL-SCNC: 29 MMOL/L (ref 23–29)
CREAT SERPL-MCNC: 0.9 MG/DL (ref 0.5–1.4)
EST. GFR  (NO RACE VARIABLE): >60 ML/MIN/1.73 M^2
GLUCOSE SERPL-MCNC: 71 MG/DL (ref 70–110)
POTASSIUM SERPL-SCNC: 4.1 MMOL/L (ref 3.5–5.1)
PROT SERPL-MCNC: 7.3 G/DL (ref 6–8.4)
SODIUM SERPL-SCNC: 142 MMOL/L (ref 136–145)

## 2023-05-04 PROCEDURE — 80053 COMPREHEN METABOLIC PANEL: CPT | Performed by: INTERNAL MEDICINE

## 2023-05-04 PROCEDURE — 36415 COLL VENOUS BLD VENIPUNCTURE: CPT | Performed by: INTERNAL MEDICINE

## 2023-05-16 ENCOUNTER — PATIENT MESSAGE (OUTPATIENT)
Dept: INFECTIOUS DISEASES | Facility: CLINIC | Age: 63
End: 2023-05-16
Payer: COMMERCIAL

## 2023-05-18 ENCOUNTER — LAB VISIT (OUTPATIENT)
Dept: LAB | Facility: HOSPITAL | Age: 63
End: 2023-05-18
Attending: INTERNAL MEDICINE
Payer: COMMERCIAL

## 2023-05-18 DIAGNOSIS — R74.01 TRANSAMINITIS: ICD-10-CM

## 2023-05-18 LAB
ALBUMIN SERPL BCP-MCNC: 4.1 G/DL (ref 3.5–5.2)
ALP SERPL-CCNC: 74 U/L (ref 55–135)
ALT SERPL W/O P-5'-P-CCNC: 67 U/L (ref 10–44)
ANION GAP SERPL CALC-SCNC: 10 MMOL/L (ref 8–16)
AST SERPL-CCNC: 49 U/L (ref 10–40)
BILIRUB SERPL-MCNC: 0.3 MG/DL (ref 0.1–1)
BUN SERPL-MCNC: 19 MG/DL (ref 8–23)
CALCIUM SERPL-MCNC: 9.4 MG/DL (ref 8.7–10.5)
CHLORIDE SERPL-SCNC: 109 MMOL/L (ref 95–110)
CO2 SERPL-SCNC: 26 MMOL/L (ref 23–29)
CREAT SERPL-MCNC: 0.9 MG/DL (ref 0.5–1.4)
EST. GFR  (NO RACE VARIABLE): >60 ML/MIN/1.73 M^2
GLUCOSE SERPL-MCNC: 92 MG/DL (ref 70–110)
POTASSIUM SERPL-SCNC: 4.3 MMOL/L (ref 3.5–5.1)
PROT SERPL-MCNC: 7 G/DL (ref 6–8.4)
SODIUM SERPL-SCNC: 145 MMOL/L (ref 136–145)

## 2023-05-18 PROCEDURE — 80053 COMPREHEN METABOLIC PANEL: CPT | Performed by: INTERNAL MEDICINE

## 2023-05-18 PROCEDURE — 36415 COLL VENOUS BLD VENIPUNCTURE: CPT | Performed by: INTERNAL MEDICINE

## 2023-07-06 ENCOUNTER — PATIENT MESSAGE (OUTPATIENT)
Dept: INFECTIOUS DISEASES | Facility: CLINIC | Age: 63
End: 2023-07-06
Payer: COMMERCIAL

## 2023-07-06 DIAGNOSIS — B20 HUMAN IMMUNODEFICIENCY VIRUS (HIV) DISEASE: ICD-10-CM

## 2023-07-06 DIAGNOSIS — K75.9 HEPATITIS: Primary | ICD-10-CM

## 2023-07-06 DIAGNOSIS — Z80.42 FAMILY HISTORY OF PROSTATE CANCER: ICD-10-CM

## 2023-07-07 ENCOUNTER — LAB VISIT (OUTPATIENT)
Dept: LAB | Facility: HOSPITAL | Age: 63
End: 2023-07-07
Attending: INTERNAL MEDICINE
Payer: COMMERCIAL

## 2023-07-07 DIAGNOSIS — B20 HUMAN IMMUNODEFICIENCY VIRUS (HIV) DISEASE: ICD-10-CM

## 2023-07-07 DIAGNOSIS — K75.9 HEPATITIS: ICD-10-CM

## 2023-07-07 LAB
ALBUMIN SERPL BCP-MCNC: 4.4 G/DL (ref 3.5–5.2)
ALP SERPL-CCNC: 71 U/L (ref 55–135)
ALT SERPL W/O P-5'-P-CCNC: 37 U/L (ref 10–44)
ANION GAP SERPL CALC-SCNC: 9 MMOL/L (ref 8–16)
AST SERPL-CCNC: 36 U/L (ref 10–40)
BASOPHILS # BLD AUTO: 0.02 K/UL (ref 0–0.2)
BASOPHILS NFR BLD: 0.5 % (ref 0–1.9)
BILIRUB SERPL-MCNC: 0.5 MG/DL (ref 0.1–1)
BUN SERPL-MCNC: 10 MG/DL (ref 8–23)
CALCIUM SERPL-MCNC: 9.8 MG/DL (ref 8.7–10.5)
CHLORIDE SERPL-SCNC: 105 MMOL/L (ref 95–110)
CO2 SERPL-SCNC: 27 MMOL/L (ref 23–29)
CREAT SERPL-MCNC: 1.2 MG/DL (ref 0.5–1.4)
DIFFERENTIAL METHOD: ABNORMAL
EOSINOPHIL # BLD AUTO: 0.1 K/UL (ref 0–0.5)
EOSINOPHIL NFR BLD: 1.6 % (ref 0–8)
ERYTHROCYTE [DISTWIDTH] IN BLOOD BY AUTOMATED COUNT: 12.9 % (ref 11.5–14.5)
EST. GFR  (NO RACE VARIABLE): >60 ML/MIN/1.73 M^2
GLUCOSE SERPL-MCNC: 99 MG/DL (ref 70–110)
HCT VFR BLD AUTO: 44.5 % (ref 40–54)
HGB BLD-MCNC: 14.9 G/DL (ref 14–18)
IMM GRANULOCYTES # BLD AUTO: 0 K/UL (ref 0–0.04)
IMM GRANULOCYTES NFR BLD AUTO: 0 % (ref 0–0.5)
LYMPHOCYTES # BLD AUTO: 1.7 K/UL (ref 1–4.8)
LYMPHOCYTES NFR BLD: 45.4 % (ref 18–48)
MCH RBC QN AUTO: 30.8 PG (ref 27–31)
MCHC RBC AUTO-ENTMCNC: 33.5 G/DL (ref 32–36)
MCV RBC AUTO: 92 FL (ref 82–98)
MONOCYTES # BLD AUTO: 0.4 K/UL (ref 0.3–1)
MONOCYTES NFR BLD: 9.5 % (ref 4–15)
NEUTROPHILS # BLD AUTO: 1.6 K/UL (ref 1.8–7.7)
NEUTROPHILS NFR BLD: 43 % (ref 38–73)
NRBC BLD-RTO: 0 /100 WBC
PLATELET # BLD AUTO: 217 K/UL (ref 150–450)
PMV BLD AUTO: 10.1 FL (ref 9.2–12.9)
POTASSIUM SERPL-SCNC: 4.2 MMOL/L (ref 3.5–5.1)
PROT SERPL-MCNC: 7.4 G/DL (ref 6–8.4)
RBC # BLD AUTO: 4.83 M/UL (ref 4.6–6.2)
RPR SER QL: NORMAL
SODIUM SERPL-SCNC: 141 MMOL/L (ref 136–145)
WBC # BLD AUTO: 3.7 K/UL (ref 3.9–12.7)

## 2023-07-07 PROCEDURE — 86592 SYPHILIS TEST NON-TREP QUAL: CPT | Performed by: INTERNAL MEDICINE

## 2023-07-07 PROCEDURE — 80053 COMPREHEN METABOLIC PANEL: CPT | Performed by: INTERNAL MEDICINE

## 2023-07-07 PROCEDURE — 36415 COLL VENOUS BLD VENIPUNCTURE: CPT | Performed by: INTERNAL MEDICINE

## 2023-07-07 PROCEDURE — 85025 COMPLETE CBC W/AUTO DIFF WBC: CPT | Performed by: INTERNAL MEDICINE

## 2023-07-07 PROCEDURE — 87536 HIV-1 QUANT&REVRSE TRNSCRPJ: CPT | Performed by: INTERNAL MEDICINE

## 2023-07-10 LAB
HIV1 RNA # SERPL NAA+PROBE: NOT DETECTED COPIES/ML
HIV1 RNA SERPL QL NAA+PROBE: NOT DETECTED

## 2023-07-12 ENCOUNTER — PATIENT MESSAGE (OUTPATIENT)
Dept: INFECTIOUS DISEASES | Facility: CLINIC | Age: 63
End: 2023-07-12
Payer: COMMERCIAL

## 2023-07-12 DIAGNOSIS — B20 HUMAN IMMUNODEFICIENCY VIRUS (HIV) DISEASE: Primary | ICD-10-CM

## 2023-07-13 ENCOUNTER — LAB VISIT (OUTPATIENT)
Dept: LAB | Facility: HOSPITAL | Age: 63
End: 2023-07-13
Attending: INTERNAL MEDICINE
Payer: COMMERCIAL

## 2023-07-13 DIAGNOSIS — B20 HUMAN IMMUNODEFICIENCY VIRUS (HIV) DISEASE: ICD-10-CM

## 2023-07-13 PROCEDURE — 86361 T CELL ABSOLUTE COUNT: CPT | Performed by: INTERNAL MEDICINE

## 2023-07-14 ENCOUNTER — OFFICE VISIT (OUTPATIENT)
Dept: INFECTIOUS DISEASES | Facility: CLINIC | Age: 63
End: 2023-07-14
Payer: COMMERCIAL

## 2023-07-14 DIAGNOSIS — B20 HUMAN IMMUNODEFICIENCY VIRUS (HIV) DISEASE: Primary | ICD-10-CM

## 2023-07-14 LAB
CD3+CD4+ CELLS # BLD: 966 CELLS/UL (ref 300–1400)
CD3+CD4+ CELLS NFR BLD: 37.1 % (ref 28–57)

## 2023-07-14 PROCEDURE — 99214 OFFICE O/P EST MOD 30 MIN: CPT | Mod: 95,,, | Performed by: INTERNAL MEDICINE

## 2023-07-14 PROCEDURE — 99214 PR OFFICE/OUTPT VISIT, EST, LEVL IV, 30-39 MIN: ICD-10-PCS | Mod: 95,,, | Performed by: INTERNAL MEDICINE

## 2023-07-14 RX ORDER — DOLUTEGRAVIR SODIUM AND LAMIVUDINE 50; 300 MG/1; MG/1
1 TABLET, FILM COATED ORAL DAILY
Qty: 30 TABLET | Refills: 11 | Status: SHIPPED | OUTPATIENT
Start: 2023-07-14 | End: 2023-07-17

## 2023-07-14 NOTE — PROGRESS NOTES
The patient location is: home  The chief complaint leading to consultation is: hiv follow up    Visit type: audiovisual    Face to Face time with patient: 15  30 minutes of total time spent on the encounter, which includes face to face time and non-face to face time preparing to see the patient (eg, review of tests), Obtaining and/or reviewing separately obtained history, Documenting clinical information in the electronic or other health record, Independently interpreting results (not separately reported) and communicating results to the patient/family/caregiver, or Care coordination (not separately reported).         Each patient to whom he or she provides medical services by telemedicine is:  (1) informed of the relationship between the physician and patient and the respective role of any other health care provider with respect to management of the patient; and (2) notified that he or she may decline to receive medical services by telemedicine and may withdraw from such care at any time.    Notes:   INFECT[facility]OUS DISEASE CLINIC  07/14/2023 8:30AM    Subjective:      Chief Complaint:   Hiv follow up       History of Present Illness:    Patient Simón Fong is a 63 y.o. male with well controlled hiv and prostate cancer (active surveillance, psa 13, estab with urology) who presents today for hiv follow-up. Reports 100% compliance with dovato. Denies missed doses. Needs refills.     Review of Symptoms:  Constitutional: Denies fevers, chills, or weakness.  ENT: Denies dysphagia, nasal discharge, ear pain or discharge.  Cardiovascular: Denies chest pain, palpitations, orthopnea, or claudication.  Respiratory: Denies shortness of breath, cough, hemoptysis, or wheezing.  GI: Denies nausea/vomitting, hematochezia, melena, abd pain, or changes in appetite.  : Denies dysuria, incontinence, or hematuria.  Musculoskeletal: Denies joint pain or myalgias.  Skin/breast: Denies rashes, lumps, lesions, or discharge.  Neurologic:  Denies headache, dizziness, vertigo, or paresthesias.    Past Medical History:   Diagnosis Date    HIV infection     Hypertension        Past Surgical History:   Procedure Laterality Date    COLONOSCOPY      COLONOSCOPY N/A 7/31/2020    Procedure: COLONOSCOPY;  Surgeon: Tree Mendiola MD;  Location: [address];  Service: Endoscopy;  Laterality: N/A;  covid 7/28-[facility]    PROSTATE BIOPSY         Family History   Problem Relation Age of Onset    Diabetes Mother     Heart disease Mother     Hypertension Mother     Stroke Mother     Hypertension Father     Cancer Sister     Cancer Brother        Social History     Socioeconomic History    Marital status: Single   Tobacco Use    Smoking status: Never    Smokeless tobacco: Never   Substance and Sexual Activity    Alcohol use: No    Drug use: No       Review of patient's allergies indicates:   Allergen Reactions    Sulfa (sulfonamide antibiotics)          Objective:   VS (24h):   There were no vitals filed for this visit.    [unfilled]  General:  alert, comfortable, no acute distress.   Pulmonary: Non labored  Neurological:  Alert and oriented x 4.   Exam limited 2/2 telemedicine    Labs:    Glucose   Date Value Ref Range Status   07/07/2023 99 70 - 110 mg/dL Final   05/18/2023 92 70 - 110 mg/dL Final   05/04/2023 71 70 - 110 mg/dL Final       Calcium   Date Value Ref Range Status   07/07/2023 9.8 8.7 - 10.5 mg/dL Final   05/18/2023 9.4 8.7 - 10.5 mg/dL Final   05/04/2023 9.7 8.7 - 10.5 mg/dL Final       Albumin   Date Value Ref Range Status   07/07/2023 4.4 3.5 - 5.2 g/dL Final   05/18/2023 4.1 3.5 - 5.2 g/dL Final   05/04/2023 4.3 3.5 - 5.2 g/dL Final       Total Protein   Date Value Ref Range Status   07/07/2023 7.4 6.0 - 8.4 g/dL Final   05/18/2023 7.0 6.0 - 8.4 g/dL Final   05/04/2023 7.3 6.0 - 8.4 g/dL Final       Sodium   Date Value Ref Range Status   07/07/2023 141 136 - 145 mmol/L Final   05/18/2023 145 136 - 145 mmol/L Final   05/04/2023  142 136 - 145 mmol/L Final       Potassium   Date Value Ref Range Status   07/07/2023 4.2 3.5 - 5.1 mmol/L Final   05/18/2023 4.3 3.5 - 5.1 mmol/L Final   05/04/2023 4.1 3.5 - 5.1 mmol/L Final       CO2   Date Value Ref Range Status   07/07/2023 27 23 - 29 mmol/L Final   05/18/2023 26 23 - 29 mmol/L Final   05/04/2023 29 23 - 29 mmol/L Final       Chloride   Date Value Ref Range Status   07/07/2023 105 95 - 110 mmol/L Final   05/18/2023 109 95 - 110 mmol/L Final   05/04/2023 107 95 - 110 mmol/L Final       BUN   Date Value Ref Range Status   07/07/2023 10 8 - 23 mg/dL Final   05/18/2023 19 8 - 23 mg/dL Final   05/04/2023 17 8 - 23 mg/dL Final       Creatinine   Date Value Ref Range Status   07/07/2023 1.2 0.5 - 1.4 mg/dL Final   05/18/2023 0.9 0.5 - 1.4 mg/dL Final   05/04/2023 0.9 0.5 - 1.4 mg/dL Final       Alkaline Phosphatase   Date Value Ref Range Status   07/07/2023 71 55 - 135 U/L Final   05/18/2023 74 55 - 135 U/L Final   05/04/2023 67 55 - 135 U/L Final       ALT   Date Value Ref Range Status   07/07/2023 37 10 - 44 U/L Final   05/18/2023 67 (H) 10 - 44 U/L Final   05/04/2023 77 (H) 10 - 44 U/L Final       AST   Date Value Ref Range Status   07/07/2023 36 10 - 40 U/L Final   05/18/2023 49 (H) 10 - 40 U/L Final   05/04/2023 80 (H) 10 - 40 U/L Final       Total Bilirubin   Date Value Ref Range Status   07/07/2023 0.5 0.1 - 1.0 mg/dL Final     Comment:     For infants and newborns, interpretation of results should be based  on gestational age, weight and in agreement with clinical  observations.    Premature Infant recommended reference ranges:  Up to 24 hours.............<8.0 mg/dL  Up to 48 hours............<12.0 mg/dL  3-5 days..................<15.0 mg/dL  6-29 days.................<15.0 mg/dL     05/18/2023 0.3 0.1 - 1.0 mg/dL Final     Comment:     For infants and newborns, interpretation of results should be based  on gestational age, weight and in agreement with clinical  observations.    Premature  Infant recommended reference ranges:  Up to 24 hours.............<8.0 mg/dL  Up to 48 hours............<12.0 mg/dL  3-5 days..................<15.0 mg/dL  6-29 days.................<15.0 mg/dL     05/04/2023 0.6 0.1 - 1.0 mg/dL Final     Comment:     For infants and newborns, interpretation of results should be based  on gestational age, weight and in agreement with clinical  observations.    Premature Infant recommended reference ranges:  Up to 24 hours.............<8.0 mg/dL  Up to 48 hours............<12.0 mg/dL  3-5 days..................<15.0 mg/dL  6-29 days.................<15.0 mg/dL         WBC   Date Value Ref Range Status   07/07/2023 3.70 (L) 3.90 - 12.70 K/uL Final   02/07/2023 3.38 (L) 3.90 - 12.70 K/uL Final   08/09/2022 3.41 (L) 3.90 - 12.70 K/uL Final       Hemoglobin   Date Value Ref Range Status   07/07/2023 14.9 14.0 - 18.0 g/dL Final   02/07/2023 14.8 14.0 - 18.0 g/dL Final   08/09/2022 14.1 14.0 - 18.0 g/dL Final       Hematocrit   Date Value Ref Range Status   07/07/2023 44.5 40.0 - 54.0 % Final   02/07/2023 45.6 40.0 - 54.0 % Final   08/09/2022 41.7 40.0 - 54.0 % Final       MCV   Date Value Ref Range Status   07/07/2023 92 82 - 98 fL Final   02/07/2023 94 82 - 98 fL Final   08/09/2022 91 82 - 98 fL Final       Platelets   Date Value Ref Range Status   07/07/2023 217 150 - 450 K/uL Final   02/07/2023 213 150 - 450 K/uL Final   08/09/2022 222 150 - 450 K/uL Final       Lab Results   Component Value Date    CHOL 172 02/07/2023    CHOL 163 03/28/2022    CHOL 183 01/26/2021       Lab Results   Component Value Date    HDL 66 02/07/2023    HDL 61 03/28/2022    HDL 65 01/26/2021       Lab Results   Component Value Date    LDLCALC 98.4 02/07/2023    LDLCALC 91.4 03/28/2022    LDLCALC 106.0 01/26/2021       Lab Results   Component Value Date    TRIG 38 02/07/2023    TRIG 53 03/28/2022    TRIG 60 01/26/2021       Lab Results   Component Value Date    CHOLHDL 38.4 02/07/2023    CHOLHDL 37.4 03/28/2022     CHOLHDL 35.5 01/26/2021       RPR   Date Value Ref Range Status   07/07/2023 Non-reactive Non-reactive Final   08/09/2022 Non-reactive Non-reactive Final   04/04/2022 Reactive (A) Non-reactive Final     No results found for: QUANTIFERON    Medications:  Current Outpatient Medications on File Prior to Visit   Medication Sig Dispense Refill    amLODIPine (NORVASC) 10 MG tablet TAKE 1 TABLET ONCE DAILY 90 tablet 3    ascorbic acid, vitamin C, (VITAMIN C) 1000 MG tablet Take 1,000 mg by mouth once daily.       fish oil-omega-3 fatty acids 300-1,000 mg capsule Take by mouth once daily.      glucosamine-chondroitin 500-400 mg tablet Take 1 tablet by mouth 3 (three) times daily.      isoniazid (NYDRAZID) 300 MG Tab Take 1 tablet (300 mg total) by mouth once daily. One tablet daily for prevention of tuberculosis 90 tablet 3    leucine-isoleucine-valine (NEOKE BCAA4) 82 gram-328 kcal/100 gram Powd Take by mouth.       naproxen (NAPROSYN) 500 MG tablet Take 1 tablet (500 mg total) by mouth 2 (two) times daily with meals. 60 tablet 1    pyridoxine, vitamin B6, (VITAMIN B-6) 50 MG Tab Take 1 tablet (50 mg total) by mouth once daily. 90 tablet 3     No current facility-administered medications on file prior to visit.       Antibiotics:   Antibiotics (From admission, onward)      None            HIV: No components found for: HIV 1/2 AG/AB  Hepatitis C IgG: No components found for: HEPATITIS C  Syphilis:   RPR   Date Value Ref Range Status   07/07/2023 Non-reactive Non-reactive Final   08/09/2022 Non-reactive Non-reactive Final   04/04/2022 Reactive (A) Non-reactive Final       Hepatitis A IgG: No components found for: HEPATITIS A IGG  Hepatitis Bc IgG: No components found for: HEPATITIS B CORE IGG  Hepatitis Bs IgG:  Quantiferon: No results found for: QUANTIFERON  VZV IgG: No components found for: VARICELLA IGG    No components found for: SEDIMENTATION RATE  No components found for: C-REACTIVE PROTEIN      Microbiology x 7d:    Microbiology Results (last 7 days)       ** No results found for the last 168 hours. **            Immunization History   Administered Date(s) Administered    COVID-19 Vaccine 02/19/2021, 03/19/2021, 10/23/2021    COVID-19, MRNA, LN-S, PF (MODERNA FULL 0.5 ML DOSE) 02/19/2021, 03/19/2021, 10/23/2021, 03/31/2022    COVID-19, mRNA, LNP-S, bivalent booster, PF (Moderna Omicron) 10/31/2022    Influenza (FLUAD) - Quadrivalent - Adjuvanted - PF *Preferred* (65+) 10/07/2021, 10/31/2022    Influenza - Quadrivalent 10/26/2015    Influenza - Quadrivalent - PF *Preferred* (6 months and older) 10/23/2006, 09/10/2009, 11/01/2017, 10/28/2018, 10/29/2019, 10/02/2020    Meningococcal Conjugate (MCV4P) 01/30/2017, 01/07/2019    Pneumococcal Conjugate - 13 Valent 04/17/2014, 04/17/2014    Pneumococcal Polysaccharide - 23 Valent 12/19/2012, 07/23/2020    Tdap 05/02/2016    Vaccinia, smallpox monkeypox vaccine live, PF 08/09/2022, 09/06/2022    Zoster Recombinant 05/14/2018, 07/11/2018         Reviewed records today as well as relevant labs, cultures, and imaging    Assessment:     HIV, well controlled  Hbv core ab positive; resolved hep B   Immunization counseling  Health maintanence  H/o ltbi- treated ~6 mo INH, stopped 2/2 transaminitis, which resolved with stopping      Plan:     # HIV--- well controlled on dovato, VL undetectable. Continue.     Vaccines reviewed    - Will monitor drug therapy for toxicity  Orders Placed This Encounter   Procedures    C. trachomatis/N. gonorrhoeae by AMP DNA     Standing Status:   Future     Standing Expiration Date:   9/11/2024     Order Specific Question:   Source:     Answer:   Urine    HIV RNA, Quantitative, PCR     Standing Status:   Future     Standing Expiration Date:   7/13/2024    CD4 T-[place] Cells     Standing Status:   Future     Standing Expiration Date:   7/13/2024    RPR     Standing Status:   Future     Standing Expiration Date:   9/11/2024     Order Specific Question:   Release  to patient     Answer:   Immediate    HEPATITIS B VIRAL DNA, QUANTITATIVE     Standing Status:   Future     Standing Expiration Date:   9/11/2024    HIV RNA, Quantitative, PCR     Standing Status:   Future     Standing Expiration Date:   7/13/2024    Comprehensive Metabolic Panel     Standing Status:   Future     Standing Expiration Date:   7/13/2024    HEPATITIS C ANTIBODY     Standing Status:   Future     Standing Expiration Date:   9/11/2024     Order Specific Question:   Release to patient     Answer:   Immediate       I have sent communication to the referring physician and/or primary care provider.     I spent a total of 30 minutes on the day of the visit. This includes face to face time and non-face to face time preparing to see the patient (eg, review of tests), obtaining and/or reviewing separately obtained history, documenting clinical information in the electronic or other health record, independently interpreting results, and communicating results to the patient/family/caregiver, or care coordination.      Jennifer Purvis MD, MPH  Infectious Disease

## 2023-07-17 ENCOUNTER — OFFICE VISIT (OUTPATIENT)
Dept: OPTOMETRY | Facility: CLINIC | Age: 63
End: 2023-07-17
Payer: COMMERCIAL

## 2023-07-17 DIAGNOSIS — H52.13 MYOPIA OF BOTH EYES WITH ASTIGMATISM AND PRESBYOPIA: ICD-10-CM

## 2023-07-17 DIAGNOSIS — H52.4 MYOPIA OF BOTH EYES WITH ASTIGMATISM AND PRESBYOPIA: ICD-10-CM

## 2023-07-17 DIAGNOSIS — H00.023 ACUTE MEIBOMIANITIS, RIGHT: Primary | ICD-10-CM

## 2023-07-17 DIAGNOSIS — H52.203 MYOPIA OF BOTH EYES WITH ASTIGMATISM AND PRESBYOPIA: ICD-10-CM

## 2023-07-17 DIAGNOSIS — H25.13 NUCLEAR SCLEROSIS, BILATERAL: ICD-10-CM

## 2023-07-17 DIAGNOSIS — B20 HUMAN IMMUNODEFICIENCY VIRUS (HIV) DISEASE: ICD-10-CM

## 2023-07-17 PROCEDURE — 99999 PR PBB SHADOW E&M-EST. PATIENT-LVL III: CPT | Mod: PBBFAC,,, | Performed by: OPTOMETRIST

## 2023-07-17 PROCEDURE — 99999 PR PBB SHADOW E&M-EST. PATIENT-LVL III: ICD-10-PCS | Mod: PBBFAC,,, | Performed by: OPTOMETRIST

## 2023-07-17 PROCEDURE — 99204 OFFICE O/P NEW MOD 45 MIN: CPT | Mod: S$GLB,,, | Performed by: OPTOMETRIST

## 2023-07-17 PROCEDURE — 99204 PR OFFICE/OUTPT VISIT, NEW, LEVL IV, 45-59 MIN: ICD-10-PCS | Mod: S$GLB,,, | Performed by: OPTOMETRIST

## 2023-07-17 RX ORDER — DOLUTEGRAVIR SODIUM AND LAMIVUDINE 50; 300 MG/1; MG/1
1 TABLET, FILM COATED ORAL DAILY
Qty: 90 TABLET | Refills: 5 | Status: SHIPPED | OUTPATIENT
Start: 2023-07-17 | End: 2023-10-15

## 2023-07-17 RX ORDER — TOBRAMYCIN AND DEXAMETHASONE 3; 1 MG/ML; MG/ML
1-2 SUSPENSION/ DROPS OPHTHALMIC
Qty: 5 ML | Refills: 0 | Status: SHIPPED | OUTPATIENT
Start: 2023-07-17 | End: 2023-07-31

## 2023-07-17 NOTE — PROGRESS NOTES
HPI    irritation OD   Pt  reports gritty/ redness/ Watering/ Burning  Gtt: Yes Visine   Pt reports mild releif      Last edited by Eusebio Alonso, OD on 7/17/2023  2:04 PM.            Assessment /Plan     For exam results, see Encounter Report.    Acute meibomianitis, right  -     tobramycin-dexAMETHasone 0.3-0.1% (TOBRADEX) 0.3-0.1 % DrpS; Place 1-2 drops into both eyes every 4 (four) hours while awake. for 14 days  Dispense: 5 mL; Refill: 0  -Stop Visine  -Start Tobradex QID OU x 14 days  -Long term Systane Complete PF    Nuclear sclerosis, bilateral  -Educated patient on presence of cataracts at today's exam, monitor at annual dilated fundus exam. 8+ years surgical estimate.    Myopia of both eyes with astigmatism and presbyopia  Eyeglass Final Rx       Eyeglass Final Rx         Sphere Cylinder Axis Dist VA Add    Right -1.00 +1.25 165 20/20 +2.25    Left -1.00 +2.00 175 20/25 +2.25      Type: PAL    Expiration Date: 7/17/2024                      RTC 1 yr

## 2023-08-23 ENCOUNTER — LAB VISIT (OUTPATIENT)
Dept: LAB | Facility: HOSPITAL | Age: 63
End: 2023-08-23
Attending: NURSE PRACTITIONER
Payer: COMMERCIAL

## 2023-08-23 ENCOUNTER — PATIENT MESSAGE (OUTPATIENT)
Dept: UROLOGY | Facility: CLINIC | Age: 63
End: 2023-08-23
Payer: COMMERCIAL

## 2023-08-23 DIAGNOSIS — N13.8 BPH WITH URINARY OBSTRUCTION: ICD-10-CM

## 2023-08-23 DIAGNOSIS — R97.20 ELEVATED PSA, BETWEEN 10 AND LESS THAN 20 NG/ML: ICD-10-CM

## 2023-08-23 DIAGNOSIS — C61 PROSTATE CANCER: ICD-10-CM

## 2023-08-23 DIAGNOSIS — N40.1 BPH WITH URINARY OBSTRUCTION: ICD-10-CM

## 2023-08-23 LAB — COMPLEXED PSA SERPL-MCNC: 14.5 NG/ML (ref 0–4)

## 2023-08-23 PROCEDURE — 84153 ASSAY OF PSA TOTAL: CPT | Performed by: NURSE PRACTITIONER

## 2023-08-23 PROCEDURE — 36415 COLL VENOUS BLD VENIPUNCTURE: CPT | Performed by: NURSE PRACTITIONER

## 2023-08-25 ENCOUNTER — PATIENT MESSAGE (OUTPATIENT)
Dept: PRIMARY CARE CLINIC | Facility: CLINIC | Age: 63
End: 2023-08-25
Payer: COMMERCIAL

## 2023-08-25 ENCOUNTER — PATIENT MESSAGE (OUTPATIENT)
Dept: UROLOGY | Facility: CLINIC | Age: 63
End: 2023-08-25

## 2023-08-25 ENCOUNTER — OFFICE VISIT (OUTPATIENT)
Dept: UROLOGY | Facility: CLINIC | Age: 63
End: 2023-08-25
Payer: COMMERCIAL

## 2023-08-25 VITALS
BODY MASS INDEX: 27.73 KG/M2 | SYSTOLIC BLOOD PRESSURE: 143 MMHG | WEIGHT: 216.06 LBS | DIASTOLIC BLOOD PRESSURE: 92 MMHG | HEART RATE: 46 BPM | HEIGHT: 74 IN

## 2023-08-25 DIAGNOSIS — R97.20 RISING PSA LEVEL: ICD-10-CM

## 2023-08-25 DIAGNOSIS — Z85.46 ENCOUNTER FOR FOLLOW-UP SURVEILLANCE OF PROSTATE CANCER: ICD-10-CM

## 2023-08-25 DIAGNOSIS — C61 PROSTATE CANCER: Primary | ICD-10-CM

## 2023-08-25 DIAGNOSIS — N13.8 BPH WITH URINARY OBSTRUCTION: ICD-10-CM

## 2023-08-25 DIAGNOSIS — R97.20 ELEVATED PSA, BETWEEN 10 AND LESS THAN 20 NG/ML: ICD-10-CM

## 2023-08-25 DIAGNOSIS — Z08 ENCOUNTER FOR FOLLOW-UP SURVEILLANCE OF PROSTATE CANCER: ICD-10-CM

## 2023-08-25 DIAGNOSIS — N40.1 BPH WITH URINARY OBSTRUCTION: ICD-10-CM

## 2023-08-25 LAB
BILIRUB SERPL-MCNC: NORMAL MG/DL
BLOOD URINE, POC: NORMAL
CLARITY, POC UA: CLEAR
COLOR, POC UA: YELLOW
GLUCOSE UR QL STRIP: NORMAL
KETONES UR QL STRIP: NORMAL
LEUKOCYTE ESTERASE URINE, POC: NORMAL
NITRITE, POC UA: NORMAL
PH, POC UA: 6
PROTEIN, POC: NORMAL
SPECIFIC GRAVITY, POC UA: 1.02
UROBILINOGEN, POC UA: NORMAL

## 2023-08-25 PROCEDURE — 81002 POCT URINE DIPSTICK WITHOUT MICROSCOPE: ICD-10-PCS | Mod: S$GLB,,, | Performed by: NURSE PRACTITIONER

## 2023-08-25 PROCEDURE — 81002 URINALYSIS NONAUTO W/O SCOPE: CPT | Mod: S$GLB,,, | Performed by: NURSE PRACTITIONER

## 2023-08-25 PROCEDURE — 99214 PR OFFICE/OUTPT VISIT, EST, LEVL IV, 30-39 MIN: ICD-10-PCS | Mod: S$GLB,,, | Performed by: NURSE PRACTITIONER

## 2023-08-25 PROCEDURE — 99214 OFFICE O/P EST MOD 30 MIN: CPT | Mod: S$GLB,,, | Performed by: NURSE PRACTITIONER

## 2023-08-25 PROCEDURE — 99999 PR PBB SHADOW E&M-EST. PATIENT-LVL III: ICD-10-PCS | Mod: PBBFAC,,, | Performed by: NURSE PRACTITIONER

## 2023-08-25 PROCEDURE — 99999 PR PBB SHADOW E&M-EST. PATIENT-LVL III: CPT | Mod: PBBFAC,,, | Performed by: NURSE PRACTITIONER

## 2023-08-25 NOTE — PROGRESS NOTES
CHIEF COMPLAINT:    Simón Fong is a 63 y.o. male presents today for Prostate Cancer.    HISTORY OF PRESENTING ILLINESS:    Simón Fong is a 63 y.o. male who is an established patient in our clinic. He has a history of prostate cancer diagnosed in 2018. He has been on AS since diagnosis.   Initial PSA 7.5. Uronav biopsy in 2018- Alcides 6 right middle and right base 1/2 cores each, <5%. Overall 2/12 cores positive.     Seen 06/28/2022 with Dr. Samuel.     Last clinic visit was 01/11/2023.   01/10/2023 MRI of Prostate:   - PSA was 13.3 on 01/10/2023.   - 53.1 cc  - PI-RADS 3 lesion, right apex posterior peripheral zone. ( appears slightly less conspicuous compared to prior exam.)    -No new suspicious focal prostate lesion      Here today for 6 month f/u visit.  PSA is now 14.5. he not really concerned with PSA since 01/2023   Over all doing well.   No complaints.  FOS is ok. Nocturia x 2.   Not interested in alpha blockers due to possible side effects.   Possibly Rezum      His brother has pancreatic cancer.         PROSTATE CANCER INFORMATION:     FH -positive  PSA - 12.2  AJCC Stage - T1C  STAR CAP stage- IB  Volume - 55 ccs  MRI - 5/12/2022- 0.9 cm PI-RADS 3 lesion RAPZ. Negative extra prostatic extension, NVBI (neurovascular bundle involvement), SVI (seminal vesicle involvement), nodes.  Biopsy - 6/7/2022-[facility]- Alcides 6 (GG1) left apex 2/2 60%, right apex (target) 1/4 60%, right middle 2/2 90%, right base 1/1 10%. Overall 4/6 sites, 6/14 cores.  ANDRES Score - 3 intermediate  NCCN - favorable intermediate  Germline testing -discussed, indicated  Somatic testing - Prolaris score- 3.2     STAR CAP:                 REVIEW OF SYSTEMS:  Review of Systems   Constitutional: Negative.  Negative for chills and fever.   Eyes:  Negative for double vision.   Respiratory:  Negative for cough and shortness of breath.    Cardiovascular:  Negative for chest pain.   Gastrointestinal:  Negative for abdominal pain,  constipation, diarrhea, nausea and vomiting.   Genitourinary:  Positive for frequency. Negative for dysuria, flank pain and hematuria.        FOS is good;  Nocturia x 2.     Neurological:  Negative for dizziness and seizures.   Endo/Heme/Allergies:  Negative for polydipsia.         PATIENT HISTORY:    Past Medical History:   Diagnosis Date    HIV infection     Hypertension        Past Surgical History:   Procedure Laterality Date    COLONOSCOPY      COLONOSCOPY N/A 7/31/2020    Procedure: COLONOSCOPY;  Surgeon: Tree Mendiola MD;  Location: [facility] ([address] [place] [address]);  Service: Endoscopy;  Laterality: N/A;  covid 7/28-[facility]-tb    PROSTATE BIOPSY         Family History   Problem Relation Age of Onset    Diabetes Mother     Heart disease Mother     Hypertension Mother     Stroke Mother     Hypertension Father     Cancer Sister     Cancer Brother        Social History     Socioeconomic History    Marital status: Single   Tobacco Use    Smoking status: Never    Smokeless tobacco: Never   Substance and Sexual Activity    Alcohol use: No    Drug use: No       Allergies:  Sulfa (sulfonamide antibiotics)    Medications:    Current Outpatient Medications:     amLODIPine (NORVASC) 10 MG tablet, TAKE 1 TABLET ONCE DAILY, Disp: 90 tablet, Rfl: 3    ascorbic acid, vitamin C, (VITAMIN C) 1000 MG tablet, Take 1,000 mg by mouth once daily. , Disp: , Rfl:     dolutegravir-lamivudine (DOVATO)  mg Tab, Take 1 tablet by mouth once daily., Disp: 90 tablet, Rfl: 5    fish oil-omega-3 fatty acids 300-1,000 mg capsule, Take by mouth once daily., Disp: , Rfl:     glucosamine-chondroitin 500-400 mg tablet, Take 1 tablet by mouth 3 (three) times daily., Disp: , Rfl:     leucine-isoleucine-valine (NEOKE BCAA4) 82 gram-328 kcal/100 gram Powd, Take by mouth. , Disp: , Rfl:     naproxen (NAPROSYN) 500 MG tablet, Take 1 tablet (500 mg total) by mouth 2 (two) times daily with meals., Disp: 60 tablet, Rfl: 1    PHYSICAL  EXAMINATION:  Physical Exam  Vitals and nursing note reviewed.   Constitutional:       General: He is awake.      Appearance: Normal appearance.   HENT:      Head: Normocephalic.      Right Ear: External ear normal.      Left Ear: External ear normal.      Nose: Nose normal.   Cardiovascular:      Rate and Rhythm: Normal rate.   Pulmonary:      Effort: Pulmonary effort is normal. No respiratory distress.   Abdominal:      Tenderness: There is no abdominal tenderness. There is no right CVA tenderness or left CVA tenderness.   Genitourinary:     Comments: Declines BRIANNA    Musculoskeletal:         General: Normal range of motion.      Cervical back: Normal range of motion.   Skin:     General: Skin is warm and dry.   Neurological:      General: No focal deficit present.      Mental Status: He is alert and oriented to person, place, and time.   Psychiatric:         Mood and Affect: Mood normal.         Behavior: Behavior is cooperative.           LABS:      In office UA today was clear of active infection and blood.        Lab Results   Component Value Date    PSA 6.5 (H) 12/21/2018    PSA 7.5 (H) 04/30/2018    PSA 4.6 (H) 07/26/2017    PSADIAG 14.5 (H) 08/23/2023    PSADIAG 13.3 (H) 01/10/2023    PSADIAG 12.2 (H) 06/03/2022    PSATOTAL 6.4 (H) 08/22/2019       Lab Results   Component Value Date    CREATININE 1.2 07/07/2023    EGFRNORACEVR >60.0 07/07/2023             IMPRESSION:    Encounter Diagnoses   Name Primary?    Prostate cancer Yes    Rising PSA level     BPH with urinary obstruction     Elevated PSA, between 10 and less than 20 ng/ml     Encounter for follow-up surveillance of prostate cancer          Assessment:       1. Prostate cancer    2. Rising PSA level    3. BPH with urinary obstruction    4. Elevated PSA, between 10 and less than 20 ng/ml    5. Encounter for follow-up surveillance of prostate cancer        Plan:         I spent 30 minutes with the patient of which more than half was spent in direct  consultation with the patient in regards to our treatment and plan.  We addressed the office findings and recent labs.   Education and recommendations of today's plan of care including home remedies and needed follow up with PCP.   We discussed the chief complaint; reviewed the LUTS and the possible contributory factors.   Reassurance no infection in urine. Discussed options for his LUTS with possible less side effects  Discussed the expectations with Rezum, but no guarantee.   Reviewed his PSA's and MRI's. Discussed accuracy of MRI and my concern for rising PSA. Discussed recommendations with bx. He agreed to recheck PSA in 3 months. Next step based on PSA.   Recommended lifestyle modifications with a proper, healthy diet, good hydration but during the day. Reducing bladder irritants.   Benefits of regular exercise.

## 2023-08-30 ENCOUNTER — TELEPHONE (OUTPATIENT)
Dept: PRIMARY CARE CLINIC | Facility: CLINIC | Age: 63
End: 2023-08-30
Payer: COMMERCIAL

## 2023-08-30 DIAGNOSIS — R79.9 ABNORMAL FINDING OF BLOOD CHEMISTRY: Primary | ICD-10-CM

## 2023-09-05 ENCOUNTER — LAB VISIT (OUTPATIENT)
Dept: LAB | Facility: HOSPITAL | Age: 63
End: 2023-09-05
Attending: INTERNAL MEDICINE
Payer: COMMERCIAL

## 2023-09-05 DIAGNOSIS — R79.9 ABNORMAL FINDING OF BLOOD CHEMISTRY: ICD-10-CM

## 2023-09-05 LAB — PTH-INTACT SERPL-MCNC: 61.6 PG/ML (ref 9–77)

## 2023-09-05 PROCEDURE — 83970 ASSAY OF PARATHORMONE: CPT | Performed by: INTERNAL MEDICINE

## 2023-09-05 PROCEDURE — 36415 COLL VENOUS BLD VENIPUNCTURE: CPT | Performed by: INTERNAL MEDICINE

## 2023-09-08 NOTE — PROGRESS NOTES
Please let patient know that his PTH looks fine and does not require any change in treatment. Please see if they have any additional concerns.

## 2023-09-13 ENCOUNTER — PATIENT MESSAGE (OUTPATIENT)
Dept: PRIMARY CARE CLINIC | Facility: CLINIC | Age: 63
End: 2023-09-13
Payer: COMMERCIAL

## 2023-09-14 ENCOUNTER — TELEPHONE (OUTPATIENT)
Dept: PRIMARY CARE CLINIC | Facility: CLINIC | Age: 63
End: 2023-09-14

## 2023-09-14 ENCOUNTER — IMMUNIZATION (OUTPATIENT)
Dept: INTERNAL MEDICINE | Facility: CLINIC | Age: 63
End: 2023-09-14
Payer: COMMERCIAL

## 2023-09-14 ENCOUNTER — PATIENT MESSAGE (OUTPATIENT)
Dept: INFECTIOUS DISEASES | Facility: CLINIC | Age: 63
End: 2023-09-14
Payer: COMMERCIAL

## 2023-09-14 DIAGNOSIS — Z23 FLU VACCINE NEED: ICD-10-CM

## 2023-09-14 PROCEDURE — 90694 FLU VACCINE - QUADRIVALENT - ADJUVANTED: ICD-10-PCS | Mod: S$GLB,,, | Performed by: INTERNAL MEDICINE

## 2023-09-14 PROCEDURE — 90694 VACC AIIV4 NO PRSRV 0.5ML IM: CPT | Mod: S$GLB,,, | Performed by: INTERNAL MEDICINE

## 2023-09-14 PROCEDURE — 90471 FLU VACCINE - QUADRIVALENT - ADJUVANTED: ICD-10-PCS | Mod: S$GLB,,, | Performed by: INTERNAL MEDICINE

## 2023-09-14 PROCEDURE — 90471 IMMUNIZATION ADMIN: CPT | Mod: S$GLB,,, | Performed by: INTERNAL MEDICINE

## 2023-09-14 NOTE — ASSESSMENT & PLAN NOTE
Patient with multiple co-morbidities (HTN, HLD, HIV, prostate CA), and high-risk for poor outcomes with flu and COVID  · HDflu requested today at [facility] pharmacy

## 2023-09-14 NOTE — TELEPHONE ENCOUNTER
Patient presented to pharmacy for flu vaccine. He is appropriate for HD flu due to his chronic morbidities of immunosuppression.    Thanks,  Jimena Barone MD/MPH  [facility]  [phone] [place]

## 2023-11-27 ENCOUNTER — LAB VISIT (OUTPATIENT)
Dept: LAB | Facility: HOSPITAL | Age: 63
End: 2023-11-27
Payer: COMMERCIAL

## 2023-11-27 DIAGNOSIS — R97.20 RISING PSA LEVEL: ICD-10-CM

## 2023-11-27 DIAGNOSIS — Z85.46 ENCOUNTER FOR FOLLOW-UP SURVEILLANCE OF PROSTATE CANCER: ICD-10-CM

## 2023-11-27 DIAGNOSIS — N40.1 BPH WITH URINARY OBSTRUCTION: ICD-10-CM

## 2023-11-27 DIAGNOSIS — Z08 ENCOUNTER FOR FOLLOW-UP SURVEILLANCE OF PROSTATE CANCER: ICD-10-CM

## 2023-11-27 DIAGNOSIS — N13.8 BPH WITH URINARY OBSTRUCTION: ICD-10-CM

## 2023-11-27 DIAGNOSIS — C61 PROSTATE CANCER: ICD-10-CM

## 2023-11-27 DIAGNOSIS — R97.20 ELEVATED PSA, BETWEEN 10 AND LESS THAN 20 NG/ML: ICD-10-CM

## 2023-11-27 LAB — COMPLEXED PSA SERPL-MCNC: 14.1 NG/ML (ref 0–4)

## 2023-11-27 PROCEDURE — 84153 ASSAY OF PSA TOTAL: CPT | Performed by: NURSE PRACTITIONER

## 2023-11-27 PROCEDURE — 36415 COLL VENOUS BLD VENIPUNCTURE: CPT | Performed by: NURSE PRACTITIONER

## 2023-11-30 ENCOUNTER — PATIENT MESSAGE (OUTPATIENT)
Dept: UROLOGY | Facility: CLINIC | Age: 63
End: 2023-11-30
Payer: COMMERCIAL

## 2023-11-30 DIAGNOSIS — C61 PROSTATE CANCER: Primary | ICD-10-CM

## 2023-11-30 DIAGNOSIS — R97.20 ELEVATED PSA, BETWEEN 10 AND LESS THAN 20 NG/ML: ICD-10-CM

## 2024-01-22 ENCOUNTER — PATIENT MESSAGE (OUTPATIENT)
Dept: PRIMARY CARE CLINIC | Facility: CLINIC | Age: 64
End: 2024-01-22
Payer: COMMERCIAL

## 2024-01-22 DIAGNOSIS — E11.51 TYPE 2 DIABETES MELLITUS WITH DIABETIC PERIPHERAL ANGIOPATHY WITHOUT GANGRENE, WITH LONG-TERM CURRENT USE OF INSULIN: ICD-10-CM

## 2024-01-22 DIAGNOSIS — Z13.220 SCREENING CHOLESTEROL LEVEL: ICD-10-CM

## 2024-01-22 DIAGNOSIS — Z79.4 TYPE 2 DIABETES MELLITUS WITH DIABETIC PERIPHERAL ANGIOPATHY WITHOUT GANGRENE, WITH LONG-TERM CURRENT USE OF INSULIN: ICD-10-CM

## 2024-01-22 DIAGNOSIS — I10 ESSENTIAL HYPERTENSION: Primary | ICD-10-CM

## 2024-01-22 NOTE — TELEPHONE ENCOUNTER
Called and spoke to patient, patient requesting labs to be scheduled on 01/24 or 01/25 at [facility] so that lab results can be reviewed in office with Dr. Barone at [facility] visit on 01/29/2024. Informed patient that I will call him back once labs are signed so we can schedule him at preferred time slot and location.

## 2024-01-24 ENCOUNTER — LAB VISIT (OUTPATIENT)
Dept: LAB | Facility: HOSPITAL | Age: 64
End: 2024-01-24
Attending: INTERNAL MEDICINE
Payer: COMMERCIAL

## 2024-01-24 DIAGNOSIS — I10 ESSENTIAL HYPERTENSION: ICD-10-CM

## 2024-01-24 DIAGNOSIS — E11.51 TYPE 2 DIABETES MELLITUS WITH DIABETIC PERIPHERAL ANGIOPATHY WITHOUT GANGRENE, WITH LONG-TERM CURRENT USE OF INSULIN: ICD-10-CM

## 2024-01-24 DIAGNOSIS — Z13.220 SCREENING CHOLESTEROL LEVEL: ICD-10-CM

## 2024-01-24 DIAGNOSIS — Z79.4 TYPE 2 DIABETES MELLITUS WITH DIABETIC PERIPHERAL ANGIOPATHY WITHOUT GANGRENE, WITH LONG-TERM CURRENT USE OF INSULIN: ICD-10-CM

## 2024-01-24 LAB
ALBUMIN SERPL BCP-MCNC: 4.2 G/DL (ref 3.5–5.2)
ALP SERPL-CCNC: 72 U/L (ref 55–135)
ALT SERPL W/O P-5'-P-CCNC: 27 U/L (ref 10–44)
ANION GAP SERPL CALC-SCNC: 7 MMOL/L (ref 8–16)
AST SERPL-CCNC: 32 U/L (ref 10–40)
BASOPHILS # BLD AUTO: 0.04 K/UL (ref 0–0.2)
BASOPHILS NFR BLD: 1 % (ref 0–1.9)
BILIRUB SERPL-MCNC: 0.5 MG/DL (ref 0.1–1)
BUN SERPL-MCNC: 13 MG/DL (ref 8–23)
CALCIUM SERPL-MCNC: 9.7 MG/DL (ref 8.7–10.5)
CHLORIDE SERPL-SCNC: 108 MMOL/L (ref 95–110)
CHOLEST SERPL-MCNC: 150 MG/DL (ref 120–199)
CHOLEST/HDLC SERPL: 2.7 {RATIO} (ref 2–5)
CO2 SERPL-SCNC: 27 MMOL/L (ref 23–29)
CREAT SERPL-MCNC: 0.9 MG/DL (ref 0.5–1.4)
DIFFERENTIAL METHOD BLD: ABNORMAL
EOSINOPHIL # BLD AUTO: 0.1 K/UL (ref 0–0.5)
EOSINOPHIL NFR BLD: 1.5 % (ref 0–8)
ERYTHROCYTE [DISTWIDTH] IN BLOOD BY AUTOMATED COUNT: 12.7 % (ref 11.5–14.5)
EST. GFR  (NO RACE VARIABLE): >60 ML/MIN/1.73 M^2
ESTIMATED AVG GLUCOSE: 114 MG/DL (ref 68–131)
GLUCOSE SERPL-MCNC: 90 MG/DL (ref 70–110)
HBA1C MFR BLD: 5.6 % (ref 4–5.6)
HCT VFR BLD AUTO: 45.5 % (ref 40–54)
HDLC SERPL-MCNC: 55 MG/DL (ref 40–75)
HDLC SERPL: 36.7 % (ref 20–50)
HGB BLD-MCNC: 15 G/DL (ref 14–18)
IMM GRANULOCYTES # BLD AUTO: 0.01 K/UL (ref 0–0.04)
IMM GRANULOCYTES NFR BLD AUTO: 0.3 % (ref 0–0.5)
LDLC SERPL CALC-MCNC: 87.2 MG/DL (ref 63–159)
LYMPHOCYTES # BLD AUTO: 1.8 K/UL (ref 1–4.8)
LYMPHOCYTES NFR BLD: 46.1 % (ref 18–48)
MCH RBC QN AUTO: 30.9 PG (ref 27–31)
MCHC RBC AUTO-ENTMCNC: 33 G/DL (ref 32–36)
MCV RBC AUTO: 94 FL (ref 82–98)
MONOCYTES # BLD AUTO: 0.4 K/UL (ref 0.3–1)
MONOCYTES NFR BLD: 10.3 % (ref 4–15)
NEUTROPHILS # BLD AUTO: 1.6 K/UL (ref 1.8–7.7)
NEUTROPHILS NFR BLD: 40.8 % (ref 38–73)
NONHDLC SERPL-MCNC: 95 MG/DL
NRBC BLD-RTO: 0 /100 WBC
PLATELET # BLD AUTO: 244 K/UL (ref 150–450)
PMV BLD AUTO: 10.8 FL (ref 9.2–12.9)
POTASSIUM SERPL-SCNC: 4.8 MMOL/L (ref 3.5–5.1)
PROT SERPL-MCNC: 7.5 G/DL (ref 6–8.4)
RBC # BLD AUTO: 4.86 M/UL (ref 4.6–6.2)
SODIUM SERPL-SCNC: 142 MMOL/L (ref 136–145)
TRIGL SERPL-MCNC: 39 MG/DL (ref 30–150)
WBC # BLD AUTO: 3.88 K/UL (ref 3.9–12.7)

## 2024-01-24 PROCEDURE — 80061 LIPID PANEL: CPT | Performed by: INTERNAL MEDICINE

## 2024-01-24 PROCEDURE — 80053 COMPREHEN METABOLIC PANEL: CPT | Performed by: INTERNAL MEDICINE

## 2024-01-24 PROCEDURE — 85025 COMPLETE CBC W/AUTO DIFF WBC: CPT | Performed by: INTERNAL MEDICINE

## 2024-01-24 PROCEDURE — 36415 COLL VENOUS BLD VENIPUNCTURE: CPT | Performed by: INTERNAL MEDICINE

## 2024-01-24 PROCEDURE — 83036 HEMOGLOBIN GLYCOSYLATED A1C: CPT | Performed by: INTERNAL MEDICINE

## 2024-01-25 ENCOUNTER — PATIENT MESSAGE (OUTPATIENT)
Dept: PRIMARY CARE CLINIC | Facility: CLINIC | Age: 64
End: 2024-01-25
Payer: COMMERCIAL

## 2024-01-26 ENCOUNTER — TELEPHONE (OUTPATIENT)
Dept: PRIMARY CARE CLINIC | Facility: CLINIC | Age: 64
End: 2024-01-26
Payer: COMMERCIAL

## 2024-01-29 ENCOUNTER — OFFICE VISIT (OUTPATIENT)
Dept: PRIMARY CARE CLINIC | Facility: CLINIC | Age: 64
End: 2024-01-29
Payer: COMMERCIAL

## 2024-01-29 VITALS
BODY MASS INDEX: 28.12 KG/M2 | HEART RATE: 60 BPM | TEMPERATURE: 98 F | OXYGEN SATURATION: 99 % | DIASTOLIC BLOOD PRESSURE: 70 MMHG | HEIGHT: 74 IN | SYSTOLIC BLOOD PRESSURE: 138 MMHG | WEIGHT: 219.13 LBS

## 2024-01-29 DIAGNOSIS — I10 HYPERTENSION, UNSPECIFIED TYPE: Primary | ICD-10-CM

## 2024-01-29 DIAGNOSIS — C61 PROSTATE CANCER: ICD-10-CM

## 2024-01-29 DIAGNOSIS — Z12.11 SCREENING FOR COLON CANCER: ICD-10-CM

## 2024-01-29 DIAGNOSIS — M25.561 ACUTE PAIN OF BOTH KNEES: ICD-10-CM

## 2024-01-29 DIAGNOSIS — B20 HUMAN IMMUNODEFICIENCY VIRUS (HIV) DISEASE: ICD-10-CM

## 2024-01-29 DIAGNOSIS — M25.562 ACUTE PAIN OF BOTH KNEES: ICD-10-CM

## 2024-01-29 PROCEDURE — 99214 OFFICE O/P EST MOD 30 MIN: CPT | Mod: S$GLB,,, | Performed by: INTERNAL MEDICINE

## 2024-01-29 RX ORDER — DOLUTEGRAVIR SODIUM AND LAMIVUDINE 50; 300 MG/1; MG/1
1 TABLET, FILM COATED ORAL DAILY
COMMUNITY
Start: 2023-12-26

## 2024-01-29 NOTE — ASSESSMENT & PLAN NOTE
Followed by ID (Dr Purvis). HIV was diagnosed 30 years ago. Prior antiretroviral regimens include truvada/reyataz, stribild, genvoya. No h/o drug resistance. 100 % compliant on genvoya and denies side effects. Will be switching from genvoya to dovato 2/2 side effect profile/ to avoid COVID booster.   Continue ID follow-up with Dr Purvis

## 2024-01-29 NOTE — ASSESSMENT & PLAN NOTE
Low-volume, low-risk prostate cancer on active surveillance since 8/2018. He presented with an elevated PSA and a family history of prostate cancer. He underwent a MRI fusion prostate needle biopsy on 08/13/2018 which showed 2 positive cores with [place] 6 disease, less than 5% on each positive core. His PSA was 7.5 at the time with a prostate volume of 47. 3cc. He had been seen in clinic with Dr. Richardson 01/16/2019. Last office visit was 08/2023. Has MRI planned for restaging. Followed by Tammy Wright for active surveillance of prostate cancer.   PSA trending up  Imaging planned with Urology in March, 2024 (MRI)

## 2024-01-29 NOTE — PROGRESS NOTES
Primary Care Provider Appointment- [facility]    Subjective:      Patient ID: Simón Fong is a 63 y.o. male.    Chief Complaint: Follow-up    Patient with multiple co-morbid illnesses here to receive chronic care management with PCP. Last seen in this office Visit date not found.    Is followed by Dr Purvis for HIV.    He is about to go on a cruise in November. He just got off a cruise 2 weeks ago.     His brother struggled with pancreatic cancer for 3 years, he was primary caregiver. This motivates him to prioritize his health and life.    Assessment/ Plan:     Problem List Items Addressed This Visit          Cardiac/Vascular    Hypertension - Primary     BP readings controlled on CCB  Advised to continue amlodipine 10mg  Cut back on exercise supplements            ID    Human immunodeficiency virus (HIV) disease     Followed by ID (Dr Purvis). HIV was diagnosed 30 years ago. Prior antiretroviral regimens include truvada/reyataz, stribild, genvoya. No h/o drug resistance. 100 % compliant on genvoya and denies side effects. Will be switching from genvoya to dovato 2/2 side effect profile/ to avoid COVID booster.   Continue ID follow-up with Dr Purvis            Oncology    Prostate cancer     Low-volume, low-risk prostate cancer on active surveillance since 8/2018. He presented with an elevated PSA and a family history of prostate cancer. He underwent a MRI fusion prostate needle biopsy on 08/13/2018 which showed 2 positive cores with [place] 6 disease, less than 5% on each positive core. His PSA was 7.5 at the time with a prostate volume of 47. 3cc. He had been seen in clinic with Dr. Richardson 01/16/2019. Last office visit was 08/2023. Has MRI planned for restaging. Followed by Tammy Wright for active surveillance of prostate cancer.   PSA trending up  Imaging planned with Urology in March, 2024 (MRI)            GI    Screening for colon cancer     Due in 2025  Plans to address this at his next appointment    "         Orthopedic    Acute pain of both knees     OA knees bilaterally, undergoing PT with [facility] in [place]  Advised to continue  Continue self-care trajectory            Health Maintenance         Date Due Completion Date    Pneumococcal Vaccines (Age 0-64) (4 of 4 - PPSV23 or PCV20) 07/23/2025 7/23/2020    Colorectal Cancer Screening 07/31/2025 7/31/2020    TETANUS VACCINE 05/02/2026 5/2/2016    Hemoglobin A1c (Diabetic Prevention Screening) 01/24/2027 1/24/2024    Lipid Panel 01/24/2029 1/24/2024            Review of Systems   Constitutional:  Negative for activity change and appetite change.   Endocrine: Positive for polyuria.   Musculoskeletal:  Positive for arthralgias.       Objective:   /70 (BP Location: Right arm, Patient Position: Sitting, BP Method: Large (Manual))   Pulse 60   Temp 98.4 °F (36.9 °C) (Oral)   Ht 6' 2" (1.88 m)   Wt 99.4 kg (219 lb 2.2 oz)   SpO2 99%   BMI 28.14 kg/m²     Physical Exam  Constitutional:       Appearance: Normal appearance.   Neurological:      Mental Status: He is alert.   Psychiatric:         Mood and Affect: Mood normal.         4Ms for Medical Decision-Making in Older Adults    Last Completed EAWV: None    MOBILITY:  Get Up and Go:       No data to display              Activities of Daily Living:       No data to display              Whisper Test:       No data to display              Disability Status:       No data to display              Nutrition Screening:       No data to display             Screening Score: 0-7 Malnourished, 8-11 At Risk, 12-14 Normal    MENTATION:   Depression Patient Health Questionnaire:      1/29/2024     8:14 AM   Depression Patient Health Questionnaire   Over the last two weeks how often have you been bothered by little interest or pleasure in doing things Not at all   Over the last two weeks how often have you been bothered by feeling down, depressed or hopeless Not at all   PHQ-2 Total Score 0     Has Dementia Dx: " "No    Cognitive Function Screening:       No data to display              Cognitive Function Screening Total - Less than 4 = Abnormal,  Greater than or equal to 4 = Normal    MEDICATIONS:  High Risk Medications:  Total Active Medications: 0  This patient does not have an active medication from one of the medication groupers.    WHAT MATTERS MOST:  Advance Care Planning   ACP Status:   Patient has had an ACP conversation  Living Will: No  Power of : No  LaPOST: No    What is most important right now is to focus on remaining as independent as possible and quality of life, even if it means sacrificing a little time    Accordingly, we have decided that the best plan to meet the patient's goals includes continuing with treatment      What matters most to patient today is: "enjoy everything I worked for"           RESULTS: Reviewed labs and images today  Lab Results   Component Value Date    WBC 3.88 (L) 01/24/2024    HGB 15.0 01/24/2024    HCT 45.5 01/24/2024     01/24/2024    CHOL 150 01/24/2024    TRIG 39 01/24/2024    HDL 55 01/24/2024    ALT 27 01/24/2024    AST 32 01/24/2024     01/24/2024    K 4.8 01/24/2024     01/24/2024    CREATININE 0.9 01/24/2024    BUN 13 01/24/2024    CO2 27 01/24/2024    TSH 2.607 03/28/2022    PSA 6.5 (H) 12/21/2018    INR 1.2 02/08/2012    HGBA1C 5.6 01/24/2024       Follow up in about 1 year (around 1/29/2025). 30min spent with patient today addressing chronic care issues including vaccines and preventative assessments    Jimena Barone MD/MPH  Internal Medicine  [facility]  [phone]  "

## 2024-01-29 NOTE — PATIENT INSTRUCTIONS
TODAY:  - next year, PNAvax and colon cancer screening due  - eat before you take your meds  - do your homework on the Vit C, do you really want this med? Please consider whether you need to take this, it can be hard on the stomach  - naproxen can be hard on the stomach and kidneys  - all other screenings and vaccines are up to date  - keep up with you health and life goals  - get you flu vaccine before the next cruise

## 2024-01-29 NOTE — ASSESSMENT & PLAN NOTE
OA knees bilaterally, undergoing PT with [facility] in [place]  Advised to continue  Continue self-care trajectory

## 2024-01-29 NOTE — ASSESSMENT & PLAN NOTE
BP readings controlled on CCB  Advised to continue amlodipine 10mg  Cut back on exercise supplements

## 2024-02-07 ENCOUNTER — TELEPHONE (OUTPATIENT)
Dept: SPORTS MEDICINE | Facility: CLINIC | Age: 64
End: 2024-02-07
Payer: COMMERCIAL

## 2024-02-07 NOTE — TELEPHONE ENCOUNTER
Called and spoke to patient.  He will see Dr. Castellon on 2/20/24 for right knee pain. Patient will arrive at 11:15am for xrays

## 2024-02-09 DIAGNOSIS — M25.561 RIGHT KNEE PAIN, UNSPECIFIED CHRONICITY: Primary | ICD-10-CM

## 2024-02-20 ENCOUNTER — OFFICE VISIT (OUTPATIENT)
Dept: SPORTS MEDICINE | Facility: CLINIC | Age: 64
End: 2024-02-20
Payer: COMMERCIAL

## 2024-02-20 ENCOUNTER — HOSPITAL ENCOUNTER (OUTPATIENT)
Dept: RADIOLOGY | Facility: HOSPITAL | Age: 64
Discharge: HOME | End: 2024-02-20
Attending: STUDENT IN AN ORGANIZED HEALTH CARE EDUCATION/TRAINING PROGRAM
Payer: COMMERCIAL

## 2024-02-20 VITALS
DIASTOLIC BLOOD PRESSURE: 90 MMHG | WEIGHT: 213.38 LBS | BODY MASS INDEX: 27.4 KG/M2 | HEART RATE: 65 BPM | SYSTOLIC BLOOD PRESSURE: 131 MMHG

## 2024-02-20 DIAGNOSIS — M25.561 RIGHT KNEE PAIN, UNSPECIFIED CHRONICITY: ICD-10-CM

## 2024-02-20 DIAGNOSIS — M25.661 DECREASED ROM OF RIGHT KNEE: ICD-10-CM

## 2024-02-20 DIAGNOSIS — M17.11 PRIMARY OSTEOARTHRITIS OF RIGHT KNEE: ICD-10-CM

## 2024-02-20 DIAGNOSIS — M25.561 MECHANICAL KNEE PAIN, RIGHT: Primary | ICD-10-CM

## 2024-02-20 PROCEDURE — 99214 OFFICE O/P EST MOD 30 MIN: CPT | Mod: S$GLB,,, | Performed by: STUDENT IN AN ORGANIZED HEALTH CARE EDUCATION/TRAINING PROGRAM

## 2024-02-20 PROCEDURE — 73562 X-RAY EXAM OF KNEE 3: CPT | Mod: 26,LT,, | Performed by: RADIOLOGY

## 2024-02-20 PROCEDURE — 99999 PR PBB SHADOW E&M-EST. PATIENT-LVL III: CPT | Mod: PBBFAC,,, | Performed by: STUDENT IN AN ORGANIZED HEALTH CARE EDUCATION/TRAINING PROGRAM

## 2024-02-20 PROCEDURE — 73564 X-RAY EXAM KNEE 4 OR MORE: CPT | Mod: 26,RT,, | Performed by: RADIOLOGY

## 2024-02-20 PROCEDURE — 73562 X-RAY EXAM OF KNEE 3: CPT | Mod: 59,TC,PN,LT

## 2024-02-20 NOTE — PROGRESS NOTES
"CC: right knee pain    63 y.o. Male presents today for evaluation of his right knee pain. Pt reports gradual onset of knee pain since June 2023, then states he twisted his knee playing tennis a few months later. Pt has been doing fPT since June '23. Pt reports no pain today, but states pain can get up to 5/10 with walking and at night. Pt localizes pain to medial knee and radiating pain to calf. Pt states pain also triggered sciatica-like symptoms (posterior leg pain). Pt denies mechanical symptoms. Pt denies numbness/tingling.     SYMPTOMS:   Pain Score: 5/10 with walking/at night   Pain location: medial  Time of onset: June '23  Trauma, injury: gradual onset, then acute event (twisted knee)    Audible pop: no  Clicking: no  Catching: no  Locking: no  Giving out, instability: "seldom"  Swelling: yes  Theater sign: "stiffness"  Problems with stairs: yes, descending    INTERVENTIONS:   Medications tried: tylenol arthritis (had relief), naproxen (no relief)  Braces/devices: compression sleeve  Physical therapy: currently in fPT  Injections: none    RELEVANT HISTORY:   Imaging to date: 2/20/24  Previous significant knee injuries: none  Previous knee surgeries: none    REVIEW OF SYSTEMS:   Constitution: Patient denies fever or chills.  Eyes: Patient denies eye pain or vision changes.  HEENT: Patient denies ear pain, sore throat, or nasal discharge.  CVS: Patient denies chest pain.  Lungs: Patient denies shortness of breath or cough.  Abdomen: Patient denies any stomach pain, nausea, vomiting, or diarrhea  Skin: Patient denies skin rash or itching.    Musculoskeletal: Patient denies recent injuries or trauma.  Neuro: Patient denies any numbness or tingling in upper or lower extremities.  Psych: Patient denies any current anxiety or nervousness.    PAST MEDICAL HISTORY:   Past Medical History:   Diagnosis Date    HIV infection     Hypertension        PAST SURGICAL HISTORY:  Past Surgical History:   Procedure Laterality " Date    COLONOSCOPY      COLONOSCOPY N/A 7/31/2020    Procedure: COLONOSCOPY;  Surgeon: Tree Mendiola MD;  Location: [facility] ([address] [place] [address]);  Service: Endoscopy;  Laterality: N/A;  covid 7/28-[facility]    PROSTATE BIOPSY         FAMILY HISTORY:  Family History   Problem Relation Age of Onset    Diabetes Mother     Heart disease Mother     Hypertension Mother     Stroke Mother     Hypertension Father     Cancer Sister     Cancer Brother        SOCIAL HISTORY:  Social History     Socioeconomic History    Marital status: Single   Tobacco Use    Smoking status: Never    Smokeless tobacco: Never   Substance and Sexual Activity    Alcohol use: No    Drug use: No     Social Determinants of Health     Financial Resource Strain: Low Risk  (1/26/2024)    Overall Financial Resource Strain (CARDIA)     Difficulty of Paying Living Expenses: Not hard at all   Food Insecurity: No Food Insecurity (1/26/2024)    Hunger Vital Sign     Worried About Running Out of Food in the Last Year: Never true     Ran Out of Food in the Last Year: Never true   Transportation Needs: No Transportation Needs (1/26/2024)    PRAPARE - Transportation     Lack of Transportation (Medical): No     Lack of Transportation (Non-Medical): No   Physical Activity: Sufficiently Active (1/26/2024)    Exercise Vital Sign     Days of Exercise per Week: 6 days     Minutes of Exercise per Session: 80 min   Stress: No Stress Concern Present (1/26/2024)    [employer] [place] [employer] - Occupational Stress Questionnaire     Feeling of Stress : Only a little   Social Connections: Unknown (1/26/2024)    Social Connection and Isolation Panel [NHANES]     Frequency of Communication with Friends and Family: More than three times a week     Frequency of Social Gatherings with Friends and Family: Once a week     Active Member of Clubs or Organizations: No     Attends Club or Organization Meetings: Never     Marital Status: Never    Housing  Stability: Unknown (1/26/2024)    Housing Stability Vital Sign     Unable to Pay for Housing in the Last Year: No     Unstable Housing in the Last Year: No       MEDICATIONS:     Current Outpatient Medications:     amLODIPine (NORVASC) 10 MG tablet, TAKE 1 TABLET ONCE DAILY, Disp: 90 tablet, Rfl: 3    ascorbic acid, vitamin C, (VITAMIN C) 1000 MG tablet, Take 1,000 mg by mouth once daily. , Disp: , Rfl:     DOVATO  mg Tab, Take 1 tablet by mouth once daily., Disp: , Rfl:     fish oil-omega-3 fatty acids 300-1,000 mg capsule, Take by mouth once daily., Disp: , Rfl:     glucosamine-chondroitin 500-400 mg tablet, Take 1 tablet by mouth 3 (three) times daily., Disp: , Rfl:     leucine-isoleucine-valine (NEOKE BCAA4) 82 gram-328 kcal/100 gram Powd, Take by mouth. , Disp: , Rfl:     naproxen (NAPROSYN) 500 MG tablet, Take 1 tablet (500 mg total) by mouth 2 (two) times daily with meals., Disp: 60 tablet, Rfl: 1    ALLERGIES:   Review of patient's allergies indicates:   Allergen Reactions    Sulfa (sulfonamide antibiotics)         PHYSICAL EXAMINATION:  BP (!) 131/90   Pulse 65   Wt 96.8 kg (213 lb 6.5 oz)   BMI 27.40 kg/m²   Vitals signs and nursing note have been reviewed.    General: In no acute distress, well developed, well nourished, no diaphoresis  Eyes: EOM full and smooth, no eye redness or discharge  HEENT: normocephalic and atraumatic, neck supple, trachea midline, no nasal discharge  Cardiovascular: no LE edema  Lungs: respirations non-labored, no conversational dyspnea   Neuro: AAOx3, CN2-12 grossly intact  Skin: No rashes, warm and dry  Psychiatric: cooperative, pleasant, mood and affect appropriate for age    Right Knee:   Gait: wnl    Inspection/Palpation:   -Rubor   -Calor  -Effusion   -Patella ballotable   -Patellar apprehension  -Retinacular tenderness   -Patellar crepitus   Patellar tilt grossly normal     TTP at:  -Joint line   -MCL   -LCL   -Popliteal region   -Quad tendon   -Patella  -Pat  tendon  -Pat border  -Med condyle   -Lat condyle   -Pes   -Prox fibula   -Tib tub  -Gerdy's tubercle  -Distal Hamstring tendons  -Proximal Hamstrings/Ischial tuberosity  -ITB    ROM:   Ext: 0°   Flex:110° vs. 140° on left   Popliteal Angle: 45°   +Discomfort w/ full flex   -Bounce-home discomfort     Ligamentous:   -Ant drawer   -Post drawer   -Lachman's   Good endpoints & no pain w/ valgus & varus stress    Meniscal:  -Jennifer's   -Francheska   -Thessaly   -Pain w/ squat     Other:  -Patellar apprehension  -Patellar grind  -Alvarado's   -J sign  -Lisandra's  Abductors     IMAGIN. Knee X-ray ordered due to right knee pain. 4 views taken today.   2. X-ray images were reviewed personally by me and then directly with patient.  3. FINDINGS: Joint spaces are maintained mild degenerative changes seen. Bony structures are intact some bony spurring is arising from the right patella. No definite joint effusion is seen.     4. IMPRESSION:  Kellgren Jovon grade 2 osteoarthritic changes.  No acute osseous abnormalities appreciated.    ASSESSMENT:      ICD-10-CM ICD-9-CM   1. Right knee pain, unspecified chronicity  M25.561 719.46   2. Primary osteoarthritis of right knee  M17.11 715.16         PLAN:  Based on patient history, physical exam findings, and imaging I am concerned for internal derangement to the patient's right knee that is causing his decreased range of motion and mechanical pain.  Patient interested in conservative treatment options and is not interested in surgical interventions, but we will move forward with MRI to get a more definitive diagnosis before forming a treatment plan.    All questions were answered to the best of my ability and all concerns were addressed at this time.    Follow up for above, or sooner if needed.      This note is dictated using the M*Modal Fluency Direct word recognition program. There are word recognition mistakes that are occasionally missed on review.

## 2024-02-23 ENCOUNTER — PATIENT MESSAGE (OUTPATIENT)
Dept: UROLOGY | Facility: CLINIC | Age: 64
End: 2024-02-23
Payer: COMMERCIAL

## 2024-02-28 ENCOUNTER — PATIENT MESSAGE (OUTPATIENT)
Dept: SPORTS MEDICINE | Facility: CLINIC | Age: 64
End: 2024-02-28
Payer: COMMERCIAL

## 2024-03-01 ENCOUNTER — HOSPITAL ENCOUNTER (OUTPATIENT)
Dept: RADIOLOGY | Facility: HOSPITAL | Age: 64
Discharge: HOME | End: 2024-03-01
Attending: NURSE PRACTITIONER
Payer: COMMERCIAL

## 2024-03-01 DIAGNOSIS — C61 PROSTATE CANCER: ICD-10-CM

## 2024-03-01 DIAGNOSIS — R97.20 ELEVATED PSA, BETWEEN 10 AND LESS THAN 20 NG/ML: ICD-10-CM

## 2024-03-01 PROCEDURE — A9585 GADOBUTROL INJECTION: HCPCS | Performed by: NURSE PRACTITIONER

## 2024-03-01 PROCEDURE — 72197 MRI PELVIS W/O & W/DYE: CPT | Mod: TC

## 2024-03-01 PROCEDURE — 72197 MRI PELVIS W/O & W/DYE: CPT | Mod: 26,,, | Performed by: RADIOLOGY

## 2024-03-01 PROCEDURE — 25500020 PHARM REV CODE 255: Performed by: NURSE PRACTITIONER

## 2024-03-01 RX ORDER — GADOBUTROL 604.72 MG/ML
10 INJECTION INTRAVENOUS
Status: COMPLETED | OUTPATIENT
Start: 2024-03-01 | End: 2024-03-01

## 2024-03-01 RX ADMIN — GADOBUTROL 10 ML: 604.72 INJECTION INTRAVENOUS at 09:03

## 2024-03-04 ENCOUNTER — PATIENT MESSAGE (OUTPATIENT)
Dept: UROLOGY | Facility: CLINIC | Age: 64
End: 2024-03-04
Payer: COMMERCIAL

## 2024-03-04 ENCOUNTER — TELEPHONE (OUTPATIENT)
Dept: SPORTS MEDICINE | Facility: CLINIC | Age: 64
End: 2024-03-04
Payer: COMMERCIAL

## 2024-03-04 ENCOUNTER — PATIENT MESSAGE (OUTPATIENT)
Dept: SPORTS MEDICINE | Facility: CLINIC | Age: 64
End: 2024-03-04
Payer: COMMERCIAL

## 2024-03-04 DIAGNOSIS — R97.20 RISING PSA LEVEL: ICD-10-CM

## 2024-03-04 DIAGNOSIS — R97.20 ELEVATED PSA, BETWEEN 10 AND LESS THAN 20 NG/ML: ICD-10-CM

## 2024-03-04 DIAGNOSIS — R93.89 ABNORMAL MRI: ICD-10-CM

## 2024-03-04 DIAGNOSIS — Z08 ENCOUNTER FOR FOLLOW-UP SURVEILLANCE OF PROSTATE CANCER: ICD-10-CM

## 2024-03-04 DIAGNOSIS — C61 PROSTATE CANCER: Primary | ICD-10-CM

## 2024-03-04 DIAGNOSIS — Z85.46 ENCOUNTER FOR FOLLOW-UP SURVEILLANCE OF PROSTATE CANCER: ICD-10-CM

## 2024-03-04 RX ORDER — LIDOCAINE HYDROCHLORIDE 10 MG/ML
10 INJECTION INFILTRATION; PERINEURAL ONCE
Status: CANCELLED | OUTPATIENT
Start: 2024-03-04 | End: 2024-03-04

## 2024-03-04 RX ORDER — CIPROFLOXACIN 500 MG/1
500 TABLET ORAL ONCE
Qty: 1 TABLET | Refills: 0 | Status: SHIPPED | OUTPATIENT
Start: 2024-03-12 | End: 2024-03-12

## 2024-03-04 RX ORDER — CEFTRIAXONE 1 G/1
1 INJECTION, POWDER, FOR SOLUTION INTRAMUSCULAR; INTRAVENOUS
Status: COMPLETED | OUTPATIENT
Start: 2024-03-12 | End: 2024-03-12

## 2024-03-04 RX ORDER — LIDOCAINE HYDROCHLORIDE 20 MG/ML
JELLY TOPICAL ONCE
Status: CANCELLED | OUTPATIENT
Start: 2024-03-04 | End: 2024-03-04

## 2024-03-04 NOTE — TELEPHONE ENCOUNTER
Called and spoke to patient in regards to 3/12/24 appointment.  Patient appointment has been rescheduled from 11:30 to 4pm on 3/12/24 due to other appointment in the morning.

## 2024-03-06 ENCOUNTER — PATIENT MESSAGE (OUTPATIENT)
Dept: UROLOGY | Facility: CLINIC | Age: 64
End: 2024-03-06
Payer: COMMERCIAL

## 2024-03-07 ENCOUNTER — PATIENT MESSAGE (OUTPATIENT)
Dept: SPORTS MEDICINE | Facility: CLINIC | Age: 64
End: 2024-03-07
Payer: COMMERCIAL

## 2024-03-12 ENCOUNTER — OFFICE VISIT (OUTPATIENT)
Dept: SPORTS MEDICINE | Facility: CLINIC | Age: 64
End: 2024-03-12
Payer: COMMERCIAL

## 2024-03-12 ENCOUNTER — PROCEDURE VISIT (OUTPATIENT)
Dept: UROLOGY | Facility: CLINIC | Age: 64
End: 2024-03-12
Payer: COMMERCIAL

## 2024-03-12 VITALS
HEART RATE: 72 BPM | WEIGHT: 215.38 LBS | SYSTOLIC BLOOD PRESSURE: 123 MMHG | BODY MASS INDEX: 27.65 KG/M2 | DIASTOLIC BLOOD PRESSURE: 81 MMHG

## 2024-03-12 VITALS
BODY MASS INDEX: 27.64 KG/M2 | TEMPERATURE: 98 F | SYSTOLIC BLOOD PRESSURE: 152 MMHG | HEART RATE: 73 BPM | WEIGHT: 215.25 LBS | DIASTOLIC BLOOD PRESSURE: 91 MMHG | RESPIRATION RATE: 17 BRPM

## 2024-03-12 DIAGNOSIS — C61 PROSTATE CANCER: Primary | ICD-10-CM

## 2024-03-12 DIAGNOSIS — Z08 ENCOUNTER FOR FOLLOW-UP SURVEILLANCE OF PROSTATE CANCER: ICD-10-CM

## 2024-03-12 DIAGNOSIS — M23.351 INTERNAL DERANGEMENT OF RIGHT KNEE INVOLVING POSTERIOR HORN OF LATERAL MENISCUS: ICD-10-CM

## 2024-03-12 DIAGNOSIS — S83.511A SPRAIN OF ANTERIOR CRUCIATE LIGAMENT OF RIGHT KNEE, INITIAL ENCOUNTER: ICD-10-CM

## 2024-03-12 DIAGNOSIS — R97.20 ELEVATED PSA, BETWEEN 10 AND LESS THAN 20 NG/ML: ICD-10-CM

## 2024-03-12 DIAGNOSIS — S83.411A SPRAIN OF MEDIAL COLLATERAL LIGAMENT OF RIGHT KNEE, INITIAL ENCOUNTER: ICD-10-CM

## 2024-03-12 DIAGNOSIS — M17.11 PRIMARY OSTEOARTHRITIS OF RIGHT KNEE: ICD-10-CM

## 2024-03-12 DIAGNOSIS — R93.89 ABNORMAL MRI: ICD-10-CM

## 2024-03-12 DIAGNOSIS — M23.311 INTERNAL DERANGEMENT OF RIGHT KNEE INVOLVING ANTERIOR HORN OF MEDIAL MENISCUS: Primary | ICD-10-CM

## 2024-03-12 DIAGNOSIS — R97.20 RISING PSA LEVEL: ICD-10-CM

## 2024-03-12 DIAGNOSIS — Z85.46 ENCOUNTER FOR FOLLOW-UP SURVEILLANCE OF PROSTATE CANCER: ICD-10-CM

## 2024-03-12 PROCEDURE — 76872 US TRANSRECTAL: CPT | Mod: 26,S$GLB,, | Performed by: UROLOGY

## 2024-03-12 PROCEDURE — 96372 THER/PROPH/DIAG INJ SC/IM: CPT | Mod: 59,S$GLB,, | Performed by: UROLOGY

## 2024-03-12 PROCEDURE — 99999 PR PBB SHADOW E&M-EST. PATIENT-LVL III: CPT | Mod: PBBFAC,,, | Performed by: STUDENT IN AN ORGANIZED HEALTH CARE EDUCATION/TRAINING PROGRAM

## 2024-03-12 PROCEDURE — 88344 IMHCHEM/IMCYTCHM EA MLT ANTB: CPT | Performed by: PATHOLOGY

## 2024-03-12 PROCEDURE — 88305 TISSUE EXAM BY PATHOLOGIST: CPT | Performed by: PATHOLOGY

## 2024-03-12 PROCEDURE — 88344 IMHCHEM/IMCYTCHM EA MLT ANTB: CPT | Mod: 26,,, | Performed by: PATHOLOGY

## 2024-03-12 PROCEDURE — 99214 OFFICE O/P EST MOD 30 MIN: CPT | Mod: S$GLB,,, | Performed by: STUDENT IN AN ORGANIZED HEALTH CARE EDUCATION/TRAINING PROGRAM

## 2024-03-12 PROCEDURE — 88305 TISSUE EXAM BY PATHOLOGIST: CPT | Mod: 26,,, | Performed by: PATHOLOGY

## 2024-03-12 PROCEDURE — 55700 TRANSRECTAL ULTRASOUND W/ BIOPSY: CPT | Mod: S$GLB,,, | Performed by: UROLOGY

## 2024-03-12 RX ORDER — LIDOCAINE HYDROCHLORIDE 20 MG/ML
JELLY TOPICAL ONCE
Status: COMPLETED | OUTPATIENT
Start: 2024-03-12 | End: 2024-03-12

## 2024-03-12 RX ORDER — LIDOCAINE HYDROCHLORIDE 10 MG/ML
10 INJECTION INFILTRATION; PERINEURAL ONCE
Status: COMPLETED | OUTPATIENT
Start: 2024-03-12 | End: 2024-03-12

## 2024-03-12 RX ADMIN — LIDOCAINE HYDROCHLORIDE 10 ML: 10 INJECTION INFILTRATION; PERINEURAL at 08:03

## 2024-03-12 RX ADMIN — CEFTRIAXONE 1 G: 1 INJECTION, POWDER, FOR SOLUTION INTRAMUSCULAR; INTRAVENOUS at 08:03

## 2024-03-12 RX ADMIN — LIDOCAINE HYDROCHLORIDE: 20 JELLY TOPICAL at 08:03

## 2024-03-12 NOTE — PROGRESS NOTES
CC: right knee pain    63 y.o. Male presents today for follow up evaluation of his right knee pain and to review MRI results.     Attempted treatments: fPT  Pain score: 0/10  History of trauma/injury: none since last visit  Affecting ADLs: improving     REVIEW OF SYSTEMS:   Constitution: Patient denies fever or chills.  Eyes: Patient denies eye pain or vision changes.  HEENT: Patient denies ear pain, sore throat, or nasal discharge.  CVS: Patient denies chest pain.  Lungs: Patient denies shortness of breath or cough.  Skin: Patient denies skin rash or itching.    Musculoskeletal: Patient denies recent falls. See HPI.  Psych: Patient denies any current anxiety or nervousness.    PAST MEDICAL HISTORY:   Past Medical History:   Diagnosis Date    HIV infection     Hypertension        MEDICATIONS:     Current Outpatient Medications:     amLODIPine (NORVASC) 10 MG tablet, TAKE 1 TABLET ONCE DAILY, Disp: 90 tablet, Rfl: 3    ascorbic acid, vitamin C, (VITAMIN C) 1000 MG tablet, Take 1,000 mg by mouth once daily. , Disp: , Rfl:     ciprofloxacin HCl (CIPRO) 500 MG tablet, Take 1 tablet (500 mg total) by mouth once. Take 1 hour before the biopsy. for 1 dose, Disp: 1 tablet, Rfl: 0    DOVATO  mg Tab, Take 1 tablet by mouth once daily., Disp: , Rfl:     fish oil-omega-3 fatty acids 300-1,000 mg capsule, Take by mouth once daily., Disp: , Rfl:     glucosamine-chondroitin 500-400 mg tablet, Take 1 tablet by mouth 3 (three) times daily., Disp: , Rfl:     leucine-isoleucine-valine (NEOKE BCAA4) 82 gram-328 kcal/100 gram Powd, Take by mouth. , Disp: , Rfl:     naproxen (NAPROSYN) 500 MG tablet, Take 1 tablet (500 mg total) by mouth 2 (two) times daily with meals., Disp: 60 tablet, Rfl: 1    sodium phosphates (FLEET) 19-7 gram/118 mL Enem, Place 1 enema rectally once. Use as directed the morning of biopsy. for 1 dose, Disp: 1 enema, Rfl: 0  No current facility-administered medications for this visit.    ALLERGIES:   Review of  patient's allergies indicates:   Allergen Reactions    Sulfa (sulfonamide antibiotics)         IMAGIN. MRI ordered due to right knee pain, taken on 3/7/24.  2. MRI images were reviewed personally by me and then directly with patient.  3. FINDINGS: See media.    4. IMPRESSION: 1. Complex tear medial meniscus with extension into the posterior root ligament.  2. Longitudinal horizontal tear lateral meniscus.  3. Acute partial low grade MCL sprain.  4. Tricompartment osteoarthrosis.  5. Large knee joint effusion/synovitis.  6. Acute low grade ACL sprain    PHYSICAL EXAMINATION:  /81   Pulse 72   Wt 97.7 kg (215 lb 6.2 oz)   BMI 27.65 kg/m²   Vitals signs and nursing note have been reviewed.    General: In no acute distress, well developed, well nourished, no diaphoresis  Eyes: EOM full and smooth, no eye redness or discharge  HENT: normocephalic and atraumatic, neck supple, trachea midline, no nasal discharge  Cardiovascular: no LE edema  Lungs: respirations non-labored, no conversational dyspnea   Neuro: AAOx3, CN2-12 grossly intact  Skin: No rashes, warm and dry  Psychiatric: cooperative, pleasant, mood and affect appropriate for age    ASSESSMENT:      ICD-10-CM ICD-9-CM   1. Internal derangement of right knee involving anterior horn of medial meniscus  M23.311 717.1   2. Internal derangement of right knee involving posterior horn of lateral meniscus  M23.351 717.43   3. Sprain of medial collateral ligament of right knee, initial encounter  S83.411A 844.1   4. Primary osteoarthritis of right knee  M17.11 715.16   5. Sprain of anterior cruciate ligament of right knee, initial encounter  S83.511A 844.2         PLAN:  MRI reviewed with patient at today's visit.  Patient with medial and lateral meniscus tears as well as ACL and MCL sprains and tricompartmental osteoarthritic changes.  Patient not in any pain today.  He has not having to take pain medication on a routine basis.  He denies having feelings  of recurrent instability.  It was explained to patient that should discomfort become more prominent, we can move for with injection therapy for pain relief.  Should instability become more of an issue, we would likely have to move forward with surgical consultation.  It was explained to patient that though he has meniscal tears that could be fixed with an arthroscope, given the advanced tricompartmental osteoarthritic changes, he will likely be looking at a total knee arthroplasty.     All questions were answered to the best of my ability and all concerns were addressed at this time.    Follow up PRN.      This note is dictated using the M*Modal Fluency Direct word recognition program. There are word recognition mistakes that are occasionally missed on review.

## 2024-03-12 NOTE — PROCEDURES
"Transrectal Ultrasound w/ Biopsy    Date/Time: 3/12/2024 9:00 AM    Performed by: Donnie Samuel MD  Authorized by: Tammy Wright NP    Consent Done?:  Yes (Written)  Time out: Immediately prior to procedure a "time out" was called to verify the correct patient, procedure, equipment, support staff and site/side marked as required.    Indications: Prostate Cancer    Preparation: Patient was prepped and draped in usual sterile fashion    Anesthesia:  Pudendal nerve block, 20cc's 1% Lidocaine and Lidocaine jelly  Prostate Size:  61 ccs  Left Base Biopsies: 2  Left Mid Biopsies: 3  Left [place] Biopsies: 3  Right Base Biopsies: 2  Right Mid Biopsies: 2  Right [place] Biopsies: 2  Total Biopsies:  14    Patient tolerance:  Patient tolerated the procedure well with no immediate complications      The risks and benefits of the procedure were explained to the patient and consent was obtained.  The patient laid in the lateral decubitus position.    The ultrasound probe was advanced into the rectum. The prostate was visualized.    20cc of 1% lidocaine without epi was used for a prostatic nerve block.    At this point, a sweep of the prostate was performed from base to apex in the transverse plane using the ultrasound and URONAV platform.  Landmarks were then labeled with anterior, posterior, right, left, base and apex were labeled in the axial as well as sagittal planes.  Segmentation was then performed and manual adjustments were made as needed starting in the sagittal plane followed by the axial plane.  At this point, alignment of the ultrasound with MRI images was ensured using the toggle between ultrasound and MRI.     At this point elastic deformation was computed.  Our images were satisfactory.    Target 1- right apex-2 cores  Target 2- left apex-3 cores  Co-registration good.  "

## 2024-03-12 NOTE — PATIENT INSTRUCTIONS
What to Expect After a Prostate Biopsy    Please be sure to finish your pre-procedure antibiotics as instructed.    You may have mild bleeding from the rectum or urine for about 1 week to 1 month, or in your ejaculate for several months. This bleeding is normal and expected, and it will stop. You may have mild discomfort in your rectal or urethral area for 24-48 hours.    You cannot do any strenuous lifting, straining, or exercising for 24 hours. You may return to full activity the day after the biopsy.    You may continue to take all your regular medications after the procedure except for the blood thinners.    You may resume all blood-thinning medications once you no longer see any bleeding or whenever your physician prescribing the medication says it is all right to do so. You may take Tylenol if you have a fever and your temperature is less than 100° F or if you have some discomfort.    You will receive a call from the Urology Department at [facility] with the results of your prostate biopsy within one week.    Signs and Symptoms to Report    Call your [facility] urologist at [phone] if you develop any of the following:  Temperature greater than 101°  F  Inability to urinate  A large amount of bleeding from the rectum or in the urine  Persistent or severe pain    After hours or on weekends, you may reach a urology resident on call at this number: [phone].

## 2024-03-19 ENCOUNTER — PATIENT MESSAGE (OUTPATIENT)
Dept: UROLOGY | Facility: CLINIC | Age: 64
End: 2024-03-19
Payer: COMMERCIAL

## 2024-03-19 DIAGNOSIS — C61 PROSTATE CANCER: Primary | ICD-10-CM

## 2024-03-19 DIAGNOSIS — R97.20 ELEVATED PSA, BETWEEN 10 AND LESS THAN 20 NG/ML: ICD-10-CM

## 2024-03-19 LAB
COMMENT: NORMAL
FINAL PATHOLOGIC DIAGNOSIS: NORMAL
GROSS: NORMAL
Lab: NORMAL

## 2024-03-20 ENCOUNTER — TELEPHONE (OUTPATIENT)
Dept: UROLOGY | Facility: CLINIC | Age: 64
End: 2024-03-20
Payer: COMMERCIAL

## 2024-03-20 ENCOUNTER — PATIENT MESSAGE (OUTPATIENT)
Dept: UROLOGY | Facility: CLINIC | Age: 64
End: 2024-03-20
Payer: COMMERCIAL

## 2024-03-20 NOTE — TELEPHONE ENCOUNTER
Patient notified that He needs a PSMA Bone Scan due to prostate Cancer and PSA > 10.  Patient states he do not want to schedule anymore test until he get a second opinion, set up  appointment with Tammy to discuss options.

## 2024-03-22 ENCOUNTER — OFFICE VISIT (OUTPATIENT)
Dept: UROLOGY | Facility: CLINIC | Age: 64
End: 2024-03-22
Payer: COMMERCIAL

## 2024-03-22 ENCOUNTER — PATIENT MESSAGE (OUTPATIENT)
Dept: UROLOGY | Facility: CLINIC | Age: 64
End: 2024-03-22

## 2024-03-22 DIAGNOSIS — C61 PROSTATE CANCER: Primary | ICD-10-CM

## 2024-03-22 PROCEDURE — 99499 UNLISTED E&M SERVICE: CPT | Mod: ,,, | Performed by: NURSE PRACTITIONER

## 2024-03-22 NOTE — PROGRESS NOTES
Unable to connect on time.   We discussed on the phone.   He is waiting on 2nd opinion from [facility],.   Has interest in the [place] Pro

## 2024-04-09 ENCOUNTER — TELEPHONE (OUTPATIENT)
Dept: INFECTIOUS DISEASES | Facility: CLINIC | Age: 64
End: 2024-04-09
Payer: COMMERCIAL

## 2024-04-09 DIAGNOSIS — B20 HUMAN IMMUNODEFICIENCY VIRUS (HIV) DISEASE: Primary | ICD-10-CM

## 2024-04-09 NOTE — TELEPHONE ENCOUNTER
----- Message from Soniya Olvera MA sent at 4/8/2024  4:42 PM CDT -----  Regarding: FW: Lab Advise  Contact: [phone]  Do you want the labs that are in his requests?  ----- Message -----  From: Cecily Arellano  Sent: 4/8/2024   4:18 PM CDT  To: Hyun Rodriguez Staff  Subject: Lab Advise                                       Simón Fong calling regarding Patient Advice (message) for # pt is asking which lab orders does the provider want him to do. I advise there is a few orders in I can schedule and he stated he would like to speak with nurse to see which exact ones should he be doing for his 6 month f/u

## 2024-04-10 ENCOUNTER — TELEPHONE (OUTPATIENT)
Dept: INFECTIOUS DISEASES | Facility: CLINIC | Age: 64
End: 2024-04-10
Payer: COMMERCIAL

## 2024-04-10 NOTE — TELEPHONE ENCOUNTER
----- Message from Jennifer Purvis MD sent at 4/10/2024  9:32 AM CDT -----  His last one was 2/2023 and negative. I usually screen for hep C (and other STD's) annually. He can decline the test if he doesn't want to be tested.     ----- Message -----  From: Charlee Montague MA  Sent: 4/9/2024   4:13 PM CDT  To: Jennifer Purvis MD    Called pt    He would like to know why he is getting another Hep C lab   ----- Message -----  From: Jennifer Purvis MD  Sent: 4/9/2024  10:24 AM CDT  To: Yee Rivera LPN    Patient sent message asking for labs before appt. Labs are ordered. Can you please schedule? Thanks

## 2024-04-10 NOTE — TELEPHONE ENCOUNTER
Called pt and let him know Hep C lab is done annually per Dr. Purvis     Pt acknowledged and said he was okay to get it he just did not know it was done annually

## 2024-04-15 ENCOUNTER — LAB VISIT (OUTPATIENT)
Dept: LAB | Facility: HOSPITAL | Age: 64
End: 2024-04-15
Attending: INTERNAL MEDICINE
Payer: COMMERCIAL

## 2024-04-15 DIAGNOSIS — B20 HUMAN IMMUNODEFICIENCY VIRUS (HIV) DISEASE: ICD-10-CM

## 2024-04-15 LAB
ALBUMIN SERPL BCP-MCNC: 4.1 G/DL (ref 3.5–5.2)
ALP SERPL-CCNC: 75 U/L (ref 55–135)
ALT SERPL W/O P-5'-P-CCNC: 25 U/L (ref 10–44)
ANION GAP SERPL CALC-SCNC: 7 MMOL/L (ref 8–16)
AST SERPL-CCNC: 27 U/L (ref 10–40)
BILIRUB SERPL-MCNC: 0.5 MG/DL (ref 0.1–1)
BUN SERPL-MCNC: 12 MG/DL (ref 8–23)
CALCIUM SERPL-MCNC: 9.5 MG/DL (ref 8.7–10.5)
CHLORIDE SERPL-SCNC: 107 MMOL/L (ref 95–110)
CO2 SERPL-SCNC: 28 MMOL/L (ref 23–29)
CREAT SERPL-MCNC: 1 MG/DL (ref 0.5–1.4)
EST. GFR  (NO RACE VARIABLE): >60 ML/MIN/1.73 M^2
GLUCOSE SERPL-MCNC: 104 MG/DL (ref 70–110)
HCV AB SERPL QL IA: NORMAL
HEPATITIS B VIRUS DNA: NORMAL
HEPATITIS B VIRUS PCR, QUANT: NOT DETECTED IU/ML
POTASSIUM SERPL-SCNC: 4.5 MMOL/L (ref 3.5–5.1)
PROT SERPL-MCNC: 7.3 G/DL (ref 6–8.4)
SODIUM SERPL-SCNC: 142 MMOL/L (ref 136–145)

## 2024-04-15 PROCEDURE — 86592 SYPHILIS TEST NON-TREP QUAL: CPT | Performed by: INTERNAL MEDICINE

## 2024-04-15 PROCEDURE — 86361 T CELL ABSOLUTE COUNT: CPT | Performed by: INTERNAL MEDICINE

## 2024-04-15 PROCEDURE — 86780 TREPONEMA PALLIDUM: CPT | Performed by: INTERNAL MEDICINE

## 2024-04-15 PROCEDURE — 86593 SYPHILIS TEST NON-TREP QUANT: CPT | Performed by: INTERNAL MEDICINE

## 2024-04-15 PROCEDURE — 86803 HEPATITIS C AB TEST: CPT | Performed by: INTERNAL MEDICINE

## 2024-04-15 PROCEDURE — 87517 HEPATITIS B DNA QUANT: CPT | Performed by: INTERNAL MEDICINE

## 2024-04-15 PROCEDURE — 36415 COLL VENOUS BLD VENIPUNCTURE: CPT | Performed by: INTERNAL MEDICINE

## 2024-04-15 PROCEDURE — 87536 HIV-1 QUANT&REVRSE TRNSCRPJ: CPT | Performed by: INTERNAL MEDICINE

## 2024-04-15 PROCEDURE — 80053 COMPREHEN METABOLIC PANEL: CPT | Performed by: INTERNAL MEDICINE

## 2024-04-16 LAB
CD3+CD4+ CELLS # BLD: 632 CELLS/UL (ref 300–1400)
CD3+CD4+ CELLS NFR BLD: 33.7 % (ref 28–57)
HIV1 RNA # SERPL NAA+PROBE: NOT DETECTED COPIES/ML
HIV1 RNA SERPL QL NAA+PROBE: NOT DETECTED
RPR SER QL: REACTIVE
RPR SER-TITR: ABNORMAL {TITER}

## 2024-04-17 LAB — T PALLIDUM AB SER QL IF: REACTIVE

## 2024-04-18 ENCOUNTER — TELEPHONE (OUTPATIENT)
Dept: INFECTIOUS DISEASES | Facility: HOSPITAL | Age: 64
End: 2024-04-18
Payer: COMMERCIAL

## 2024-04-18 NOTE — TELEPHONE ENCOUNTER
Spoke with patient about RPR/fta-ab results. Has been treated for syphilis in past and no partners in last 3 years, always protected. Current RPR 1:1. 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

## 2024-04-30 ENCOUNTER — PATIENT MESSAGE (OUTPATIENT)
Dept: UROLOGY | Facility: CLINIC | Age: 64
End: 2024-04-30
Payer: COMMERCIAL

## 2024-05-01 ENCOUNTER — OFFICE VISIT (OUTPATIENT)
Dept: UROLOGY | Facility: CLINIC | Age: 64
End: 2024-05-01
Payer: COMMERCIAL

## 2024-05-01 DIAGNOSIS — Z08 ENCOUNTER FOR FOLLOW-UP SURVEILLANCE OF PROSTATE CANCER: ICD-10-CM

## 2024-05-01 DIAGNOSIS — Z85.46 ENCOUNTER FOR FOLLOW-UP SURVEILLANCE OF PROSTATE CANCER: ICD-10-CM

## 2024-05-01 DIAGNOSIS — R97.20 ELEVATED PSA, BETWEEN 10 AND LESS THAN 20 NG/ML: ICD-10-CM

## 2024-05-01 DIAGNOSIS — C61 PROSTATE CANCER: Primary | ICD-10-CM

## 2024-05-01 PROCEDURE — 99214 OFFICE O/P EST MOD 30 MIN: CPT | Mod: 95,,, | Performed by: NURSE PRACTITIONER

## 2024-05-01 NOTE — PROGRESS NOTES
CHIEF COMPLAINT:        HISTORY OF PRESENTING ILLINESS:            REVIEW OF SYSTEMS:  ROS      PATIENT HISTORY:    Past Medical History:   Diagnosis Date    HIV infection     Hypertension        Past Surgical History:   Procedure Laterality Date    COLONOSCOPY      COLONOSCOPY N/A 7/31/2020    Procedure: COLONOSCOPY;  Surgeon: Tree Mendiola MD;  Location: [address];  Service: Endoscopy;  Laterality: N/A;  covid 7/28-[facility]    PROSTATE BIOPSY         Family History   Problem Relation Name Age of Onset    Diabetes Mother      Heart disease Mother      Hypertension Mother      Stroke Mother      Hypertension Father      Cancer Sister      Cancer Brother         Social History     Socioeconomic History    Marital status: Single   Tobacco Use    Smoking status: Never    Smokeless tobacco: Never   Substance and Sexual Activity    Alcohol use: No    Drug use: No     Social Determinants of Health     Financial Resource Strain: Low Risk  (1/26/2024)    Overall Financial Resource Strain (CARDIA)     Difficulty of Paying Living Expenses: Not hard at all   Food Insecurity: No Food Insecurity (1/26/2024)    Hunger Vital Sign     Worried About Running Out of Food in the Last Year: Never true     Ran Out of Food in the Last Year: Never true   Transportation Needs: No Transportation Needs (1/26/2024)    PRAPARE - Transportation     Lack of Transportation (Medical): No     Lack of Transportation (Non-Medical): No   Physical Activity: Sufficiently Active (1/26/2024)    Exercise Vital Sign     Days of Exercise per Week: 6 days     Minutes of Exercise per Session: 80 min   Stress: No Stress Concern Present (1/26/2024)    [employer] - Occupational Stress Questionnaire     Feeling of Stress : Only a little   Housing Stability: Unknown (1/26/2024)    Housing Stability Vital Sign     Unable to Pay for Housing in the Last Year: No     Unstable Housing in the Last Year: No        Allergies:  Sulfa (sulfonamide antibiotics)    Medications:    Current Outpatient Medications:     amLODIPine (NORVASC) 10 MG tablet, TAKE 1 TABLET ONCE DAILY, Disp: 90 tablet, Rfl: 3    ascorbic acid, vitamin C, (VITAMIN C) 1000 MG tablet, Take 1,000 mg by mouth once daily. , Disp: , Rfl:     DOVATO  mg Tab, Take 1 tablet by mouth once daily., Disp: , Rfl:     fish oil-omega-3 fatty acids 300-1,000 mg capsule, Take by mouth once daily., Disp: , Rfl:     glucosamine-chondroitin 500-400 mg tablet, Take 1 tablet by mouth 3 (three) times daily., Disp: , Rfl:     leucine-isoleucine-valine (NEOKE BCAA4) 82 gram-328 kcal/100 gram Powd, Take by mouth. , Disp: , Rfl:     naproxen (NAPROSYN) 500 MG tablet, Take 1 tablet (500 mg total) by mouth 2 (two) times daily with meals., Disp: 60 tablet, Rfl: 1    PHYSICAL EXAMINATION:  Physical Exam      LABS:      In office UA today was ***      Lab Results   Component Value Date    PSA 6.5 (H) 12/21/2018    PSA 7.5 (H) 04/30/2018    PSA 4.6 (H) 07/26/2017    PSADIAG 14.1 (H) 11/27/2023    PSADIAG 14.5 (H) 08/23/2023    PSADIAG 13.3 (H) 01/10/2023    PSATOTAL 6.4 (H) 08/22/2019       Lab Results   Component Value Date    CREATININE 1.0 04/15/2024    EGFRNORACEVR >60.0 04/15/2024               IMPRESSION:    No diagnosis found.      Assessment:       No diagnosis found.    Plan:       ***

## 2024-05-01 NOTE — PROGRESS NOTES
The patient location is: [place]  The chief complaint leading to consultation is: Prostate Cancer    Visit type: audiovisual    Face to Face time with patient: 10 minutes  30 minutes of total time spent on the encounter, which includes face to face time and non-face to face time preparing to see the patient (eg, review of tests), Obtaining and/or reviewing separately obtained history, Documenting clinical information in the electronic or other health record, Independently interpreting results (not separately reported) and communicating results to the patient/family/caregiver, or Care coordination (not separately reported).         Each patient to whom he or she provides medical services by telemedicine is:  (1) informed of the relationship between the physician and patient and the respective role of any other health care provider with respect to management of the patient; and (2) notified that he or she may decline to receive medical services by telemedicine and may withdraw from such care at any time.    Notes:     Simón Fong is a 64 y.o. male who is an established patient in our clinic. He has a history of prostate cancer diagnosed in 2018. He has been on AS since diagnosis.   Initial PSA 7.5. [place] biopsy in 2018- Alcides 6 right middle and right base 1/2 cores each, <5%. Overall 2/12 cores positive.  He is very focused on continuing AS, understands risk of progression     06/07/2022 Prostate Biopsy with PSA of 12.2:  MRI - 5/12/2022- 0.9 cm PI-RADS 3 lesion RAPZ. Negative extra prostatic extension, NVBI (neurovascular bundle involvement), SVI (seminal vesicle involvement), nodes.  Biopsy - 6/7/2022-[facility]- Alcides 6 (GG1) left apex 2/2 60%, right apex (target) 1/4 60%, right middle 2/2 90%, right base 1/1 10%. Overall 4/6 sites, 6/14 cores.  ANDRES Score - 3 intermediate  NCCN - favorable intermediate      03/01/2024 Prostate MRI   - PSA 14.1  - 61.13cc  - Stable 1.1cm  PI-RADS 4 lesion in the right peripheral  zone at the apex. No extraprostatic extension. No lymphadenopathy.   - 0.7cm PI-RADS 3 lesion in the left anterolateral peripheral zone at the mid-zone, stable dating back to 2021 MRI.       03/12/2024 Prostate Biopsy with PSA of 14.1.  1. Prostate, left apex, target lesion 2, core needle biopsy:  Prostatic adenocarcinoma, [place] pattern (3+4), score 7, involving 2 of 3 core biopsies, approximately 5-10% of tissue sampled.  Perineural invasion is not identified.  Note:  Percentage of Alcides pattern 4 = 30%.    2. Prostate, left middle, core needle biopsy:  Prostatic adenocarcinoma, [place] pattern (3+4), score 7, involving 1 of 3 core biopsies, approximately 5% of tissue sampled.  Perineural invasion is not identified.  Note:  Percentage of Alcides pattern 4 = 5-10%.    3. Prostate, left base, core needle biopsy:  Benign prostatic tissue with mild chronic inflammation.    4. Prostate, right apex, target lesion 1, core needle biopsy:  Prostatic adenocarcinoma, [place] pattern (3+4), score 7, involving 2 of 2 core biopsies, approximately 15-20% of tissue sampled.  Perineural invasion is not identified.  Note:  Percentage of [place] pattern 4 = 5%.    5. Prostate, right middle, core needle biopsy:  Prostatic adenocarcinoma, [place] pattern (3+4), score 7, involving 2 of 2 core biopsies, approximately 40% of tissue sampled.  Perineural invasion is not identified.  Note:  Percentage of Alcides pattern 4 = 10-15%.    6. Prostate, right base, core needle biopsy:  Prostatic adenocarcinoma, [place] pattern (3+4), score 7, involving 2 of 2 core biopsies, approximately 15% of tissue sampled.  Perineural invasion is not identified.     03/22/2024 we discussed his results. Change and findings of more aggressive prostate cancer. Recommendations to f/u with Dr. Samuel. AS may still be an option but he should discuss options with him. He was waiting on 2nd opinion from [facility],.   Has interest in the [employer] [facility]    Today we discussed  previous conversations and recommendations.  Understands risks but will continue AS. [facility] Pro is his preferred/only choice for PCA treatment. Unfortunately not covered by his insurance. Will wait until he is Medicare eligible. He turns 65 on 4/16/2025. Again understands risks. Discussed ADT but he is not interested.   He denies any urinary complaints. No Ed. No abdominal or bone pain.   Would like to continue with AS and PSA's every 3-4 months.   Based on his maxine core and PSA of 14, I recommend PSMA scan. Orders place,.   New psa is due now.

## 2024-05-02 ENCOUNTER — PATIENT MESSAGE (OUTPATIENT)
Dept: UROLOGY | Facility: CLINIC | Age: 64
End: 2024-05-02
Payer: COMMERCIAL

## 2024-05-22 ENCOUNTER — HOSPITAL ENCOUNTER (OUTPATIENT)
Dept: RADIOLOGY | Facility: HOSPITAL | Age: 64
Discharge: HOME | End: 2024-05-22
Attending: NURSE PRACTITIONER
Payer: COMMERCIAL

## 2024-05-22 DIAGNOSIS — R97.20 ELEVATED PSA, BETWEEN 10 AND LESS THAN 20 NG/ML: ICD-10-CM

## 2024-05-22 DIAGNOSIS — C61 PROSTATE CANCER: ICD-10-CM

## 2024-05-22 PROCEDURE — A9595 HC PIFLUFOLASTAT F-18, DX, PER 1 MCI: HCPCS | Mod: TB | Performed by: NURSE PRACTITIONER

## 2024-05-22 PROCEDURE — 78815 PET IMAGE W/CT SKULL-THIGH: CPT | Mod: TC

## 2024-05-22 PROCEDURE — 78815 PET IMAGE W/CT SKULL-THIGH: CPT | Mod: 26,PI,, | Performed by: STUDENT IN AN ORGANIZED HEALTH CARE EDUCATION/TRAINING PROGRAM

## 2024-05-22 RX ADMIN — PIFLUFOLASTAT F-18 12.7 MILLICURIE: 80 INJECTION INTRAVENOUS at 11:05

## 2024-05-24 ENCOUNTER — PATIENT MESSAGE (OUTPATIENT)
Dept: UROLOGY | Facility: CLINIC | Age: 64
End: 2024-05-24
Payer: COMMERCIAL

## 2024-05-24 DIAGNOSIS — C61 PROSTATE CANCER: Primary | ICD-10-CM

## 2024-05-30 NOTE — ANESTHESIA PREPROCEDURE EVALUATION
07/31/2020  Simón Fong is a 60 y.o., male.  Past Medical History:   Diagnosis Date    HIV infection     Hypertension      Past Surgical History:   Procedure Laterality Date    PROSTATE BIOPSY       Patient Active Problem List   Diagnosis    Human immunodeficiency virus (HIV) disease    Family history of prostate cancer    Onychomycosis    SI joint dislocation    Chronic left-sided low back pain with sciatica    Lumbar radiculopathy    Sacroiliac joint dysfunction of left side    Elevated PSA    Hypertension    Pneumonia of right lower lobe due to infectious organism    Screening for colon cancer           Anesthesia Evaluation    I have reviewed the Patient Summary Reports.    I have reviewed the Nursing Notes.    I have reviewed the Medications.     Review of Systems  Anesthesia Hx:  No problems with previous Anesthesia    Hematology/Oncology:  Hematology Normal   Oncology Normal     EENT/Dental:EENT/Dental Normal   Cardiovascular:   Hypertension  Hypertension    Pulmonary:   Pneumonia  Pulmonary Infection:    Renal/:  Renal/ Normal     Hepatic/GI:  Hepatic/GI Normal    Musculoskeletal:  Musculoskeletal Normal    Neurological:   Neuromuscular Disease,  Neuromuscular Disease   Endocrine:  Endocrine Normal    Dermatological:  Skin Normal    Psych:  Psychiatric Normal           Physical Exam  General:  Well nourished            Mental Status:  Mental Status Findings:  Cooperative, Alert and Oriented         Anesthesia Plan  Type of Anesthesia, risks & benefits discussed:  Anesthesia Type:  general  Patient's Preference:   Intra-op Monitoring Plan: standard ASA monitors  Intra-op Monitoring Plan Comments:   Post Op Pain Control Plan:   Post Op Pain Control Plan Comments:   Induction:   IV  Beta Blocker:  Patient is not currently on a Beta-Blocker (No further documentation required).        Informed Consent: Patient understands risks and agrees with Anesthesia plan.  Questions answered.   ASA Score: 2     Day of Surgery Review of History & Physical: I have interviewed and examined the patient. I have reviewed the patient's H&P dated:  There are no significant changes.          Ready For Surgery From Anesthesia Perspective.        Quality 130: Documentation Of Current Medications In The Medical Record: Current Medications Documented Detail Level: Detailed

## 2024-06-20 ENCOUNTER — TELEPHONE (OUTPATIENT)
Dept: INFECTIOUS DISEASES | Facility: CLINIC | Age: 64
End: 2024-06-20
Payer: COMMERCIAL

## 2024-06-20 NOTE — TELEPHONE ENCOUNTER
Spoke to pt about switching appt to virtual.      ----- Message from Eleanor Hill sent at 6/20/2024  2:18 PM CDT -----  Regarding: Change appt to virtual  Contact: Pt @ [phone]  Pt  is asking to speak to someone in the office to change in person appt to virtual.Pt  is asking to please keep appt on the same date and time if possible. Pt is asking for a return call first before making changes. Please call. Thanks.

## 2024-06-21 ENCOUNTER — OFFICE VISIT (OUTPATIENT)
Dept: INFECTIOUS DISEASES | Facility: CLINIC | Age: 64
End: 2024-06-21
Payer: COMMERCIAL

## 2024-06-21 DIAGNOSIS — B20 HUMAN IMMUNODEFICIENCY VIRUS (HIV) DISEASE: Primary | ICD-10-CM

## 2024-06-21 PROCEDURE — 99214 OFFICE O/P EST MOD 30 MIN: CPT | Mod: 95,,, | Performed by: INTERNAL MEDICINE

## 2024-06-21 NOTE — PROGRESS NOTES
No orders of the defined types were placed in this encounter.        INFECTIOUS DISEASE CLINIC  06/21/2024 3:30AM    Subjective:      Chief Complaint:   Hiv follow up       History of Present Illness:    Patient Simón Fong is a 64 y.o. male who presents today for hiv follow-up. Last seen 7/2023. Reports 100% compliance with dovato. Denies missed doses. Denies complaints    Review of Symptoms:  Constitutional: Denies fevers, chills, or weakness.  ENT: Denies dysphagia, nasal discharge, ear pain or discharge.  Cardiovascular: Denies chest pain, palpitations, orthopnea, or claudication.  Respiratory: Denies shortness of breath, cough, hemoptysis, or wheezing.  GI: Denies nausea/vomitting, hematochezia, melena, abd pain, or changes in appetite.  : Denies dysuria, incontinence, or hematuria.  Musculoskeletal: Denies joint pain or myalgias.  Skin/breast: Denies rashes, lumps, lesions, or discharge.  Neurologic: Denies headache, dizziness, vertigo, or paresthesias.    Past Medical History:   Diagnosis Date    HIV infection     Hypertension        Past Surgical History:   Procedure Laterality Date    COLONOSCOPY      COLONOSCOPY N/A 7/31/2020    Procedure: COLONOSCOPY;  Surgeon: Tree Mendiola MD;  Location: [address]);  Service: Endoscopy;  Laterality: N/A;  covid 7/28-[facility]    PROSTATE BIOPSY         Family History   Problem Relation Name Age of Onset    Diabetes Mother      Heart disease Mother      Hypertension Mother      Stroke Mother      Hypertension Father      Cancer Sister      Cancer Brother         Social History     Socioeconomic History    Marital status: Single   Tobacco Use    Smoking status: Never    Smokeless tobacco: Never   Substance and Sexual Activity    Alcohol use: No    Drug use: No     Social Determinants of Health     Financial Resource Strain: Low Risk  (1/26/2024)    Overall Financial Resource Strain (CARDIA)     Difficulty of Paying Living Expenses: Not hard at all    Food Insecurity: No Food Insecurity (1/26/2024)    Hunger Vital Sign     Worried About Running Out of Food in the Last Year: Never true     Ran Out of Food in the Last Year: Never true   Transportation Needs: No Transportation Needs (1/26/2024)    PRAPARE - Transportation     Lack of Transportation (Medical): No     Lack of Transportation (Non-Medical): No   Physical Activity: Sufficiently Active (1/26/2024)    Exercise Vital Sign     Days of Exercise per Week: 6 days     Minutes of Exercise per Session: 80 min   Stress: No Stress Concern Present (1/26/2024)    [facility] [place] of Occupational Health - Occupational Stress Questionnaire     Feeling of Stress : Only a little   Housing Stability: Unknown (1/26/2024)    Housing Stability Vital Sign     Unable to Pay for Housing in the Last Year: No     Unstable Housing in the Last Year: No       Review of patient's allergies indicates:   Allergen Reactions    Sulfa (sulfonamide antibiotics)          Objective:   VS (24h):   There were no vitals filed for this visit.    General: alert, comfortable, no acute distress.   Pulmonary: Non labored  Neurological:  Alert and oriented x 4.   Exam limited 2/2 telemedicine    Labs:    Glucose   Date Value Ref Range Status   04/15/2024 104 70 - 110 mg/dL Final   01/24/2024 90 70 - 110 mg/dL Final   07/07/2023 99 70 - 110 mg/dL Final       Calcium   Date Value Ref Range Status   04/15/2024 9.5 8.7 - 10.5 mg/dL Final   01/24/2024 9.7 8.7 - 10.5 mg/dL Final   07/07/2023 9.8 8.7 - 10.5 mg/dL Final       Albumin   Date Value Ref Range Status   04/15/2024 4.1 3.5 - 5.2 g/dL Final   01/24/2024 4.2 3.5 - 5.2 g/dL Final   07/07/2023 4.4 3.5 - 5.2 g/dL Final       Total Protein   Date Value Ref Range Status   04/15/2024 7.3 6.0 - 8.4 g/dL Final   01/24/2024 7.5 6.0 - 8.4 g/dL Final   07/07/2023 7.4 6.0 - 8.4 g/dL Final       Sodium   Date Value Ref Range Status   04/15/2024 142 136 - 145 mmol/L Final   01/24/2024 142 136 - 145 mmol/L Final    07/07/2023 141 136 - 145 mmol/L Final       Potassium   Date Value Ref Range Status   04/15/2024 4.5 3.5 - 5.1 mmol/L Final   01/24/2024 4.8 3.5 - 5.1 mmol/L Final   07/07/2023 4.2 3.5 - 5.1 mmol/L Final       CO2   Date Value Ref Range Status   04/15/2024 28 23 - 29 mmol/L Final   01/24/2024 27 23 - 29 mmol/L Final   07/07/2023 27 23 - 29 mmol/L Final       Chloride   Date Value Ref Range Status   04/15/2024 107 95 - 110 mmol/L Final   01/24/2024 108 95 - 110 mmol/L Final   07/07/2023 105 95 - 110 mmol/L Final       BUN   Date Value Ref Range Status   04/15/2024 12 8 - 23 mg/dL Final   01/24/2024 13 8 - 23 mg/dL Final   07/07/2023 10 8 - 23 mg/dL Final       Creatinine   Date Value Ref Range Status   04/15/2024 1.0 0.5 - 1.4 mg/dL Final   01/24/2024 0.9 0.5 - 1.4 mg/dL Final   07/07/2023 1.2 0.5 - 1.4 mg/dL Final       Alkaline Phosphatase   Date Value Ref Range Status   04/15/2024 75 55 - 135 U/L Final   01/24/2024 72 55 - 135 U/L Final   07/07/2023 71 55 - 135 U/L Final       ALT   Date Value Ref Range Status   04/15/2024 25 10 - 44 U/L Final   01/24/2024 27 10 - 44 U/L Final   07/07/2023 37 10 - 44 U/L Final       AST   Date Value Ref Range Status   04/15/2024 27 10 - 40 U/L Final   01/24/2024 32 10 - 40 U/L Final   07/07/2023 36 10 - 40 U/L Final       Total Bilirubin   Date Value Ref Range Status   04/15/2024 0.5 0.1 - 1.0 mg/dL Final     Comment:     For infants and newborns, interpretation of results should be based  on gestational age, weight and in agreement with clinical  observations.    Premature Infant recommended reference ranges:  Up to 24 hours.............<8.0 mg/dL  Up to 48 hours............<12.0 mg/dL  3-5 days..................<15.0 mg/dL  6-29 days.................<15.0 mg/dL     01/24/2024 0.5 0.1 - 1.0 mg/dL Final     Comment:     For infants and newborns, interpretation of results should be based  on gestational age, weight and in agreement with clinical  observations.    Premature Infant  recommended reference ranges:  Up to 24 hours.............<8.0 mg/dL  Up to 48 hours............<12.0 mg/dL  3-5 days..................<15.0 mg/dL  6-29 days.................<15.0 mg/dL     07/07/2023 0.5 0.1 - 1.0 mg/dL Final     Comment:     For infants and newborns, interpretation of results should be based  on gestational age, weight and in agreement with clinical  observations.    Premature Infant recommended reference ranges:  Up to 24 hours.............<8.0 mg/dL  Up to 48 hours............<12.0 mg/dL  3-5 days..................<15.0 mg/dL  6-29 days.................<15.0 mg/dL         WBC   Date Value Ref Range Status   01/24/2024 3.88 (L) 3.90 - 12.70 K/uL Final   07/07/2023 3.70 (L) 3.90 - 12.70 K/uL Final   02/07/2023 3.38 (L) 3.90 - 12.70 K/uL Final       Hemoglobin   Date Value Ref Range Status   01/24/2024 15.0 14.0 - 18.0 g/dL Final   07/07/2023 14.9 14.0 - 18.0 g/dL Final   02/07/2023 14.8 14.0 - 18.0 g/dL Final       Hematocrit   Date Value Ref Range Status   01/24/2024 45.5 40.0 - 54.0 % Final   07/07/2023 44.5 40.0 - 54.0 % Final   02/07/2023 45.6 40.0 - 54.0 % Final       MCV   Date Value Ref Range Status   01/24/2024 94 82 - 98 fL Final   07/07/2023 92 82 - 98 fL Final   02/07/2023 94 82 - 98 fL Final       Platelets   Date Value Ref Range Status   01/24/2024 244 150 - 450 K/uL Final   07/07/2023 217 150 - 450 K/uL Final   02/07/2023 213 150 - 450 K/uL Final       Lab Results   Component Value Date    CHOL 150 01/24/2024    CHOL 172 02/07/2023    CHOL 163 03/28/2022       Lab Results   Component Value Date    HDL 55 01/24/2024    HDL 66 02/07/2023    HDL 61 03/28/2022       Lab Results   Component Value Date    LDLCALC 87.2 01/24/2024    LDLCALC 98.4 02/07/2023    LDLCALC 91.4 03/28/2022       Lab Results   Component Value Date    TRIG 39 01/24/2024    TRIG 38 02/07/2023    TRIG 53 03/28/2022       Lab Results   Component Value Date    CHOLHDL 36.7 01/24/2024    CHOLHDL 38.4 02/07/2023     "CHOLHDL 37.4 03/28/2022       RPR   Date Value Ref Range Status   04/15/2024 Reactive (A) Non-reactive Final   07/07/2023 Non-reactive Non-reactive Final   08/09/2022 Non-reactive Non-reactive Final     No results found for: "QUANTIFERON"    Medications:  Current Outpatient Medications on File Prior to Visit   Medication Sig Dispense Refill    amLODIPine (NORVASC) 10 MG tablet TAKE 1 TABLET ONCE DAILY 90 tablet 3    ascorbic acid, vitamin C, (VITAMIN C) 1000 MG tablet Take 1,000 mg by mouth once daily.       DOVATO  mg Tab Take 1 tablet by mouth once daily.      fish oil-omega-3 fatty acids 300-1,000 mg capsule Take by mouth once daily.      glucosamine-chondroitin 500-400 mg tablet Take 1 tablet by mouth 3 (three) times daily.      leucine-isoleucine-valine (NEOKE BCAA4) 82 gram-328 kcal/100 gram Powd Take by mouth.       naproxen (NAPROSYN) 500 MG tablet Take 1 tablet (500 mg total) by mouth 2 (two) times daily with meals. 60 tablet 1     No current facility-administered medications on file prior to visit.       Antibiotics:   Antibiotics (From admission, onward)      None            HIV: No components found for: "HIV 1/2 AG/AB"  Hepatitis C IgG: No components found for: "HEPATITIS C"  Syphilis:   RPR   Date Value Ref Range Status   04/15/2024 Reactive (A) Non-reactive Final   07/07/2023 Non-reactive Non-reactive Final   08/09/2022 Non-reactive Non-reactive Final       Hepatitis A IgG: No components found for: "HEPATITIS A IGG"  Hepatitis Bc IgG: No components found for: "HEPATITIS B CORE IGG"  Hepatitis Bs IgG:  Quantiferon: No results found for: "QUANTIFERON"  VZV IgG: No components found for: "VARICELLA IGG"    No components found for: "SEDIMENTATION RATE"  No components found for: "C-REACTIVE PROTEIN"      Microbiology x 7d:   Microbiology Results (last 7 days)       ** No results found for the last 168 hours. **            Immunization History   Administered Date(s) Administered    COVID-19 Vaccine " 02/19/2021, 03/19/2021, 10/23/2021    COVID-19, MRNA, LN-S, PF (MODERNA FULL 0.5 ML DOSE) 02/19/2021, 03/19/2021    COVID-19, mRNA, LNP-S, bivalent booster, PF (Moderna Omicron)12 + YEARS 10/31/2022    Influenza (FLUAD) - Quadrivalent - Adjuvanted - PF *Preferred* (65+) 10/07/2021, 10/31/2022, 09/14/2023    Influenza - Quadrivalent 10/26/2015    Influenza - Quadrivalent - PF *Preferred* (6 months and older) 10/23/2006, 09/10/2009, 11/01/2017, 10/28/2018, 10/29/2019, 10/02/2020    Meningococcal Conjugate (MCV4P) 01/30/2017, 01/07/2019    Pneumococcal Conjugate - 13 Valent 04/17/2014, 04/17/2014    Pneumococcal Polysaccharide - 23 Valent 12/19/2012, 07/23/2020    RSVpreF (Arexvy) 09/14/2023    Tdap 05/02/2016    Vaccinia, smallpox monkeypox vaccine live, PF 08/09/2022, 09/06/2022    Zoster Recombinant 05/14/2018, 07/11/2018         Reviewed records today as well as relevant labs, cultures, and imaging    Assessment:     HIV, well controlled  Hep b core ab+, dna undetectable (resolved hep b)  Std screening    Plan:     # HIV--- well controlled on dovato, refill sent. VL undetectable 4/2024.  Needs labs and f/u q6 mo.    Std screening labs ordered    - Will monitor drug therapy for toxicity  Orders Placed This Encounter   Procedures    RPR (for monitoring)     Standing Status:   Future     Standing Expiration Date:   10/7/2025    Comprehensive Metabolic Panel     Standing Status:   Future     Standing Expiration Date:   7/9/2025    CD4 T-[place] Cells     Standing Status:   Future     Standing Expiration Date:   7/9/2025    C. trachomatis/N. gonorrhoeae by AMP DNA     Standing Status:   Future     Standing Expiration Date:   9/7/2025     Order Specific Question:   Source:     Answer:   Urine    HEPATITIS B VIRAL DNA, QUANTITATIVE     Standing Status:   Future     Standing Expiration Date:   10/7/2025             I have sent communication to the referring physician and/or primary care provider.     I spent a total of 20  minutes on the day of the visit. This includes face to face time and non-face to face time preparing to see the patient (eg, review of tests), obtaining and/or reviewing separately obtained history, documenting clinical information in the electronic or other health record, independently interpreting results, and communicating results to the patient/family/caregiver, or care coordination.      Jennifer Purvis MD, MPH  Infectious Disease

## 2024-06-23 DIAGNOSIS — B20 HUMAN IMMUNODEFICIENCY VIRUS (HIV) DISEASE: Primary | ICD-10-CM

## 2024-06-24 RX ORDER — DOLUTEGRAVIR SODIUM AND LAMIVUDINE 50; 300 MG/1; MG/1
1 TABLET, FILM COATED ORAL
Qty: 90 TABLET | Refills: 3 | Status: SHIPPED | OUTPATIENT
Start: 2024-06-24

## 2024-07-09 ENCOUNTER — PATIENT MESSAGE (OUTPATIENT)
Dept: INFECTIOUS DISEASES | Facility: CLINIC | Age: 64
End: 2024-07-09
Payer: COMMERCIAL

## 2024-08-25 NOTE — TELEPHONE ENCOUNTER
----- Message from Brigitte Tapia sent at 10/5/2018  1:39 PM CDT -----  Contact: Self [phone]  PT need orders for labs. pls schedule on 11/5 at [facility].    Presents to ER with c/o nasal congestion since last Friday. States taking OTC prednisone.

## 2024-09-23 ENCOUNTER — PATIENT MESSAGE (OUTPATIENT)
Dept: PRIMARY CARE CLINIC | Facility: CLINIC | Age: 64
End: 2024-09-23
Payer: COMMERCIAL

## 2024-10-04 ENCOUNTER — PATIENT MESSAGE (OUTPATIENT)
Dept: PRIMARY CARE CLINIC | Facility: CLINIC | Age: 64
End: 2024-10-04
Payer: COMMERCIAL

## 2024-10-08 ENCOUNTER — CLINICAL SUPPORT (OUTPATIENT)
Dept: PRIMARY CARE CLINIC | Facility: CLINIC | Age: 64
End: 2024-10-08
Payer: COMMERCIAL

## 2024-10-08 DIAGNOSIS — Z23 FLU VACCINE NEED: Primary | ICD-10-CM

## 2024-10-21 ENCOUNTER — PATIENT MESSAGE (OUTPATIENT)
Dept: INFECTIOUS DISEASES | Facility: CLINIC | Age: 64
End: 2024-10-21
Payer: COMMERCIAL

## 2024-10-21 ENCOUNTER — TELEPHONE (OUTPATIENT)
Dept: INFECTIOUS DISEASES | Facility: CLINIC | Age: 64
End: 2024-10-21
Payer: COMMERCIAL

## 2024-12-02 ENCOUNTER — PATIENT MESSAGE (OUTPATIENT)
Dept: INFECTIOUS DISEASES | Facility: CLINIC | Age: 64
End: 2024-12-02
Payer: COMMERCIAL

## 2024-12-04 ENCOUNTER — LAB VISIT (OUTPATIENT)
Dept: LAB | Facility: HOSPITAL | Age: 64
End: 2024-12-04
Attending: INTERNAL MEDICINE
Payer: COMMERCIAL

## 2024-12-04 DIAGNOSIS — B20 HUMAN IMMUNODEFICIENCY VIRUS (HIV) DISEASE: ICD-10-CM

## 2024-12-04 LAB
ALBUMIN SERPL BCP-MCNC: 4.5 G/DL (ref 3.5–5.2)
ALP SERPL-CCNC: 87 U/L (ref 40–150)
ALT SERPL W/O P-5'-P-CCNC: 23 U/L (ref 10–44)
ANION GAP SERPL CALC-SCNC: 7 MMOL/L (ref 8–16)
AST SERPL-CCNC: 35 U/L (ref 10–40)
BILIRUB SERPL-MCNC: 0.4 MG/DL (ref 0.1–1)
BUN SERPL-MCNC: 9 MG/DL (ref 8–23)
CALCIUM SERPL-MCNC: 9.7 MG/DL (ref 8.7–10.5)
CHLORIDE SERPL-SCNC: 108 MMOL/L (ref 95–110)
CO2 SERPL-SCNC: 27 MMOL/L (ref 23–29)
CREAT SERPL-MCNC: 0.9 MG/DL (ref 0.5–1.4)
EST. GFR  (NO RACE VARIABLE): >60 ML/MIN/1.73 M^2
GLUCOSE SERPL-MCNC: 94 MG/DL (ref 70–110)
POTASSIUM SERPL-SCNC: 4.7 MMOL/L (ref 3.5–5.1)
PROT SERPL-MCNC: 7.7 G/DL (ref 6–8.4)
SODIUM SERPL-SCNC: 142 MMOL/L (ref 136–145)

## 2024-12-04 PROCEDURE — 80053 COMPREHEN METABOLIC PANEL: CPT | Performed by: INTERNAL MEDICINE

## 2024-12-04 PROCEDURE — 86592 SYPHILIS TEST NON-TREP QUAL: CPT | Performed by: INTERNAL MEDICINE

## 2024-12-04 PROCEDURE — 86361 T CELL ABSOLUTE COUNT: CPT | Performed by: INTERNAL MEDICINE

## 2024-12-04 PROCEDURE — 87517 HEPATITIS B DNA QUANT: CPT | Performed by: INTERNAL MEDICINE

## 2024-12-04 PROCEDURE — 36415 COLL VENOUS BLD VENIPUNCTURE: CPT | Performed by: INTERNAL MEDICINE

## 2024-12-05 LAB
CD3+CD4+ CELLS # BLD: 797 CELLS/UL (ref 300–1400)
CD3+CD4+ CELLS NFR BLD: 37.5 % (ref 28–57)
HBV DNA SERPL NAA+PROBE-ACNC: NOT DETECTED IU/ML
HEPATITIS B VIRUS DNA: NORMAL

## 2024-12-09 LAB — RPR SER QL: NORMAL

## 2024-12-11 ENCOUNTER — PATIENT MESSAGE (OUTPATIENT)
Dept: INFECTIOUS DISEASES | Facility: CLINIC | Age: 64
End: 2024-12-11
Payer: COMMERCIAL

## 2024-12-11 DIAGNOSIS — B20 HUMAN IMMUNODEFICIENCY VIRUS (HIV) DISEASE: Primary | ICD-10-CM

## 2024-12-16 ENCOUNTER — LAB VISIT (OUTPATIENT)
Dept: LAB | Facility: HOSPITAL | Age: 64
End: 2024-12-16
Attending: INTERNAL MEDICINE
Payer: COMMERCIAL

## 2024-12-16 ENCOUNTER — TELEPHONE (OUTPATIENT)
Dept: INFECTIOUS DISEASES | Facility: CLINIC | Age: 64
End: 2024-12-16
Payer: COMMERCIAL

## 2024-12-16 DIAGNOSIS — B20 HUMAN IMMUNODEFICIENCY VIRUS (HIV) DISEASE: ICD-10-CM

## 2024-12-16 PROCEDURE — 87536 HIV-1 QUANT&REVRSE TRNSCRPJ: CPT | Performed by: INTERNAL MEDICINE

## 2024-12-16 PROCEDURE — 36415 COLL VENOUS BLD VENIPUNCTURE: CPT | Performed by: INTERNAL MEDICINE

## 2024-12-17 ENCOUNTER — PATIENT MESSAGE (OUTPATIENT)
Dept: INFECTIOUS DISEASES | Facility: HOSPITAL | Age: 64
End: 2024-12-17
Payer: COMMERCIAL

## 2024-12-17 LAB
HIV1 RNA # SERPL NAA+PROBE: <20 COPIES/ML
HIV1 RNA SERPL NAA+PROBE-LOG#: <1.3 LOG COPIES/ML
HIV1 RNA SERPL QL NAA+PROBE: DETECTED

## 2025-01-02 ENCOUNTER — TELEPHONE (OUTPATIENT)
Dept: PRIMARY CARE CLINIC | Facility: CLINIC | Age: 65
End: 2025-01-02

## 2025-01-02 DIAGNOSIS — B20 HUMAN IMMUNODEFICIENCY VIRUS (HIV) DISEASE: ICD-10-CM

## 2025-01-02 DIAGNOSIS — I10 PRIMARY HYPERTENSION: Primary | ICD-10-CM

## 2025-01-02 NOTE — ADDENDUM NOTE
Addended by: ELY ASHLEY on: 1/2/2025 01:18 PM     Modules accepted: Orders     Problem: Adult Inpatient Plan of Care  Goal: Plan of Care Review  Outcome: Ongoing (interventions implemented as appropriate)  Pt is AAOx4-and slightly confused, VSS-2L NC PRN, and stand by assist. Paracentesis and thoracentesis obtained. 1 unit RBC infusing. TTE obtained. 1L bolus ordered. Vanc trough obtained. Pt's family at bedside. VISI and telemetry monitoring in place. Pt's bed in lowest position, call bell within reach, and nonskid socks on. RN encouraged to call for any assistance and bed alarm set when family is away. Will continue to monitor.

## 2025-01-07 ENCOUNTER — LAB VISIT (OUTPATIENT)
Dept: LAB | Facility: HOSPITAL | Age: 65
End: 2025-01-07
Payer: COMMERCIAL

## 2025-01-07 DIAGNOSIS — Z85.46 ENCOUNTER FOR FOLLOW-UP SURVEILLANCE OF PROSTATE CANCER: ICD-10-CM

## 2025-01-07 DIAGNOSIS — R97.20 ELEVATED PSA, BETWEEN 10 AND LESS THAN 20 NG/ML: ICD-10-CM

## 2025-01-07 DIAGNOSIS — Z08 ENCOUNTER FOR FOLLOW-UP SURVEILLANCE OF PROSTATE CANCER: ICD-10-CM

## 2025-01-07 DIAGNOSIS — C61 PROSTATE CANCER: ICD-10-CM

## 2025-01-07 DIAGNOSIS — I10 PRIMARY HYPERTENSION: ICD-10-CM

## 2025-01-07 DIAGNOSIS — B20 HUMAN IMMUNODEFICIENCY VIRUS (HIV) DISEASE: ICD-10-CM

## 2025-01-07 LAB
BASOPHILS # BLD AUTO: 0.01 K/UL (ref 0–0.2)
BASOPHILS NFR BLD: 0.2 % (ref 0–1.9)
CHOLEST SERPL-MCNC: 171 MG/DL (ref 120–199)
CHOLEST/HDLC SERPL: 3.4 {RATIO} (ref 2–5)
COMPLEXED PSA SERPL-MCNC: 19.7 NG/ML (ref 0–4)
DIFFERENTIAL METHOD BLD: ABNORMAL
EOSINOPHIL # BLD AUTO: 0 K/UL (ref 0–0.5)
EOSINOPHIL NFR BLD: 1 % (ref 0–8)
ERYTHROCYTE [DISTWIDTH] IN BLOOD BY AUTOMATED COUNT: 12 % (ref 11.5–14.5)
ESTIMATED AVG GLUCOSE: 114 MG/DL (ref 68–131)
HBA1C MFR BLD: 5.6 % (ref 4–5.6)
HCT VFR BLD AUTO: 47.3 % (ref 40–54)
HDLC SERPL-MCNC: 50 MG/DL (ref 40–75)
HDLC SERPL: 29.2 % (ref 20–50)
HGB BLD-MCNC: 15.8 G/DL (ref 14–18)
IMM GRANULOCYTES # BLD AUTO: 0 K/UL (ref 0–0.04)
IMM GRANULOCYTES NFR BLD AUTO: 0 % (ref 0–0.5)
LDLC SERPL CALC-MCNC: 106.8 MG/DL (ref 63–159)
LYMPHOCYTES # BLD AUTO: 2 K/UL (ref 1–4.8)
LYMPHOCYTES NFR BLD: 49.3 % (ref 18–48)
MCH RBC QN AUTO: 30.9 PG (ref 27–31)
MCHC RBC AUTO-ENTMCNC: 33.4 G/DL (ref 32–36)
MCV RBC AUTO: 93 FL (ref 82–98)
MONOCYTES # BLD AUTO: 0.4 K/UL (ref 0.3–1)
MONOCYTES NFR BLD: 10.6 % (ref 4–15)
NEUTROPHILS # BLD AUTO: 1.6 K/UL (ref 1.8–7.7)
NEUTROPHILS NFR BLD: 38.9 % (ref 38–73)
NONHDLC SERPL-MCNC: 121 MG/DL
NRBC BLD-RTO: 0 /100 WBC
PLATELET # BLD AUTO: 232 K/UL (ref 150–450)
PMV BLD AUTO: 10.3 FL (ref 9.2–12.9)
RBC # BLD AUTO: 5.11 M/UL (ref 4.6–6.2)
TRIGL SERPL-MCNC: 71 MG/DL (ref 30–150)
TSH SERPL DL<=0.005 MIU/L-ACNC: 3.1 UIU/ML (ref 0.4–4)
WBC # BLD AUTO: 4.04 K/UL (ref 3.9–12.7)

## 2025-01-07 PROCEDURE — 84443 ASSAY THYROID STIM HORMONE: CPT | Performed by: HOSPITALIST

## 2025-01-07 PROCEDURE — 83036 HEMOGLOBIN GLYCOSYLATED A1C: CPT | Performed by: HOSPITALIST

## 2025-01-07 PROCEDURE — 84153 ASSAY OF PSA TOTAL: CPT | Performed by: NURSE PRACTITIONER

## 2025-01-07 PROCEDURE — 80061 LIPID PANEL: CPT | Performed by: HOSPITALIST

## 2025-01-07 PROCEDURE — 36415 COLL VENOUS BLD VENIPUNCTURE: CPT | Performed by: HOSPITALIST

## 2025-01-07 PROCEDURE — 85025 COMPLETE CBC W/AUTO DIFF WBC: CPT | Performed by: HOSPITALIST

## 2025-01-08 ENCOUNTER — TELEPHONE (OUTPATIENT)
Dept: PRIMARY CARE CLINIC | Facility: CLINIC | Age: 65
End: 2025-01-08
Payer: COMMERCIAL

## 2025-01-08 ENCOUNTER — PATIENT MESSAGE (OUTPATIENT)
Dept: UROLOGY | Facility: CLINIC | Age: 65
End: 2025-01-08
Payer: COMMERCIAL

## 2025-01-13 ENCOUNTER — OFFICE VISIT (OUTPATIENT)
Dept: PRIMARY CARE CLINIC | Facility: CLINIC | Age: 65
End: 2025-01-13
Payer: COMMERCIAL

## 2025-01-13 VITALS
BODY MASS INDEX: 27.71 KG/M2 | OXYGEN SATURATION: 100 % | WEIGHT: 215.94 LBS | DIASTOLIC BLOOD PRESSURE: 72 MMHG | HEART RATE: 66 BPM | TEMPERATURE: 98 F | SYSTOLIC BLOOD PRESSURE: 140 MMHG | HEIGHT: 74 IN

## 2025-01-13 DIAGNOSIS — C61 PROSTATE CANCER: ICD-10-CM

## 2025-01-13 DIAGNOSIS — B20 HUMAN IMMUNODEFICIENCY VIRUS (HIV) DISEASE: ICD-10-CM

## 2025-01-13 DIAGNOSIS — Z12.11 COLON CANCER SCREENING: Primary | ICD-10-CM

## 2025-01-13 DIAGNOSIS — I10 PRIMARY HYPERTENSION: ICD-10-CM

## 2025-01-13 PROCEDURE — 3078F DIAST BP <80 MM HG: CPT | Mod: CPTII,S$GLB,, | Performed by: HOSPITALIST

## 2025-01-13 PROCEDURE — 3008F BODY MASS INDEX DOCD: CPT | Mod: CPTII,S$GLB,, | Performed by: HOSPITALIST

## 2025-01-13 PROCEDURE — 3044F HG A1C LEVEL LT 7.0%: CPT | Mod: CPTII,S$GLB,, | Performed by: HOSPITALIST

## 2025-01-13 PROCEDURE — 3077F SYST BP >= 140 MM HG: CPT | Mod: CPTII,S$GLB,, | Performed by: HOSPITALIST

## 2025-01-13 PROCEDURE — 99215 OFFICE O/P EST HI 40 MIN: CPT | Mod: S$GLB,,, | Performed by: HOSPITALIST

## 2025-01-13 PROCEDURE — 1159F MED LIST DOCD IN RCRD: CPT | Mod: CPTII,S$GLB,, | Performed by: HOSPITALIST

## 2025-01-13 NOTE — PROGRESS NOTES
Primary Care Provider Appointment - Curt Lawrence MD      Subjective:      Patient ID: Simón Fong is a 64 y.o. male with   Past Medical History:   Diagnosis Date    HIV infection     Hypertension            Chief Complaint: Follow-up    Prior to this visit, patient's last encounter with PCP was 10/8/2024.    Pt reports for annual physical.  C/o gas pain, bloating, constipation since just after [place] when he tried a new plant-based protein drink and protein bar which he didn't tolerate very well.  Some occasional pain.  Sx relieved w/ belching or BM; taking dulcolax occasionally.  Tried some fiber yesterday but seemed to make sx worse.  Normally avoids dairy but can eat ice cream, etc. W/o difficulty if he wants to.    Has low-grade prostate cancer and is on active surveillance with Urology.  Will be due for repeat colonoscopy this summer.            Medications: Does not have pill packs              4Ms for Medical Decision-Making in Older Adults    Last Completed EAWV: None    MOBILITY:  Get Up and Go:       No data to display              Activities of Daily Living:       No data to display              Whisper Test:       No data to display              Disability Status:       No data to display              Nutrition Screening:       No data to display             Screening Score: 0-7 Malnourished, 8-11 At Risk, 12-14 Normal  Fall Risk:      1/13/2025    10:40 AM 10/8/2024     9:15 AM 3/12/2024     4:00 PM   Fall Risk Assessment - Outpatient   Mobility Status Ambulatory Ambulatory Ambulatory   Number of falls 0 0 0   Identified as fall risk False False False           MENTATION:   Depression Patient Health Questionnaire:      1/13/2025    10:46 AM   Depression Patient Health Questionnaire   Over the last two weeks how often have you been bothered by little interest or pleasure in doing things Not at all   Over the last two weeks how often have you been bothered by feeling down,  depressed or hopeless Not at all   PHQ-2 Total Score 0     Has Dementia Dx: No  Has Anxiety Dx: No    Cognitive Function Screening:       No data to display              Cognitive Function Screening Total - Less than 4 = Abnormal,  Greater than or equal to 4 = Normal        MEDICATIONS:  High Risk Medications:  Total Active Medications: 0  This patient does not have an active medication from one of the medication groupers.    WHAT MATTERS MOST:  Advance Care Planning   ACP Status:   Patient has had an ACP conversation  Living Will: No  Power of : No  [place]: No    What is most important right now is to focus on remaining as independent as possiblequality of life, even if it means sacrificing a little time    Accordingly, we have decided that the best plan to meet the patient's goals includes continuing with treatment      What matters most to patient today is: addressing current complaint                 Social History     Socioeconomic History    Marital status: Single   Tobacco Use    Smoking status: Never    Smokeless tobacco: Never   Substance and Sexual Activity    Alcohol use: No    Drug use: No     Social Drivers of Health     Financial Resource Strain: Low Risk  (1/26/2024)    Overall Financial Resource Strain (CARDIA)     Difficulty of Paying Living Expenses: Not hard at all   Food Insecurity: No Food Insecurity (1/26/2024)    Hunger Vital Sign     Worried About Running Out of Food in the Last Year: Never true     Ran Out of Food in the Last Year: Never true   Transportation Needs: No Transportation Needs (1/26/2024)    PRAPARE - Transportation     Lack of Transportation (Medical): No     Lack of Transportation (Non-Medical): No   Physical Activity: Sufficiently Active (1/26/2024)    Exercise Vital Sign     Days of Exercise per Week: 6 days     Minutes of Exercise per Session: 80 min   Stress: No Stress Concern Present (1/26/2024)    [place] of Occupational Health - Occupational Stress  "Questionnaire     Feeling of Stress : Only a little   Housing Stability: Unknown (1/26/2024)    Housing Stability Vital Sign     Unable to Pay for Housing in the Last Year: No     Unstable Housing in the Last Year: No       Review of Systems   Constitutional:  Negative for activity change, appetite change and unexpected weight change.   Respiratory:  Negative for shortness of breath.    Cardiovascular:  Negative for chest pain and leg swelling.   Gastrointestinal:  Positive for abdominal distention, abdominal pain, change in bowel habit and constipation. Negative for nausea and vomiting.        Objective:   BP (!) 140/72 (BP Location: Left arm, Patient Position: Sitting)   Pulse 66   Temp 97.9 °F (36.6 °C) (Oral)   Ht 6' 2" (1.88 m)   Wt 98 kg (215 lb 15.1 oz)   SpO2 100%   BMI 27.73 kg/m²     Physical Exam  Vitals reviewed.   Constitutional:       General: He is not in acute distress.     Appearance: Normal appearance.      Comments: Muscular physique   HENT:      Head: Normocephalic and atraumatic.      Right Ear: Tympanic membrane, ear canal and external ear normal.      Left Ear: Tympanic membrane, ear canal and external ear normal.      Nose: No congestion.      Mouth/Throat:      Mouth: Mucous membranes are moist.      Pharynx: Oropharynx is clear.   Eyes:      General: No scleral icterus.     Extraocular Movements: Extraocular movements intact.      Conjunctiva/sclera: Conjunctivae normal.      Pupils: Pupils are equal, round, and reactive to light.   Cardiovascular:      Rate and Rhythm: Normal rate and regular rhythm.      Pulses: Normal pulses.      Heart sounds: Normal heart sounds. No murmur heard.     No friction rub. No gallop.   Pulmonary:      Effort: Pulmonary effort is normal. No respiratory distress.      Breath sounds: Normal breath sounds.   Abdominal:      General: Bowel sounds are normal. There is no distension.      Palpations: Abdomen is soft.      Tenderness: There is no abdominal " tenderness. There is no guarding.   Musculoskeletal:         General: No tenderness. Normal range of motion.      Cervical back: Normal range of motion.      Right lower leg: No edema.      Left lower leg: No edema.   Lymphadenopathy:      Cervical: No cervical adenopathy.   Skin:     General: Skin is warm and dry.      Findings: No rash.   Neurological:      General: No focal deficit present.      Mental Status: He is alert and oriented to person, place, and time.      Cranial Nerves: No cranial nerve deficit.      Gait: Gait normal.   Psychiatric:         Mood and Affect: Mood normal.         Behavior: Behavior normal.              Lab Results   Component Value Date    WBC 4.04 01/07/2025    HGB 15.8 01/07/2025    HCT 47.3 01/07/2025     01/07/2025    CHOL 171 01/07/2025    TRIG 71 01/07/2025    HDL 50 01/07/2025    ALT 23 12/04/2024    AST 35 12/04/2024     12/04/2024    K 4.7 12/04/2024     12/04/2024    CREATININE 0.9 12/04/2024    BUN 9 12/04/2024    CO2 27 12/04/2024    TSH 3.095 01/07/2025    PSA 6.5 (H) 12/21/2018    INR 1.2 02/08/2012    HGBA1C 5.6 01/07/2025       Current Outpatient Medications on File Prior to Visit   Medication Sig Dispense Refill    amLODIPine (NORVASC) 10 MG tablet TAKE 1 TABLET ONCE DAILY 90 tablet 3    ascorbic acid, vitamin C, (VITAMIN C) 1000 MG tablet Take 1,000 mg by mouth once daily.       DOVATO  mg Tab TAKE 1 TABLET ONCE DAILY 90 tablet 3    fish oil-omega-3 fatty acids 300-1,000 mg capsule Take by mouth once daily.      glucosamine-chondroitin 500-400 mg tablet Take 1 tablet by mouth 3 (three) times daily.      leucine-isoleucine-valine (NEOKE BCAA4) 82 gram-328 kcal/100 gram Powd Take by mouth.       naproxen (NAPROSYN) 500 MG tablet Take 1 tablet (500 mg total) by mouth 2 (two) times daily with meals. 60 tablet 1     No current facility-administered medications on file prior to visit.         Assessment:   64 y.o. male with multiple co-morbid  illnesses here to follow-up for ongoing chronic disease management and preventive health maintenance.    Plan:     Problem List Items Addressed This Visit       Human immunodeficiency virus (HIV) disease     Suppressed on Dovato; follows w/ Dr. Purvis in [place].         Primary hypertension     BP at goal on amlodipine 10mg.         Prostate cancer     On active surveillance.  Most recent PSA increased; will f/u with Urology later this month to discuss options.          Other Visit Diagnoses       Colon cancer screening    -  Primary    Relevant Orders    Ambulatory referral/consult to Endo Procedure             Health Maintenance         Date Due Completion Date    Pneumococcal Vaccines (Age 50+) (4 of 4 - PCV20 or PCV21) 07/23/2025 7/23/2020    Colorectal Cancer Screening 07/31/2025 7/31/2020    TETANUS VACCINE 05/02/2026 5/2/2016    Hemoglobin A1c (Diabetic Prevention Screening) 01/07/2028 1/7/2025    Lipid Panel 01/07/2030 1/7/2025            Future Appointments   Date Time Provider Department Center   1/30/2025 10:40 AM Tammy Wright NP [facility] UROLOG [facility]   2/3/2025 10:00 AM Jennifer Purvis MD [facility] ID [place]         Follow up in about 1 year (around 1/13/2026). Total clinical care time was 40 min.    Abner Lawrence MD  [facility]  [facility]

## 2025-01-13 NOTE — ASSESSMENT & PLAN NOTE
On active surveillance.  Most recent PSA increased; will f/u with Urology later this month to discuss options.

## 2025-01-25 ENCOUNTER — CLINICAL SUPPORT (OUTPATIENT)
Dept: ENDOSCOPY | Facility: HOSPITAL | Age: 65
End: 2025-01-25
Attending: HOSPITALIST
Payer: COMMERCIAL

## 2025-01-25 ENCOUNTER — TELEPHONE (OUTPATIENT)
Dept: ENDOSCOPY | Facility: HOSPITAL | Age: 65
End: 2025-01-25

## 2025-01-25 DIAGNOSIS — Z12.11 COLON CANCER SCREENING: ICD-10-CM

## 2025-01-25 NOTE — TELEPHONE ENCOUNTER
Contact and spoke with patient to schedule colonoscopy. Patient stated that he was not due until July.  inform patient will reschedule his PAT appointment to call him back when the July schedule open. Patient verbalized understanding.

## 2025-01-29 ENCOUNTER — PATIENT MESSAGE (OUTPATIENT)
Dept: PRIMARY CARE CLINIC | Facility: CLINIC | Age: 65
End: 2025-01-29
Payer: COMMERCIAL

## 2025-01-29 DIAGNOSIS — R82.90 URINE ABNORMALITY: Primary | ICD-10-CM

## 2025-01-30 ENCOUNTER — TELEPHONE (OUTPATIENT)
Dept: PRIMARY CARE CLINIC | Facility: CLINIC | Age: 65
End: 2025-01-30
Payer: COMMERCIAL

## 2025-01-30 ENCOUNTER — OFFICE VISIT (OUTPATIENT)
Dept: UROLOGY | Facility: CLINIC | Age: 65
End: 2025-01-30
Payer: COMMERCIAL

## 2025-01-30 ENCOUNTER — TELEPHONE (OUTPATIENT)
Dept: ENDOSCOPY | Facility: HOSPITAL | Age: 65
End: 2025-01-30
Payer: COMMERCIAL

## 2025-01-30 VITALS — BODY MASS INDEX: 27.59 KG/M2 | WEIGHT: 215 LBS | HEIGHT: 74 IN

## 2025-01-30 DIAGNOSIS — Z12.11 SCREEN FOR COLON CANCER: Primary | ICD-10-CM

## 2025-01-30 DIAGNOSIS — R97.20 RISING PSA LEVEL: ICD-10-CM

## 2025-01-30 DIAGNOSIS — R97.20 ELEVATED PSA, BETWEEN 10 AND LESS THAN 20 NG/ML: ICD-10-CM

## 2025-01-30 DIAGNOSIS — C61 PROSTATE CANCER: Primary | ICD-10-CM

## 2025-01-30 DIAGNOSIS — Z12.11 ENCOUNTER FOR SCREENING COLONOSCOPY: Primary | ICD-10-CM

## 2025-01-30 DIAGNOSIS — R93.89 ABNORMAL MRI: ICD-10-CM

## 2025-01-30 NOTE — PROGRESS NOTES
The patient location is: [place]  The chief complaint leading to consultation is: Prostate Cancer    Visit type: audiovisual    Face to Face time with patient: 5 minutes  30 minutes of total time spent on the encounter, which includes face to face time and non-face to face time preparing to see the patient (eg, review of tests), Obtaining and/or reviewing separately obtained history, Documenting clinical information in the electronic or other health record, Independently interpreting results (not separately reported) and communicating results to the patient/family/caregiver, or Care coordination (not separately reported).         Each patient to whom he or she provides medical services by telemedicine is:  (1) informed of the relationship between the physician and patient and the respective role of any other health care provider with respect to management of the patient; and (2) notified that he or she may decline to receive medical services by telemedicine and may withdraw from such care at any time.    Notes:     Simón Fong is a 64 y.o. male who is an established patient in our clinic. He has a history of prostate cancer diagnosed in 2018. He has been on AS since diagnosis.   Initial PSA 7.5. [place] biopsy in 2018- [place] 6 right middle and right base 1/2 cores each, <5%. Overall 2/12 cores positive.  He is very focused on continuing AS, understands risk of progression. Has declined any treatment. His goal has been the [place] [facility] procedure; in [place] or [facility]. It is covered by medicare.   We have been monitoring with serial MRI's and PSA checks.        03/01/2024 Prostate MRI   - PSA 14.1  - 61.13cc  - Stable 1.1cm  PI-RADS 4 lesion in the right peripheral zone at the apex. No extraprostatic extension. No lymphadenopathy.   - 0.7cm PI-RADS 3 lesion in the left anterolateral peripheral zone at the mid-zone, stable dating back to 2021 MRI.         03/12/2024 Prostate Biopsy with PSA of 14.1.  1. Prostate, left apex,  target lesion 2, core needle biopsy:  Prostatic adenocarcinoma, Bu[place] pattern (3+4), score 7, involving 2 of 3 core biopsies, approximately 5-10% of tissue sampled.  Perineural invasion is not identified.  Note:  Percentage of Alcides pattern 4 = 30%.    2. Prostate, left middle, core needle biopsy:  Prostatic adenocarcinoma, [place] pattern (3+4), score 7, involving 1 of 3 core biopsies, approximately 5% of tissue sampled.  Perineural invasion is not identified.  Note:  Percentage of Alcides pattern 4 = 5-10%.    3. Prostate, left base, core needle biopsy:  Benign prostatic tissue with mild chronic inflammation.    4. Prostate, right apex, target lesion 1, core needle biopsy:  Prostatic adenocarcinoma, Bucyrus pattern (3+4), score 7, involving 2 of 2 core biopsies, approximately 15-20% of tissue sampled.  Perineural invasion is not identified.  Note:  Percentage of Alcides pattern 4 = 5%.    5. Prostate, right middle, core needle biopsy:  Prostatic adenocarcinoma, Bucyrus pattern (3+4), score 7, involving 2 of 2 core biopsies, approximately 40% of tissue sampled.  Perineural invasion is not identified.  Note:  Percentage of Bucyrus pattern 4 = 10-15%.    6. Prostate, right base, core needle biopsy:  Prostatic adenocarcinoma, Bucyrus pattern (3+4), score 7, involving 2 of 2 core biopsies, approximately 15% of tissue sampled.  Perineural invasion is not identified.      03/22/2024 we discussed his results. Change and findings of more aggressive prostate cancer. Recommendations to f/u with Dr. Samuel. AS may still be an option but he should discuss options with him. He was waiting on 2nd opinion from [facility],.   Has interest in the [place] [facility]     Last clinic visit was 05/22/2024 with PSA of 14.   We discussed previous conversations and recommendations.  Understands risks but will continue AS. [place] [facility] is his preferred/only choice for PCA treatment. Unfortunately not covered by his insurance. Will wait until he is  Medicare eligible. He turns 65 on 4/16/2025. Again understands risks. Discussed ADT but he is not interested.   05/22/2024 PSMA:  Multifocal uptake in the prostate, compatible with prostate cancer.  No PET evidence of metastatic disease in the chest, abdomen, or pelvis.    Here today for Follow up Visit.  He denies any urinary complaints. No Ed.   No abdominal or bone pain.     PSA is now 19.7.     Lab Results   Component Value Date    PSA 6.5 (H) 12/21/2018    PSA 7.5 (H) 04/30/2018    PSA 4.6 (H) 07/26/2017    PSADIAG 19.7 (H) 01/07/2025    PSADIAG 14.1 (H) 11/27/2023    PSADIAG 14.5 (H) 08/23/2023    PSATOTAL 6.4 (H) 08/22/2019    PSAFREE 0.64 08/22/2019    PSAFREEPCT 10.00 08/22/2019         I spent a total of 30 minutes on the day of the visit. This includes face to face time and non-face to face time preparing to see the patient (eg, review of tests), obtaining and/or reviewing separately obtained history, documenting clinical information in the electronic or other health record, independently interpreting results and communicating results to the patient/family/caregiver, or care coordinator  Reviewed his PSA.   We discussed the concern & risks for mPCa. Voiced understanding.    Reviewed management;   Recommendations for PCP follow up visit; any need to go to the ER.    Recommended lifestyle modifications with a proper, healthy diet, good hydration but during the day. Reducing bladder irritants.   Benefits of regular exercise     Would like to continue with AS and PSA's every 3-4 months.   Based on his maxine core and PSA of 19.7, I recommend at least repeating the PSMA & Prostate MRI.       06/07/2022 Prostate Biopsy with PSA of 12.2:  MRI - 5/12/2022- 0.9 cm PI-RADS 3 lesion RAPZ. Negative extra prostatic extension, NVBI (neurovascular bundle involvement), SVI (seminal vesicle involvement), nodes.  Biopsy - 6/7/2022-[facility]roNav- [place] 6 (GG1) left apex 2/2 60%, right apex (target) 1/4 60%, right middle 2/2  90%, right base 1/1 10%. Overall 4/6 sites, 6/14 cores.  ANDRES Score - 3 intermediate  NCCN - favorable intermediate.

## 2025-01-30 NOTE — TELEPHONE ENCOUNTER
Referral for procedure from Pat      Spoke to patient to schedule procedure(s) Colonoscopy       Physician to perform procedure(s) Dr. JAAVD Nguyen  Date of Procedure (s) 02/10/2025  Arrival Time 2:00 Pm   Time of Procedure(s) 3:00 PM    Location of Procedure(s) [place] 4th Floor  Type of Rx Prep sent to patient: PEG  Instructions provided to patient via [facility]    Patient was informed on the following information and verbalized understanding. Screening questionnaire reviewed with patient and complete. If procedure requires anesthesia, a responsible adult needs to be present to accompany the patient home, patient cannot drive after receiving anesthesia. Appointment details are tentative, especially check-in time. Patient will receive a prep-op call 7 days prior to confirm check-in time for procedure. If applicable the patient should contact their pharmacy to verify Rx for procedure prep is ready for pick-up. Patient was advised to call the scheduling department at [phone] if pharmacy states no Rx is available. Patient was advised to call the endoscopy scheduling department if any questions or concerns arise.      [facility] Scheduling Department

## 2025-01-31 ENCOUNTER — TELEPHONE (OUTPATIENT)
Dept: ENDOSCOPY | Facility: HOSPITAL | Age: 65
End: 2025-01-31
Payer: COMMERCIAL

## 2025-01-31 NOTE — TELEPHONE ENCOUNTER
Referral for procedure from telephone call r/s [facility]      Spoke to patient to reschedule procedure(s) Colonoscopy       Physician to perform procedure(s) Dr. MAURICE Nguyen  Date of Procedure (s) 2/3/25  Arrival Time 7:30 AM  Time of Procedure(s) 8:30 AM   Location of Procedure(s) [place] 4th Floor  Type of Rx Prep sent to patient: PEG  Instructions provided to patient via [facility]    Patient was informed on the following information and verbalized understanding. Screening questionnaire reviewed with patient and complete. If procedure requires anesthesia, a responsible adult needs to be present to accompany the patient home, patient cannot drive after receiving anesthesia. Appointment details are tentative, especially check-in time. Patient will receive a prep-op call 7 days prior to confirm check-in time for procedure. If applicable the patient should contact their pharmacy to verify Rx for procedure prep is ready for pick-up. Patient was advised to call the scheduling department at [phone] if pharmacy states no Rx is available. Patient was advised to call the endoscopy scheduling department if any questions or concerns arise.      [facility] Scheduling Department

## 2025-02-03 ENCOUNTER — ANESTHESIA EVENT (OUTPATIENT)
Dept: ENDOSCOPY | Facility: HOSPITAL | Age: 65
End: 2025-02-03
Payer: COMMERCIAL

## 2025-02-03 ENCOUNTER — HOSPITAL ENCOUNTER (OUTPATIENT)
Facility: HOSPITAL | Age: 65
Discharge: HOME | End: 2025-02-03
Attending: COLON & RECTAL SURGERY | Admitting: COLON & RECTAL SURGERY
Payer: COMMERCIAL

## 2025-02-03 ENCOUNTER — ANESTHESIA (OUTPATIENT)
Dept: ENDOSCOPY | Facility: HOSPITAL | Age: 65
End: 2025-02-03
Payer: COMMERCIAL

## 2025-02-03 VITALS
HEART RATE: 48 BPM | RESPIRATION RATE: 20 BRPM | WEIGHT: 215 LBS | TEMPERATURE: 98 F | OXYGEN SATURATION: 100 % | DIASTOLIC BLOOD PRESSURE: 78 MMHG | BODY MASS INDEX: 28.49 KG/M2 | SYSTOLIC BLOOD PRESSURE: 137 MMHG | HEIGHT: 73 IN

## 2025-02-03 DIAGNOSIS — Z12.11 COLON CANCER SCREENING: Primary | ICD-10-CM

## 2025-02-03 PROCEDURE — 88305 TISSUE EXAM BY PATHOLOGIST: CPT | Performed by: STUDENT IN AN ORGANIZED HEALTH CARE EDUCATION/TRAINING PROGRAM

## 2025-02-03 PROCEDURE — 45380 COLONOSCOPY AND BIOPSY: CPT | Mod: 33 | Performed by: COLON & RECTAL SURGERY

## 2025-02-03 PROCEDURE — 63600175 PHARM REV CODE 636 W HCPCS

## 2025-02-03 PROCEDURE — 37000008 HC ANESTHESIA 1ST 15 MINUTES: Performed by: COLON & RECTAL SURGERY

## 2025-02-03 PROCEDURE — 37000009 HC ANESTHESIA EA ADD 15 MINS: Performed by: COLON & RECTAL SURGERY

## 2025-02-03 PROCEDURE — 88305 TISSUE EXAM BY PATHOLOGIST: CPT | Mod: 26,,, | Performed by: STUDENT IN AN ORGANIZED HEALTH CARE EDUCATION/TRAINING PROGRAM

## 2025-02-03 PROCEDURE — 45380 COLONOSCOPY AND BIOPSY: CPT | Mod: 33,,, | Performed by: COLON & RECTAL SURGERY

## 2025-02-03 PROCEDURE — E9220 PRA ENDO ANESTHESIA: HCPCS | Mod: ,,,

## 2025-02-03 PROCEDURE — 27201089 HC SNARE, DISP (ANY): Performed by: COLON & RECTAL SURGERY

## 2025-02-03 RX ORDER — LIDOCAINE HYDROCHLORIDE 20 MG/ML
INJECTION, SOLUTION EPIDURAL; INFILTRATION; INTRACAUDAL; PERINEURAL
Status: DISCONTINUED | OUTPATIENT
Start: 2025-02-03 | End: 2025-02-03

## 2025-02-03 RX ORDER — SODIUM CHLORIDE 9 MG/ML
INJECTION, SOLUTION INTRAVENOUS CONTINUOUS
Status: DISCONTINUED | OUTPATIENT
Start: 2025-02-03 | End: 2025-02-03 | Stop reason: HOSPADM

## 2025-02-03 RX ORDER — PROPOFOL 10 MG/ML
VIAL (ML) INTRAVENOUS
Status: DISCONTINUED | OUTPATIENT
Start: 2025-02-03 | End: 2025-02-03

## 2025-02-03 RX ADMIN — LIDOCAINE HYDROCHLORIDE 50 MG: 20 INJECTION, SOLUTION EPIDURAL; INFILTRATION; INTRACAUDAL; PERINEURAL at 08:02

## 2025-02-03 RX ADMIN — PROPOFOL 150 MCG/KG/MIN: 10 INJECTION, EMULSION INTRAVENOUS at 08:02

## 2025-02-03 RX ADMIN — PROPOFOL 20 MG: 10 INJECTION, EMULSION INTRAVENOUS at 08:02

## 2025-02-03 RX ADMIN — PROPOFOL 70 MG: 10 INJECTION, EMULSION INTRAVENOUS at 08:02

## 2025-02-03 NOTE — ANESTHESIA PREPROCEDURE EVALUATION
02/03/2025  Simón Fong is a 64 y.o., male.    Past Medical History:   Diagnosis Date    HIV infection     Hypertension      Past Surgical History:   Procedure Laterality Date    COLONOSCOPY      COLONOSCOPY N/A 7/31/2020    Procedure: COLONOSCOPY;  Surgeon: Tree Mendiola MD;  Location: [address];  Service: Endoscopy;  Laterality: N/A;  covid 7/28-[facility]    PROSTATE BIOPSY             Pre-op Assessment    I have reviewed the Patient Summary Reports.     I have reviewed the Nursing Notes. I have reviewed the NPO Status.   I have reviewed the Medications.     Review of Systems  Anesthesia Hx:  No problems with previous Anesthesia                Social:  Non-Smoker       Cardiovascular:  Exercise tolerance: good   Hypertension                                          Neurological:    Neuromuscular Disease,                                       Physical Exam  General: Well nourished, Alert and Oriented    Airway:  Mallampati: II / I  Mouth Opening: Normal  TM Distance: Normal  Tongue: Normal  Neck ROM: Normal ROM    Dental:  Intact    Chest/Lungs:  Clear to auscultation, Normal Respiratory Rate    Heart:  Rate: Normal  Rhythm: Regular Rhythm  Sounds: Normal        Anesthesia Plan  Type of Anesthesia, risks & benefits discussed:    Anesthesia Type: Gen Natural Airway  Intra-op Monitoring Plan: Standard ASA Monitors  Induction:  IV  Informed Consent: Informed consent signed with the Patient and all parties understand the risks and agree with anesthesia plan.  All questions answered.   ASA Score: 2  Day of Surgery Review of History & Physical: H&P Update referred to the surgeon/provider.    Ready For Surgery From Anesthesia Perspective.     .

## 2025-02-03 NOTE — TRANSFER OF CARE
"Anesthesia Transfer of Care Note    Patient: Simón Fong    Procedure(s) Performed: Procedure(s) (LRB):  COLONOSCOPY, SCREENING, HIGH RISK PATIENT (N/A)    Patient location: PACU    Anesthesia Type: general    Transport from OR: Transported from OR on room air with adequate spontaneous ventilation    Post pain: adequate analgesia    Post assessment: no apparent anesthetic complications and tolerated procedure well    Post vital signs: stable    Level of consciousness: sedated    Nausea/Vomiting: no nausea/vomiting    Complications: none    Transfer of care protocol was followed      Last vitals: Visit Vitals  BP (!) 168/82   Pulse 74   Temp 36.5 °C (97.7 °F)   Resp 15   Ht 6' 1" (1.854 m)   Wt 97.5 kg (215 lb)   SpO2 100%   BMI 28.37 kg/m²     "

## 2025-02-03 NOTE — ANESTHESIA POSTPROCEDURE EVALUATION
Anesthesia Post Evaluation    Patient: Simón Fong    Procedure(s) Performed: Procedure(s) (LRB):  COLONOSCOPY, SCREENING, HIGH RISK PATIENT (N/A)    Final Anesthesia Type: general      Patient location during evaluation: PACU  Patient participation: Yes- Able to Participate  Level of consciousness: awake and alert  Post-procedure vital signs: reviewed and stable  Pain management: adequate  Airway patency: patent    PONV status at discharge: No PONV  Anesthetic complications: no      Cardiovascular status: blood pressure returned to baseline  Respiratory status: spontaneous ventilation and room air  Hydration status: euvolemic  Follow-up not needed.              Vitals Value Taken Time   /78 02/03/25 0935   Temp 36.6 °C (97.8 °F) 02/03/25 0915   Pulse 48 02/03/25 0935   Resp 20 02/03/25 0935   SpO2 100 % 02/03/25 0935         Event Time   Out of Recovery 09:47:02         Pain/Destinee Score: Destinee Score: 8 (2/3/2025  9:05 AM)

## 2025-02-03 NOTE — PROVATION PATIENT INSTRUCTIONS
Discharge Summary/Instructions after an Endoscopic Procedure  Patient Name: Simón Fong  Patient MRN: [id number]  Patient YOB: 1960  Monday, February 3, 2025  Clarence Nguyen MD  Dear patient,  As a result of recent federal legislation (The Federal Cures Act), you may   receive lab or pathology results from your procedure in your [facility]   account before your physician is able to contact you. Your physician or   their representative will relay the results to you with their   recommendations at their soonest availability.  Thank you,  RESTRICTIONS:  During your procedure today, you received medications for sedation.  These   medications may affect your judgment, balance and coordination.  Therefore,   for 24 hours, you have the following restrictions:   - DO NOT drive a car, operate machinery, make legal/financial decisions,   sign important papers or drink alcohol.    ACTIVITY:  Today: no heavy lifting, straining or running due to procedural   sedation/anesthesia.  The following day: return to full activity including work.  DIET:  Eat and drink normally unless instructed otherwise.     TREATMENT FOR COMMON SIDE EFFECTS:  - Mild abdominal pain, nausea, belching, bloating or excessive gas:  rest,   eat lightly and use a heating pad.  - Sore Throat: treat with throat lozenges and/or gargle with warm salt   water.  - Because air was used during the procedure, expelling large amounts of air   from your rectum or belching is normal.  - If a bowel prep was taken, you may not have a bowel movement for 1-3 days.    This is normal.  SYMPTOMS TO WATCH FOR AND REPORT TO YOUR PHYSICIAN:  1. Abdominal pain or bloating, other than gas cramps.  2. Chest pain.  3. Back pain.  4. Signs of infection such as: chills or fever occurring within 24 hours   after the procedure.  5. Rectal bleeding, which would show as bright red, maroon, or black stools.   (A tablespoon of blood from the rectum is not serious, especially if    hemorrhoids are present.)  6. Vomiting.  7. Weakness or dizziness.  GO DIRECTLY TO THE NEAREST EMERGENCY ROOM IF YOU HAVE ANY OF THE FOLLOWING:      Difficulty breathing              Chills and/or fever over 101 F   Persistent vomiting and/or vomiting blood   Severe abdominal pain   Severe chest pain   Black, tarry stools   Bleeding- more than one tablespoon   Any other symptom or condition that you feel may need urgent attention  Your doctor recommends these additional instructions:  If any biopsies were taken, your doctors clinic will contact you in 1 to 2   weeks with any results.  - Discharge patient to home (ambulatory).   - Patient has a contact number available for emergencies.  The signs and   symptoms of potential delayed complications were discussed with the   patient.  Return to normal activities tomorrow.  Written discharge   instructions were provided to the patient.   - Resume previous diet.   - Continue present medications.   - Return to primary care physician as previously scheduled.   - Repeat colonoscopy in 7 years for surveillance.  For questions, problems or results please call your physician - Clarence Nguyen MD at Work:  [phone].  [facility], EMERGENCY ROOM PHONE NUMBER: [phone]  IF A COMPLICATION OR EMERGENCY SITUATION ARISES AND YOU ARE UNABLE TO REACH   YOUR PHYSICIAN - GO DIRECTLY TO THE EMERGENCY ROOM.  Clarence Nguyen MD  2/3/2025 9:03:15 AM  This report has been verified and signed electronically.  Dear patient,  As a result of recent federal legislation (The Federal Cures Act), you may   receive lab or pathology results from your procedure in your [facility]   account before your physician is able to contact you. Your physician or   their representative will relay the results to you with their   recommendations at their soonest availability.  Thank you,  PROVATION

## 2025-02-03 NOTE — H&P
COLONOSCOPY HISTORY AND PHYSICAL EXAM    Procedure : Colonoscopy      INDICATIONS: personal history of colon polyps    Family Hx of CRC: none    Last Colonoscopy:  2020  Findings: polyp       Past Medical History:   Diagnosis Date    HIV infection     Hypertension      Sedation Problems: NO  Family History   Problem Relation Name Age of Onset    Diabetes Mother      Heart disease Mother      Hypertension Mother      Stroke Mother      Hypertension Father      Cancer Sister      Cancer Brother       Fam Hx of Sedation Problems: NO  Social History     Socioeconomic History    Marital status: Single   Tobacco Use    Smoking status: Never    Smokeless tobacco: Never   Substance and Sexual Activity    Alcohol use: No    Drug use: No     Social Drivers of Health     Financial Resource Strain: Low Risk  (1/26/2024)    Overall Financial Resource Strain (CARDIA)     Difficulty of Paying Living Expenses: Not hard at all   Food Insecurity: No Food Insecurity (1/26/2024)    Hunger Vital Sign     Worried About Running Out of Food in the Last Year: Never true     Ran Out of Food in the Last Year: Never true   Transportation Needs: No Transportation Needs (1/26/2024)    PRAPARE - Transportation     Lack of Transportation (Medical): No     Lack of Transportation (Non-Medical): No   Physical Activity: Sufficiently Active (1/26/2024)    Exercise Vital Sign     Days of Exercise per Week: 6 days     Minutes of Exercise per Session: 80 min   Stress: No Stress Concern Present (1/26/2024)    [employer] - Occupational Stress Questionnaire     Feeling of Stress : Only a little   Housing Stability: Unknown (1/26/2024)    Housing Stability Vital Sign     Unable to Pay for Housing in the Last Year: No     Unstable Housing in the Last Year: No       Review of Systems - Negative except   Respiratory ROS: no dyspnea  Cardiovascular ROS: no exertional chest pain  Gastrointestinal ROS: NO abdominal discomfort,  NO  "rectal bleeding  Musculoskeletal ROS: no muscular pain  Neurological ROS: no recent stroke    Physical Exam:  BP (!) 168/82   Pulse 74   Temp 97.7 °F (36.5 °C)   Resp 15   Ht 6' 1" (1.854 m)   Wt 97.5 kg (215 lb)   SpO2 100%   BMI 28.37 kg/m²   General: no distress  Head: normocephalic  Mallampati Score   Neck: supple, symmetrical, trachea midline  Lungs:  normal respiratory effort  Heart: regular rate and rhythm  Abdomen: soft, non-tender non-distented; bowel sounds normal; no masses,  no organomegaly  Extremities: no cyanosis or edema, or clubbing    ASA:  II    PLAN  COLONOSCOPY.    SedationPlan :MAC    The details of the procedure, the possible need for biopsy or polypectomy and the potential risks including bleeding, perforation, missed polyps were discussed in detail.      "

## 2025-02-03 NOTE — PLAN OF CARE
Discharge instructions along with Provation reports reviewed with patient. Verbalizes understanding. Escorted to lobby steady gait noted. NAD noted, no c/o pain or discomfort.  Sister in waiting room states patient.

## 2025-02-04 ENCOUNTER — CLINICAL SUPPORT (OUTPATIENT)
Dept: PRIMARY CARE CLINIC | Facility: CLINIC | Age: 65
End: 2025-02-04
Payer: COMMERCIAL

## 2025-02-04 DIAGNOSIS — R97.20 ELEVATED PSA, BETWEEN 10 AND LESS THAN 20 NG/ML: ICD-10-CM

## 2025-02-04 DIAGNOSIS — C61 PROSTATE CANCER: ICD-10-CM

## 2025-02-04 DIAGNOSIS — C61 PROSTATE CANCER: Primary | ICD-10-CM

## 2025-02-04 DIAGNOSIS — Z85.46 ENCOUNTER FOR FOLLOW-UP SURVEILLANCE OF PROSTATE CANCER: ICD-10-CM

## 2025-02-04 DIAGNOSIS — Z08 ENCOUNTER FOR FOLLOW-UP SURVEILLANCE OF PROSTATE CANCER: ICD-10-CM

## 2025-02-04 LAB
FINAL PATHOLOGIC DIAGNOSIS: NORMAL
GROSS: NORMAL
Lab: NORMAL

## 2025-02-04 PROCEDURE — 87088 URINE BACTERIA CULTURE: CPT | Performed by: NURSE PRACTITIONER

## 2025-02-04 PROCEDURE — 87186 SC STD MICRODIL/AGAR DIL: CPT | Performed by: NURSE PRACTITIONER

## 2025-02-04 PROCEDURE — 87086 URINE CULTURE/COLONY COUNT: CPT | Performed by: NURSE PRACTITIONER

## 2025-02-06 LAB — BACTERIA UR CULT: ABNORMAL

## 2025-02-07 ENCOUNTER — LAB VISIT (OUTPATIENT)
Dept: LAB | Facility: HOSPITAL | Age: 65
End: 2025-02-07
Attending: HOSPITALIST
Payer: COMMERCIAL

## 2025-02-07 ENCOUNTER — OFFICE VISIT (OUTPATIENT)
Dept: GASTROENTEROLOGY | Facility: CLINIC | Age: 65
End: 2025-02-07
Payer: COMMERCIAL

## 2025-02-07 ENCOUNTER — TELEPHONE (OUTPATIENT)
Facility: CLINIC | Age: 65
End: 2025-02-07
Payer: COMMERCIAL

## 2025-02-07 VITALS
HEART RATE: 76 BPM | SYSTOLIC BLOOD PRESSURE: 162 MMHG | DIASTOLIC BLOOD PRESSURE: 105 MMHG | BODY MASS INDEX: 27.77 KG/M2 | HEIGHT: 74 IN | WEIGHT: 216.38 LBS

## 2025-02-07 DIAGNOSIS — R14.0 BLOATING: Primary | ICD-10-CM

## 2025-02-07 DIAGNOSIS — R82.90 URINE ABNORMALITY: ICD-10-CM

## 2025-02-07 DIAGNOSIS — Z79.4 TYPE 2 DIABETES MELLITUS WITH DIABETIC PERIPHERAL ANGIOPATHY WITHOUT GANGRENE, WITH LONG-TERM CURRENT USE OF INSULIN: ICD-10-CM

## 2025-02-07 DIAGNOSIS — K59.00 CONSTIPATION, UNSPECIFIED CONSTIPATION TYPE: ICD-10-CM

## 2025-02-07 DIAGNOSIS — Z79.4 TYPE 2 DIABETES MELLITUS WITH DIABETIC PERIPHERAL ANGIOPATHY WITHOUT GANGRENE, WITH LONG-TERM CURRENT USE OF INSULIN: Primary | ICD-10-CM

## 2025-02-07 DIAGNOSIS — E11.51 TYPE 2 DIABETES MELLITUS WITH DIABETIC PERIPHERAL ANGIOPATHY WITHOUT GANGRENE, WITH LONG-TERM CURRENT USE OF INSULIN: Primary | ICD-10-CM

## 2025-02-07 DIAGNOSIS — E11.51 TYPE 2 DIABETES MELLITUS WITH DIABETIC PERIPHERAL ANGIOPATHY WITHOUT GANGRENE, WITH LONG-TERM CURRENT USE OF INSULIN: ICD-10-CM

## 2025-02-07 LAB
BILIRUB UR QL STRIP: NEGATIVE
CLARITY UR: CLEAR
COLOR UR: COLORLESS
GLUCOSE UR QL STRIP: NEGATIVE
HGB UR QL STRIP: NEGATIVE
KETONES UR QL STRIP: NEGATIVE
LEUKOCYTE ESTERASE UR QL STRIP: NEGATIVE
NITRITE UR QL STRIP: NEGATIVE
PH UR STRIP: 8 [PH] (ref 5–8)
PROT UR QL STRIP: NEGATIVE
SP GR UR STRIP: 1.01 (ref 1–1.03)
URN SPEC COLLECT METH UR: ABNORMAL
UROBILINOGEN UR STRIP-ACNC: NEGATIVE EU/DL

## 2025-02-07 PROCEDURE — 82043 UR ALBUMIN QUANTITATIVE: CPT | Performed by: HOSPITALIST

## 2025-02-07 PROCEDURE — 81003 URINALYSIS AUTO W/O SCOPE: CPT | Performed by: HOSPITALIST

## 2025-02-07 PROCEDURE — 3077F SYST BP >= 140 MM HG: CPT | Mod: CPTII,S$GLB,,

## 2025-02-07 PROCEDURE — 3044F HG A1C LEVEL LT 7.0%: CPT | Mod: CPTII,S$GLB,,

## 2025-02-07 PROCEDURE — 1159F MED LIST DOCD IN RCRD: CPT | Mod: CPTII,S$GLB,,

## 2025-02-07 PROCEDURE — 3008F BODY MASS INDEX DOCD: CPT | Mod: CPTII,S$GLB,,

## 2025-02-07 PROCEDURE — 99999 PR PBB SHADOW E&M-EST. PATIENT-LVL IV: CPT | Mod: PBBFAC,,,

## 2025-02-07 PROCEDURE — 99203 OFFICE O/P NEW LOW 30 MIN: CPT | Mod: S$GLB,,,

## 2025-02-07 PROCEDURE — 3080F DIAST BP >= 90 MM HG: CPT | Mod: CPTII,S$GLB,,

## 2025-02-07 PROCEDURE — 84105 ASSAY OF URINE PHOSPHORUS: CPT | Performed by: HOSPITALIST

## 2025-02-07 NOTE — PROGRESS NOTES
"    [facility] Consultation Note    Reason for Consult:  The primary encounter diagnosis was Bloating. A diagnosis of Constipation, unspecified constipation type was also pertinent to this visit.    PCP:   Jimena Barone   No address on file    Referring MD:  Self, Madhavi  No address on file    HPI:  This is a 64 y.o. male with HTN, HIV, and prostate cancer here for evaluation of bloating, belching, gas, and intermittent lower abdominal pain for 3-4 weeks. Pt also had constipation, but this improved with daily miralax for a week. He has since discontinued Miralax and has been having daily, formed BM. Reports stool was floating previously, but not today. Abdominal pain is relieved with belching or with passing gas/BM. Of note, he also may have UTI, UA is pending. Pt denies N/V, loss of appetite, dysphagia, reflux, bloody stools, rectal bleeding, melena, or unintentional weight loss. Denies frequent NSAID use. Denies tobacco and alcohol use. Brother diagnosed with pancreatic cancer in his 70s. He is concerned about his pancreas. Colonoscopy 4 days ago with one 3 mm polyp and melanosis of the colon. Repeat in 7 years.    Objective Findings:    Vital Signs:  BP (!) 152/100   Pulse 73   Ht 6' 2" (1.88 m)   Wt 98.1 kg (216 lb 6.1 oz)   BMI 27.78 kg/m²   Body mass index is 27.78 kg/m².    Physical Exam:  General Appearance: Well appearing in no acute distress  Abdomen: Soft, non tender, non distended in all four quadrants. No hepatosplenomegaly, ascites, or mass.    Assessment:  1. Bloating    2. Constipation, unspecified constipation type      This is a 64 y.o M who presents for evaluation of constipation, now improved, and bloating, belching, gas, and intermittent lower abdominal pain for about a month. Pain relieved with gas, belching, or BM. Will test for H.pylori.     - Ordered stool testing  - Recommended daily fiber supplement   - Continue Miralax as needed for constipation  - " Upcoming PET CT scan    RTC 4 months.     Order summary:  Orders Placed This Encounter    H. pylori antigen, stool    Pancreatic elastase, fecal     Thank you so much for allowing me to participate in the care of Simón Jordan Sarah Abukhader, PA-C [facility]

## 2025-02-07 NOTE — TELEPHONE ENCOUNTER
UA orders reordered and scheduled.     Urine was ordered at clinic collect but should have been future home collect.   solids

## 2025-02-07 NOTE — PATIENT INSTRUCTIONS
Ordered 2 stool tests, please submit stool sample at your convenience.   Testing for H.pylori infection and fecal elastase (pancreatic digestive enzyme).     Recommend starting fiber supplement daily, such as Citrucel.   Continue taking Miralax as needed.     Recommend trying IBgard for bloating/gas.

## 2025-02-08 LAB
ALBUMIN/CREAT UR: NORMAL UG/MG (ref 0–30)
CREAT UR-MCNC: 53 MG/DL (ref 23–375)
MICROALBUMIN UR DL<=1MG/L-MCNC: <5 UG/ML
PHOSPHATE UR-MCNC: 24 MG/DL

## 2025-02-09 ENCOUNTER — PATIENT MESSAGE (OUTPATIENT)
Dept: GASTROENTEROLOGY | Facility: CLINIC | Age: 65
End: 2025-02-09
Payer: COMMERCIAL

## 2025-02-10 ENCOUNTER — PATIENT MESSAGE (OUTPATIENT)
Dept: UROLOGY | Facility: CLINIC | Age: 65
End: 2025-02-10
Payer: COMMERCIAL

## 2025-02-10 DIAGNOSIS — R82.71 BACTERIA IN URINE: ICD-10-CM

## 2025-02-10 DIAGNOSIS — C61 PROSTATE CANCER: Primary | ICD-10-CM

## 2025-02-10 RX ORDER — NITROFURANTOIN 25; 75 MG/1; MG/1
100 CAPSULE ORAL 2 TIMES DAILY
Qty: 14 CAPSULE | Refills: 0 | Status: SHIPPED | OUTPATIENT
Start: 2025-02-10 | End: 2025-02-17

## 2025-02-10 NOTE — TELEPHONE ENCOUNTER
Rx sent to cover.     
As attending physician, I performed evaluation and agree with the above assessment and plan. I discussed the plan with ENT team and primary team. I reviewed appropriate radiology and/or lab work. I discussed plan with the patient or patient's family.

## 2025-02-11 ENCOUNTER — LAB VISIT (OUTPATIENT)
Dept: LAB | Facility: HOSPITAL | Age: 65
End: 2025-02-11
Payer: COMMERCIAL

## 2025-02-11 ENCOUNTER — PATIENT MESSAGE (OUTPATIENT)
Dept: UROLOGY | Facility: CLINIC | Age: 65
End: 2025-02-11
Payer: COMMERCIAL

## 2025-02-11 DIAGNOSIS — R14.0 BLOATING: ICD-10-CM

## 2025-02-11 PROCEDURE — 82653 EL-1 FECAL QUANTITATIVE: CPT

## 2025-02-11 PROCEDURE — 87338 HPYLORI STOOL AG IA: CPT

## 2025-02-16 ENCOUNTER — PATIENT MESSAGE (OUTPATIENT)
Dept: GASTROENTEROLOGY | Facility: CLINIC | Age: 65
End: 2025-02-16
Payer: COMMERCIAL

## 2025-02-16 ENCOUNTER — PATIENT MESSAGE (OUTPATIENT)
Dept: UROLOGY | Facility: CLINIC | Age: 65
End: 2025-02-16
Payer: COMMERCIAL

## 2025-02-17 LAB
ELASTASE 1, FECAL: 15
ELASTASE INTERPRETATION: ABNORMAL

## 2025-02-18 ENCOUNTER — PATIENT MESSAGE (OUTPATIENT)
Dept: GASTROENTEROLOGY | Facility: CLINIC | Age: 65
End: 2025-02-18
Payer: COMMERCIAL

## 2025-02-18 ENCOUNTER — RESULTS FOLLOW-UP (OUTPATIENT)
Dept: GASTROENTEROLOGY | Facility: CLINIC | Age: 65
End: 2025-02-18
Payer: COMMERCIAL

## 2025-02-18 DIAGNOSIS — K86.89 PANCREATIC INSUFFICIENCY: Primary | ICD-10-CM

## 2025-02-18 NOTE — TELEPHONE ENCOUNTER
Spoke with patient over the phone to discuss fecal elastase test results which came back low. States he took Miralax when he submitted stool sample, but denies loose/watery stool. He reports he is cancelling his scheduled PET CT scan for tmrw due to upcoming change in insurance, planning on rescheduling. Will plan to retest fecal elastase and get abdominal US in the meantime to evaluate pancreas. He verbalized understanding.

## 2025-02-20 LAB — H.PYLORI ANTIGEN INTERPRETATION (OHS): NORMAL

## 2025-02-21 ENCOUNTER — HOSPITAL ENCOUNTER (OUTPATIENT)
Dept: RADIOLOGY | Facility: HOSPITAL | Age: 65
Discharge: HOME | End: 2025-02-21
Payer: COMMERCIAL

## 2025-02-21 ENCOUNTER — RESULTS FOLLOW-UP (OUTPATIENT)
Dept: GASTROENTEROLOGY | Facility: CLINIC | Age: 65
End: 2025-02-21

## 2025-02-21 ENCOUNTER — PATIENT MESSAGE (OUTPATIENT)
Dept: PRIMARY CARE CLINIC | Facility: CLINIC | Age: 65
End: 2025-02-21
Payer: COMMERCIAL

## 2025-02-21 DIAGNOSIS — K86.89 PANCREATIC INSUFFICIENCY: ICD-10-CM

## 2025-02-21 PROCEDURE — 76700 US EXAM ABDOM COMPLETE: CPT | Mod: TC

## 2025-02-21 PROCEDURE — 76700 US EXAM ABDOM COMPLETE: CPT | Mod: 26,,, | Performed by: RADIOLOGY

## 2025-03-18 ENCOUNTER — HOSPITAL ENCOUNTER (EMERGENCY)
Facility: HOSPITAL | Age: 65
Discharge: HOME | End: 2025-03-18
Attending: EMERGENCY MEDICINE
Payer: COMMERCIAL

## 2025-03-18 VITALS
OXYGEN SATURATION: 100 % | BODY MASS INDEX: 27.35 KG/M2 | WEIGHT: 213 LBS | HEART RATE: 50 BPM | DIASTOLIC BLOOD PRESSURE: 103 MMHG | SYSTOLIC BLOOD PRESSURE: 181 MMHG | TEMPERATURE: 98 F | RESPIRATION RATE: 14 BRPM

## 2025-03-18 DIAGNOSIS — N40.0 ENLARGED PROSTATE: ICD-10-CM

## 2025-03-18 DIAGNOSIS — R10.9 ABDOMINAL PAIN, UNSPECIFIED ABDOMINAL LOCATION: Primary | ICD-10-CM

## 2025-03-18 DIAGNOSIS — N13.30 HYDRONEPHROSIS, UNSPECIFIED HYDRONEPHROSIS TYPE: ICD-10-CM

## 2025-03-18 LAB
ALBUMIN SERPL BCP-MCNC: 3.9 G/DL (ref 3.5–5.2)
ALP SERPL-CCNC: 64 U/L (ref 40–150)
ALT SERPL W/O P-5'-P-CCNC: 13 U/L (ref 10–44)
ANION GAP SERPL CALC-SCNC: 7 MMOL/L (ref 8–16)
AST SERPL-CCNC: 22 U/L (ref 10–40)
BASOPHILS # BLD AUTO: 0.02 K/UL (ref 0–0.2)
BASOPHILS NFR BLD: 0.6 % (ref 0–1.9)
BILIRUB SERPL-MCNC: 0.4 MG/DL (ref 0.1–1)
BILIRUB UR QL STRIP: NEGATIVE
BUN SERPL-MCNC: 11 MG/DL (ref 8–23)
CALCIUM SERPL-MCNC: 8.4 MG/DL (ref 8.7–10.5)
CHLORIDE SERPL-SCNC: 110 MMOL/L (ref 95–110)
CLARITY UR REFRACT.AUTO: CLEAR
CO2 SERPL-SCNC: 24 MMOL/L (ref 23–29)
COLOR UR AUTO: NORMAL
CREAT SERPL-MCNC: 0.9 MG/DL (ref 0.5–1.4)
DIFFERENTIAL METHOD BLD: ABNORMAL
EOSINOPHIL # BLD AUTO: 0 K/UL (ref 0–0.5)
EOSINOPHIL NFR BLD: 1.2 % (ref 0–8)
ERYTHROCYTE [DISTWIDTH] IN BLOOD BY AUTOMATED COUNT: 12.6 % (ref 11.5–14.5)
EST. GFR  (NO RACE VARIABLE): >60 ML/MIN/1.73 M^2
GLUCOSE SERPL-MCNC: 97 MG/DL (ref 70–110)
GLUCOSE UR QL STRIP: NEGATIVE
HCT VFR BLD AUTO: 45.4 % (ref 40–54)
HCV AB SERPL QL IA: NORMAL
HGB BLD-MCNC: 15.4 G/DL (ref 14–18)
HGB UR QL STRIP: NEGATIVE
IMM GRANULOCYTES # BLD AUTO: 0 K/UL (ref 0–0.04)
IMM GRANULOCYTES NFR BLD AUTO: 0 % (ref 0–0.5)
KETONES UR QL STRIP: NEGATIVE
LACTATE SERPL-SCNC: 1.1 MMOL/L (ref 0.5–2.2)
LEUKOCYTE ESTERASE UR QL STRIP: NEGATIVE
LIPASE SERPL-CCNC: 20 U/L (ref 4–60)
LYMPHOCYTES # BLD AUTO: 1.7 K/UL (ref 1–4.8)
LYMPHOCYTES NFR BLD: 52 % (ref 18–48)
MCH RBC QN AUTO: 30.9 PG (ref 27–31)
MCHC RBC AUTO-ENTMCNC: 33.9 G/DL (ref 32–36)
MCV RBC AUTO: 91 FL (ref 82–98)
MONOCYTES # BLD AUTO: 0.4 K/UL (ref 0.3–1)
MONOCYTES NFR BLD: 10.6 % (ref 4–15)
NEUTROPHILS # BLD AUTO: 1.2 K/UL (ref 1.8–7.7)
NEUTROPHILS NFR BLD: 35.6 % (ref 38–73)
NITRITE UR QL STRIP: NEGATIVE
NRBC BLD-RTO: 0 /100 WBC
PH UR STRIP: 7 [PH] (ref 5–8)
PLATELET # BLD AUTO: 264 K/UL (ref 150–450)
PMV BLD AUTO: 11 FL (ref 9.2–12.9)
POTASSIUM SERPL-SCNC: 3.8 MMOL/L (ref 3.5–5.1)
PROT SERPL-MCNC: 6.6 G/DL (ref 6–8.4)
PROT UR QL STRIP: NEGATIVE
RBC # BLD AUTO: 4.99 M/UL (ref 4.6–6.2)
SODIUM SERPL-SCNC: 141 MMOL/L (ref 136–145)
SP GR UR STRIP: 1.01 (ref 1–1.03)
URN SPEC COLLECT METH UR: NORMAL
WBC # BLD AUTO: 3.29 K/UL (ref 3.9–12.7)

## 2025-03-18 PROCEDURE — 25500020 PHARM REV CODE 255: Performed by: EMERGENCY MEDICINE

## 2025-03-18 PROCEDURE — 83690 ASSAY OF LIPASE: CPT | Performed by: EMERGENCY MEDICINE

## 2025-03-18 PROCEDURE — 81003 URINALYSIS AUTO W/O SCOPE: CPT | Performed by: PHYSICIAN ASSISTANT

## 2025-03-18 PROCEDURE — 99285 EMERGENCY DEPT VISIT HI MDM: CPT | Mod: 25

## 2025-03-18 PROCEDURE — 96375 TX/PRO/DX INJ NEW DRUG ADDON: CPT

## 2025-03-18 PROCEDURE — 63600175 PHARM REV CODE 636 W HCPCS: Performed by: PHYSICIAN ASSISTANT

## 2025-03-18 PROCEDURE — 83605 ASSAY OF LACTIC ACID: CPT | Performed by: PHYSICIAN ASSISTANT

## 2025-03-18 PROCEDURE — 86803 HEPATITIS C AB TEST: CPT | Performed by: EMERGENCY MEDICINE

## 2025-03-18 PROCEDURE — 85025 COMPLETE CBC W/AUTO DIFF WBC: CPT | Performed by: PHYSICIAN ASSISTANT

## 2025-03-18 PROCEDURE — 80053 COMPREHEN METABOLIC PANEL: CPT | Performed by: EMERGENCY MEDICINE

## 2025-03-18 PROCEDURE — 96374 THER/PROPH/DIAG INJ IV PUSH: CPT

## 2025-03-18 PROCEDURE — 25000003 PHARM REV CODE 250: Performed by: PHYSICIAN ASSISTANT

## 2025-03-18 RX ORDER — TAMSULOSIN HYDROCHLORIDE 0.4 MG/1
0.4 CAPSULE ORAL DAILY
Qty: 30 CAPSULE | Refills: 0 | Status: SHIPPED | OUTPATIENT
Start: 2025-03-18 | End: 2025-04-17

## 2025-03-18 RX ORDER — KETOROLAC TROMETHAMINE 30 MG/ML
15 INJECTION, SOLUTION INTRAMUSCULAR; INTRAVENOUS ONCE
Status: COMPLETED | OUTPATIENT
Start: 2025-03-18 | End: 2025-03-18

## 2025-03-18 RX ORDER — DICYCLOMINE HYDROCHLORIDE 10 MG/1
20 CAPSULE ORAL
Status: COMPLETED | OUTPATIENT
Start: 2025-03-18 | End: 2025-03-18

## 2025-03-18 RX ORDER — MORPHINE SULFATE 4 MG/ML
4 INJECTION, SOLUTION INTRAMUSCULAR; INTRAVENOUS
Refills: 0 | Status: COMPLETED | OUTPATIENT
Start: 2025-03-18 | End: 2025-03-18

## 2025-03-18 RX ADMIN — KETOROLAC TROMETHAMINE 15 MG: 30 INJECTION, SOLUTION INTRAMUSCULAR; INTRAVENOUS at 11:03

## 2025-03-18 RX ADMIN — DICYCLOMINE HYDROCHLORIDE 20 MG: 10 CAPSULE ORAL at 08:03

## 2025-03-18 RX ADMIN — MORPHINE SULFATE 4 MG: 4 INJECTION INTRAVENOUS at 09:03

## 2025-03-18 RX ADMIN — IOHEXOL 100 ML: 350 INJECTION, SOLUTION INTRAVENOUS at 11:03

## 2025-03-18 NOTE — DISCHARGE INSTRUCTIONS
As we discussed, your workup today was pretty reassuring.  No signs of acute infection in your abdomen.  No signs of decreased blood flow to your bowel.  Your labs were pretty unremarkable. .  We did noticed some swelling in your kidneys and bladder which could be due to the enlarged prostate.  Urology saw you and recommended that you begin Flomax which can help with urine output.  Return to the emergency department with any worsening symptoms or concerns.  It is important that you follow up with Urology and Gastroenterology.  Thank you so much for allowing us to take care of you today.  I hope you feel better soon.

## 2025-03-18 NOTE — CONSULTS
[facility] - Emergency Dept  Urology  Consult Note    Patient Name: Simón Fong  MRN: [id number]  Admission Date: 3/18/2025  Hospital Length of Stay: 0   Code Status: Prior   Attending Provider: Celio Boyer MD   Consulting Provider: Kirill Herrera MD  Primary Care Physician: Jimena Barone MD  Principal Problem:<principal problem not specified>    Inpatient consult to Urology  Consult performed by: Kirill Herrera MD  Consult ordered by: Pamella Carr PA-C          Subjective:     HPI:  64yoM consult for bladder outlet obstruction and bilateral hydronephrosis. Patient has a medical history notable for HIV on ART and PCa (unfavorable intermediate risk, on AS), for whom he follows with urology. Presented to ED today with vague abdominal pain, said he has not had bowel movement in multiple days which prompted him to come to ED. Denies flank pain. Denies any f/c/dysuria/hematuria/bone pain. Denies significant LUTS, not on flomax.    On exam he is AF/HDS, NAD/RCIB, WBC 3.3, Cr 0.9 (at baseline), UA non concerning for infection, CT showing distended bladder, large prostate, mild left hydro, minimal right hydro, no ureteral stones bilaterally. .     Past Medical History:   Diagnosis Date    HIV infection     Hypertension        Past Surgical History:   Procedure Laterality Date    COLONOSCOPY      COLONOSCOPY N/A 7/31/2020    Procedure: COLONOSCOPY;  Surgeon: Tree Mendiola MD;  [facility]  Service: Endoscopy;  Laterality: N/A;  covid 7/28-[facility]    COLONOSCOPY, SCREENING, HIGH RISK PATIENT N/A 2/3/2025    Procedure: COLONOSCOPY, SCREENING, HIGH RISK PATIENT;  Surgeon: Clarence Nguyen MD;  [facility]  Service: Endoscopy;  Laterality: N/A;  Abner Floyd, peg, portal, ASam  1/31 pt rescheduled, PEG, portal -ml    PROSTATE BIOPSY         Review of patient's allergies indicates:   Allergen Reactions    Sulfa (sulfonamide  antibiotics)        Family History       Problem Relation (Age of Onset)    Cancer Sister, Brother    Diabetes Mother    Heart disease Mother    Hypertension Mother, Father    Stroke Mother            Tobacco Use    Smoking status: Never    Smokeless tobacco: Never   Substance and Sexual Activity    Alcohol use: No    Drug use: No    Sexual activity: Not on file           Objective:     Temp:  [97.5 °F (36.4 °C)-97.8 °F (36.6 °C)] 97.8 °F (36.6 °C)  Pulse:  [56-67] 57  Resp:  [15-18] 15  SpO2:  [98 %-100 %] 98 %  BP: (136-137)/(80-89) 136/89  Weight: 96.6 kg (213 lb)  Body mass index is 27.35 kg/m².    Date 03/18/25 0700 - 03/19/25 0659   Shift 1707-7357 6258-1918 4832-8989 24 Hour Total   INTAKE   Shift Total(mL/kg)       OUTPUT   Urine(mL/kg/hr) 250   250   Shift Total(mL/kg) 250(2.6)   250(2.6)   Weight (kg) 96.6 96.6 96.6 96.6     Bladder Scan Volume (mL): (S) 125 mL (Post-Void residual) (03/18/25 1407)    Drains       None                    Physical Exam  Constitutional:       General: He is not in acute distress.  HENT:      Head: Normocephalic and atraumatic.      Nose: Nose normal.   Eyes:      Conjunctiva/sclera: Conjunctivae normal.   Cardiovascular:      Rate and Rhythm: Normal rate.   Pulmonary:      Effort: Pulmonary effort is normal. No respiratory distress.   Musculoskeletal:         General: No deformity.   Skin:     General: Skin is warm and dry.   Neurological:      General: No focal deficit present.      Mental Status: He is alert.   Psychiatric:         Mood and Affect: Mood normal.         Behavior: Behavior normal.          Significant Labs:    BMP:  Recent Labs   Lab 03/18/25  1018      K 3.8      CO2 24   BUN 11   CREATININE 0.9   CALCIUM 8.4*       CBC:  Recent Labs   Lab 03/18/25  0906   WBC 3.29*   HGB 15.4   HCT 45.4          All pertinent labs results from the past 24 hours have been reviewed.    Significant Imaging:  All pertinent imaging results/findings from the  past 24 hours have been reviewed.                    Assessment and Plan:     Prostate cancer  64yoM with pmhx of PCa (unfavorable intermediate risk, on AS) who presented to ED with constipation and diffuse abdominal pain, urology consulted for voiding concerns     - Patient appears to be emptying bladder adequately given bladder scan   - Mild hydro finding may be related to brief bladder distention. Cr at baseline   - Recommend renal US as outpatient in one month to follow up   - Discussed PCa with patient and risks of AS given risk category, will have him follow up with Tammy Wright to discuss treatment options   - Can consider starting flomax    - No acute urologic intervention indicated         VTE Risk Mitigation (From admission, onward)      None            Thank you for your consult. I will sign off. Please contact us if you have any additional questions.    Kirill Herrera MD  Urology  [facility] - Emergency Dept

## 2025-03-18 NOTE — ED NOTES
"Pt presents to ED via personal transport w/ c/o abdominal pain; reports 3/10 pain in his lower abdomen. Reports relief of pain w/ belching and passing gas. Also endorsing chills and constipation. States he has been seeing someone from GI since January due to "digestive issues."  States he has "been doubled over in pain." Denies nausea, vomiting, loss of appetite, dysuria, chest pain, shortness of breath.     Patient identifiers for Simón Fong 64 y.o. male checked and correct.  Chief Complaint   Patient presents with    Abdominal Pain     Past Medical History:   Diagnosis Date    HIV infection     Hypertension      Allergies reported:   Review of patient's allergies indicates:   Allergen Reactions    Sulfa (sulfonamide antibiotics)      Most recent vital signs:  Vitals:    03/18/25 0816   BP:    Pulse:    Resp:    Temp: 97.5 °F (36.4 °C)      "

## 2025-03-18 NOTE — SUBJECTIVE & OBJECTIVE
Past Medical History:   Diagnosis Date    HIV infection     Hypertension        Past Surgical History:   Procedure Laterality Date    COLONOSCOPY      COLONOSCOPY N/A 7/31/2020    Procedure: COLONOSCOPY;  Surgeon: Tree Mendiola MD;  Location: [facility] ([facility]);  Service: Endoscopy;  Laterality: N/A;  covid 7/28-[facility]-    COLONOSCOPY, SCREENING, HIGH RISK PATIENT N/A 2/3/2025    Procedure: COLONOSCOPY, SCREENING, HIGH RISK PATIENT;  Surgeon: Clarence Nguyen MD;  Location: [facility] END[facility] ([facility]);  Service: Endoscopy;  Laterality: N/A;  Abner Floyd, peg, portal, ASam  1/31 pt rescheduled, PEG, portal -ml    PROSTATE BIOPSY         Review of patient's allergies indicates:   Allergen Reactions    Sulfa (sulfonamide antibiotics)        Family History       Problem Relation (Age of Onset)    Cancer Sister, Brother    Diabetes Mother    Heart disease Mother    Hypertension Mother, Father    Stroke Mother            Tobacco Use    Smoking status: Never    Smokeless tobacco: Never   Substance and Sexual Activity    Alcohol use: No    Drug use: No    Sexual activity: Not on file           Objective:     Temp:  [97.5 °F (36.4 °C)-97.8 °F (36.6 °C)] 97.8 °F (36.6 °C)  Pulse:  [56-67] 57  Resp:  [15-18] 15  SpO2:  [98 %-100 %] 98 %  BP: (136-137)/(80-89) 136/89  Weight: 96.6 kg (213 lb)  Body mass index is 27.35 kg/m².    Date 03/18/25 0700 - 03/19/25 0659   Shift 5957-5847 4562-1723 3489-1770 24 Hour Total   INTAKE   Shift Total(mL/kg)       OUTPUT   Urine(mL/kg/hr) 250   250   Shift Total(mL/kg) 250(2.6)   250(2.6)   Weight (kg) 96.6 96.6 96.6 96.6     Bladder Scan Volume (mL): (S) 125 mL (Post-Void residual) (03/18/25 1407)    Drains       None                    Physical Exam  Constitutional:       General: He is not in acute distress.  HENT:      Head: Normocephalic and atraumatic.      Nose: Nose normal.   Eyes:      Conjunctiva/sclera: Conjunctivae normal.   Cardiovascular:      Rate and  Rhythm: Normal rate.   Pulmonary:      Effort: Pulmonary effort is normal. No respiratory distress.   Musculoskeletal:         General: No deformity.   Skin:     General: Skin is warm and dry.   Neurological:      General: No focal deficit present.      Mental Status: He is alert.   Psychiatric:         Mood and Affect: Mood normal.         Behavior: Behavior normal.          Significant Labs:    BMP:  Recent Labs   Lab 03/18/25  1018      K 3.8      CO2 24   BUN 11   CREATININE 0.9   CALCIUM 8.4*       CBC:  Recent Labs   Lab 03/18/25  0906   WBC 3.29*   HGB 15.4   HCT 45.4          All pertinent labs results from the past 24 hours have been reviewed.    Significant Imaging:  All pertinent imaging results/findings from the past 24 hours have been reviewed.

## 2025-03-18 NOTE — HPI
64yoM consult for bladder outlet obstruction and bilateral hydronephrosis. Patient has a medical history notable for HIV on ART and PCa (unfavorable intermediate risk, on AS), for whom he follows with urology. Presented to ED today with vague abdominal pain, said he has not had bowel movement in multiple days which prompted him to come to ED. Denies flank pain. Denies any f/c/dysuria/hematuria/bone pain. Denies significant LUTS, not on flomax.    On exam he is AF/HDS, NAD/RCIB, WBC 3.3, Cr 0.9 (at baseline), UA non concerning for infection, CT showing distended bladder, large prostate, mild left hydro, minimal right hydro, no ureteral stones bilaterally. .

## 2025-03-18 NOTE — ED PROVIDER NOTES
Encounter Date: 3/18/2025       History     Chief Complaint   Patient presents with    Abdominal Pain     Patient is a 64-year-old male with history of HIV, hypertension, and prostate carcinoma who presents to the emergency department with abdominal fullness.  Patient reports over the last couple of months he has had intermittent digestive issues.  Reports at times when he eats he feels like he is very bloated and can not go to the restroom.  Reports he saw his PCP who had him see a GI doctor.  Reports he was scoped with no abnormalities found.  Reports they also did some testing as his brother has pancreatic cancer so he was concerned he may have pancreatic cancer.  Reports his symptoms have worsened over the last 2 days.  Reports he has right-sided abdominal pain with bloating.  Reports some constipation.  Reports frequent urination with good urine output.  Denies fevers.  Denies blood or mucus in stool.  Reports at times he has to belch to relieve his pain.  Does report an associated intermittent flank pain.    The history is provided by the patient.     Review of patient's allergies indicates:   Allergen Reactions    Sulfa (sulfonamide antibiotics)      Past Medical History:   Diagnosis Date    HIV infection     Hypertension      Past Surgical History:   Procedure Laterality Date    COLONOSCOPY      COLONOSCOPY N/A 7/31/2020    Procedure: COLONOSCOPY;  Surgeon: Tree Mendiola MD;  Location: [facility] ([address] [place] [address]);  Service: Endoscopy;  Laterality: N/A;  covid 7/28-[facility]-    COLONOSCOPY, SCREENING, HIGH RISK PATIENT N/A 2/3/2025    Procedure: COLONOSCOPY, SCREENING, HIGH RISK PATIENT;  Surgeon: Clarence Nguyen MD;  Location: [facility] ([address] [place] [address]);  Service: Endoscopy;  Laterality: N/A;  Abner Floyd, peg, portal, ASam  1/31 pt rescheduled, PEG, portal -ml    PROSTATE BIOPSY       Family History   Problem Relation Name Age of Onset    Diabetes Mother      Heart disease Mother       Hypertension Mother      Stroke Mother      Hypertension Father      Cancer Sister      Cancer Brother       Social History[1]  Review of Systems   Constitutional:  Positive for appetite change. Negative for activity change, chills, fatigue and fever.   HENT:  Negative for congestion, ear discharge, ear pain, postnasal drip, rhinorrhea, sore throat and trouble swallowing.    Respiratory:  Negative for cough and shortness of breath.    Cardiovascular:  Negative for chest pain.   Gastrointestinal:  Positive for abdominal distention, abdominal pain and constipation. Negative for blood in stool, diarrhea and vomiting.   Genitourinary:  Positive for flank pain. Negative for dysuria and hematuria.   Musculoskeletal:  Negative for back pain, neck pain and neck stiffness.   Skin:  Negative for rash.   Neurological:  Negative for dizziness, light-headedness and headaches.       Physical Exam     Initial Vitals   BP Pulse Resp Temp SpO2   03/18/25 0814 03/18/25 0814 03/18/25 0814 03/18/25 0816 03/18/25 0814   137/89 67 18 97.5 °F (36.4 °C) 99 %      MAP       --                Physical Exam    Nursing note and vitals reviewed.  Constitutional: He appears well-developed and well-nourished. He is not diaphoretic.  Non-toxic appearance. He appears distressed (secondary to pain).   HENT:   Head: Normocephalic.   Right Ear: Hearing and external ear normal.   Left Ear: Hearing and external ear normal.   Nose: Nose normal. Mouth/Throat: Uvula is midline, oropharynx is clear and moist and mucous membranes are normal. No oropharyngeal exudate.   Eyes: Conjunctivae are normal. Pupils are equal, round, and reactive to light.   Neck:   Normal range of motion.  Cardiovascular:  Normal rate and regular rhythm.           Pulmonary/Chest: Breath sounds normal.   Abdominal: Abdomen is soft. He exhibits distension. He exhibits no mass. Bowel sounds are decreased. There is generalized abdominal tenderness. There is no rebound and no guarding.    Musculoskeletal:         General: Normal range of motion.      Cervical back: Normal range of motion.     Neurological: He is alert and oriented to person, place, and time. He has normal strength.   Skin: Skin is warm and dry. Capillary refill takes less than 2 seconds.   Psychiatric: He has a normal mood and affect.         ED Course   Procedures  Labs Reviewed   CBC W/ AUTO DIFFERENTIAL - Abnormal       Result Value    WBC 3.29 (*)     RBC 4.99      Hemoglobin 15.4      Hematocrit 45.4      MCV 91      MCH 30.9      MCHC 33.9      RDW 12.6      Platelets 264      MPV 11.0      Immature Granulocytes 0.0      Gran # (ANC) 1.2 (*)     Immature Grans (Abs) 0.00      Lymph # 1.7      Mono # 0.4      Eos # 0.0      Baso # 0.02      nRBC 0      Gran % 35.6 (*)     Lymph % 52.0 (*)     Mono % 10.6      Eosinophil % 1.2      Basophil % 0.6      Differential Method Automated      Narrative:     Release to patient->Immediate   COMPREHENSIVE METABOLIC PANEL - Abnormal    Sodium 141      Potassium 3.8      Chloride 110      CO2 24      Glucose 97      BUN 11      Creatinine 0.9      Calcium 8.4 (*)     Total Protein 6.6      Albumin 3.9      Total Bilirubin 0.4      Alkaline Phosphatase 64      AST 22      ALT 13      eGFR >60.0      Anion Gap 7 (*)    HEPATITIS C ANTIBODY    Hepatitis C Ab Non-reactive      Narrative:     Release to patient->Immediate   URINALYSIS, REFLEX TO URINE CULTURE    Specimen UA Urine, Clean Catch      Color, UA Straw      Appearance, UA Clear      pH, UA 7.0      Specific Gravity, UA 1.015      Protein, UA Negative      Glucose, UA Negative      Ketones, UA Negative      Bilirubin (UA) Negative      Occult Blood UA Negative      Nitrite, UA Negative      Leukocytes, UA Negative      Narrative:     Specimen Source->Urine   LACTIC ACID, PLASMA    Lactate (Lactic Acid) 1.1     LIPASE    Lipase 20            Imaging Results              CT Abdomen Pelvis With IV Contrast NO Oral Contrast (Final result)   Result time 03/18/25 11:40:17      Final result by Erik Johns MD (03/18/25 11:40:17)                   Impression:      Mild bilateral hydroureteronephrosis with moderate bladder distension and bladder wall thickening.  Correlate for clinical evidence of bladder outlet obstruction.    Enlarged prostate noting history of prostate carcinoma.      Electronically signed by: Erik Johns  Date:    03/18/2025  Time:    11:40               Narrative:    EXAMINATION:  CT ABDOMEN PELVIS WITH IV CONTRAST    CLINICAL HISTORY:  Abdominal pain, acute, nonlocalized;    TECHNIQUE:  Low dose axial images, sagittal and coronal reformations were obtained from the lung bases to the pubic symphysis following the IV administration of 100 mL of Omnipaque 350 .  Oral contrast was not administered.    COMPARISON:  Ultrasound abdomen 02/21/2025; 05/22/2024    FINDINGS:  Abdomen:    - Lower thorax:Normal appearance of the base of the heart.    - Lung bases: No infiltrates and no nodules.    - Liver: No focal mass.    - Gallbladder: No calcified gallstones.    - Bile Ducts: No evidence of intra or extra hepatic biliary ductal dilation.    - Spleen: Negative.    - Kidneys: Mild bilateral hydroureteronephrosis could be indicative of bladder outlet obstruction.  No perinephric stranding.  No suspicious renal mass.    - Adrenals: Unremarkable.    - Pancreas: No mass or peripancreatic fat stranding.    - Retroperitoneum:  No significant adenopathy.    - Vascular: Atherosclerotic calcification of the abdominal aorta and iliac arteries without aneurysm.  Patent mesenteric arteries.    - Abdominal wall:  Unremarkable.    Pelvis:    Mild thickening of the bladder wall with moderate distension.  The prostate is enlarged with multiple calcifications noting history of prostate carcinoma.    Bowel/Mesentery:    No evidence of bowel obstruction or inflammation.    Bones:  No change.                                       Medications   dicyclomine  capsule 20 mg (20 mg Oral Given 3/18/25 3030)   morphine injection 4 mg (4 mg Intravenous Given 3/18/25 0910)   iohexoL (OMNIPAQUE 350) injection 100 mL (100 mLs Intravenous Given 3/18/25 1125)   ketorolac injection 15 mg (15 mg Intravenous Given 3/18/25 1145)     Medical Decision Making  Urgent evaluation of a 64-year-old male who presents to the emergency department with abdominal pain.  Patient has history of HIV, hypertension, and prostate cancer.  No treatment for the prostate cancer only surveillance.  Patient is afebrile, nontoxic-appearing, and hemodynamically stable.  On exam, he is mildly distended.  Diffuse abdominal tenderness with deep palpation.  Hypoactive bowel sounds.  The severe pain does come and go.  Will obtain labs including CBC, CMP, lipase, urinalysis.  Will also check lactic to ensure no signs of mesenteric ischemia although doubt.  Will obtain CT.    1245PM  CT scan shows mild bilateral hydroureteronephrosis with moderate bladder distention and bladder wall thickening.  Enlarged prostate noting history of prostate carcinoma.  No evidence of small-bowel obstruction.  No evidence of diverticulitis or appendicitis.  Labs reveal no significant abnormality specifically no leukocytosis, kidney insufficiency, or electrolyte disruption.  No elevation in LFTs or lipase.  Bili normal.  Lactic normal.  Urinalysis shows no signs of infection.  Will obtain postvoid residual.    111cc's is post void residual.  Will consult urology.    1:26 PM  Urology consult - Dr. Mathews will review images and see patient and get back with me.    2:30PM  Dr. Herrera suggested repeat PVR and urine output measurement - which was 220ccs output and 125ccs remained in bladder.  He will see patient and report back.    3:58 PM  Urology recommendations:   - Patient appears to be emptying bladder adequately given bladder scan   - Mild hydro finding may be related to brief bladder distention. Cr at baseline   - Recommend renal US  as outpatient in one month to follow up   - Discussed PCa with patient and risks of AS given risk category, will have him follow up with Tammy Wright to discuss treatment options   - Can consider starting flomax    - No acute urologic intervention indicated       Will discharge patient with urology and gastro follow-up.  Will start him on Flomax.  Advised to return to the emergency department with any worsening symptoms or concerns.    Amount and/or Complexity of Data Reviewed  Labs: ordered.  Radiology: ordered.    Risk  Prescription drug management.                                      Clinical Impression:  Final diagnoses:  [R10.9] Abdominal pain, unspecified abdominal location (Primary)  [N13.30] Hydronephrosis, unspecified hydronephrosis type  [N40.0] Enlarged prostate          ED Disposition Condition    Discharge Stable          ED Prescriptions    None       Follow-up Information    None              [1]   Social History  Tobacco Use    Smoking status: Never    Smokeless tobacco: Never   Substance Use Topics    Alcohol use: No    Drug use: No        Pamella Carr PA-C  03/18/25 1600

## 2025-03-20 ENCOUNTER — PATIENT MESSAGE (OUTPATIENT)
Dept: INFECTIOUS DISEASES | Facility: CLINIC | Age: 65
End: 2025-03-20
Payer: COMMERCIAL

## 2025-03-20 DIAGNOSIS — B20 HUMAN IMMUNODEFICIENCY VIRUS (HIV) DISEASE: ICD-10-CM

## 2025-03-20 RX ORDER — DOLUTEGRAVIR SODIUM AND LAMIVUDINE 50; 300 MG/1; MG/1
1 TABLET, FILM COATED ORAL DAILY
Qty: 90 TABLET | Refills: 5 | Status: ACTIVE | OUTPATIENT
Start: 2025-03-20 | End: 2025-04-19

## 2025-03-21 ENCOUNTER — TELEPHONE (OUTPATIENT)
Dept: PRIMARY CARE CLINIC | Facility: CLINIC | Age: 65
End: 2025-03-21
Payer: COMMERCIAL

## 2025-04-01 ENCOUNTER — OFFICE VISIT (OUTPATIENT)
Dept: PRIMARY CARE CLINIC | Facility: CLINIC | Age: 65
End: 2025-04-01
Payer: MEDICARE

## 2025-04-01 VITALS
OXYGEN SATURATION: 98 % | BODY MASS INDEX: 27.91 KG/M2 | SYSTOLIC BLOOD PRESSURE: 157 MMHG | DIASTOLIC BLOOD PRESSURE: 80 MMHG | HEIGHT: 74 IN | HEART RATE: 63 BPM | TEMPERATURE: 98 F | WEIGHT: 217.5 LBS

## 2025-04-01 DIAGNOSIS — R39.14 BENIGN PROSTATIC HYPERPLASIA WITH INCOMPLETE BLADDER EMPTYING: ICD-10-CM

## 2025-04-01 DIAGNOSIS — C61 PROSTATE CANCER: ICD-10-CM

## 2025-04-01 DIAGNOSIS — K59.04 CHRONIC IDIOPATHIC CONSTIPATION: ICD-10-CM

## 2025-04-01 DIAGNOSIS — N40.1 BENIGN PROSTATIC HYPERPLASIA WITH INCOMPLETE BLADDER EMPTYING: ICD-10-CM

## 2025-04-01 DIAGNOSIS — I10 PRIMARY HYPERTENSION: Primary | ICD-10-CM

## 2025-04-01 PROBLEM — Z23 FLU VACCINE NEED: Status: RESOLVED | Noted: 2023-09-14 | Resolved: 2025-04-01

## 2025-04-01 PROBLEM — Z12.11 SCREENING FOR COLON CANCER: Status: RESOLVED | Noted: 2020-07-31 | Resolved: 2025-04-01

## 2025-04-01 PROCEDURE — 99215 OFFICE O/P EST HI 40 MIN: CPT | Mod: S$GLB,,, | Performed by: HOSPITALIST

## 2025-04-01 NOTE — ASSESSMENT & PLAN NOTE
Elevated PVRs documented at recent ED visit; sx resolved with tamsulosin but still having nocturia x 2-3.  He was advised that that would likely improve following his upcoming   TURP.

## 2025-04-01 NOTE — PROGRESS NOTES
Primary Care Provider Appointment - Curt Lawrence MD      Subjective:      Patient ID: Simón Fong is a 64 y.o. male with   Past Medical History:   Diagnosis Date    HIV infection     Hypertension            Chief Complaint: Follow-up    Prior to this visit, patient's last encounter with PCP was 2025.    ED visit 3/18 for abd pain.  Seen by urology for hydronephrosis and started on flomax which has helped relieve the symptoms.  Doing much better overall.  Seeing GI; gas sx resolved and taking Miralax regularly to have regular BMs.  Has been noticing floating stools and oils in toilet and had stool enzymes that were abnormal but now has to repeat them off laxatives to confirm.  Worried about this b/c his brother  of pancreatic cancer.    Having laser TURP  at [facility]; was told he won't qualify for the other procedure b/c of calcifications of the prostate.      : Pt reports for annual physical.  C/o gas pain, bloating, constipation since just after  when he tried a new plant-based protein drink and protein bar which he didn't tolerate very well.  Some occasional pain.  Sx relieved w/ belching or BM; taking dulcolax occasionally.  Tried some fiber yesterday but seemed to make sx worse.  Normally avoids dairy but can eat ice cream, etc. W/o difficulty if he wants to.    Has low-grade prostate cancer and is on active surveillance with Urology.  Will be due for repeat colonoscopy this summer.            Medications: Does not have pill packs              4Ms for Medical Decision-Making in Older Adults    Last Completed EAWV: None    MOBILITY:  Get Up and Go:       No data to display              Activities of Daily Living:       No data to display              Whisper Test:       No data to display              Disability Status:       No data to display              Nutrition Screening:       No data to display             Screening Score: 0-7 Malnourished, 8-11 At Risk, 12-14  Normal  Fall Risk:      4/1/2025    10:20 AM 2/7/2025    10:40 AM 1/13/2025    10:40 AM   Fall Risk Assessment - Outpatient   Mobility Status Ambulatory Ambulatory Ambulatory   Number of falls 0 0 0   Identified as fall risk False False False           MENTATION:   Depression Patient Health Questionnaire:      4/1/2025    10:27 AM   Depression Patient Health Questionnaire   Over the last two weeks how often have you been bothered by little interest or pleasure in doing things Not at all   Over the last two weeks how often have you been bothered by feeling down, depressed or hopeless Not at all   PHQ-2 Total Score 0     Has Dementia Dx: No  Has Anxiety Dx: No    Cognitive Function Screening:       No data to display              Cognitive Function Screening Total - Less than 4 = Abnormal,  Greater than or equal to 4 = Normal        MEDICATIONS:  High Risk Medications:  Total Active Medications: 0  This patient does not have an active medication from one of the medication groupers.    WHAT MATTERS MOST:  Advance Care Planning   ACP Status:   Patient has had an ACP conversation  Living Will: No  Power of : No  LaPOST: No    What is most important right now is to focus on remaining as independent as possiblequality of life, even if it means sacrificing a little time    Accordingly, we have decided that the best plan to meet the patient's goals includes continuing with treatment      What matters most to patient today is: addressing current complaint                 Social History     Socioeconomic History    Marital status: Single   Tobacco Use    Smoking status: Never    Smokeless tobacco: Never   Substance and Sexual Activity    Alcohol use: No    Drug use: No     Social Drivers of Health     Financial Resource Strain: Low Risk  (4/1/2025)    Overall Financial Resource Strain (CARDIA)     Difficulty of Paying Living Expenses: Not hard at all   Food Insecurity: No Food Insecurity (4/1/2025)    Hunger Vital Sign  "    Worried About Running Out of Food in the Last Year: Never true     Ran Out of Food in the Last Year: Never true   Transportation Needs: No Transportation Needs (4/1/2025)    PRAPARE - Transportation     Lack of Transportation (Medical): No     Lack of Transportation (Non-Medical): No   Physical Activity: Sufficiently Active (4/1/2025)    Exercise Vital Sign     Days of Exercise per Week: 6 days     Minutes of Exercise per Session: 90 min   Stress: No Stress Concern Present (4/1/2025)    [employer] [place] [employer] - Occupational Stress Questionnaire     Feeling of Stress : Only a little   Housing Stability: Low Risk  (4/1/2025)    Housing Stability Vital Sign     Unable to Pay for Housing in the Last Year: No     Homeless in the Last Year: No       Review of Systems   Constitutional:  Negative for activity change, appetite change and unexpected weight change.   Respiratory:  Negative for shortness of breath.    Cardiovascular:  Negative for chest pain and leg swelling.   Gastrointestinal:  Positive for abdominal distention, abdominal pain, change in bowel habit and constipation. Negative for nausea and vomiting.        Objective:   BP (!) 157/80 (BP Location: Right arm, Patient Position: Sitting)   Pulse 63   Temp 98 °F (36.7 °C) (Oral)   Ht 6' 2" (1.88 m)   Wt 98.6 kg (217 lb 7.7 oz)   SpO2 98%   BMI 27.92 kg/m²     Physical Exam  Vitals reviewed.   Constitutional:       General: He is not in acute distress.     Appearance: Normal appearance.      Comments: Muscular physique   HENT:      Head: Normocephalic and atraumatic.      Right Ear: Tympanic membrane, ear canal and external ear normal.      Left Ear: Tympanic membrane, ear canal and external ear normal.      Nose: No congestion.      Mouth/Throat:      Mouth: Mucous membranes are moist.      Pharynx: Oropharynx is clear.   Eyes:      General: No scleral icterus.     Extraocular Movements: Extraocular movements intact.      " Conjunctiva/sclera: Conjunctivae normal.      Pupils: Pupils are equal, round, and reactive to light.   Cardiovascular:      Rate and Rhythm: Normal rate and regular rhythm.      Pulses: Normal pulses.      Heart sounds: Normal heart sounds. No murmur heard.     No friction rub. No gallop.   Pulmonary:      Effort: Pulmonary effort is normal. No respiratory distress.      Breath sounds: Normal breath sounds.   Abdominal:      General: Bowel sounds are normal. There is no distension.      Palpations: Abdomen is soft.      Tenderness: There is no abdominal tenderness. There is no guarding.   Musculoskeletal:         General: No tenderness. Normal range of motion.      Cervical back: Normal range of motion.      Right lower leg: No edema.      Left lower leg: No edema.   Lymphadenopathy:      Cervical: No cervical adenopathy.   Skin:     General: Skin is warm and dry.      Findings: No rash.   Neurological:      General: No focal deficit present.      Mental Status: He is alert and oriented to person, place, and time.      Cranial Nerves: No cranial nerve deficit.      Gait: Gait normal.   Psychiatric:         Mood and Affect: Mood normal.         Behavior: Behavior normal.              Lab Results   Component Value Date    WBC 3.29 (L) 03/18/2025    HGB 15.4 03/18/2025    HCT 45.4 03/18/2025     03/18/2025    CHOL 171 01/07/2025    TRIG 71 01/07/2025    HDL 50 01/07/2025    ALT 13 03/18/2025    AST 22 03/18/2025     03/18/2025    K 3.8 03/18/2025     03/18/2025    CREATININE 0.9 03/18/2025    BUN 11 03/18/2025    CO2 24 03/18/2025    TSH 3.095 01/07/2025    PSA 6.5 (H) 12/21/2018    INR 1.2 02/08/2012    HGBA1C 5.6 01/07/2025       Current Outpatient Medications on File Prior to Visit   Medication Sig Dispense Refill    amLODIPine (NORVASC) 10 MG tablet TAKE 1 TABLET ONCE DAILY 90 tablet 3    ascorbic acid, vitamin C, (VITAMIN C) 1000 MG tablet Take 1,000 mg by mouth once daily.        dolutegravir-lamivudine (DOVATO)  mg Tab Take 1 tablet by mouth once daily. 90 tablet 5    fish oil-omega-3 fatty acids 300-1,000 mg capsule Take by mouth once daily.      glucosamine-chondroitin 500-400 mg tablet Take 1 tablet by mouth 3 (three) times daily.      leucine-isoleucine-valine (NEOKE BCAA4) 82 gram-328 kcal/100 gram Powd Take by mouth.       naproxen (NAPROSYN) 500 MG tablet Take 1 tablet (500 mg total) by mouth 2 (two) times daily with meals. 60 tablet 1    tamsulosin (FLOMAX) 0.4 mg Cap Take 1 capsule (0.4 mg total) by mouth once daily. 30 capsule 0     No current facility-administered medications on file prior to visit.         Assessment:   64 y.o. male with multiple co-morbid illnesses here to follow-up for ongoing chronic disease management and preventive health maintenance.    Plan:     1. Primary hypertension  Assessment & Plan:  BP not at goal today, but pt is a bit anxious about upcoming surgery as well as worried about a pancreatic issue concerning his family h/o pancreatic cancer.      2. Chronic idiopathic constipation  Assessment & Plan:  Etiology currently unclear.  Sx adequately managed with regular use of Miralax, but he may do better with a promotility agent; however that should be started after he provides repeat stool sample to reassess for low fecal elastase, which could be impacted by laxative use.  Other than fatty stool he has no other concerning symptoms for exocrine pancreatic insufficiency or pancreatic cancer.  We had a long discussion about the diagnostic processes of both issues and how he is currently low risk for either based on his lack of objective findings.  Reviewed recent CT abdomen done at his ED visit with him.      3. Benign prostatic hyperplasia with incomplete bladder emptying  Assessment & Plan:  Elevated PVRs documented at recent ED visit; sx resolved with tamsulosin but still having nocturia x 2-3.  He was advised that that would likely improve  following his upcoming   TURP.      4. Prostate cancer  Overview:  Low-volume, low-risk prostate cancer on active surveillance since 8/2018. He presented with an elevated PSA and a family history of prostate cancer. He underwent a MRI fusion prostate needle biopsy on 08/13/2018 which showed 2 positive cores with Alcides 6 disease, less than 5% on each positive core. His PSA was 7.5 at the time with a prostate volume of 47. 3cc. He had been seen in clinic with Dr. Richardson 01/16/2019. Last office visit was 08/13/2020. At the time he chose not to go ahead with MRI for restaging. He does not want to have another biopsy. Followed by Tammy Wright for active surveillance of prostate cancer.     Assessment & Plan:  Pt scheduled for laser TURP next week at [facility].  PACO consent provided for patient to bring with him to surgery to allow record sharing.              Health Maintenance         Date Due Completion Date    Pneumococcal Vaccines (Age 50+) (4 of 4 - PCV20 or PCV21) 07/23/2025 7/23/2020    TETANUS VACCINE 05/02/2026 5/2/2016    Hemoglobin A1c (Diabetic Prevention Screening) 01/07/2028 1/7/2025    Lipid Panel 01/07/2030 1/7/2025    Colorectal Cancer Screening 02/03/2032 2/3/2025            Future Appointments   Date Time Provider Department Center   4/4/2025  7:00 AM [facility]-MRI1 [facility]   4/8/2025 12:00 PM [facility] PET CT LIMIT 500 LBS [facility] PET CT [facility]   5/27/2025  7:40 AM Eusebio Alonso OD [facility] OPT[facility]   6/9/2025 10:40 AM Marline Munroe PA-C [facility] GASTRO [facility]   7/1/2025 10:40 AM Abner Lawrence MD [facility] MED CLN [facility] jase PCW         Follow up in about 3 months (around 7/1/2025). Total clinical care time was 40 min.    Abner Lawrence MD  [facility]  [facility]

## 2025-04-01 NOTE — ASSESSMENT & PLAN NOTE
Pt scheduled for laser TURP next week at [facility].  PACO consent provided for patient to bring with him to surgery to allow record sharing.

## 2025-04-01 NOTE — ASSESSMENT & PLAN NOTE
BP not at goal today, but pt is a bit anxious about upcoming surgery as well as worried about a pancreatic issue concerning his family h/o pancreatic cancer.

## 2025-04-01 NOTE — ASSESSMENT & PLAN NOTE
Etiology currently unclear.  Sx adequately managed with regular use of Miralax, but he may do better with a promotility agent; however that should be started after he provides repeat stool sample to reassess for low fecal elastase, which could be impacted by laxative use.  Other than fatty stool he has no other concerning symptoms for exocrine pancreatic insufficiency or pancreatic cancer.  We had a long discussion about the diagnostic processes of both issues and how he is currently low risk for either based on his lack of objective findings.  Reviewed recent CT abdomen done at his ED visit with him.

## 2025-04-04 ENCOUNTER — PATIENT MESSAGE (OUTPATIENT)
Dept: UROLOGY | Facility: CLINIC | Age: 65
End: 2025-04-04
Payer: COMMERCIAL

## 2025-04-04 ENCOUNTER — HOSPITAL ENCOUNTER (OUTPATIENT)
Dept: RADIOLOGY | Facility: HOSPITAL | Age: 65
Discharge: HOME | End: 2025-04-04
Attending: NURSE PRACTITIONER
Payer: MEDICARE

## 2025-04-04 DIAGNOSIS — C61 PROSTATE CANCER: ICD-10-CM

## 2025-04-04 DIAGNOSIS — R97.20 RISING PSA LEVEL: ICD-10-CM

## 2025-04-04 DIAGNOSIS — R93.89 ABNORMAL MRI: ICD-10-CM

## 2025-04-04 DIAGNOSIS — R97.20 ELEVATED PSA, BETWEEN 10 AND LESS THAN 20 NG/ML: ICD-10-CM

## 2025-04-04 PROCEDURE — A9585 GADOBUTROL INJECTION: HCPCS | Performed by: NURSE PRACTITIONER

## 2025-04-04 PROCEDURE — 25500020 PHARM REV CODE 255: Performed by: NURSE PRACTITIONER

## 2025-04-04 PROCEDURE — 72197 MRI PELVIS W/O & W/DYE: CPT | Mod: 26,,, | Performed by: RADIOLOGY

## 2025-04-04 PROCEDURE — 72197 MRI PELVIS W/O & W/DYE: CPT | Mod: TC

## 2025-04-04 RX ORDER — GADOBUTROL 604.72 MG/ML
10 INJECTION INTRAVENOUS
Status: COMPLETED | OUTPATIENT
Start: 2025-04-04 | End: 2025-04-04

## 2025-04-04 RX ADMIN — GADOBUTROL 10 ML: 604.72 INJECTION INTRAVENOUS at 07:04

## 2025-04-16 ENCOUNTER — HOSPITAL ENCOUNTER (EMERGENCY)
Facility: HOSPITAL | Age: 65
Discharge: HOME | End: 2025-04-16
Attending: STUDENT IN AN ORGANIZED HEALTH CARE EDUCATION/TRAINING PROGRAM
Payer: MEDICARE

## 2025-04-16 ENCOUNTER — OFFICE VISIT (OUTPATIENT)
Dept: URGENT CARE | Facility: CLINIC | Age: 65
End: 2025-04-16
Payer: MEDICARE

## 2025-04-16 ENCOUNTER — OCHSNER VIRTUAL EMERGENCY DEPARTMENT (OUTPATIENT)
Facility: CLINIC | Age: 65
End: 2025-04-16
Payer: MEDICARE

## 2025-04-16 ENCOUNTER — PATIENT OUTREACH (OUTPATIENT)
Facility: OTHER | Age: 65
End: 2025-04-16
Payer: MEDICARE

## 2025-04-16 VITALS
BODY MASS INDEX: 28.75 KG/M2 | WEIGHT: 224 LBS | HEIGHT: 74 IN | OXYGEN SATURATION: 98 % | RESPIRATION RATE: 20 BRPM | TEMPERATURE: 98 F | HEART RATE: 73 BPM | SYSTOLIC BLOOD PRESSURE: 167 MMHG | DIASTOLIC BLOOD PRESSURE: 75 MMHG

## 2025-04-16 VITALS
HEART RATE: 48 BPM | WEIGHT: 214.94 LBS | DIASTOLIC BLOOD PRESSURE: 70 MMHG | RESPIRATION RATE: 16 BRPM | TEMPERATURE: 97 F | SYSTOLIC BLOOD PRESSURE: 145 MMHG | BODY MASS INDEX: 28.49 KG/M2 | HEIGHT: 73 IN | OXYGEN SATURATION: 99 %

## 2025-04-16 DIAGNOSIS — R33.9 URINARY RETENTION: Primary | ICD-10-CM

## 2025-04-16 DIAGNOSIS — R39.198 DIFFICULTY URINATING: ICD-10-CM

## 2025-04-16 DIAGNOSIS — R19.8 ABDOMINAL FULLNESS: Primary | ICD-10-CM

## 2025-04-16 LAB
ABSOLUTE EOSINOPHIL (OHS): 0.01 K/UL
ABSOLUTE MONOCYTE (OHS): 0.6 K/UL (ref 0.3–1)
ABSOLUTE NEUTROPHIL COUNT (OHS): 4.79 K/UL (ref 1.8–7.7)
ANION GAP (OHS): 12 MMOL/L (ref 8–16)
BASOPHILS # BLD AUTO: 0.02 K/UL
BASOPHILS NFR BLD AUTO: 0.3 %
BUN SERPL-MCNC: 18 MG/DL (ref 8–23)
CALCIUM SERPL-MCNC: 9.6 MG/DL (ref 8.7–10.5)
CHLORIDE SERPL-SCNC: 100 MMOL/L (ref 95–110)
CO2 SERPL-SCNC: 22 MMOL/L (ref 23–29)
CREAT SERPL-MCNC: 1.5 MG/DL (ref 0.5–1.4)
ERYTHROCYTE [DISTWIDTH] IN BLOOD BY AUTOMATED COUNT: 12.1 % (ref 11.5–14.5)
GFR SERPLBLD CREATININE-BSD FMLA CKD-EPI: 51 ML/MIN/1.73/M2
GLUCOSE SERPL-MCNC: 108 MG/DL (ref 70–110)
HCT VFR BLD AUTO: 37.2 % (ref 40–54)
HGB BLD-MCNC: 13 GM/DL (ref 14–18)
IMM GRANULOCYTES # BLD AUTO: 0.03 K/UL (ref 0–0.04)
IMM GRANULOCYTES NFR BLD AUTO: 0.5 % (ref 0–0.5)
LYMPHOCYTES # BLD AUTO: 0.7 K/UL (ref 1–4.8)
MCH RBC QN AUTO: 30.7 PG (ref 27–31)
MCHC RBC AUTO-ENTMCNC: 34.9 G/DL (ref 32–36)
MCV RBC AUTO: 88 FL (ref 82–98)
NUCLEATED RBC (/100WBC) (OHS): 0 /100 WBC
PLATELET # BLD AUTO: 230 K/UL (ref 150–450)
PMV BLD AUTO: 9.4 FL (ref 9.2–12.9)
POTASSIUM SERPL-SCNC: 3.9 MMOL/L (ref 3.5–5.1)
RBC # BLD AUTO: 4.23 M/UL (ref 4.6–6.2)
RELATIVE EOSINOPHIL (OHS): 0.2 %
RELATIVE LYMPHOCYTE (OHS): 11.4 % (ref 18–48)
RELATIVE MONOCYTE (OHS): 9.8 % (ref 4–15)
RELATIVE NEUTROPHIL (OHS): 77.8 % (ref 38–73)
SODIUM SERPL-SCNC: 134 MMOL/L (ref 136–145)
WBC # BLD AUTO: 6.15 K/UL (ref 3.9–12.7)

## 2025-04-16 PROCEDURE — 85025 COMPLETE CBC W/AUTO DIFF WBC: CPT | Performed by: PHYSICIAN ASSISTANT

## 2025-04-16 PROCEDURE — 99284 EMERGENCY DEPT VISIT MOD MDM: CPT | Mod: 25

## 2025-04-16 PROCEDURE — 63600175 PHARM REV CODE 636 W HCPCS: Performed by: PHYSICIAN ASSISTANT

## 2025-04-16 PROCEDURE — 99204 OFFICE O/P NEW MOD 45 MIN: CPT | Mod: S$GLB,,,

## 2025-04-16 PROCEDURE — 51798 US URINE CAPACITY MEASURE: CPT

## 2025-04-16 PROCEDURE — 96374 THER/PROPH/DIAG INJ IV PUSH: CPT | Mod: 59

## 2025-04-16 PROCEDURE — 51702 INSERT TEMP BLADDER CATH: CPT

## 2025-04-16 PROCEDURE — 80048 BASIC METABOLIC PNL TOTAL CA: CPT | Performed by: PHYSICIAN ASSISTANT

## 2025-04-16 RX ORDER — MORPHINE SULFATE 4 MG/ML
4 INJECTION, SOLUTION INTRAMUSCULAR; INTRAVENOUS
Refills: 0 | Status: COMPLETED | OUTPATIENT
Start: 2025-04-16 | End: 2025-04-16

## 2025-04-16 RX ORDER — POLYETHYLENE GLYCOL-3350 AND ELECTROLYTES WITH FLAVOR PACK 240; 5.84; 2.98; 6.72; 22.72 G/278.26G; G/278.26G; G/278.26G; G/278.26G; G/278.26G
4000 POWDER, FOR SOLUTION ORAL ONCE
COMMUNITY
Start: 2025-01-31

## 2025-04-16 RX ORDER — CEFDINIR 300 MG/1
300 CAPSULE ORAL 2 TIMES DAILY
COMMUNITY
Start: 2025-04-08

## 2025-04-16 RX ORDER — HYDROCODONE BITARTRATE AND ACETAMINOPHEN 5; 325 MG/1; MG/1
TABLET ORAL
COMMUNITY
Start: 2025-04-08

## 2025-04-16 RX ORDER — OXYBUTYNIN CHLORIDE 5 MG/1
5 TABLET ORAL
COMMUNITY
Start: 2025-04-15 | End: 2025-04-22

## 2025-04-16 RX ADMIN — MORPHINE SULFATE 4 MG: 4 INJECTION INTRAVENOUS at 04:04

## 2025-04-16 NOTE — ED NOTES
"Patient seen in room. Patient standing, pacing, with tearful expression. Patient unable to sit down and continued to pace the room. Patient states " I had a prostate ablation in [place]. The IR doctor told me to take out the catheter this morning. I did and I have only peed a little over 100 ML. I am in so much pain, I cannot sit, or stand or walk without pain. Patient tearful when speaking. Patient clearly in distress, Provider notified, patient bladder scanned immediately 980 ML present. MD notified.   "

## 2025-04-16 NOTE — DISCHARGE INSTRUCTIONS

## 2025-04-16 NOTE — PLAN OF CARE-OVED
[facility] Emergency Department Plan of Care Note  Referral Source: Urgent Care                          Referral Comment: MELANIA Taylor    Chief Complaint   Patient presents with    Urinary Retention     Patient with laser ablation performed on the 8th of April in [place].  Discontinued his Hernández catheter at 7:00 a.m. this morning per their instructions in his not been able to urinate since.  Went to urgent care and was noted to be in significant pain with tenderness in the suprapubic area and distention.       Recommendation: Emergency Department            Emergency Department: [facility]               Encounter Diagnosis   Name Primary?    Urinary retention Yes

## 2025-04-16 NOTE — PROGRESS NOTES
Patient seen by MELANIA Taylor at urgent care on 4/16/25  for c/o urinary retention; Jana consulted.     Jana Provider Dr Ronnie Francois disposition recommendation patient to go to ED.    Patient currently at [facility] ED.    Follow up scheduled for 4/17/25 to assess for additional needs.

## 2025-04-16 NOTE — ED NOTES
Pt provided catheter education, return demonstration used to evaluate teaching. Pt states he is already following up with his urologist. Pt has transportation home.

## 2025-04-16 NOTE — PROGRESS NOTES
"Subjective:      Patient ID: Simón Fong is a 65 y.o. male.    Vitals:  height is 6' 2" (1.88 m) and weight is 101.6 kg (223 lb 15.8 oz). His oral temperature is 97.6 °F (36.4 °C). His blood pressure is 167/75 (abnormal) and his pulse is 73. His respiration is 20 and oxygen saturation is 98%.     Chief Complaint: Pelvic Pain      Patient presents to clinic with complaints of lower abdominal pressure.  Patient self removed a Hernández catheter that was present for a week from a prostate procedure that occurred in [place].  Patient states removed at around 7:00 a.m. this morning and started having symptoms around 9:00 a.m..  Patient has drank plenty of fluids including water and cranberry juice and had minimal urine output since removing the catheter.  Patient presents in pain and abdominal tenderness and pressure.    Pelvic Pain  This is a new problem. The current episode started today. The problem occurs constantly. The problem has been gradually worsening. Nothing aggravates the symptoms.       Constitution: Negative.   HENT: Negative.     Neck: neck negative.   Cardiovascular: Negative.    Eyes: Negative.    Respiratory: Negative.     Gastrointestinal: Negative.    Endocrine: negative.   Genitourinary:  Positive for urine decreased and pelvic pain.   Skin: Negative.    Allergic/Immunologic: Negative.    Neurological: Negative.    Hematologic/Lymphatic: Negative.    Psychiatric/Behavioral: Negative.        Objective:     Physical Exam   Constitutional: He is oriented to person, place, and time.   HENT:   Head: Normocephalic.   Nose: Nose normal.   Cardiovascular: Normal rate.   Abdominal: He exhibits distension. There is abdominal tenderness.   Musculoskeletal: Normal range of motion.         General: Normal range of motion.   Neurological: He is alert and oriented to person, place, and time.   Skin: Skin is warm and dry.   Psychiatric: His behavior is normal. Mood, judgment and thought content normal. "       Assessment:     1. Abdominal fullness    2. Difficulty urinating        Plan:       Abdominal fullness  -     Refer to Emergency Dept.    Difficulty urinating  -     Refer to Emergency Dept.        Discussed with MD collaborator chat and PRADEEP patient difficulty urinating post catheter removal and pain. Patient going to [facility] ED. Patient wanted to go via EMS. EMS called to [facility] clinic.     Patient Instructions   Patient to ED for further evaluation

## 2025-04-16 NOTE — ED PROVIDER NOTES
"Encounter Date: 4/16/2025       History     Chief Complaint   Patient presents with    Urinary Retention     Pt presents [facility] ED today  [facility] from urgent care on [address]   Pt c/o urinary retention after self removing  catheter today      65-year-old male, PMH HIV, HTN, prostate cancer, presents to ED via EMS with concern of urinary retention.  Patient is s/p prostate ablation 8 days ago in [place]; discharged home with Hernández catheter.  Per instructions from his urologist, Hernández catheter was successfully removed this morning without complications.  He states he was able to "urinate just a little" immediately after Hernández removed but he has since had inability to urinate with gradually worsening lower abdominal pain/pressure.  Pain is nonradiating with severity on arrival 10/10.  No fevers, chills, nausea or vomiting.  No other acute complaints at this time    The history is provided by the patient.     Review of patient's allergies indicates:   Allergen Reactions    Sulfa (sulfonamide antibiotics) Other (See Comments)     Past Medical History:   Diagnosis Date    HIV infection     Hypertension      Past Surgical History:   Procedure Laterality Date    COLONOSCOPY      COLONOSCOPY N/A 7/31/2020    Procedure: COLONOSCOPY;  Surgeon: Tree Mendiola MD;  Location: [facility] ([address] [place] [address]);  Service: Endoscopy;  Laterality: N/A;  covid 7/28-[facility]    COLONOSCOPY, SCREENING, HIGH RISK PATIENT N/A 2/3/2025    Procedure: COLONOSCOPY, SCREENING, HIGH RISK PATIENT;  Surgeon: Clarence Nguyen MD;  Location: [facility] ([facility]  Service: Endoscopy;  Laterality: N/A;  Abner Floyd, peg, portal, ASam  1/31 pt rescheduled, PEG, portal -ml    PROSTATE BIOPSY       Family History   Problem Relation Name Age of Onset    Diabetes Mother      Heart disease Mother      Hypertension Mother      Stroke Mother      Hypertension Father      Cancer Sister      Cancer Brother       Social History[1]  Review of " Systems   Constitutional:  Negative for chills and fever.   Gastrointestinal:  Positive for abdominal pain. Negative for nausea and vomiting.   Genitourinary:         Urinary retention       Physical Exam     Initial Vitals [04/16/25 1451]   BP Pulse Resp Temp SpO2   (!) 157/90 77 20 97.7 °F (36.5 °C) 100 %      MAP       --         Physical Exam    Vitals reviewed.  Constitutional: He appears well-developed and well-nourished. He is cooperative. He does not have a sickly appearance. He does not appear ill.   HENT:   Head: Normocephalic and atraumatic.   Eyes: EOM are normal.   Neck:   Normal range of motion.  Cardiovascular:  Normal rate and regular rhythm.           Pulmonary/Chest: Effort normal and breath sounds normal.   Abdominal: There is abdominal tenderness in the suprapubic area.   Musculoskeletal:      Cervical back: Normal range of motion.     Neurological: He is alert and oriented to person, place, and time. GCS eye subscore is 4. GCS verbal subscore is 5. GCS motor subscore is 6.   Psychiatric: He has a normal mood and affect. His speech is normal and behavior is normal.         ED Course   Procedures  Labs Reviewed   BASIC METABOLIC PANEL - Abnormal       Result Value    Sodium 134 (*)     Potassium 3.9      Chloride 100      CO2 22 (*)     Glucose 108      BUN 18      Creatinine 1.5 (*)     Calcium 9.6      Anion Gap 12      eGFR 51 (*)    CBC WITH DIFFERENTIAL - Abnormal    WBC 6.15      RBC 4.23 (*)     HGB 13.0 (*)     HCT 37.2 (*)     MCV 88      MCH 30.7      MCHC 34.9      RDW 12.1      Platelet Count 230      MPV 9.4      Nucleated RBC 0      Neut % 77.8 (*)     Lymph % 11.4 (*)     Mono % 9.8      Eos % 0.2      Basophil % 0.3      Imm Grans % 0.5      Neut # 4.79      Lymph # 0.70 (*)     Mono # 0.60      Eos # 0.01      Baso # 0.02      Imm Grans # 0.03     CBC W/ AUTO DIFFERENTIAL    Narrative:     The following orders were created for panel order CBC auto differential.  Procedure                                Abnormality         Status                     ---------                               -----------         ------                     CBC with Differential[[id number]]       Abnormal            Final result                 Please view results for these tests on the individual orders.          Imaging Results    None          Medications   morphine injection 4 mg (4 mg Intravenous Given 4/16/25 1604)     Medical Decision Making  Patient presents with concern of suprapubic abdominal pain/pressure and urinary retention.  Afebrile.  Patient appears to be in discomfort but no severe distress on exam    DDx:  Including but not limited to urinary retention, urinary outlet obstruction, AURY, electrolyte abnormality      Amount and/or Complexity of Data Reviewed  Labs: ordered. Decision-making details documented in ED Course.    Risk  Prescription drug management.               ED Course as of 04/16/25 1706   Wed Apr 16, 2025   1622 CBC auto differential(!)  No elevation WBC [KS]   1622 Basic metabolic panel(!)  Creatinine 1.5, baseline 1.0.  I expect elevated from urinary retention. [KS]   1630 Bladder scan arrival >900 mL.  Pain symptoms significantly improved after Hernández placement.  Clear/yellow urine noted in Hernández bag [KS]   1705 Patient was reassessed and continues to rest comfortably.  He did notify his urologist in [place] who has requested him to keep Hernández for 1 more week.  Patient will also follow up with his local urologist for further care.  Encouraged to continue monitoring symptoms closely with close outpatient follow-up and ED return precautions.  Patient states his understanding and agrees with plan [KS]      ED Course User Index  [KS] Ronnie Meng PA-C                           Clinical Impression:  Final diagnoses:  [R33.9] Urinary retention (Primary)          ED Disposition Condition    Discharge Good          ED Prescriptions    None       Follow-up Information       Follow up With  Specialties Details Why Contact Info    Your Urologist                   [1]   Social History  Tobacco Use    Smoking status: Never     Passive exposure: Never    Smokeless tobacco: Never   Substance Use Topics    Alcohol use: No    Drug use: No        Ronnie Meng PA-C  04/16/25 9102

## 2025-04-17 ENCOUNTER — PATIENT OUTREACH (OUTPATIENT)
Facility: OTHER | Age: 65
End: 2025-04-17
Payer: MEDICARE

## 2025-04-17 NOTE — PROGRESS NOTES
Patient was seen in the Emergency Department on 4/16/25. The Emergency Department After Visit Summary instructs the patient to follow up with urology. A follow-up call was made to assess additional needs. Patient stated that he does not want to schedule an appointment at this time and will do so via the portal when he is ready. Patient denies additional concerns at this time.

## 2025-04-21 ENCOUNTER — OFFICE VISIT (OUTPATIENT)
Dept: PRIMARY CARE CLINIC | Facility: CLINIC | Age: 65
End: 2025-04-21
Payer: MEDICARE

## 2025-04-21 DIAGNOSIS — N32.0 BLADDER OUTLET OBSTRUCTION: Primary | ICD-10-CM

## 2025-04-21 PROCEDURE — 98005 SYNCH AUDIO-VIDEO EST LOW 20: CPT | Mod: 95,,, | Performed by: HOSPITALIST

## 2025-04-21 NOTE — Clinical Note
He will be removing the Hernández placed last week in ED in another day or two; which had to be replaced due to inability to void after removing the Hernández put in after his TURP on 4/8.  I told him to give us a call if he can't pee after removing the Hernández to call us and we could find a nurse somewhere who can replace it so he wouldn't have to go back to ED.

## 2025-04-21 NOTE — ASSESSMENT & PLAN NOTE
Likely secondary to residual edema at surgical site s/p TURP.  He will attempt voiding trial again in the next couple of days under the direction of his urologist.  In the event he is still unable to void he was advised to call the clinic and we could get him seen by a nurse somewhere who could replace the Hernández w/o having to go back to ED.

## 2025-04-21 NOTE — PROGRESS NOTES
[facility] Primary Care Physician Telemedicine Appointment  Abner Lawrence MD      The patient location is:  Patient Home   The chief complaint leading to consultation is: f/u  Total time spent with patient: 20 min    Visit type: Virtual visit with synchronous audio and video  Each patient to whom he or she provides medical services by telemedicine is:  (1) informed of the relationship between the physician and patient and the respective role of any other health care provider with respect to management of the patient; and (2) notified that he or she may decline to receive medical services by telemedicine and may withdraw from such care at any time.      Subjective:      Patient ID: Simón Fong is a 65 y.o. male.    Chief Complaint: No chief complaint on file.    Prior to this visit, patient's last encounter with PCP was 4/1/2025.    Pt reports for ED f/u 4/16; went to  prior to that and was sent by ambulance for urinary retention.  Had TURP 4/8 in [place] and had indwelling Hernández that he was supposed to remove on the 16th as planned.  After doing so, he was able to void a little but after some time found that he was not able to so drank a lot of water to see if that would help him void but eventually developed severe bladder pain and still couldn't void.  In the ED a new Hernández was placed and he put out 2L and has been fine since.  He discussed with his urologist who did the procedure who recommended leaving it in for another week before removing; which is coming up in the next couple of days.      History of Present Illness              4Ms for Medical Decision-Making in Older Adults    MOBILITY:  Activities of Daily Living:       No data to display              Fall Risk:      4/1/2025    10:20 AM 2/7/2025    10:40 AM 1/13/2025    10:40 AM   Fall Risk Assessment - Outpatient   Mobility Status Ambulatory Ambulatory Ambulatory   Number of falls 0 0 0   Identified as fall risk False False False     Disability  Status:       No data to display              Nutrition Screening:       No data to display             Screening Score: 0-7 Malnourished, 8-11 At Risk, 12-14 Normal  Get Up and Go:       No data to display              Whisper Test:       No data to display                    MENTATION:   Has Dementia Dx: No  Has Anxiety Dx: No    Depression Patient Health Questionnaire:      4/1/2025    10:27 AM   Depression Patient Health Questionnaire   Over the last two weeks how often have you been bothered by little interest or pleasure in doing things Not at all   Over the last two weeks how often have you been bothered by feeling down, depressed or hopeless Not at all   PHQ-2 Total Score 0     Cognitive Function Screening:       No data to display              Cognitive Function Screening Total - Less than 4 = Abnormal,  Greater than or equal to 4 = Normal        MEDICATIONS:  High Risk Medications:  Total Active Medications: 1  HYDROcodone-acetaminophen - 5-325 mg    WHAT MATTERS MOST:  Advance Care Planning   ACP Status:   Patient has had an ACP conversation  Living Will: No  Power of : No  [place]: No    What is most important right now is to focus on remaining as independent as possible    Accordingly, we have decided that the best plan to meet the patient's goals includes continuing with treatment      What matters most to patient today is: having a fallback plan in case voiding trial doesn't go as it should.                 Social History[1]    Past Surgical History:   Procedure Laterality Date    COLONOSCOPY      COLONOSCOPY N/A 7/31/2020    Procedure: COLONOSCOPY;  Surgeon: Tree Mendiola MD;  Location: [address]);  Service: Endoscopy;  Laterality: N/A;  covid 7/28-[facility]-    COLONOSCOPY, SCREENING, HIGH RISK PATIENT N/A 2/3/2025    Procedure: COLONOSCOPY, SCREENING, HIGH RISK PATIENT;  Surgeon: Clarence Nguyen MD;  Location: [address]);  Service: Endoscopy;  Laterality:  N/A;  Abner Flody, sylvia, portal, ASam  1/31 pt rescheduled, PEG, portal -ml    PROSTATE BIOPSY         Past Medical History:   Diagnosis Date    HIV infection     Hypertension        Review of Systems   Genitourinary:  Positive for difficulty urinating.       Objective:   There were no vitals taken for this visit.    Physical Exam  Constitutional:       General: He is not in acute distress.     Appearance: Normal appearance.      Comments: Pt seen via virtual visit.   Neurological:      Mental Status: He is alert.         Physical Exam              Lab Results   Component Value Date    WBC 6.15 04/16/2025    HGB 13.0 (L) 04/16/2025    HCT 37.2 (L) 04/16/2025     04/16/2025    CHOL 171 01/07/2025    TRIG 71 01/07/2025    HDL 50 01/07/2025    ALT 13 03/18/2025    AST 22 03/18/2025     (L) 04/16/2025    K 3.9 04/16/2025     04/16/2025    CREATININE 1.5 (H) 04/16/2025    BUN 18 04/16/2025    CO2 22 (L) 04/16/2025    TSH 3.095 01/07/2025    PSA 6.5 (H) 12/21/2018    INR 1.2 02/08/2012    HGBA1C 5.6 01/07/2025         Assessment:   65 y.o. male with multiple co-morbid illnesses seen virtually for ED f/u.     Plan:   1. Bladder outlet obstruction  Assessment & Plan:  Likely secondary to residual edema at surgical site s/p TURP.  He will attempt voiding trial again in the next couple of days under the direction of his urologist.  In the event he is still unable to void he was advised to call the clinic and we could get him seen by a nurse somewhere who could replace the Hernández w/o having to go back to ED.          Assessment & Plan              Health Maintenance         Date Due Completion Date    Pneumococcal Vaccines (Age 50+) (4 of 4 - PCV20 or PCV21) 07/23/2025 7/23/2020    TETANUS VACCINE 05/02/2026 5/2/2016    Hemoglobin A1c (Diabetic Prevention Screening) 01/07/2028 1/7/2025    Lipid Panel 01/07/2030 1/7/2025    Colorectal Cancer Screening 02/03/2032 2/3/2025            Follow up for 7/1  as scheduled. .    This note was generated with the assistance of ambient listening technology. Verbal consent was obtained by the patient and accompanying visitor(s) for the recording of patient appointment to facilitate this note. I attest to having reviewed and edited the generated note for accuracy, though some syntax or spelling errors may persist. Please contact the author of this note for any clarification.      Abner Lawrence MD   [facility], [facility], and Critical access hospital    This note was partially dictated using voice recognition software and although actively proofread during transcription, it is possible some errors in transcription may have been overlooked.         [1]   Social History  Socioeconomic History    Marital status: Single   Tobacco Use    Smoking status: Never     Passive exposure: Never    Smokeless tobacco: Never   Substance and Sexual Activity    Alcohol use: No    Drug use: No    Sexual activity: Not Currently     Social Drivers of Health     Financial Resource Strain: Low Risk  (4/1/2025)    Overall Financial Resource Strain (CARDIA)     Difficulty of Paying Living Expenses: Not hard at all   Food Insecurity: No Food Insecurity (4/1/2025)    Hunger Vital Sign     Worried About Running Out of Food in the Last Year: Never true     Ran Out of Food in the Last Year: Never true   Transportation Needs: No Transportation Needs (4/1/2025)    PRAPARE - Transportation     Lack of Transportation (Medical): No     Lack of Transportation (Non-Medical): No   Physical Activity: Sufficiently Active (4/1/2025)    Exercise Vital Sign     Days of Exercise per Week: 6 days     Minutes of Exercise per Session: 90 min   Stress: No Stress Concern Present (4/1/2025)    [place] [place] [employer] - Occupational Stress Questionnaire     Feeling of Stress : Only a little   Housing Stability: Low Risk  (4/1/2025)    Housing Stability Vital Sign     Unable to Pay for Housing in the Last  Year: No     Homeless in the Last Year: No

## 2025-04-22 ENCOUNTER — TELEPHONE (OUTPATIENT)
Dept: PRIMARY CARE CLINIC | Facility: CLINIC | Age: 65
End: 2025-04-22
Payer: MEDICARE

## 2025-04-22 NOTE — TELEPHONE ENCOUNTER
----- Message from Abner Lawrence MD sent at 4/21/2025  2:41 PM CDT -----  He will be removing the Hernández placed last week in ED in another day or two; which had to be replaced due to inability to void after removing the Hernández put in after his TURP on 4/8.  I told him to give us a call if he can't pee after removing the Hernández to call us and we could find a nurse somewhere who can replace it so he wouldn't have to go back to ED.

## 2025-04-24 ENCOUNTER — TELEPHONE (OUTPATIENT)
Dept: PRIMARY CARE CLINIC | Facility: CLINIC | Age: 65
End: 2025-04-24
Payer: MEDICARE

## 2025-04-24 NOTE — TELEPHONE ENCOUNTER
----- Message from Abner Lawrence MD sent at 4/22/2025  9:24 AM CDT -----  I don't think he needs anything fancy; just a placeholder to last another several days in event he still can't void after removing the current one.  ----- Message -----  From: Lore Navarro RN  Sent: 4/22/2025   7:15 AM CDT  To: Abner Lawrence MD    I'm at [facility] on Fridays.  I don't know if they have bragg kits but I can check.  We have some here.  I could easily place a cath but only have 16Fr, does he need specialty size or coude?  M  ----- Message -----  From: Abner Lawrence MD  Sent: 4/21/2025   2:41 PM CDT  To: Abisai Hess Melinda Staff    He will be removing the Bragg placed last week in ED in another day or two; which had to be replaced due to inability to void after removing the Bragg put in after his TURP on 4/8.  I told him to give us a call if he can't pee after removing the Bragg to call us and we could find a nurse somewhere who can replace it so he wouldn't have to go back to ED.

## 2025-04-25 ENCOUNTER — PATIENT MESSAGE (OUTPATIENT)
Dept: PRIMARY CARE CLINIC | Facility: CLINIC | Age: 65
End: 2025-04-25
Payer: MEDICARE

## 2025-04-26 ENCOUNTER — HOSPITAL ENCOUNTER (EMERGENCY)
Facility: HOSPITAL | Age: 65
Discharge: HOME | End: 2025-04-26
Attending: EMERGENCY MEDICINE
Payer: MEDICARE

## 2025-04-26 VITALS
RESPIRATION RATE: 18 BRPM | OXYGEN SATURATION: 97 % | HEART RATE: 55 BPM | DIASTOLIC BLOOD PRESSURE: 84 MMHG | BODY MASS INDEX: 23.86 KG/M2 | HEIGHT: 73 IN | WEIGHT: 180 LBS | TEMPERATURE: 98 F | SYSTOLIC BLOOD PRESSURE: 181 MMHG

## 2025-04-26 DIAGNOSIS — N39.0 URINARY TRACT INFECTION WITH HEMATURIA, SITE UNSPECIFIED: ICD-10-CM

## 2025-04-26 DIAGNOSIS — R31.9 URINARY TRACT INFECTION WITH HEMATURIA, SITE UNSPECIFIED: ICD-10-CM

## 2025-04-26 DIAGNOSIS — R33.9 URINARY RETENTION: Primary | ICD-10-CM

## 2025-04-26 DIAGNOSIS — R10.2 SUPRAPUBIC PAIN: ICD-10-CM

## 2025-04-26 LAB
ABSOLUTE EOSINOPHIL (OHS): 0.09 K/UL
ABSOLUTE MONOCYTE (OHS): 0.8 K/UL (ref 0.3–1)
ABSOLUTE NEUTROPHIL COUNT (OHS): 5.13 K/UL (ref 1.8–7.7)
ANION GAP (OHS): 13 MMOL/L (ref 8–16)
BACTERIA #/AREA URNS AUTO: ABNORMAL /HPF
BASOPHILS # BLD AUTO: 0.03 K/UL
BASOPHILS NFR BLD AUTO: 0.4 %
BILIRUB UR QL STRIP.AUTO: NEGATIVE
BUN SERPL-MCNC: 26 MG/DL (ref 8–23)
CALCIUM SERPL-MCNC: 10.3 MG/DL (ref 8.7–10.5)
CHLORIDE SERPL-SCNC: 107 MMOL/L (ref 95–110)
CLARITY UR: ABNORMAL
CO2 SERPL-SCNC: 22 MMOL/L (ref 23–29)
COLOR UR AUTO: ABNORMAL
CREAT SERPL-MCNC: 1.3 MG/DL (ref 0.5–1.4)
ERYTHROCYTE [DISTWIDTH] IN BLOOD BY AUTOMATED COUNT: 12.3 % (ref 11.5–14.5)
GFR SERPLBLD CREATININE-BSD FMLA CKD-EPI: >60 ML/MIN/1.73/M2
GLUCOSE SERPL-MCNC: 125 MG/DL (ref 70–110)
GLUCOSE UR QL STRIP: NEGATIVE
HCT VFR BLD AUTO: 40.4 % (ref 40–54)
HGB BLD-MCNC: 13.3 GM/DL (ref 14–18)
HGB UR QL STRIP: ABNORMAL
HOLD SPECIMEN: NORMAL
HYALINE CASTS UR QL AUTO: 0 /LPF (ref 0–1)
IMM GRANULOCYTES # BLD AUTO: 0.02 K/UL (ref 0–0.04)
IMM GRANULOCYTES NFR BLD AUTO: 0.3 % (ref 0–0.5)
KETONES UR QL STRIP: NEGATIVE
LEUKOCYTE ESTERASE UR QL STRIP: ABNORMAL
LYMPHOCYTES # BLD AUTO: 1.49 K/UL (ref 1–4.8)
MCH RBC QN AUTO: 29.9 PG (ref 27–31)
MCHC RBC AUTO-ENTMCNC: 32.9 G/DL (ref 32–36)
MCV RBC AUTO: 91 FL (ref 82–98)
MICROSCOPIC COMMENT: ABNORMAL
NITRITE UR QL STRIP: NEGATIVE
NUCLEATED RBC (/100WBC) (OHS): 0 /100 WBC
PH UR STRIP: 6 [PH]
PLATELET # BLD AUTO: 279 K/UL (ref 150–450)
PMV BLD AUTO: 9.6 FL (ref 9.2–12.9)
POTASSIUM SERPL-SCNC: 4.5 MMOL/L (ref 3.5–5.1)
PROT UR QL STRIP: ABNORMAL
RBC # BLD AUTO: 4.45 M/UL (ref 4.6–6.2)
RBC #/AREA URNS AUTO: 71 /HPF (ref 0–4)
RELATIVE EOSINOPHIL (OHS): 1.2 %
RELATIVE LYMPHOCYTE (OHS): 19.7 % (ref 18–48)
RELATIVE MONOCYTE (OHS): 10.6 % (ref 4–15)
RELATIVE NEUTROPHIL (OHS): 67.8 % (ref 38–73)
SODIUM SERPL-SCNC: 142 MMOL/L (ref 136–145)
SP GR UR STRIP: 1.01
UROBILINOGEN UR STRIP-ACNC: NEGATIVE EU/DL
WBC # BLD AUTO: 7.56 K/UL (ref 3.9–12.7)
WBC #/AREA URNS AUTO: >100 /HPF (ref 0–5)
WBC CLUMPS UR QL AUTO: ABNORMAL
YEAST UR QL AUTO: ABNORMAL /HPF

## 2025-04-26 PROCEDURE — 87086 URINE CULTURE/COLONY COUNT: CPT

## 2025-04-26 PROCEDURE — 63600175 PHARM REV CODE 636 W HCPCS

## 2025-04-26 PROCEDURE — 51702 INSERT TEMP BLADDER CATH: CPT

## 2025-04-26 PROCEDURE — 96374 THER/PROPH/DIAG INJ IV PUSH: CPT

## 2025-04-26 PROCEDURE — 85025 COMPLETE CBC W/AUTO DIFF WBC: CPT | Performed by: EMERGENCY MEDICINE

## 2025-04-26 PROCEDURE — 25000003 PHARM REV CODE 250: Performed by: EMERGENCY MEDICINE

## 2025-04-26 PROCEDURE — 51798 US URINE CAPACITY MEASURE: CPT

## 2025-04-26 PROCEDURE — 81003 URINALYSIS AUTO W/O SCOPE: CPT

## 2025-04-26 PROCEDURE — 99284 EMERGENCY DEPT VISIT MOD MDM: CPT | Mod: 25

## 2025-04-26 PROCEDURE — 80048 BASIC METABOLIC PNL TOTAL CA: CPT | Performed by: EMERGENCY MEDICINE

## 2025-04-26 RX ORDER — NITROFURANTOIN 25; 75 MG/1; MG/1
100 CAPSULE ORAL 2 TIMES DAILY
Qty: 20 CAPSULE | Refills: 0 | Status: SHIPPED | OUTPATIENT
Start: 2025-04-26 | End: 2025-04-26

## 2025-04-26 RX ORDER — NITROFURANTOIN 25; 75 MG/1; MG/1
100 CAPSULE ORAL
Status: COMPLETED | OUTPATIENT
Start: 2025-04-26 | End: 2025-04-26

## 2025-04-26 RX ORDER — MORPHINE SULFATE 4 MG/ML
4 INJECTION, SOLUTION INTRAMUSCULAR; INTRAVENOUS
Refills: 0 | Status: COMPLETED | OUTPATIENT
Start: 2025-04-26 | End: 2025-04-26

## 2025-04-26 RX ORDER — LIDOCAINE HYDROCHLORIDE 20 MG/ML
JELLY TOPICAL
Status: COMPLETED | OUTPATIENT
Start: 2025-04-26 | End: 2025-04-26

## 2025-04-26 RX ORDER — NITROFURANTOIN 25; 75 MG/1; MG/1
100 CAPSULE ORAL 2 TIMES DAILY
Qty: 20 CAPSULE | Refills: 0 | Status: SHIPPED | OUTPATIENT
Start: 2025-04-26 | End: 2025-05-06

## 2025-04-26 RX ADMIN — LIDOCAINE HYDROCHLORIDE: 20 JELLY TOPICAL at 06:04

## 2025-04-26 RX ADMIN — NITROFURANTOIN MONOHYDRATE/MACROCRYSTALS 100 MG: 25; 75 CAPSULE ORAL at 08:04

## 2025-04-26 RX ADMIN — MORPHINE SULFATE 4 MG: 4 INJECTION INTRAVENOUS at 06:04

## 2025-04-26 NOTE — ED PROVIDER NOTES
"Encounter Date: 4/26/2025       History     Chief Complaint   Patient presents with    Urinary Retention     Arrived to ED with complaint of urinary retention post removing urethral catheter yesterday. Pt states that he had prostate surgery 04/08/25 and catheter was placed post surgery. Pt also reports some back pain and constipation. Hx of prostate cancer     Simón Fong is a 66 YO male with PMH significant for HTN, HIV (CD4 797), and prostate cancer that presents to the ED for suprapubic pain and urinary retention. Patient states that he underwent a bilateral radioablation for the prostate cancer on 4/8/25 with a urologist in [place] and had a bragg catheter placed post-operatively. It was removed 1 week later but then he developed urinary retention on 4/16 and had another bragg placed at [facility]. That bragg remained in place for approximately 9 days before the patient removed it. Initially, he states he was producing adequate urine but now states his urine "dribbles" and is associated with worsening 10/10 suprapubic pain that radiates to his low back and constipation. He denies fever, chills, chest pain, shortness of breath, or any visible changes in the urine quality.         Review of patient's allergies indicates:   Allergen Reactions    Sulfa (sulfonamide antibiotics) Other (See Comments)     Past Medical History:   Diagnosis Date    HIV infection     Hypertension      Past Surgical History:   Procedure Laterality Date    COLONOSCOPY      COLONOSCOPY N/A 7/31/2020    Procedure: COLONOSCOPY;  Surgeon: Tree Mendiola MD;  [facility]  Service: Endoscopy;  Laterality: N/A;  covid 7/28-[facility]-    COLONOSCOPY, SCREENING, HIGH RISK PATIENT N/A 2/3/2025    Procedure: COLONOSCOPY, SCREENING, HIGH RISK PATIENT;  Surgeon: Clarence Nguyen MD;  [facility]  Service: Endoscopy;  Laterality: N/A;  Abner Floyd, peg, portal, ASa  1/31 pt rescheduled, " PEG, portal -ml    PROSTATE BIOPSY       Family History   Problem Relation Name Age of Onset    Diabetes Mother      Heart disease Mother      Hypertension Mother      Stroke Mother      Hypertension Father      Cancer Sister      Cancer Brother       Social History[1]  Review of Systems    Physical Exam     Initial Vitals [04/26/25 1734]   BP Pulse Resp Temp SpO2   (!) 181/81 78 16 98.7 °F (37.1 °C) 97 %      MAP       --         Physical Exam    Constitutional: He appears well-developed and well-nourished. No distress.   Uncomfortable, unable to tolerate laying flat   HENT:   Head: Normocephalic and atraumatic.   Eyes: EOM are normal. Pupils are equal, round, and reactive to light.   Neck: Neck supple.   Cardiovascular:  Normal rate and regular rhythm.     Exam reveals no gallop and no friction rub.       No murmur heard.  Pulmonary/Chest: Breath sounds normal. No respiratory distress.   Abdominal: Abdomen is soft. He exhibits no distension. There is abdominal tenderness in the suprapubic area.   No right CVA tenderness.  No left CVA tenderness.   Musculoskeletal:      Cervical back: Neck supple.     Neurological: He is alert and oriented to person, place, and time.   Skin: Skin is warm and dry.         ED Course   Procedures  Labs Reviewed   BASIC METABOLIC PANEL - Abnormal       Result Value    Sodium 142      Potassium 4.5      Chloride 107      CO2 22 (*)     Glucose 125 (*)     BUN 26 (*)     Creatinine 1.3      Calcium 10.3      Anion Gap 13      eGFR >60     CBC WITH DIFFERENTIAL - Abnormal    WBC 7.56      RBC 4.45 (*)     HGB 13.3 (*)     HCT 40.4      MCV 91      MCH 29.9      MCHC 32.9      RDW 12.3      Platelet Count 279      MPV 9.6      Nucleated RBC 0      Neut % 67.8      Lymph % 19.7      Mono % 10.6      Eos % 1.2      Basophil % 0.4      Imm Grans % 0.3      Neut # 5.13      Lymph # 1.49      Mono # 0.80      Eos # 0.09      Baso # 0.03      Imm Grans # 0.02     URINALYSIS, REFLEX TO URINE  CULTURE - Abnormal    Color, UA Orange (*)     Appearance, UA Cloudy (*)     pH, UA 6.0      Spec Grav UA 1.010      Protein, UA Trace (*)     Glucose, UA Negative      Ketones, UA Negative      Bilirubin, UA Negative      Blood, UA 3+ (*)     Nitrites, UA Negative      Urobilinogen, UA Negative      Leukocyte Esterase, UA 3+ (*)    URINALYSIS MICROSCOPIC - Abnormal    RBC, UA 71 (*)     WBC, UA >100 (*)     WBC Clumps, UA Occasional (*)     Bacteria, UA Few (*)     Yeast, UA None      Hyaline Casts, UA 0      Microscopic Comment       CULTURE, URINE   CBC W/ AUTO DIFFERENTIAL    Narrative:     The following orders were created for panel order CBC auto differential.  Procedure                               Abnormality         Status                     ---------                               -----------         ------                     CBC with Differential[[id number]]       Abnormal            Final result                 Please view results for these tests on the individual orders.   GREY TOP URINE HOLD    Extra Tube Hold for add-ons.            Imaging Results    None          Medications   morphine injection 4 mg (4 mg Intravenous Given 4/26/25 1806)   LIDOcaine HCl 2% urojet ( Mucous Membrane Given 4/26/25 1845)   nitrofurantoin (macrocrystal-monohydrate) 100 MG capsule 100 mg (100 mg Oral Given 4/26/25 2004)     Medical Decision Making  66 YO male with PMH significant for HTN, HIV (CD4 797), and prostate cancer that presents to the ED for suprapubic pain and urinary retention s/p prostatic ablation on 4/8/25. Differential diagnosis includes bladder outlet obstruction, UTI, or AURY. In the ED, the patient was afebrile, hypertensive at 181/81 but otherwise hemodynamically stable. Physical exam was significant for suprapubic tenderness and negative for bilateral CVA tenderness. CBC showed mild normocytic anemia with Hgb 13.3. BMP was unremarkable with normal kidney function. Bladder scan showed 750cc. Hernández was  placed. UA with 3+ blood, 3+ leukocyte esterase and > 100 WBCs. Urine culture pending. Patient was given morphine for pain control. He was discharged home in stable condition with Macrobid 100mg BID x 10 days and outpatient follow up with urology or PCP in 1 week for bragg removal     Amount and/or Complexity of Data Reviewed  Labs: ordered.    Risk  Prescription drug management.                                      Clinical Impression:  Final diagnoses:  [R33.9] Urinary retention (Primary)  [R10.2] Suprapubic pain  [N39.0, R31.9] Urinary tract infection with hematuria, site unspecified          ED Disposition Condition    Discharge Stable          ED Prescriptions       Medication Sig Dispense Start Date End Date Auth. Provider    nitrofurantoin, macrocrystal-monohydrate, (MACROBID) 100 MG capsule  (Status: Discontinued) Take 1 capsule (100 mg total) by mouth 2 (two) times daily. for 10 days 20 capsule 4/26/2025 4/26/2025 Jie Herrera MD    nitrofurantoin, macrocrystal-monohydrate, (MACROBID) 100 MG capsule Take 1 capsule (100 mg total) by mouth 2 (two) times daily. for 10 days 20 capsule 4/26/2025 5/6/2025 Jie Herrera MD          Follow-up Information       Follow up With Specialties Details Why Contact Info    Jimena Barone MD Internal Medicine Schedule an appointment as soon as possible for a visit in 1 week  [address]  [place] [address]  [phone]      [facility] UROLOGY Urology Schedule an appointment as soon as possible for a visit in 1 week  [address] [facility] [id number] [phone]    [facility] - Emergency Dept Emergency Medicine Go to  Return to ED for worsening symptoms, inability to eat/ drink, fever greater than 100.4, or any other concerns. [address] [facility] [id number] [phone]                 [1]   Social History  Tobacco Use    Smoking status: Never     Passive exposure: Never    Smokeless tobacco: Never   Substance Use  Topics    Alcohol use: No    Drug use: No        Jie Herrera MD  Resident  04/26/25 2013

## 2025-04-26 NOTE — ED TRIAGE NOTES
Pt had a recent prostate ablation had to use a catheter post-procedure for 7 days. The pt took the bragg out yesterday, but started getting abdominal pain that has now radiated to his back and urine is only dribbling out.

## 2025-04-27 NOTE — DISCHARGE INSTRUCTIONS
You have a urinary tract infection. Please take the Macrobid 100mg once in the morning and once in the evening for 10 days.   Schedule an appointment with urology for bragg removal.   Return to the ED if your symptoms worsen or recur.

## 2025-04-28 ENCOUNTER — TELEPHONE (OUTPATIENT)
Dept: PRIMARY CARE CLINIC | Facility: CLINIC | Age: 65
End: 2025-04-28
Payer: MEDICARE

## 2025-04-28 ENCOUNTER — OFFICE VISIT (OUTPATIENT)
Dept: UROLOGY | Facility: CLINIC | Age: 65
End: 2025-04-28
Payer: MEDICARE

## 2025-04-28 VITALS
WEIGHT: 218.5 LBS | HEART RATE: 66 BPM | BODY MASS INDEX: 28.96 KG/M2 | DIASTOLIC BLOOD PRESSURE: 81 MMHG | HEIGHT: 73 IN | SYSTOLIC BLOOD PRESSURE: 154 MMHG

## 2025-04-28 DIAGNOSIS — R33.9 URINARY RETENTION: ICD-10-CM

## 2025-04-28 DIAGNOSIS — R10.2 SUPRAPUBIC PAIN: ICD-10-CM

## 2025-04-28 PROCEDURE — 99213 OFFICE O/P EST LOW 20 MIN: CPT | Mod: PBBFAC | Performed by: STUDENT IN AN ORGANIZED HEALTH CARE EDUCATION/TRAINING PROGRAM

## 2025-04-28 PROCEDURE — 99999 PR PBB SHADOW E&M-EST. PATIENT-LVL III: CPT | Mod: PBBFAC,,, | Performed by: STUDENT IN AN ORGANIZED HEALTH CARE EDUCATION/TRAINING PROGRAM

## 2025-04-28 NOTE — TELEPHONE ENCOUNTER
----- Message from Abner Lawrence MD sent at 4/28/2025  8:42 AM CDT -----  Looks like we spoke too soon; went back to ED on Saturday for retention and had another bragg placed.  UA c/w UTI and Cx growing GNR; on macrobid.  Urology referral placed by ED but nothing scheduled as of yet; let's see if we can get him a urology appt this week or next at the latest.  That should suffice for a hospital f/u b/c there's really nothing else for us to do.

## 2025-04-28 NOTE — PROGRESS NOTES
[facility]  Urologic Oncology Clinic Note        Date of Service: 04/28/2025    Chief Complaint/Reason for Consultation: Prostate Cancer    Requesting Provider:   No referring provider defined for this encounter.      History of Present Illness:   Patient 65 y.o. male presents with history of prostate cancer. Previous patient of Dr. Samuel.    He underwent prostate ablation 4/8/25 TPLA bilateral at [facility]. Post-procedure imaging was WNL 4/10/25.   Self-removed bragg catheter 4/16/25 after which he went into urinary retention. ED replaced bragg and this stayed for 8 days.  Recently seen in the ED for urinary retention and bilateral hydronephrosis.  ml.  Re-presented to the ED 4/26/25 and required bragg catheter placement. He is currently taking nitrofurantoin for infection.    Patient's doctor in [place] recommended doubling Flomax and remove Bragg catheter Thursday 5/1/25.    FH -positive  PSA - 12.2  AJCC Stage - T1C  STAR CAP stage- IB  Volume - 55 ccs  MRI - 5/12/2022- 0.9 cm PI-RADS 3 lesion RAPZ. Negative extra prostatic extension, NVBI (neurovascular bundle involvement), SVI (seminal vesicle involvement), nodes.  Biopsy - 6/7/2022-UroNav- [place] 6 (GG1) left apex 2/2 60%, right apex (target) 1/4 60%, right middle 2/2 90%, right base 1/1 10%. Overall 4/6 sites, 6/14 cores.  ANDRES Score - 3 intermediate  NCCN - favorable intermediate  Germline testing -discussed, indicated  Somatic testing - Prolaris score- 3.2    Blood thinners: None   No Hx of TIA, MI, and CVA   Abdominal surgeries: None.       Urologic History:     Date    Patient Active Problem List    Diagnosis Date Noted    Bladder outlet obstruction 04/21/2025    Chronic idiopathic constipation 04/01/2025    Benign prostatic hyperplasia with incomplete bladder emptying 04/01/2025    Acute pain of both knees 01/29/2024    Prostate cancer 03/23/2022    Primary hypertension 11/15/2018    Sacroiliac joint dysfunction of left side 02/16/2017    SI  "joint dislocation 02/03/2017    Chronic left-sided low back pain with sciatica 02/03/2017    Lumbar radiculopathy 02/03/2017    Family history of prostate cancer 11/01/2015    Human immunodeficiency virus (HIV) disease 12/20/2012        Prescriptions Prior to Admission[1]  Review of patient's allergies indicates:   Allergen Reactions    Sulfa (sulfonamide antibiotics) Other (See Comments)        Past Medical History:   Diagnosis Date    HIV infection     Hypertension       Past Surgical History:   Procedure Laterality Date    COLONOSCOPY      COLONOSCOPY N/A 7/31/2020    Procedure: COLONOSCOPY;  Surgeon: Tree Mendiola MD;  Location: [facility] ENDO (4TH FLR);  Service: Endoscopy;  Laterality: N/A;  covid 7/28-[facility]-    COLONOSCOPY, SCREENING, HIGH RISK PATIENT N/A 2/3/2025    Procedure: COLONOSCOPY, SCREENING, HIGH RISK PATIENT;  Surgeon: Clarence Nguyen MD;  Location: [facility] ENDO (4TH FLR);  Service: Endoscopy;  Laterality: N/A;  Abner Floyd, peg, portal, ASam  1/31 pt rescheduled, PEG, portal -ml    PROSTATE BIOPSY        Family History   Problem Relation Name Age of Onset    Diabetes Mother      Heart disease Mother      Hypertension Mother      Stroke Mother      Hypertension Father      Cancer Sister      Cancer Brother        Social History     Tobacco Use    Smoking status: Never     Passive exposure: Never    Smokeless tobacco: Never   Substance Use Topics    Alcohol use: No        Review of Systems          OBJECTIVE:     Vitals:    04/28/25 1135   BP: (!) 154/81   BP Location: Right arm   Patient Position: Sitting   Pulse: 66   Weight: 99.1 kg (218 lb 7.6 oz)   Height: 6' 1" (1.854 m)          General Appearance: Alert, cooperative, no distress  Head: Normocephalic  Eyes: Clear conjunctiva  Neck: No obvious LND or JVD  Lungs: Normal chest excursion, no accessory muscle use  Chest: Regular rate rhythm by palpation, no pedal edema  Abdomen: Soft, non-tender, non-distended  Extremities: " Atraumatic   Lymph Nodes: No appreciable lymph adenopathy  Neurologic: No gross gait, motor or sensory deficits            LAB:      All laboratory values listed below was/were independently reviewed with patient at this clinic visit.      IMAGING:      All imaging listed below was/were independently reviewed with patient at this clinic visit.          ASSESSMENT/PLAN:         Plan:  - Patient wants to self-remove catheter Thursday 5/1/25  - Continue full course of antibiotics as prescribed by the ED  - patient has supplies from outside urologist to perform self-catheterization  - Will contact Tammy Wright should he be unable to self-cath. Return precautions discussed.      Nir Carmen MD  [facility] - PGY4             [1] (Not in a hospital admission)

## 2025-04-28 NOTE — TELEPHONE ENCOUNTER
RN spoke to Urology and scheduled pt's appt for today.  RN will notify patient.      RN sending pt to Dr. Madrigal.  Pt had spoken to Dr. Alfaro at [facility] as well.

## 2025-04-29 ENCOUNTER — PATIENT MESSAGE (OUTPATIENT)
Dept: UROLOGY | Facility: CLINIC | Age: 65
End: 2025-04-29
Payer: MEDICARE

## 2025-04-29 LAB — BACTERIA UR CULT: ABNORMAL

## 2025-04-30 ENCOUNTER — PATIENT MESSAGE (OUTPATIENT)
Dept: UROLOGY | Facility: CLINIC | Age: 65
End: 2025-04-30
Payer: MEDICARE

## 2025-04-30 ENCOUNTER — RESULTS FOLLOW-UP (OUTPATIENT)
Dept: EMERGENCY MEDICINE | Facility: HOSPITAL | Age: 65
End: 2025-04-30

## 2025-05-01 ENCOUNTER — HOSPITAL ENCOUNTER (EMERGENCY)
Facility: HOSPITAL | Age: 65
Discharge: HOME | End: 2025-05-01
Attending: STUDENT IN AN ORGANIZED HEALTH CARE EDUCATION/TRAINING PROGRAM
Payer: MEDICARE

## 2025-05-01 VITALS
HEART RATE: 80 BPM | OXYGEN SATURATION: 97 % | BODY MASS INDEX: 28.49 KG/M2 | HEIGHT: 73 IN | SYSTOLIC BLOOD PRESSURE: 160 MMHG | RESPIRATION RATE: 16 BRPM | DIASTOLIC BLOOD PRESSURE: 84 MMHG | TEMPERATURE: 98 F | WEIGHT: 215 LBS

## 2025-05-01 DIAGNOSIS — R31.9 HEMATURIA, UNSPECIFIED TYPE: Primary | ICD-10-CM

## 2025-05-01 LAB
ABSOLUTE EOSINOPHIL (OHS): 0.07 K/UL
ABSOLUTE MONOCYTE (OHS): 0.45 K/UL (ref 0.3–1)
ABSOLUTE NEUTROPHIL COUNT (OHS): 4.09 K/UL (ref 1.8–7.7)
ALBUMIN SERPL BCP-MCNC: 4.1 G/DL (ref 3.5–5.2)
ALP SERPL-CCNC: 79 UNIT/L (ref 40–150)
ALT SERPL W/O P-5'-P-CCNC: 15 UNIT/L (ref 10–44)
ANION GAP (OHS): 10 MMOL/L (ref 8–16)
AST SERPL-CCNC: 18 UNIT/L (ref 11–45)
BACTERIA #/AREA URNS AUTO: ABNORMAL /HPF
BASOPHILS # BLD AUTO: 0.03 K/UL
BASOPHILS NFR BLD AUTO: 0.5 %
BILIRUB SERPL-MCNC: 0.4 MG/DL (ref 0.1–1)
BILIRUB UR QL STRIP.AUTO: NEGATIVE
BUN SERPL-MCNC: 11 MG/DL (ref 8–23)
CALCIUM SERPL-MCNC: 9.5 MG/DL (ref 8.7–10.5)
CHLORIDE SERPL-SCNC: 105 MMOL/L (ref 95–110)
CK SERPL-CCNC: 200 U/L (ref 20–200)
CLARITY UR: CLEAR
CO2 SERPL-SCNC: 26 MMOL/L (ref 23–29)
COLOR UR AUTO: YELLOW
CREAT SERPL-MCNC: 0.9 MG/DL (ref 0.5–1.4)
ERYTHROCYTE [DISTWIDTH] IN BLOOD BY AUTOMATED COUNT: 11.9 % (ref 11.5–14.5)
GFR SERPLBLD CREATININE-BSD FMLA CKD-EPI: >60 ML/MIN/1.73/M2
GLUCOSE SERPL-MCNC: 102 MG/DL (ref 70–110)
GLUCOSE UR QL STRIP: NEGATIVE
HCT VFR BLD AUTO: 41.8 % (ref 40–54)
HGB BLD-MCNC: 13.9 GM/DL (ref 14–18)
HGB UR QL STRIP: ABNORMAL
HOLD SPECIMEN: NORMAL
IMM GRANULOCYTES # BLD AUTO: 0.04 K/UL (ref 0–0.04)
IMM GRANULOCYTES NFR BLD AUTO: 0.7 % (ref 0–0.5)
KETONES UR QL STRIP: NEGATIVE
LEUKOCYTE ESTERASE UR QL STRIP: ABNORMAL
LYMPHOCYTES # BLD AUTO: 1.08 K/UL (ref 1–4.8)
MCH RBC QN AUTO: 29.8 PG (ref 27–31)
MCHC RBC AUTO-ENTMCNC: 33.3 G/DL (ref 32–36)
MCV RBC AUTO: 90 FL (ref 82–98)
MICROSCOPIC COMMENT: ABNORMAL
NITRITE UR QL STRIP: NEGATIVE
NUCLEATED RBC (/100WBC) (OHS): 0 /100 WBC
PH UR STRIP: 7 [PH]
PLATELET # BLD AUTO: 278 K/UL (ref 150–450)
PMV BLD AUTO: 9.2 FL (ref 9.2–12.9)
POTASSIUM SERPL-SCNC: 3.4 MMOL/L (ref 3.5–5.1)
PROT SERPL-MCNC: 8 GM/DL (ref 6–8.4)
PROT UR QL STRIP: ABNORMAL
RBC # BLD AUTO: 4.66 M/UL (ref 4.6–6.2)
RBC #/AREA URNS AUTO: 3 /HPF (ref 0–4)
RELATIVE EOSINOPHIL (OHS): 1.2 %
RELATIVE LYMPHOCYTE (OHS): 18.8 % (ref 18–48)
RELATIVE MONOCYTE (OHS): 7.8 % (ref 4–15)
RELATIVE NEUTROPHIL (OHS): 71 % (ref 38–73)
SODIUM SERPL-SCNC: 141 MMOL/L (ref 136–145)
SP GR UR STRIP: <1.005
UROBILINOGEN UR STRIP-ACNC: NEGATIVE EU/DL
WBC # BLD AUTO: 5.76 K/UL (ref 3.9–12.7)
WBC #/AREA URNS AUTO: 13 /HPF (ref 0–5)

## 2025-05-01 PROCEDURE — 87186 SC STD MICRODIL/AGAR DIL: CPT

## 2025-05-01 PROCEDURE — 25000003 PHARM REV CODE 250

## 2025-05-01 PROCEDURE — 85025 COMPLETE CBC W/AUTO DIFF WBC: CPT

## 2025-05-01 PROCEDURE — 82550 ASSAY OF CK (CPK): CPT

## 2025-05-01 PROCEDURE — 81003 URINALYSIS AUTO W/O SCOPE: CPT

## 2025-05-01 PROCEDURE — 82374 ASSAY BLOOD CARBON DIOXIDE: CPT | Performed by: STUDENT IN AN ORGANIZED HEALTH CARE EDUCATION/TRAINING PROGRAM

## 2025-05-01 PROCEDURE — 99283 EMERGENCY DEPT VISIT LOW MDM: CPT

## 2025-05-01 RX ORDER — FENTANYL CITRATE 50 UG/ML
50 INJECTION, SOLUTION INTRAMUSCULAR; INTRAVENOUS
Refills: 0 | Status: DISCONTINUED | OUTPATIENT
Start: 2025-05-01 | End: 2025-05-01 | Stop reason: HOSPADM

## 2025-05-01 RX ORDER — LIDOCAINE HYDROCHLORIDE 20 MG/ML
JELLY TOPICAL
Status: COMPLETED | OUTPATIENT
Start: 2025-05-01 | End: 2025-05-01

## 2025-05-01 RX ADMIN — LIDOCAINE HYDROCHLORIDE: 20 JELLY TOPICAL at 01:05

## 2025-05-01 NOTE — ED NOTES
I-STAT Chem-8+ Results:   Value Reference Range   Sodium 135 136-145 mmol/L   Potassium  5.6 3.5-5.1 mmol/L   Chloride 105  mmol/L   Ionized Calcium 1.01 1.06-1.42 mmol/L   CO2 (measured) 26 23-29 mmol/L   Glucose 115  mg/dL   BUN 17 6-30 mg/dL   Creatinine 1.0 0.5-1.4 mg/dL   Hematocrit 42 36-54%

## 2025-05-01 NOTE — ED NOTES
Pt states recent prostate ablation with bragg catheter in place, hematuria starting today only. Denies pain or any other symptoms.     ,Patient identifiers for Simón Fong 65 y.o. male checked and correct.  Chief Complaint   Patient presents with    Hematuria     Had catheter placed 4/26 for urinary retention. Reports urine has been clear/yellow. States he woke up this morning and noticed the blood. No clots present at this time. Denies pain. Hx of prostate ca     Past Medical History:   Diagnosis Date    HIV infection     Hypertension      Allergies reported:   Review of patient's allergies indicates:   Allergen Reactions    Sulfa (sulfonamide antibiotics) Other (See Comments)       Appearance: Pt awake, alert & oriented to person, place & time. Pt in no acute distress at present time. Pt is clean and well groomed with clothes appropriately fastened.   Skin: Skin warm, dry & intact. Color consistent with ethnicity. Mucous membranes moist. No breakdown or brusing noted.   Musculoskeletal: Patient moving all extremities well, no obvious swelling or deformities noted.   Respiratory: Respirations spontaneous, even, and non-labored. Visible chest rise noted. Airway is open and patent. No accessory muscle use noted.   Neurologic: Sensation is intact. Speech is clear and appropriate. Eyes open spontaneously, behavior appropriate to situation, follows commands, facial expression symmetrical, bilateral hand grasp equal and even, purposeful motor response noted.  Cardiac: All peripheral pulses present. No Bilateral lower extremity edema. Cap refill is <3 seconds.  Abdomen: Abdomen soft, non distended, non tender to palpation.   : Pt voiding bloody urine via bragg cath with gravity bag. patent

## 2025-05-01 NOTE — ASSESSMENT & PLAN NOTE
- Stable for discharge from urologic perspective  - Continue current course of antibiotics to completion  - Follow up new urine culture  - Patient may self-remove catheter at home next Monday 5/5/25 as previously recommended  - Patient has self-catheterization supplies and is aware of return precautions  - Rest of care per ED

## 2025-05-01 NOTE — ED PROVIDER NOTES
Encounter Date: 5/1/2025       History     Chief Complaint   Patient presents with    Hematuria     Had catheter placed 4/26 for urinary retention. Reports urine has been clear/yellow. States he woke up this morning and noticed the blood. No clots present at this time. Denies pain. Hx of prostate ca     Mr. Simón Fong is a 64 y/o male with a past medical hx of HIV, HTN and prostate cancer presenting with hematuria in bragg bag. He woke up at 3am to empty his bag and it did not have blood at the time, but first noticed the blood at 4:30am today. He has no hx of hematuria and is asymptomatic at this time. He had a prostate ablation surgery on 4/8/25 and since has had 2 episodes of urinary retention on 4/16 and 4/26 after failed bragg trials and required reinsertion of bragg. At last admission, was also found to have a UTI and is currently on abx. He denies any chest pain, SOB, N/V, abdominal pain, dysuria.    The history is provided by the patient. No  was used.     Review of patient's allergies indicates:   Allergen Reactions    Sulfa (sulfonamide antibiotics) Other (See Comments)     Past Medical History:   Diagnosis Date    HIV infection     Hypertension      Past Surgical History:   Procedure Laterality Date    COLONOSCOPY      COLONOSCOPY N/A 7/31/2020    Procedure: COLONOSCOPY;  Surgeon: Tree Mendiola MD;  Location: [facility] ([address] [place] [address]);  Service: Endoscopy;  Laterality: N/A;  covid 7/28-[facility]-    COLONOSCOPY, SCREENING, HIGH RISK PATIENT N/A 2/3/2025    Procedure: COLONOSCOPY, SCREENING, HIGH RISK PATIENT;  Surgeon: Clarence Nguyen MD;  Location: [facility] ([address] [place] [address]);  Service: Endoscopy;  Laterality: N/A;  Abner Floyd, peg, portal, ASam  1/31 pt rescheduled, PEG, portal -ml    PROSTATE BIOPSY       Family History   Problem Relation Name Age of Onset    Diabetes Mother      Heart disease Mother      Hypertension Mother      Stroke Mother      Hypertension  Father      Cancer Sister      Cancer Brother       Social History[1]  Review of Systems    Physical Exam     Initial Vitals   BP Pulse Resp Temp SpO2   05/01/25 0653 05/01/25 0651 05/01/25 0651 05/01/25 0651 05/01/25 0651   (!) 161/84 60 15 97.7 °F (36.5 °C) 98 %      MAP       --                Physical Exam    Nursing note and vitals reviewed.  Constitutional: He appears well-developed. He is not diaphoretic. No distress.   HENT:   Head: Normocephalic.   Eyes: Pupils are equal, round, and reactive to light.   Neck: Neck supple.   Cardiovascular:  Normal rate, regular rhythm, normal heart sounds and intact distal pulses.           Pulmonary/Chest: Breath sounds normal. He has no wheezes. He has no rhonchi. He has no rales.   Abdominal: Abdomen is soft. There is no abdominal tenderness. There is no rebound and no guarding.   Genitourinary:    Penis normal.      Genitourinary Comments: Hernández in place, urine bag is full with blush colored urine     Musculoskeletal:      Cervical back: Neck supple.     Neurological: He is alert and oriented to person, place, and time. He has normal strength. GCS score is 15. GCS eye subscore is 4. GCS verbal subscore is 5. GCS motor subscore is 6.   Skin: Skin is warm and dry. Capillary refill takes less than 2 seconds. No rash noted. No erythema. No pallor.   Psychiatric: He has a normal mood and affect. His behavior is normal. Judgment and thought content normal.         ED Course   Procedures  Labs Reviewed   URINALYSIS, REFLEX TO URINE CULTURE - Abnormal       Result Value    Color, UA Yellow      Appearance, UA Clear      pH, UA 7.0      Spec Grav UA <1.005 (*)     Protein, UA Trace (*)     Glucose, UA Negative      Ketones, UA Negative      Bilirubin, UA Negative      Blood, UA 3+ (*)     Nitrites, UA Negative      Urobilinogen, UA Negative      Leukocyte Esterase, UA 2+ (*)    URINALYSIS MICROSCOPIC - Abnormal    RBC, UA 3      WBC, UA 13 (*)     Bacteria, UA Occasional       Microscopic Comment       CBC WITH DIFFERENTIAL - Abnormal    WBC 5.76      RBC 4.66      HGB 13.9 (*)     HCT 41.8      MCV 90      MCH 29.8      MCHC 33.3      RDW 11.9      Platelet Count 278      MPV 9.2      Nucleated RBC 0      Neut % 71.0      Lymph % 18.8      Mono % 7.8      Eos % 1.2      Basophil % 0.5      Imm Grans % 0.7 (*)     Neut # 4.09      Lymph # 1.08      Mono # 0.45      Eos # 0.07      Baso # 0.03      Imm Grans # 0.04     COMPREHENSIVE METABOLIC PANEL - Abnormal    Sodium 141      Potassium 3.4 (*)     Chloride 105      CO2 26      Glucose 102      BUN 11      Creatinine 0.9      Calcium 9.5      Protein Total 8.0      Albumin 4.1      Bilirubin Total 0.4      ALP 79      AST 18      ALT 15      Anion Gap 10      eGFR >60     CK - Normal         CULTURE, URINE   GREY TOP URINE HOLD    Extra Tube Hold for add-ons.     CBC W/ AUTO DIFFERENTIAL    Narrative:     The following orders were created for panel order CBC auto differential.  Procedure                               Abnormality         Status                     ---------                               -----------         ------                     CBC with Differential[[id number]]       Abnormal            Final result                 Please view results for these tests on the individual orders.          Imaging Results    None          Medications   LIDOcaine HCl 2% urojet ( Mucous Membrane Given 5/1/25 1300)     Medical Decision Making  Amount and/or Complexity of Data Reviewed  Labs: ordered.    Risk  Prescription drug management.               ED Course as of 05/01/25 1543   Thu May 01, 2025   0715 65-year-old male in no acute distress.  Patient is hypertensive, otherwise vital signs within normal limits.  Patient is overall well-appearing and nontoxic on exam.  Differential includes but is not limited to postoperative bleeding versus infection, doubt nephrolithiasis, patient is denying any pain but considered pocus revealed  decompressed bladder and Hernández balloon in place, clot retention unlikely [BP]   1541 Hemoglobin(!): 13.9  Increased from patient's baseline from a couple of weeks ago [BP]   1541 WBC: 5.76  No leukocytosis [BP]   1542 Urinalysis Microscopic(!)  Unexpected RBCs are 3 despite visible hematuria.  Basic labs were ordered to rule out rhabdomyolysis or AURY, CPK was normal and creatinine WNL.  The patient was offered a larger bore catheter by urology but refused.  They recommended he follow up as scheduled on Monday.  I discussed return precautions with them and answered all questions.  I provided her with discharge paperwork and he was discharged in stable condition. [BP]      ED Course User Index  [BP] Freddie Justice MD                           Clinical Impression:  Final diagnoses:  [R31.9] Hematuria, unspecified type (Primary)          ED Disposition Condition    Discharge Stable          ED Prescriptions    None       Follow-up Information       Follow up With Specialties Details Why Contact Info    Jimena Barone MD Internal Medicine  As needed, If symptoms worsen [address]  [place] [address]  [phone]                 [1]   Social History  Tobacco Use    Smoking status: Never     Passive exposure: Never    Smokeless tobacco: Never   Substance Use Topics    Alcohol use: No    Drug use: No        Freddie Justice MD  Resident  05/01/25 8541

## 2025-05-01 NOTE — HPI
Mr. Fong is a 65 y.o. male with history of HIV, HTN, BPH and prostate cancer. Urology consulted for hematuria.     He underwent TPLA bilateral prostate ablation 4/8/25 at [facility]. Post-procedure imaging was WNL 4/10/25. Self-removed bragg catheter 4/16/25 after which he went into urinary retention. ED replaced rbagg and this stayed for 8 days.  Recently seen in the ED for urinary retention and bilateral hydronephrosis.  ml. Re-presented to the ED 4/26/25 and required bragg catheter placement. He is currently taking nitrofurantoin for positive urine culture.    Patient woke up this morning with blood leaking around the bragg and lightly pink-tinged urine. Denies pain, dysuria, fevers or chills. The patient has not seen any clots pass. Upon irrigation there were no clots present.    UA 5/1/25: 3 RBC, 13 WBC, occasional bacteria, nitrite negative from indwelling bragg. Other labs pending.

## 2025-05-01 NOTE — ED NOTES
Pt states that after urology irrigated his bragg, now having bright red blood with clots to gravity bag. Dr. Justice aware

## 2025-05-01 NOTE — DISCHARGE INSTRUCTIONS
Diagnosis: Hematuria    Tests today showed:   Labs Reviewed   URINALYSIS, REFLEX TO URINE CULTURE - Abnormal       Result Value    Color, UA Yellow      Appearance, UA Clear      pH, UA 7.0      Spec Grav UA <1.005 (*)     Protein, UA Trace (*)     Glucose, UA Negative      Ketones, UA Negative      Bilirubin, UA Negative      Blood, UA 3+ (*)     Nitrites, UA Negative      Urobilinogen, UA Negative      Leukocyte Esterase, UA 2+ (*)    URINALYSIS MICROSCOPIC - Abnormal    RBC, UA 3      WBC, UA 13 (*)     Bacteria, UA Occasional      Microscopic Comment       CBC WITH DIFFERENTIAL - Abnormal    WBC 5.76      RBC 4.66      HGB 13.9 (*)     HCT 41.8      MCV 90      MCH 29.8      MCHC 33.3      RDW 11.9      Platelet Count 278      MPV 9.2      Nucleated RBC 0      Neut % 71.0      Lymph % 18.8      Mono % 7.8      Eos % 1.2      Basophil % 0.5      Imm Grans % 0.7 (*)     Neut # 4.09      Lymph # 1.08      Mono # 0.45      Eos # 0.07      Baso # 0.03      Imm Grans # 0.04     COMPREHENSIVE METABOLIC PANEL - Abnormal    Sodium 141      Potassium 3.4 (*)     Chloride 105      CO2 26      Glucose 102      BUN 11      Creatinine 0.9      Calcium 9.5      Protein Total 8.0      Albumin 4.1      Bilirubin Total 0.4      ALP 79      AST 18      ALT 15      Anion Gap 10      eGFR >60     CK - Normal         CULTURE, URINE   GREY TOP URINE HOLD    Extra Tube Hold for add-ons.     CBC W/ AUTO DIFFERENTIAL    Narrative:     The following orders were created for panel order CBC auto differential.  Procedure                               Abnormality         Status                     ---------                               -----------         ------                     CBC with Differential[[id number]]       Abnormal            Final result                 Please view results for these tests on the individual orders.   [employer] CHEM8     No orders to display       Treatments you had today:   Medications - No data to  display    Follow-Up Plan:  - Follow-up with primary care doctor within 3 - 5 days  - Additional testing and/or evaluation as directed by your primary doctor    Return to the Emergency Department for symptoms including but not limited to: worsening symptoms, shortness of breath or chest pain, vomiting with inability to hold down fluids, fevers greater than 100.4°F, passing out/fainting/unconsciousness, or other concerning symptoms.

## 2025-05-01 NOTE — CONSULTS
[facility] - Emergency Dept  Urology  Consult Note    Patient Name: Simón Fong  MRN: [id number]  Admission Date: 5/1/2025  Hospital Length of Stay: 0   Code Status: Prior   Attending Provider: Leonel Ricci DO   Consulting Provider: Nir Carmen MD  Primary Care Physician: Jimena Barone MD  Principal Problem:<principal problem not specified>    Inpatient consult to Urology  Consult performed by: Nir Carmen MD  Consult ordered by: Freddie Justice MD  Reason for consult: Urinary retention, Gross hematuria        Subjective:     HPI:  Mr. Fong is a 65 y.o. male with history of HIV, HTN, BPH and prostate cancer. Urology consulted for hematuria.     He underwent TPLA bilateral prostate ablation 4/8/25 at [facility]. Post-procedure imaging was WNL 4/10/25. Self-removed bragg catheter 4/16/25 after which he went into urinary retention. ED replaced bragg and this stayed for 8 days.  Recently seen in the ED for urinary retention and bilateral hydronephrosis.  ml. Re-presented to the ED 4/26/25 and required bragg catheter placement. He is currently taking nitrofurantoin for positive urine culture.    Patient woke up this morning with blood leaking around the bragg and lightly pink-tinged urine. Denies pain, dysuria, fevers or chills. The patient has not seen any clots pass. Upon irrigation there were no clots present.    UA 5/1/25: 3 RBC, 13 WBC, occasional bacteria, nitrite negative from indwelling bragg. Other labs pending.       Past Medical History:   Diagnosis Date    HIV infection     Hypertension        Past Surgical History:   Procedure Laterality Date    COLONOSCOPY      COLONOSCOPY N/A 7/31/2020    Procedure: COLONOSCOPY;  Surgeon: Tree Mendiola MD;  Location: [address]);  Service: Endoscopy;  Laterality: N/A;  covid 7/28-[facility]    COLONOSCOPY, SCREENING, HIGH RISK PATIENT N/A 2/3/2025    Procedure: COLONOSCOPY, SCREENING, HIGH RISK PATIENT;  Surgeon:  Clarence Nguyen MD;  Location: [facility] ([address] [place] [address]);  Service: Endoscopy;  Laterality: N/A;  Abner Floyd, peg, portal, ASam  1/31 pt rescheduled, PEG, portal -ml    PROSTATE BIOPSY         Review of patient's allergies indicates:   Allergen Reactions    Sulfa (sulfonamide antibiotics) Other (See Comments)       Family History       Problem Relation (Age of Onset)    Cancer Sister, Brother    Diabetes Mother    Heart disease Mother    Hypertension Mother, Father    Stroke Mother            Tobacco Use    Smoking status: Never     Passive exposure: Never    Smokeless tobacco: Never   Substance and Sexual Activity    Alcohol use: No    Drug use: No    Sexual activity: Not Currently       Review of Systems   Constitutional:  Negative for chills, fatigue and fever.   HENT: Negative.     Respiratory: Negative.     Cardiovascular: Negative.    Gastrointestinal:  Negative for constipation, nausea and vomiting.   Genitourinary:  Positive for difficulty urinating and hematuria. Negative for dysuria, flank pain and testicular pain.   Musculoskeletal:  Negative for arthralgias and back pain.   Skin:  Negative for color change and pallor.   Neurological: Negative.  Negative for seizures, speech difficulty and headaches.   Psychiatric/Behavioral:  Negative for agitation and confusion.        Objective:     Temp:  [97.7 °F (36.5 °C)] 97.7 °F (36.5 °C)  Pulse:  [60] 60  Resp:  [15] 15  SpO2:  [98 %] 98 %  BP: (161)/(84) 161/84  Weight: 97.5 kg (215 lb)  Body mass index is 28.37 kg/m².           Drains       None                    Physical Exam  Vitals and nursing note reviewed.   Constitutional:       Appearance: Normal appearance.   HENT:      Head: Atraumatic.      Nose: Nose normal.   Eyes:      Extraocular Movements: Extraocular movements intact.      Pupils: Pupils are equal, round, and reactive to light.   Cardiovascular:      Rate and Rhythm: Normal rate.   Pulmonary:      Effort: Pulmonary effort is  "normal.   Abdominal:      General: Abdomen is flat. There is no distension.      Tenderness: There is no abdominal tenderness. There is no right CVA tenderness or left CVA tenderness.   Genitourinary:     Comments: 16 Fr Hernández catheter indwelling with pink-tinged urine. Irrigated without return of clot.  Musculoskeletal:         General: Normal range of motion.      Cervical back: Normal range of motion.   Skin:     Coloration: Skin is not jaundiced.   Neurological:      General: No focal deficit present.      Mental Status: He is alert and oriented to person, place, and time.   Psychiatric:         Mood and Affect: Mood normal.         Behavior: Behavior normal.          Significant Labs:    BMP:  Recent Labs   Lab 04/26/25  1802      K 4.5      CO2 22*   BUN 26*   CREATININE 1.3   CALCIUM 10.3       CBC:  Recent Labs   Lab 04/26/25  1802   WBC 7.56   HGB 13.3*   HCT 40.4          Blood Culture: No results for input(s): "LABBLOO" in the last 168 hours.  Urine Culture:   Recent Labs   Lab 04/26/25 1853   LABURIN >100,000 cfu/ml Hafnia alvei*     Urine Studies:   Recent Labs   Lab 04/26/25  1853 05/01/25  0759   COLORU Orange* Yellow   APPEARANCEUA Cloudy* Clear   PHUR 6.0 7.0   SPECGRAV 1.010 <1.005*   PROTEINUA Trace* Trace*   GLUCUA Negative Negative   BILIRUBINUA Negative Negative   OCCULTUA 3+* 3+*   NITRITE Negative Negative   UROBILINOGEN Negative Negative   LEUKOCYTESUR 3+* 2+*   RBCUA 71* 3   WBCUA >100* 13*   BACTERIA Few* Occasional   HYALINECASTS 0  --        Significant Imaging:  No pertinent imaging for review.      Assessment and Plan:     Prostate cancer  - Stable for discharge from urologic perspective  - Continue current course of antibiotics to completion  - Follow up new urine culture  - Patient may self-remove catheter at home next Monday 5/5/25 as previously recommended  - Patient has self-catheterization supplies and is aware of return precautions  - Rest of care per " ED        VTE Risk Mitigation (From admission, onward)      None            Thank you for your consult. I will sign off. Please contact us if you have any additional questions.    Nir Carmen MD  Urology  [facility] Dept

## 2025-05-01 NOTE — ED NOTES
Urology at bedside to place indwelling catheter. After a discussion with them pt decided against have them exchange the catheter.

## 2025-05-01 NOTE — SUBJECTIVE & OBJECTIVE
Past Medical History:   Diagnosis Date    HIV infection     Hypertension        Past Surgical History:   Procedure Laterality Date    COLONOSCOPY      COLONOSCOPY N/A 7/31/2020    Procedure: COLONOSCOPY;  Surgeon: Tree Mendiola MD;  Location: [facility] (4TH FLR);  Service: Endoscopy;  Laterality: N/A;  covid 7/28-[facility]-    COLONOSCOPY, SCREENING, HIGH RISK PATIENT N/A 2/3/2025    Procedure: COLONOSCOPY, SCREENING, HIGH RISK PATIENT;  Surgeon: Clarence Nguyen MD;  Location: [facility] ([facility] FLR);  Service: Endoscopy;  Laterality: N/A;  Abner Floyd, peg, portal, ASam  1/31 pt rescheduled, PEG, portal -ml    PROSTATE BIOPSY         Review of patient's allergies indicates:   Allergen Reactions    Sulfa (sulfonamide antibiotics) Other (See Comments)       Family History       Problem Relation (Age of Onset)    Cancer Sister, Brother    Diabetes Mother    Heart disease Mother    Hypertension Mother, Father    Stroke Mother            Tobacco Use    Smoking status: Never     Passive exposure: Never    Smokeless tobacco: Never   Substance and Sexual Activity    Alcohol use: No    Drug use: No    Sexual activity: Not Currently       Review of Systems   Constitutional:  Negative for chills, fatigue and fever.   HENT: Negative.     Respiratory: Negative.     Cardiovascular: Negative.    Gastrointestinal:  Negative for constipation, nausea and vomiting.   Genitourinary:  Positive for difficulty urinating and hematuria. Negative for dysuria, flank pain and testicular pain.   Musculoskeletal:  Negative for arthralgias and back pain.   Skin:  Negative for color change and pallor.   Neurological: Negative.  Negative for seizures, speech difficulty and headaches.   Psychiatric/Behavioral:  Negative for agitation and confusion.        Objective:     Temp:  [97.7 °F (36.5 °C)] 97.7 °F (36.5 °C)  Pulse:  [60] 60  Resp:  [15] 15  SpO2:  [98 %] 98 %  BP: (161)/(84) 161/84  Weight: 97.5 kg (215 lb)  Body mass  "index is 28.37 kg/m².           Drains       None                    Physical Exam  Vitals and nursing note reviewed.   Constitutional:       Appearance: Normal appearance.   HENT:      Head: Atraumatic.      Nose: Nose normal.   Eyes:      Extraocular Movements: Extraocular movements intact.      Pupils: Pupils are equal, round, and reactive to light.   Cardiovascular:      Rate and Rhythm: Normal rate.   Pulmonary:      Effort: Pulmonary effort is normal.   Abdominal:      General: Abdomen is flat. There is no distension.      Tenderness: There is no abdominal tenderness. There is no right CVA tenderness or left CVA tenderness.   Genitourinary:     Comments: 16 Fr Hernández catheter indwelling with pink-tinged urine. Irrigated without return of clot.  Musculoskeletal:         General: Normal range of motion.      Cervical back: Normal range of motion.   Skin:     Coloration: Skin is not jaundiced.   Neurological:      General: No focal deficit present.      Mental Status: He is alert and oriented to person, place, and time.   Psychiatric:         Mood and Affect: Mood normal.         Behavior: Behavior normal.          Significant Labs:    BMP:  Recent Labs   Lab 04/26/25  1802      K 4.5      CO2 22*   BUN 26*   CREATININE 1.3   CALCIUM 10.3       CBC:  Recent Labs   Lab 04/26/25  1802   WBC 7.56   HGB 13.3*   HCT 40.4          Blood Culture: No results for input(s): "LABBLOO" in the last 168 hours.  Urine Culture:   Recent Labs   Lab 04/26/25  1853   LABURIN >100,000 cfu/ml Hafnia alvei*     Urine Studies:   Recent Labs   Lab 04/26/25  1853 05/01/25  0759   COLORU Orange* Yellow   APPEARANCEUA Cloudy* Clear   PHUR 6.0 7.0   SPECGRAV 1.010 <1.005*   PROTEINUA Trace* Trace*   GLUCUA Negative Negative   BILIRUBINUA Negative Negative   OCCULTUA 3+* 3+*   NITRITE Negative Negative   UROBILINOGEN Negative Negative   LEUKOCYTESUR 3+* 2+*   RBCUA 71* 3   WBCUA >100* 13*   BACTERIA Few* Occasional "   HYALINECASTS 0  --        Significant Imaging:  No pertinent imaging for review.

## 2025-05-02 ENCOUNTER — RESULTS FOLLOW-UP (OUTPATIENT)
Dept: EMERGENCY MEDICINE | Facility: HOSPITAL | Age: 65
End: 2025-05-02

## 2025-05-02 LAB
BUN SERPL-MCNC: 17 MG/DL (ref 6–30)
CHLORIDE SERPL-SCNC: 105 MMOL/L (ref 95–110)
CREAT SERPL-MCNC: 1 MG/DL (ref 0.5–1.4)
GLUCOSE SERPL-MCNC: 115 MG/DL (ref 70–110)
HCT VFR BLD CALC: 42 %PCV (ref 36–54)
POC IONIZED CALCIUM: 1.01 MMOL/L (ref 1.06–1.42)
POC TCO2 (MEASURED): 26 MMOL/L (ref 23–29)
POTASSIUM BLD-SCNC: 5.6 MMOL/L (ref 3.5–5.1)
SAMPLE: ABNORMAL
SODIUM BLD-SCNC: 135 MMOL/L (ref 136–145)

## 2025-05-03 LAB — BACTERIA UR CULT: ABNORMAL

## 2025-05-05 ENCOUNTER — OFFICE VISIT (OUTPATIENT)
Dept: UROLOGY | Facility: CLINIC | Age: 65
End: 2025-05-05
Payer: MEDICARE

## 2025-05-05 ENCOUNTER — TELEPHONE (OUTPATIENT)
Dept: PRIMARY CARE CLINIC | Facility: CLINIC | Age: 65
End: 2025-05-05
Payer: MEDICARE

## 2025-05-05 ENCOUNTER — PATIENT MESSAGE (OUTPATIENT)
Dept: UROLOGY | Facility: CLINIC | Age: 65
End: 2025-05-05

## 2025-05-05 VITALS
HEART RATE: 68 BPM | HEIGHT: 73 IN | DIASTOLIC BLOOD PRESSURE: 88 MMHG | BODY MASS INDEX: 28.34 KG/M2 | SYSTOLIC BLOOD PRESSURE: 160 MMHG | WEIGHT: 213.88 LBS

## 2025-05-05 DIAGNOSIS — N13.8 BPH WITH URINARY OBSTRUCTION: ICD-10-CM

## 2025-05-05 DIAGNOSIS — C61 PROSTATE CANCER: ICD-10-CM

## 2025-05-05 DIAGNOSIS — N40.1 BPH WITH URINARY OBSTRUCTION: ICD-10-CM

## 2025-05-05 DIAGNOSIS — R33.9 URINARY RETENTION: Primary | ICD-10-CM

## 2025-05-05 DIAGNOSIS — Z98.890 HISTORY OF PROSTATE SURGERY: ICD-10-CM

## 2025-05-05 DIAGNOSIS — R10.2 SUPRAPUBIC PAIN: ICD-10-CM

## 2025-05-05 DIAGNOSIS — Z87.440 HISTORY OF UTI: ICD-10-CM

## 2025-05-05 PROCEDURE — 99999 PR PBB SHADOW E&M-EST. PATIENT-LVL III: CPT | Mod: PBBFAC,,, | Performed by: NURSE PRACTITIONER

## 2025-05-05 PROCEDURE — 99215 OFFICE O/P EST HI 40 MIN: CPT | Mod: S$PBB,,, | Performed by: NURSE PRACTITIONER

## 2025-05-05 PROCEDURE — 99213 OFFICE O/P EST LOW 20 MIN: CPT | Mod: PBBFAC | Performed by: NURSE PRACTITIONER

## 2025-05-05 PROCEDURE — 87186 SC STD MICRODIL/AGAR DIL: CPT | Performed by: NURSE PRACTITIONER

## 2025-05-05 PROCEDURE — G2211 COMPLEX E/M VISIT ADD ON: HCPCS | Mod: S$PBB,,, | Performed by: NURSE PRACTITIONER

## 2025-05-05 RX ORDER — NAPROXEN 500 MG/1
500 TABLET ORAL 2 TIMES DAILY WITH MEALS
Qty: 30 TABLET | Refills: 1 | Status: SHIPPED | OUTPATIENT
Start: 2025-05-05 | End: 2025-06-04

## 2025-05-05 NOTE — TELEPHONE ENCOUNTER
----- Message from Abner Lawrence MD sent at 5/5/2025  8:39 AM CDT -----  Went to ED again 5/1 for hematuria; needs Urology f/u.

## 2025-05-05 NOTE — TELEPHONE ENCOUNTER
RN spoke to pt to schedule follow up with urology per Dr. Lawrence.  RN scheduled appt with Tammy Kyle for tomorrow.  However, pt states he pulled catheter as per urology recs and has not urinated as of yet and has been unable to self cath.  Pt claims he was not educated on how to self cath but has looked it up on YouTube.    RN then placed a call to urology.  Urology said to tell the pt to come there.      RN called the pt back and told him to go to [place] 2nd floor.

## 2025-05-05 NOTE — PROGRESS NOTES
CHIEF COMPLAINT:    Simón Fong is a 65 y.o. male presents today for Urinary Retention.     HISTORY OF PRESENTING ILLINESS:    Simón Fong is a 65 y.o. male who is an established patient in our clinic. He has a history of Prostate Cancer. He had chosen AS until age 65. He underwent prostate ablation 04/08/2025 TPLA bilateral at [facility]. Post-procedure imaging was WNL 4/10/25    Self-removed bragg catheter 4/16/25 after which he went into urinary retention. ED replaced bragg and this stayed for 8 days.  Recently seen in the ED for urinary retention and bilateral hydronephrosis.  ml.  Re-presented to the ED 4/26/25 and required bragg catheter placement.   (+)UCx >100,000 cfu/ml Sanya madonna Abnormal      04/28/2025 he was seen in clinic with Dr. Madrigal. Bragg was draining. He is currently taking nitrofurantoin for infection. Plan was for him to remove his own bragg.   He presented to the ED on 5/1/2025 due to hematuria.   Urology consulted; bladder irrigated clear.   05/01/2025 UCx: 50,000 - 99,999 cfu/ml KLEBSIELLA AEROGENES Abnormal      Here today after he removed his own catheter early this morning and has been unable to urinate. Only dribbling.     He brought in his own 16fr catheters so he could learn how to perform CIC          REVIEW OF SYSTEMS:  Review of Systems   Constitutional: Negative.  Negative for chills and fever.   Eyes:  Negative for double vision.   Respiratory:  Negative for cough and shortness of breath.    Cardiovascular:  Negative for chest pain and palpitations.   Gastrointestinal:  Positive for abdominal pain (lower abdominal pressure). Negative for constipation, diarrhea, nausea and vomiting.   Genitourinary:  Positive for urgency (urge to urinate but only dribbling). Negative for dysuria, flank pain, frequency and hematuria.        Removed his bragg catheter this morning.        Musculoskeletal:  Negative for falls.   Neurological:  Negative for dizziness and seizures.    Endo/Heme/Allergies:  Negative for polydipsia.   Psychiatric/Behavioral:  The patient is nervous/anxious.          PATIENT HISTORY:    Past Medical History:   Diagnosis Date    HIV infection     Hypertension        Past Surgical History:   Procedure Laterality Date    COLONOSCOPY      COLONOSCOPY N/A 7/31/2020    Procedure: COLONOSCOPY;  Surgeon: Tree Mendiola MD;  [facility]  Service: Endoscopy;  Laterality: N/A;  covid 7/28-[facility]    COLONOSCOPY, SCREENING, HIGH RISK PATIENT N/A 2/3/2025    Procedure: COLONOSCOPY, SCREENING, HIGH RISK PATIENT;  Surgeon: Clarence Nguyen MD;  [facility]  Service: Endoscopy;  Laterality: N/A;  Abner Floyd, peg, portal, ASam  1/31 pt rescheduled, PEG, portal -ml    PROSTATE BIOPSY         Family History   Problem Relation Name Age of Onset    Diabetes Mother      Heart disease Mother      Hypertension Mother      Stroke Mother      Hypertension Father      Cancer Sister      Cancer Brother         Social History[1]    Allergies:  Sulfa (sulfonamide antibiotics)    Medications:  Current Medications[2]    PHYSICAL EXAMINATION:  Physical Exam  Vitals and nursing note reviewed.   Constitutional:       General: He is awake.      Appearance: Normal appearance.   HENT:      Head: Normocephalic.      Right Ear: External ear normal.      Left Ear: External ear normal.      Nose: Nose normal.   Cardiovascular:      Rate and Rhythm: Normal rate.   Pulmonary:      Effort: Pulmonary effort is normal. No respiratory distress.   Abdominal:      Tenderness: There is no abdominal tenderness. There is no right CVA tenderness or left CVA tenderness.   Genitourinary:     Penis: Normal. No hypospadias.       Testes: Normal.   Musculoskeletal:         General: Normal range of motion.      Cervical back: Normal range of motion.   Skin:     General: Skin is warm and dry.   Neurological:      General: No focal deficit present.      Mental  Status: He is alert and oriented to person, place, and time.   Psychiatric:         Mood and Affect: Mood normal.         Behavior: Behavior is cooperative.           LABS:      In office UA today that was collected by CIC was clear of active infection and visible blood.       Lab Results   Component Value Date    PSA 6.5 (H) 12/21/2018    PSA 7.5 (H) 04/30/2018    PSA 4.6 (H) 07/26/2017    PSADIAG 19.7 (H) 01/07/2025    PSADIAG 14.1 (H) 11/27/2023    PSADIAG 14.5 (H) 08/23/2023    PSATOTAL 6.4 (H) 08/22/2019       Lab Results   Component Value Date    CREATININE 0.9 05/01/2025    EGFRNORACEVR >60 05/01/2025               IMPRESSION:    Encounter Diagnoses   Name Primary?    Urinary retention Yes    BPH with urinary obstruction     History of prostate surgery     History of UTI     Prostate cancer     Suprapubic pain          Assessment:       1. Urinary retention    2. BPH with urinary obstruction    3. History of prostate surgery    4. History of UTI    5. Prostate cancer    6. Suprapubic pain        Plan:         I spent a total of 40 minutes on the day of the visit. This includes face to face time and non-face to face time preparing to see the patient (eg, review of tests), obtaining and/or reviewing separately obtained history, documenting clinical information in the electronic or other health record, independently interpreting results and communicating results to the patient/family/caregiver, or care coordinator  Reviewed the possible contributory factors for UR   Reassurance with today's in office UA; emptying bladder well.   Reviewed CIC: process, the purpose/need, prep, expectations, & risks.   Initially tried with his 16fr straight caths but was unsuccessful. Unable to pass; (+) resistance noted. Only thick serosanguinous drops noted.   He then tried with a 14fr straight cath. Again unable to pass due to resistance  I present for then entire process.   He then tried with a 14fr coude GentleCath Air for  Men. It easily passed with no resistance and clear yellow urine was received.   Recommendations for MD Dorsey follow up visit; any need to go to the ER.    Recommended lifestyle modifications with a proper, healthy diet, good hydration but during the day. Reducing bladder irritants.   Continue Flomax 0.4mg x 2 daily.   Rx for Naprosyn 500mg BID to help prostate inflammation.   He needs to perform CIC 4 x a day with a 14fr coude GentleCath Air for Men indefinitely. Needs to use the Coude cath due to difficulty with passage of catheter as evident with 2 straight cath failures.     The visit today is associated with current or anticipated ongoing medical care related to this patient's single serious condition/complex condition.            [1]   Social History  Socioeconomic History    Marital status: Single   Tobacco Use    Smoking status: Never     Passive exposure: Never    Smokeless tobacco: Never   Substance and Sexual Activity    Alcohol use: No    Drug use: No    Sexual activity: Not Currently     Social Drivers of Health     Financial Resource Strain: Low Risk  (4/1/2025)    Overall Financial Resource Strain (CARDIA)     Difficulty of Paying Living Expenses: Not hard at all   Food Insecurity: No Food Insecurity (4/1/2025)    Hunger Vital Sign     Worried About Running Out of Food in the Last Year: Never true     Ran Out of Food in the Last Year: Never true   Transportation Needs: No Transportation Needs (4/1/2025)    PRAPARE - Transportation     Lack of Transportation (Medical): No     Lack of Transportation (Non-Medical): No   Physical Activity: Sufficiently Active (4/1/2025)    Exercise Vital Sign     Days of Exercise per Week: 6 days     Minutes of Exercise per Session: 90 min   Stress: No Stress Concern Present (4/1/2025)    [employer] - Occupational Stress Questionnaire     Feeling of Stress : Only a little   Housing Stability: Low Risk  (4/1/2025)    Housing Stability Vital  Sign     Unable to Pay for Housing in the Last Year: No     Homeless in the Last Year: No   [2]   Current Outpatient Medications:     amLODIPine (NORVASC) 10 MG tablet, TAKE 1 TABLET ONCE DAILY, Disp: 90 tablet, Rfl: 3    ascorbic acid, vitamin C, (VITAMIN C) 1000 MG tablet, Take 1,000 mg by mouth once daily. , Disp: , Rfl:     dolutegravir-lamivudine (DOVATO)  mg Tab, Take 1 tablet by mouth once daily., Disp: 90 tablet, Rfl: 5    fish oil-omega-3 fatty acids 300-1,000 mg capsule, Take by mouth once daily., Disp: , Rfl:     GAVILYTE-C 240-22.72-6.72 -5.84 gram SolR, Take 4,000 mLs by mouth once., Disp: , Rfl:     glucosamine-chondroitin 500-400 mg tablet, Take 1 tablet by mouth 3 (three) times daily., Disp: , Rfl:     HYDROcodone-acetaminophen (NORCO) 5-325 mg per tablet, Take by mouth., Disp: , Rfl:     leucine-isoleucine-valine (NEOKE BCAA4) 82 gram-328 kcal/100 gram Powd, Take by mouth. , Disp: , Rfl:     nitrofurantoin, macrocrystal-monohydrate, (MACROBID) 100 MG capsule, Take 1 capsule (100 mg total) by mouth 2 (two) times daily. for 10 days, Disp: 20 capsule, Rfl: 0    oxybutynin (DITROPAN) 5 MG Tab, Take 5 mg by mouth., Disp: , Rfl:     tamsulosin (FLOMAX) 0.4 mg Cap, Take 1 capsule (0.4 mg total) by mouth once daily., Disp: 30 capsule, Rfl: 0    naproxen (NAPROSYN) 500 MG tablet, Take 1 tablet (500 mg total) by mouth 2 (two) times daily with meals., Disp: 30 tablet, Rfl: 1

## 2025-05-06 ENCOUNTER — OFFICE VISIT (OUTPATIENT)
Dept: UROLOGY | Facility: CLINIC | Age: 65
End: 2025-05-06
Payer: MEDICARE

## 2025-05-06 ENCOUNTER — TELEPHONE (OUTPATIENT)
Dept: UROLOGY | Facility: CLINIC | Age: 65
End: 2025-05-06
Payer: MEDICARE

## 2025-05-06 VITALS
HEART RATE: 70 BPM | BODY MASS INDEX: 28.34 KG/M2 | HEIGHT: 73 IN | SYSTOLIC BLOOD PRESSURE: 161 MMHG | WEIGHT: 213.88 LBS | DIASTOLIC BLOOD PRESSURE: 89 MMHG

## 2025-05-06 DIAGNOSIS — C61 PROSTATE CANCER: ICD-10-CM

## 2025-05-06 DIAGNOSIS — N39.43 DRIBBLING OF URINE: ICD-10-CM

## 2025-05-06 DIAGNOSIS — R39.12 WEAK URINARY STREAM: ICD-10-CM

## 2025-05-06 DIAGNOSIS — N40.1 BPH WITH URINARY OBSTRUCTION: ICD-10-CM

## 2025-05-06 DIAGNOSIS — Z98.890 HISTORY OF PROSTATE SURGERY: ICD-10-CM

## 2025-05-06 DIAGNOSIS — R33.9 URINARY RETENTION: Primary | ICD-10-CM

## 2025-05-06 DIAGNOSIS — N13.8 BPH WITH URINARY OBSTRUCTION: ICD-10-CM

## 2025-05-06 LAB
BILIRUBIN, UA POC OHS: NEGATIVE
BLOOD, UA POC OHS: ABNORMAL
CLARITY, UA POC OHS: ABNORMAL
COLOR, UA POC OHS: YELLOW
GLUCOSE, UA POC OHS: NEGATIVE
KETONES, UA POC OHS: NEGATIVE
LEUKOCYTES, UA POC OHS: ABNORMAL
NITRITE, UA POC OHS: NEGATIVE
PH, UA POC OHS: 6
POC RESIDUAL URINE VOLUME: 546 ML (ref 0–100)
PROTEIN, UA POC OHS: 100
SPECIFIC GRAVITY, UA POC OHS: 1.02
UROBILINOGEN, UA POC OHS: 0.2

## 2025-05-06 PROCEDURE — 99213 OFFICE O/P EST LOW 20 MIN: CPT | Mod: PBBFAC | Performed by: NURSE PRACTITIONER

## 2025-05-06 PROCEDURE — 99999 PR PBB SHADOW E&M-EST. PATIENT-LVL III: CPT | Mod: PBBFAC,,, | Performed by: NURSE PRACTITIONER

## 2025-05-06 PROCEDURE — G2211 COMPLEX E/M VISIT ADD ON: HCPCS | Mod: S$PBB,,, | Performed by: NURSE PRACTITIONER

## 2025-05-06 PROCEDURE — 99215 OFFICE O/P EST HI 40 MIN: CPT | Mod: S$PBB,,, | Performed by: NURSE PRACTITIONER

## 2025-05-06 PROCEDURE — 81003 URINALYSIS AUTO W/O SCOPE: CPT | Mod: PBBFAC | Performed by: NURSE PRACTITIONER

## 2025-05-06 PROCEDURE — 99999PBSHW POCT BLADDER SCAN: Mod: PBBFAC,,,

## 2025-05-06 PROCEDURE — 51798 US URINE CAPACITY MEASURE: CPT | Mod: PBBFAC | Performed by: NURSE PRACTITIONER

## 2025-05-06 PROCEDURE — 99999PBSHW POCT URINALYSIS(INSTRUMENT): Mod: PBBFAC,,,

## 2025-05-06 RX ORDER — CIPROFLOXACIN 500 MG/1
500 TABLET ORAL ONCE
OUTPATIENT
Start: 2025-05-06 | End: 2025-05-06

## 2025-05-06 RX ORDER — TAMSULOSIN HYDROCHLORIDE 0.4 MG/1
0.8 CAPSULE ORAL DAILY
Qty: 180 CAPSULE | Refills: 0 | Status: SHIPPED | OUTPATIENT
Start: 2025-05-06 | End: 2025-08-04

## 2025-05-06 RX ORDER — LIDOCAINE HYDROCHLORIDE 20 MG/ML
JELLY TOPICAL ONCE
OUTPATIENT
Start: 2025-05-06 | End: 2025-05-06

## 2025-05-06 NOTE — TELEPHONE ENCOUNTER
Spoke with pt re: confirming procedure appt for tomorrow.   Discussed location & arrival time for 8:45.   Pt verbalized understanding

## 2025-05-06 NOTE — PROGRESS NOTES
CHIEF COMPLAINT:    Simón Fong is a 65 y.o. male presents today for Urinary Retention     HISTORY OF PRESENTING ILLINESS:    Simón Fong is a 65 y.o. male who is an established patient in our clinic. He has a history of Prostate Cancer. He had chosen AS until age 65. He underwent prostate ablation 04/08/2025 TPLA bilateral at [facility].   SUCCESSFUL BILATERAL LASER ABLATION TARGETING PERIPHERAL ZONE LESIONS BUT  ALSO IMAGING TO DEBULK THE ENLARGED PROSTATE.   Post-procedure imaging was WNL 4/10/25     Self-removed bragg catheter 4/16/25 after which he went into urinary retention. ED replaced bragg and this stayed for 8 days.  Recently seen in the ED for urinary retention and bilateral hydronephrosis.  ml.  Re-presented to the ED 4/26/25 and required bragg catheter placement.   (+)UCx >100,000 cfu/ml Sanya holmani Abnormal       04/28/2025 he was seen in clinic with Dr. Madrigal. Bragg was draining. He is currently taking nitrofurantoin for infection. Plan was for him to remove his own bragg.   He presented to the ED on 5/1/2025 due to hematuria.   Urology consulted; bladder irrigated clear.   05/01/2025 UCx: 50,000 - 99,999 cfu/ml KLEBSIELLA AEROGENES Abnormal       05/04/2025 he was seen for difficulty urinating after removing the bragg that morning.   He was only dribbling urine.   We tried a 16fr, then 14fr and was successful with a 14fr coude.   Supplies ordered.     Here today due to difficulty with CIC. He reports that he is urinating. Some dribbling then a fair stream then dribbling.   Concerned that he was unable to perform CIC especially since leaving for a family cruise on Friday.   He denies any urge to urinate at this time.   No abdomen or flank pain.       He brought in his own 14fr coude catheters so he could learn how to perform CIC                 REVIEW OF SYSTEMS:  Review of Systems   Constitutional: Negative.  Negative for chills and fever.   Eyes:  Negative for double vision.   Respiratory:   Negative for cough and shortness of breath.    Cardiovascular:  Negative for chest pain and palpitations.   Gastrointestinal:  Negative for abdominal pain, constipation, diarrhea, nausea and vomiting.   Genitourinary:  Negative for dysuria, flank pain, frequency, hematuria and urgency.        See HPI   Musculoskeletal:  Negative for falls.   Neurological:  Negative for dizziness and seizures.   Endo/Heme/Allergies:  Negative for polydipsia.         PATIENT HISTORY:    Past Medical History:   Diagnosis Date    HIV infection     Hypertension        Past Surgical History:   Procedure Laterality Date    COLONOSCOPY      COLONOSCOPY N/A 7/31/2020    Procedure: COLONOSCOPY;  Surgeon: Tree Mendiola MD;  [facility]  Service: Endoscopy;  Laterality: N/A;  covid 7/28-[facility]-    COLONOSCOPY, SCREENING, HIGH RISK PATIENT N/A 2/3/2025    Procedure: COLONOSCOPY, SCREENING, HIGH RISK PATIENT;  Surgeon: Clarence Nguyen MD;  [facility]  Service: Endoscopy;  Laterality: N/A;  Abner Floyd, peg, portal, ASam  1/31 pt rescheduled, PEG, portal -ml    PROSTATE BIOPSY         Family History   Problem Relation Name Age of Onset    Diabetes Mother      Heart disease Mother      Hypertension Mother      Stroke Mother      Hypertension Father      Cancer Sister      Cancer Brother         Social History[1]    Allergies:  Sulfa (sulfonamide antibiotics)    Medications:  Current Medications[2]    PHYSICAL EXAMINATION:  Physical Exam  Vitals and nursing note reviewed.   Constitutional:       General: He is awake.      Appearance: Normal appearance.   HENT:      Head: Normocephalic.      Right Ear: External ear normal.      Left Ear: External ear normal.      Nose: Nose normal.   Cardiovascular:      Rate and Rhythm: Normal rate.   Pulmonary:      Effort: Pulmonary effort is normal. No respiratory distress.   Abdominal:      Palpations: Abdomen is soft.      Tenderness: There is  no abdominal tenderness. There is no right CVA tenderness or left CVA tenderness.   Genitourinary:     Penis: Normal.       Testes: Normal.   Musculoskeletal:         General: Normal range of motion.      Cervical back: Normal range of motion.   Skin:     General: Skin is warm and dry.   Neurological:      General: No focal deficit present.      Mental Status: He is alert and oriented to person, place, and time.   Psychiatric:         Mood and Affect: Mood normal.         Behavior: Behavior is cooperative.           LABS:      In office UA today was (+) leuks/blood; (-) nitrites.     The PVR in the office today done immediately after urination by the nurse was 546.        Lab Results   Component Value Date    PSA 6.5 (H) 12/21/2018    PSA 7.5 (H) 04/30/2018    PSA 4.6 (H) 07/26/2017    PSADIAG 19.7 (H) 01/07/2025    PSADIAG 14.1 (H) 11/27/2023    PSADIAG 14.5 (H) 08/23/2023    PSATOTAL 6.4 (H) 08/22/2019       Lab Results   Component Value Date    CREATININE 0.9 05/01/2025    EGFRNORACEVR >60 05/01/2025               IMPRESSION:    Encounter Diagnoses   Name Primary?    Urinary retention Yes    BPH with urinary obstruction     History of prostate surgery     Prostate cancer     Dribbling of urine     Weak urinary stream          Assessment:       1. Urinary retention    2. BPH with urinary obstruction    3. History of prostate surgery    4. Prostate cancer    5. Dribbling of urine    6. Weak urinary stream        Plan:         I spent a total of 40 minutes on the day of the visit. This includes face to face time and non-face to face time preparing to see the patient (eg, review of tests), obtaining and/or reviewing separately obtained history, documenting clinical information in the electronic or other health record, independently interpreting results and communicating results to the patient/family/caregiver, or care coordinator  He was unable to pass 14fr and 12fr catheters.   Reviewed the possible contributory  factors.  Discussed today's in office UA; (-) nitrites.expectations with CIC & post op.   Dicussed concern with Bladder Scan and no urge to urinate.   Reviewed possible management   Recommendations for PCP follow up visit; any need to go to the ER.    Recommended lifestyle modifications with a proper, healthy diet, good hydration but during the day. Reducing bladder irritants.   Continue Flomax and antibiotics  Cystoscopy set up tomorrow with Dr. Russell to evaluate urethral tract; why difficulty to cath especially since going out of town.   May need a bragg catheter. Will discuss management while on trip if it is necessary    The visit today is associated with current or anticipated ongoing medical care related to this patient's single serious condition/complex condition.                [1]   Social History  Socioeconomic History    Marital status: Single   Tobacco Use    Smoking status: Never     Passive exposure: Never    Smokeless tobacco: Never   Substance and Sexual Activity    Alcohol use: No    Drug use: No    Sexual activity: Not Currently     Social Drivers of Health     Financial Resource Strain: Low Risk  (4/1/2025)    Overall Financial Resource Strain (CARDIA)     Difficulty of Paying Living Expenses: Not hard at all   Food Insecurity: No Food Insecurity (4/1/2025)    Hunger Vital Sign     Worried About Running Out of Food in the Last Year: Never true     Ran Out of Food in the Last Year: Never true   Transportation Needs: No Transportation Needs (4/1/2025)    PRAPARE - Transportation     Lack of Transportation (Medical): No     Lack of Transportation (Non-Medical): No   Physical Activity: Sufficiently Active (4/1/2025)    Exercise Vital Sign     Days of Exercise per Week: 6 days     Minutes of Exercise per Session: 90 min   Stress: No Stress Concern Present (4/1/2025)    [employer] - Occupational Stress Questionnaire     Feeling of Stress : Only a little   Housing  Stability: Low Risk  (4/1/2025)    Housing Stability Vital Sign     Unable to Pay for Housing in the Last Year: No     Homeless in the Last Year: No   [2]   Current Outpatient Medications:     amLODIPine (NORVASC) 10 MG tablet, TAKE 1 TABLET ONCE DAILY, Disp: 90 tablet, Rfl: 3    ascorbic acid, vitamin C, (VITAMIN C) 1000 MG tablet, Take 1,000 mg by mouth once daily. , Disp: , Rfl:     dolutegravir-lamivudine (DOVATO)  mg Tab, Take 1 tablet by mouth once daily., Disp: 90 tablet, Rfl: 5    fish oil-omega-3 fatty acids 300-1,000 mg capsule, Take by mouth once daily., Disp: , Rfl:     GAVILYTE-C 240-22.72-6.72 -5.84 gram SolR, Take 4,000 mLs by mouth once., Disp: , Rfl:     glucosamine-chondroitin 500-400 mg tablet, Take 1 tablet by mouth 3 (three) times daily., Disp: , Rfl:     HYDROcodone-acetaminophen (NORCO) 5-325 mg per tablet, Take by mouth., Disp: , Rfl:     leucine-isoleucine-valine (NEOKE BCAA4) 82 gram-328 kcal/100 gram Powd, Take by mouth. , Disp: , Rfl:     naproxen (NAPROSYN) 500 MG tablet, Take 1 tablet (500 mg total) by mouth 2 (two) times daily with meals., Disp: 30 tablet, Rfl: 1    nitrofurantoin, macrocrystal-monohydrate, (MACROBID) 100 MG capsule, Take 1 capsule (100 mg total) by mouth 2 (two) times daily. for 10 days, Disp: 20 capsule, Rfl: 0    oxybutynin (DITROPAN) 5 MG Tab, Take 5 mg by mouth., Disp: , Rfl:     tamsulosin (FLOMAX) 0.4 mg Cap, Take 2 capsules (0.8 mg total) by mouth once daily., Disp: 180 capsule, Rfl: 0

## 2025-05-07 ENCOUNTER — PATIENT MESSAGE (OUTPATIENT)
Dept: UROLOGY | Facility: CLINIC | Age: 65
End: 2025-05-07
Payer: MEDICARE

## 2025-05-07 ENCOUNTER — PROCEDURE VISIT (OUTPATIENT)
Dept: UROLOGY | Facility: CLINIC | Age: 65
End: 2025-05-07
Payer: MEDICARE

## 2025-05-07 VITALS
DIASTOLIC BLOOD PRESSURE: 101 MMHG | WEIGHT: 209.19 LBS | RESPIRATION RATE: 18 BRPM | SYSTOLIC BLOOD PRESSURE: 182 MMHG | BODY MASS INDEX: 27.6 KG/M2 | HEART RATE: 87 BPM | TEMPERATURE: 98 F

## 2025-05-07 DIAGNOSIS — Z98.890 HISTORY OF PROSTATE SURGERY: ICD-10-CM

## 2025-05-07 DIAGNOSIS — N13.8 BPH WITH URINARY OBSTRUCTION: ICD-10-CM

## 2025-05-07 DIAGNOSIS — C61 PROSTATE CANCER: ICD-10-CM

## 2025-05-07 DIAGNOSIS — N40.1 BPH WITH URINARY OBSTRUCTION: ICD-10-CM

## 2025-05-07 DIAGNOSIS — R33.9 URINARY RETENTION: ICD-10-CM

## 2025-05-07 LAB — BACTERIA UR CULT: ABNORMAL

## 2025-05-07 PROCEDURE — 52000 CYSTOURETHROSCOPY: CPT | Mod: PBBFAC | Performed by: UROLOGY

## 2025-05-07 RX ORDER — LIDOCAINE HYDROCHLORIDE 20 MG/ML
JELLY TOPICAL
Status: COMPLETED | OUTPATIENT
Start: 2025-05-07 | End: 2025-05-07

## 2025-05-07 RX ADMIN — LIDOCAINE HYDROCHLORIDE 5 ML: 20 JELLY TOPICAL at 08:05

## 2025-05-07 NOTE — PATIENT INSTRUCTIONS
What to Expect After a Cystoscopy  For the next 24-48 hours, you may feel a mild burning when you urinate. This burning is normal and expected. Drink plenty of water to dilute the urine to help relieve the burning sensation. You may also see a small amount of blood in your urine after the procedure.    Unless you are already taking antibiotics, you may be given an antibiotic after the test to prevent infection.    Signs and Symptoms to Report  Call the [facility] at [phone] if you develop any of the following:  Fever of 101 degrees or higher  Chills or persistent bleeding  Inability to urinate

## 2025-05-07 NOTE — Clinical Note
You saw this florencio before. I had never heard of this procedure. He has some very strange anatomy now and lots of untreated prostate tissue - there is a large growth of prostate in his bladder that was untreated and some central fluff and lots of false passages in the prostatic urethra. I replaced a bragg. Would you like to take care of him or do you want me to do it?

## 2025-05-07 NOTE — PROCEDURES
Cystoscopy    Date/Time: 5/7/2025 9:00 AM    Performed by: Tiffanie Russell MD  Authorized by: Tammy Wright NP    Consent Done?:  Yes (Written)  Timeout: prior to procedure the correct patient, procedure, and site was verified    Prep: patient was prepped and draped in usual sterile fashion    Local anesthesia used?: Yes    Anesthesia:  Intraurethral instillation  Local anesthetic:  Topical anesthetic  Position:  Supine  Anesthesia:  Intraurethral instillation  Preparation: Patient was prepped and draped in usual sterile fashion    Scope type:  Flexible cystoscope  External exam normal: Yes    Urethra normal: Yes    Prostate normal: No    Bladder neck normal: Yes    Bladder normal: Yes     patient tolerated the procedure well with no immediate complications  Comments:      There are multiple false passages posteriorly and the anatomy was distorted. There was a central fluff - unclear if detached or attached. I followed the anterior normal tissue into the bladder and saw a very large untreated median lobe of tissue protruding into the bladder. Images are saved in the chart. 16F [place] catheter placed over a guide wire as patient is going to [facility] and has been seen in ER and clinic with difficulty urinating. Patient does not want a bragg bag and wants the ability to empty his bladder with a valve. This was not recommended but what he plans to do. Patient has history of prostate cancer treated in [place] with this perineal laser treatment of his prostate cancer. Patient can follow up with this IR doctor as to future plans. Patient likely will need intervention in the OR when he gets back with irrigation/turp and reassessment of this anatomy. Culture is pending and should be treated prior to him leaving on trip.

## 2025-05-08 ENCOUNTER — RESULTS FOLLOW-UP (OUTPATIENT)
Dept: UROLOGY | Facility: CLINIC | Age: 65
End: 2025-05-08
Payer: MEDICARE

## 2025-05-08 ENCOUNTER — PATIENT MESSAGE (OUTPATIENT)
Dept: UROLOGY | Facility: CLINIC | Age: 65
End: 2025-05-08
Payer: MEDICARE

## 2025-05-08 DIAGNOSIS — R33.9 URINARY RETENTION: Primary | ICD-10-CM

## 2025-05-08 DIAGNOSIS — N32.89 BLADDER SPASMS: ICD-10-CM

## 2025-05-08 DIAGNOSIS — R82.71 BACTERIA IN URINE: ICD-10-CM

## 2025-05-08 DIAGNOSIS — Z98.890 HISTORY OF PROSTATE SURGERY: ICD-10-CM

## 2025-05-08 DIAGNOSIS — Z97.8 FOLEY CATHETER IN PLACE: ICD-10-CM

## 2025-05-08 RX ORDER — OXYBUTYNIN CHLORIDE 5 MG/1
5 TABLET, EXTENDED RELEASE ORAL DAILY
Qty: 30 TABLET | Refills: 5 | Status: SHIPPED | OUTPATIENT
Start: 2025-05-08 | End: 2026-05-08

## 2025-05-08 RX ORDER — CIPROFLOXACIN 500 MG/1
500 TABLET ORAL 2 TIMES DAILY
Qty: 20 TABLET | Refills: 0 | Status: SHIPPED | OUTPATIENT
Start: 2025-05-08 | End: 2025-05-18

## 2025-05-08 RX ORDER — NICOTINE POLACRILEX 2 MG
1 MINI LOZENGE BUCCAL DAILY
Qty: 30 CAPSULE | Refills: 11 | Status: SHIPPED | OUTPATIENT
Start: 2025-05-08 | End: 2025-06-07

## 2025-05-08 NOTE — TELEPHONE ENCOUNTER
Cysto yesterday. Showing multiple false passages; large medial lobe.  UR since procedure  New bragg placed.   (+) UCx with MDR  - >100,000 cfu/ml KLEBSIELLA AEROGENES Abnormal    Rx for Cipro 500mg with daily probiotic to cover before leaving on cruise.

## 2025-05-15 ENCOUNTER — TELEPHONE (OUTPATIENT)
Dept: UROLOGY | Facility: CLINIC | Age: 65
End: 2025-05-15
Payer: MEDICARE

## 2025-05-17 ENCOUNTER — PATIENT MESSAGE (OUTPATIENT)
Dept: UROLOGY | Facility: CLINIC | Age: 65
End: 2025-05-17
Payer: MEDICARE

## 2025-05-20 ENCOUNTER — TELEPHONE (OUTPATIENT)
Dept: UROLOGY | Facility: CLINIC | Age: 65
End: 2025-05-20
Payer: MEDICARE

## 2025-05-21 ENCOUNTER — PATIENT MESSAGE (OUTPATIENT)
Dept: UROLOGY | Facility: CLINIC | Age: 65
End: 2025-05-21
Payer: MEDICARE

## 2025-05-26 ENCOUNTER — OFFICE VISIT (OUTPATIENT)
Dept: UROLOGY | Facility: CLINIC | Age: 65
End: 2025-05-26
Payer: MEDICARE

## 2025-05-26 VITALS
HEART RATE: 79 BPM | BODY MASS INDEX: 28.84 KG/M2 | HEIGHT: 73 IN | SYSTOLIC BLOOD PRESSURE: 160 MMHG | WEIGHT: 217.63 LBS | DIASTOLIC BLOOD PRESSURE: 92 MMHG

## 2025-05-26 DIAGNOSIS — R33.9 URINARY RETENTION: Primary | ICD-10-CM

## 2025-05-26 PROCEDURE — 99213 OFFICE O/P EST LOW 20 MIN: CPT | Mod: PBBFAC | Performed by: STUDENT IN AN ORGANIZED HEALTH CARE EDUCATION/TRAINING PROGRAM

## 2025-05-26 PROCEDURE — 99499 UNLISTED E&M SERVICE: CPT | Mod: S$PBB,,, | Performed by: STUDENT IN AN ORGANIZED HEALTH CARE EDUCATION/TRAINING PROGRAM

## 2025-05-26 PROCEDURE — 99999 PR PBB SHADOW E&M-EST. PATIENT-LVL III: CPT | Mod: PBBFAC,,, | Performed by: STUDENT IN AN ORGANIZED HEALTH CARE EDUCATION/TRAINING PROGRAM

## 2025-05-26 NOTE — PROGRESS NOTES
Filled bladder with 200cc of sterile water.Indwelling catheter removed. Pt urinated 400cc of clear yellow urine. Pt tolerated well. Pt instructed to return to clinic or the ED if unable to urinate within 6 hours.. Pt voiced understanding.

## 2025-05-27 ENCOUNTER — OFFICE VISIT (OUTPATIENT)
Dept: OPTOMETRY | Facility: CLINIC | Age: 65
End: 2025-05-27
Payer: MEDICARE

## 2025-05-27 DIAGNOSIS — H25.13 NUCLEAR SCLEROSIS, BILATERAL: Primary | ICD-10-CM

## 2025-05-27 DIAGNOSIS — H52.4 MYOPIA OF BOTH EYES WITH ASTIGMATISM AND PRESBYOPIA: ICD-10-CM

## 2025-05-27 DIAGNOSIS — H02.88A MEIBOMIAN GLAND DYSFUNCTION (MGD), BILATERAL, BOTH UPPER AND LOWER LIDS: ICD-10-CM

## 2025-05-27 DIAGNOSIS — H52.13 MYOPIA OF BOTH EYES WITH ASTIGMATISM AND PRESBYOPIA: ICD-10-CM

## 2025-05-27 DIAGNOSIS — H02.88B MEIBOMIAN GLAND DYSFUNCTION (MGD), BILATERAL, BOTH UPPER AND LOWER LIDS: ICD-10-CM

## 2025-05-27 DIAGNOSIS — H52.203 MYOPIA OF BOTH EYES WITH ASTIGMATISM AND PRESBYOPIA: ICD-10-CM

## 2025-05-27 PROCEDURE — 99213 OFFICE O/P EST LOW 20 MIN: CPT | Mod: PBBFAC | Performed by: OPTOMETRIST

## 2025-05-27 PROCEDURE — 92015 DETERMINE REFRACTIVE STATE: CPT | Mod: ,,, | Performed by: OPTOMETRIST

## 2025-05-27 PROCEDURE — 99999 PR PBB SHADOW E&M-EST. PATIENT-LVL III: CPT | Mod: PBBFAC,,, | Performed by: OPTOMETRIST

## 2025-05-27 PROCEDURE — 92014 COMPRE OPH EXAM EST PT 1/>: CPT | Mod: S$PBB,,, | Performed by: OPTOMETRIST

## 2025-05-27 NOTE — PROGRESS NOTES
HPI    Annual Exam for Cataract and GLC evaluation   Pt states vision has had some vision changes   Pt reports HAs    Pt denies F/F     Pt reports Dry/ Itchy eyes   Gtt: Yes, Systane. PRN   Pt reports relief     Last edited by Eusebio Alonso, OD on 5/27/2025  7:41 AM.            Assessment /Plan     For exam results, see Encounter Report.    Nuclear sclerosis, bilateral  -Educated patient on presence of cataracts at today's exam, monitor at annual dilated fundus exam. 5+ years surgical estimate.    Meibomian gland dysfunction (MGD), bilateral, both upper and lower lids  -Nightly lid scrubs using Ocusoft. Systane Complete PF as needed.    Myopia of both eyes with astigmatism and presbyopia  Eyeglass Final Rx       Eyeglass Final Rx         Sphere Cylinder Axis Add    Right -0.50 +1.25 165 +2.25    Left -1.00 +2.00 175 +2.25      Type: PAL    Expiration Date: 5/27/2026                      RTC 1 yr

## 2025-05-28 ENCOUNTER — PATIENT MESSAGE (OUTPATIENT)
Dept: OPTOMETRY | Facility: CLINIC | Age: 65
End: 2025-05-28
Payer: MEDICARE

## 2025-05-29 ENCOUNTER — PATIENT MESSAGE (OUTPATIENT)
Dept: ADMINISTRATIVE | Facility: OTHER | Age: 65
End: 2025-05-29
Payer: MEDICARE

## 2025-06-02 ENCOUNTER — PATIENT MESSAGE (OUTPATIENT)
Dept: PRIMARY CARE CLINIC | Facility: CLINIC | Age: 65
End: 2025-06-02
Payer: MEDICARE

## 2025-06-02 DIAGNOSIS — I10 HYPERTENSION, UNSPECIFIED TYPE: ICD-10-CM

## 2025-06-03 RX ORDER — AMLODIPINE BESYLATE 10 MG/1
10 TABLET ORAL DAILY
Qty: 90 TABLET | Refills: 3 | Status: SHIPPED | OUTPATIENT
Start: 2025-06-03

## 2025-06-09 ENCOUNTER — OFFICE VISIT (OUTPATIENT)
Dept: GASTROENTEROLOGY | Facility: CLINIC | Age: 65
End: 2025-06-09
Payer: MEDICARE

## 2025-06-09 VITALS
HEART RATE: 75 BPM | HEIGHT: 72 IN | DIASTOLIC BLOOD PRESSURE: 82 MMHG | WEIGHT: 217.38 LBS | SYSTOLIC BLOOD PRESSURE: 152 MMHG | BODY MASS INDEX: 29.44 KG/M2

## 2025-06-09 DIAGNOSIS — R10.84 GENERALIZED ABDOMINAL PAIN: ICD-10-CM

## 2025-06-09 DIAGNOSIS — K59.04 CHRONIC IDIOPATHIC CONSTIPATION: Primary | ICD-10-CM

## 2025-06-09 PROCEDURE — 99999 PR PBB SHADOW E&M-EST. PATIENT-LVL III: CPT | Mod: PBBFAC,,,

## 2025-06-09 PROCEDURE — 99213 OFFICE O/P EST LOW 20 MIN: CPT | Mod: PBBFAC

## 2025-06-09 PROCEDURE — 99213 OFFICE O/P EST LOW 20 MIN: CPT | Mod: S$PBB,,,

## 2025-06-09 NOTE — PROGRESS NOTES
[facility] Follow-UP Note    Reason for Follow-Up:  The primary encounter diagnosis was Chronic idiopathic constipation. A diagnosis of Generalized abdominal pain was also pertinent to this visit.    PCP:   Jimena Barone         HPI:  This is a 65 y.o. male with HTN, HIV, and prostate cancer last seen in GI clinic on 2/7/2025 for evaluation of  bloating, belching, gas, and intermittent lower abdominal pain for 3-4 weeks. Abdominal pain is relieved with belching or with passing gas/BM. Pt also had constipation, but this improved with daily miralax for a week. Brother diagnosed with pancreatic cancer in his 70s. He is concerned about his pancreas. Colonoscopy 4 days ago with one 3 mm polyp and melanosis of the colon. Repeat in 7 years.   Ordered H.pylori stool test and fecal elastase. Recommended daily fiber and Miralax prn.     Interval History:  Today, pt presents for follow up. H.pylori negative. Fecal elastase came back low, but patient reports taking excessive laxatives prior to submitting sample. States abdominal pain, bloating and belching completely resolved about 2 months ago. Constipation improved with daily prune juice. He has no complaints at this time, but is still concerned about his pancreas.       Objective Findings:    Vital Signs:  BP (!) 152/82   Pulse 75   Ht 6' (1.829 m)   Wt 98.6 kg (217 lb 6 oz)   BMI 29.48 kg/m²   Body mass index is 29.48 kg/m².    Physical Exam:  General Appearance: Well appearing in no acute distress  Abdomen: Soft, non tender, non distended in all four quadrants. No hepatosplenomegaly, ascites, or mass    Assessment:  1. Chronic idiopathic constipation    2. Generalized abdominal pain      This is a 65 y.o M here for f/u of abdominal pain and constipation, all resolved. Needs repeat fecal elastase. Last test was low but also likely diluted and false positive due to laxative use.     - Provided stool kit for repeat fecal elastase test  -  Reviewed US and CT results with patient    RTC as needed    Thank you so much for allowing me to participate in the care of Simón Fong    Sarah Abukhader, PA-C [facility]       Home

## 2025-06-26 ENCOUNTER — OFFICE VISIT (OUTPATIENT)
Dept: UROLOGY | Facility: CLINIC | Age: 65
End: 2025-06-26
Payer: MEDICARE

## 2025-06-26 ENCOUNTER — PATIENT MESSAGE (OUTPATIENT)
Dept: UROLOGY | Facility: CLINIC | Age: 65
End: 2025-06-26

## 2025-06-26 VITALS
WEIGHT: 214.94 LBS | DIASTOLIC BLOOD PRESSURE: 84 MMHG | SYSTOLIC BLOOD PRESSURE: 130 MMHG | BODY MASS INDEX: 29.11 KG/M2 | HEART RATE: 57 BPM | HEIGHT: 72 IN

## 2025-06-26 DIAGNOSIS — R33.9 URINARY RETENTION: Primary | ICD-10-CM

## 2025-06-26 DIAGNOSIS — N32.81 OVERACTIVE BLADDER: ICD-10-CM

## 2025-06-26 DIAGNOSIS — N52.9 ERECTILE DYSFUNCTION, UNSPECIFIED ERECTILE DYSFUNCTION TYPE: ICD-10-CM

## 2025-06-26 DIAGNOSIS — C61 PROSTATE CANCER: ICD-10-CM

## 2025-06-26 DIAGNOSIS — N40.1 BPH WITH URINARY OBSTRUCTION: ICD-10-CM

## 2025-06-26 DIAGNOSIS — N13.8 BPH WITH URINARY OBSTRUCTION: ICD-10-CM

## 2025-06-26 DIAGNOSIS — C61 PROSTATE CANCER: Primary | ICD-10-CM

## 2025-06-26 PROCEDURE — 99213 OFFICE O/P EST LOW 20 MIN: CPT | Mod: PBBFAC | Performed by: STUDENT IN AN ORGANIZED HEALTH CARE EDUCATION/TRAINING PROGRAM

## 2025-06-26 PROCEDURE — 99214 OFFICE O/P EST MOD 30 MIN: CPT | Mod: S$PBB,,, | Performed by: STUDENT IN AN ORGANIZED HEALTH CARE EDUCATION/TRAINING PROGRAM

## 2025-06-26 PROCEDURE — 99999 PR PBB SHADOW E&M-EST. PATIENT-LVL III: CPT | Mod: PBBFAC,,, | Performed by: STUDENT IN AN ORGANIZED HEALTH CARE EDUCATION/TRAINING PROGRAM

## 2025-06-26 PROCEDURE — G2211 COMPLEX E/M VISIT ADD ON: HCPCS | Mod: ,,, | Performed by: STUDENT IN AN ORGANIZED HEALTH CARE EDUCATION/TRAINING PROGRAM

## 2025-06-26 RX ORDER — MIRABEGRON 25 MG/1
25 TABLET, FILM COATED, EXTENDED RELEASE ORAL DAILY
Qty: 90 TABLET | Refills: 3 | Status: SHIPPED | OUTPATIENT
Start: 2025-06-26 | End: 2026-06-26

## 2025-06-26 RX ORDER — TADALAFIL 10 MG/1
10 TABLET ORAL
Qty: 30 TABLET | Refills: 3 | Status: SHIPPED | OUTPATIENT
Start: 2025-06-26

## 2025-06-26 NOTE — PROGRESS NOTES
[facility]  Urologic Oncology Clinic Note        Date of Service: 06/26/2025    Chief Complaint/Reason for Consultation: Prostate Cancer, Urinary retention    Requesting Provider:   No referring provider defined for this encounter.      History of Present Illness:   Patient 65 y.o. male presents with history of prostate cancer. Previous patient of Dr. Samuel.    He underwent prostate ablation 4/8/25 TPLA bilateral at [facility]. Post-procedure imaging was WNL 4/10/25.   Self-removed bragg catheter 4/16/25 after which he went into urinary retention. ED replaced bragg and this stayed for 8 days.  Recently seen in the ED for urinary retention and bilateral hydronephrosis.  ml.  Re-presented to the ED 4/26/25 and required bragg catheter placement. He is currently taking nitrofurantoin for infection.    Recently underwent cystoscopy with Dr. Russell who noted multiple false passages and distorted anatomy. Bragg catheter was placed at this time. Bragg removed on 5/26 and patient was able to void at that time.     He states he has not has issues with urinary retention. He admits to leakage and frequency of urination. He has ED.     FH -positive  PSA - 12.2  AJCC Stage - T1C  STAR CAP stage- IB  Volume - 55 ccs  MRI - 5/12/2022- 0.9 cm PI-RADS 3 lesion RAPZ. Negative extra prostatic extension, NVBI (neurovascular bundle involvement), SVI (seminal vesicle involvement), nodes.  Biopsy - 6/7/2022-UroNav- Alcides 6 (GG1) left apex 2/2 60%, right apex (target) 1/4 60%, right middle 2/2 90%, right base 1/1 10%. Overall 4/6 sites, 6/14 cores.  ANDRES Score - 3 intermediate  NCCN - favorable intermediate  Germline testing -discussed, indicated  Somatic testing - Prolaris score- 3.2    Blood thinners: None   No Hx of TIA, MI, and CVA   Abdominal surgeries: None.     At last visit had bragg removed after urinary retention.    Today doing ok with voiding. Does have urgency and frequency.    Urologic History:     4/8/2025  -- Prostate ablation at [facility]  4/16/2025 -- Post-op complication of urinary retention  5/7/2025 -- Cystoscopy -- multiple false passages, distorted anatomy, and large median lobe    Patient Active Problem List    Diagnosis Date Noted    Bladder outlet obstruction 04/21/2025    Chronic idiopathic constipation 04/01/2025    Benign prostatic hyperplasia with incomplete bladder emptying 04/01/2025    Acute pain of both knees 01/29/2024    Prostate cancer 03/23/2022    Primary hypertension 11/15/2018    Sacroiliac joint dysfunction of left side 02/16/2017    SI joint dislocation 02/03/2017    Chronic left-sided low back pain with sciatica 02/03/2017    Lumbar radiculopathy 02/03/2017    Family history of prostate cancer 11/01/2015    Human immunodeficiency virus (HIV) disease 12/20/2012        Prescriptions Prior to Admission[1]  Review of patient's allergies indicates:   Allergen Reactions    Sulfa (sulfonamide antibiotics) Other (See Comments)        Past Medical History:   Diagnosis Date    HIV infection     Hypertension       Past Surgical History:   Procedure Laterality Date    COLONOSCOPY      COLONOSCOPY N/A 7/31/2020    Procedure: COLONOSCOPY;  Surgeon: Tree Mendiola MD;  [facility]  Service: Endoscopy;  Laterality: N/A;  covid 7/28-[facility]    COLONOSCOPY, SCREENING, HIGH RISK PATIENT N/A 2/3/2025    Procedure: COLONOSCOPY, SCREENING, HIGH RISK PATIENT;  Surgeon: Clarence Nguyen MD;  [facility]  Service: Endoscopy;  Laterality: N/A;  Abner Floyd, peg, portal, ASam  1/31 pt rescheduled, PEG, portal -ml    PROSTATE BIOPSY        Family History   Problem Relation Name Age of Onset    Diabetes Mother      Heart disease Mother      Hypertension Mother      Stroke Mother      Hypertension Father      Cancer Sister      Cancer Brother        Social History     Tobacco Use    Smoking status: Never     Passive exposure: Never    Smokeless tobacco: Never    Substance Use Topics    Alcohol use: No        Review of Systems   Constitutional:  Negative for activity change.   HENT:  Negative for congestion.    Respiratory:  Negative for cough.    Cardiovascular:  Negative for chest pain.   Genitourinary:  Negative for hematuria.           OBJECTIVE:     Vitals:    06/26/25 0835   BP: 130/84   BP Location: Left arm   Patient Position: Sitting   Pulse: (!) 57   Weight: 97.5 kg (214 lb 15.2 oz)   Height: 6' (1.829 m)            General Appearance: Alert, cooperative, no distress  Head: Normocephalic  Eyes: Clear conjunctiva  Neck: No obvious LND or JVD  Lungs: Normal chest excursion, no accessory muscle use  Chest: Regular rate rhythm by palpation, no pedal edema  Abdomen: Soft, non-tender, non-distended  Extremities: Atraumatic   Lymph Nodes: No appreciable lymph adenopathy  Neurologic: No gross gait, motor or sensory deficits            LAB:      All laboratory values listed below was/were independently reviewed with patient at this clinic visit.    CMP  Sodium   Date Value Ref Range Status   05/01/2025 141 136 - 145 mmol/L Final   03/18/2025 141 136 - 145 mmol/L Final     Potassium   Date Value Ref Range Status   05/01/2025 3.4 (L) 3.5 - 5.1 mmol/L Final   03/18/2025 3.8 3.5 - 5.1 mmol/L Final     Chloride   Date Value Ref Range Status   05/01/2025 105 95 - 110 mmol/L Final   03/18/2025 110 95 - 110 mmol/L Final     CO2   Date Value Ref Range Status   05/01/2025 26 23 - 29 mmol/L Final   03/18/2025 24 23 - 29 mmol/L Final     Glucose   Date Value Ref Range Status   05/01/2025 102 70 - 110 mg/dL Final   03/18/2025 97 70 - 110 mg/dL Final     BUN   Date Value Ref Range Status   05/01/2025 11 8 - 23 mg/dL Final     Creatinine   Date Value Ref Range Status   05/01/2025 0.9 0.5 - 1.4 mg/dL Final     Calcium   Date Value Ref Range Status   05/01/2025 9.5 8.7 - 10.5 mg/dL Final   03/18/2025 8.4 (L) 8.7 - 10.5 mg/dL Final     Protein Total   Date Value Ref Range Status    05/01/2025 8.0 6.0 - 8.4 gm/dL Final     Total Protein   Date Value Ref Range Status   03/18/2025 6.6 6.0 - 8.4 g/dL Final     Albumin   Date Value Ref Range Status   05/01/2025 4.1 3.5 - 5.2 g/dL Final   03/18/2025 3.9 3.5 - 5.2 g/dL Final     Total Bilirubin   Date Value Ref Range Status   03/18/2025 0.4 0.1 - 1.0 mg/dL Final     Comment:     For infants and newborns, interpretation of results should be based  on gestational age, weight and in agreement with clinical  observations.    Premature Infant recommended reference ranges:  Up to 24 hours.............<8.0 mg/dL  Up to 48 hours............<12.0 mg/dL  3-5 days..................<15.0 mg/dL  6-29 days.................<15.0 mg/dL       Bilirubin Total   Date Value Ref Range Status   05/01/2025 0.4 0.1 - 1.0 mg/dL Final     Comment:     For infants and newborns, interpretation of results should be based   on gestational age, weight and in agreement with clinical   observations.    Premature Infant recommended reference ranges:   0-24 hours:  <8.0 mg/dL   24-48 hours: <12.0 mg/dL   3-5 days:    <15.0 mg/dL   6-29 days:   <15.0 mg/dL     Alkaline Phosphatase   Date Value Ref Range Status   03/18/2025 64 40 - 150 U/L Final     ALP   Date Value Ref Range Status   05/01/2025 79 40 - 150 unit/L Final     AST   Date Value Ref Range Status   05/01/2025 18 11 - 45 unit/L Final   03/18/2025 22 10 - 40 U/L Final     ALT   Date Value Ref Range Status   05/01/2025 15 10 - 44 unit/L Final   03/18/2025 13 10 - 44 U/L Final     Anion Gap   Date Value Ref Range Status   05/01/2025 10 8 - 16 mmol/L Final     eGFR   Date Value Ref Range Status   05/01/2025 >60 >60 mL/min/1.73/m2 Final     Comment:     Estimated GFR calculated using the CKD-EPI creatinine (2021) equation.   03/18/2025 >60.0 >60 mL/min/1.73 m^2 Final       Lab Results   Component Value Date    WBC 5.76 05/01/2025    HGB 13.9 (L) 05/01/2025    HCT 41.8 05/01/2025    MCV 90 05/01/2025     05/01/2025              IMAGING:      No new imaging to review.       ASSESSMENT/PLAN:     64 yo M w hx of prostate cancer s/p laser treatment in [place] here for urinary retention post procedure      Plan:    #Prostate cancer, favorable intermediate  - PSA in 3 months      #Urinary retention  - Patient denies any difficulty currently with retention  - Discussed repeating cystoscopy in the future    #OAB  - Recommend voiding diary  - Start daily Myrbetriq    #ED  - Start cialis 10mg; recommend starting with 5 mg PRN and then increasing if needed    RTC in 3 months with PSA prior       - Visit today included increased complexity associated with the ongoing care of the episodic problem prostate cancer which has been addressed and requires the longitudinal care of the patient due to the serious and/or complex managed problem of prostate cancer.       Chino Madrigal MD  Urologic Oncology  P: [phone]                   [1] (Not in a hospital admission)

## 2025-06-27 ENCOUNTER — PATIENT MESSAGE (OUTPATIENT)
Dept: UROLOGY | Facility: CLINIC | Age: 65
End: 2025-06-27
Payer: MEDICARE

## 2025-07-01 ENCOUNTER — PATIENT MESSAGE (OUTPATIENT)
Dept: ADMINISTRATIVE | Facility: OTHER | Age: 65
End: 2025-07-01
Payer: MEDICARE

## 2025-07-01 ENCOUNTER — OFFICE VISIT (OUTPATIENT)
Dept: PRIMARY CARE CLINIC | Facility: CLINIC | Age: 65
End: 2025-07-01
Payer: MEDICARE

## 2025-07-01 ENCOUNTER — LAB VISIT (OUTPATIENT)
Dept: LAB | Facility: HOSPITAL | Age: 65
End: 2025-07-01
Attending: HOSPITALIST
Payer: MEDICARE

## 2025-07-01 ENCOUNTER — CLINICAL SUPPORT (OUTPATIENT)
Dept: INTERNAL MEDICINE | Facility: CLINIC | Age: 65
End: 2025-07-01
Payer: MEDICARE

## 2025-07-01 VITALS
WEIGHT: 217.38 LBS | HEIGHT: 72 IN | RESPIRATION RATE: 18 BRPM | BODY MASS INDEX: 29.44 KG/M2 | OXYGEN SATURATION: 96 % | HEART RATE: 57 BPM | SYSTOLIC BLOOD PRESSURE: 132 MMHG | DIASTOLIC BLOOD PRESSURE: 70 MMHG

## 2025-07-01 DIAGNOSIS — B20 HUMAN IMMUNODEFICIENCY VIRUS (HIV) DISEASE: ICD-10-CM

## 2025-07-01 DIAGNOSIS — M25.421 SWELLING OF RIGHT ELBOW: Primary | ICD-10-CM

## 2025-07-01 DIAGNOSIS — Z23 IMMUNIZATION DUE: Primary | ICD-10-CM

## 2025-07-01 DIAGNOSIS — M25.421 SWELLING OF RIGHT ELBOW: ICD-10-CM

## 2025-07-01 LAB
ABSOLUTE EOSINOPHIL (OHS): 0.04 K/UL
ABSOLUTE MONOCYTE (OHS): 0.48 K/UL (ref 0.3–1)
ABSOLUTE NEUTROPHIL COUNT (OHS): 2.36 K/UL (ref 1.8–7.7)
BASOPHILS # BLD AUTO: 0.02 K/UL
BASOPHILS NFR BLD AUTO: 0.4 %
CRP SERPL-MCNC: 1.8 MG/L
ERYTHROCYTE [DISTWIDTH] IN BLOOD BY AUTOMATED COUNT: 13.1 % (ref 11.5–14.5)
HCT VFR BLD AUTO: 41.2 % (ref 40–54)
HGB BLD-MCNC: 13.3 GM/DL (ref 14–18)
IMM GRANULOCYTES # BLD AUTO: 0.01 K/UL (ref 0–0.04)
IMM GRANULOCYTES NFR BLD AUTO: 0.2 % (ref 0–0.5)
LYMPHOCYTES # BLD AUTO: 1.75 K/UL (ref 1–4.8)
MCH RBC QN AUTO: 30.2 PG (ref 27–31)
MCHC RBC AUTO-ENTMCNC: 32.3 G/DL (ref 32–36)
MCV RBC AUTO: 94 FL (ref 82–98)
NUCLEATED RBC (/100WBC) (OHS): 0 /100 WBC
PLATELET # BLD AUTO: 222 K/UL (ref 150–450)
PMV BLD AUTO: 11.3 FL (ref 9.2–12.9)
RBC # BLD AUTO: 4.4 M/UL (ref 4.6–6.2)
RELATIVE EOSINOPHIL (OHS): 0.9 %
RELATIVE LYMPHOCYTE (OHS): 37.6 % (ref 18–48)
RELATIVE MONOCYTE (OHS): 10.3 % (ref 4–15)
RELATIVE NEUTROPHIL (OHS): 50.6 % (ref 38–73)
WBC # BLD AUTO: 4.66 K/UL (ref 3.9–12.7)

## 2025-07-01 PROCEDURE — G0009 ADMIN PNEUMOCOCCAL VACCINE: HCPCS | Mod: PBBFAC

## 2025-07-01 PROCEDURE — 99999PBSHW PR PBB SHADOW TECHNICAL ONLY FILED TO HB: Mod: PBBFAC,,,

## 2025-07-01 PROCEDURE — 36415 COLL VENOUS BLD VENIPUNCTURE: CPT

## 2025-07-01 PROCEDURE — 99215 OFFICE O/P EST HI 40 MIN: CPT | Mod: S$GLB,,, | Performed by: HOSPITALIST

## 2025-07-01 PROCEDURE — 90677 PCV20 VACCINE IM: CPT | Mod: PBBFAC

## 2025-07-01 PROCEDURE — 86140 C-REACTIVE PROTEIN: CPT

## 2025-07-01 PROCEDURE — 85025 COMPLETE CBC W/AUTO DIFF WBC: CPT

## 2025-07-01 RX ADMIN — PNEUMOCOCCAL 20-VALENT CONJUGATE VACCINE 0.5 ML
2.2; 2.2; 2.2; 2.2; 2.2; 2.2; 2.2; 2.2; 2.2; 2.2; 2.2; 2.2; 2.2; 2.2; 2.2; 2.2; 4.4; 2.2; 2.2; 2.2 INJECTION, SUSPENSION INTRAMUSCULAR at 01:07

## 2025-07-01 NOTE — PROGRESS NOTES
pneumoc 20-reyna conj-dip cr(PF) (PREVNAR-20 (PF)) IM Injection given. Bandage applied. Tolerated well. Patient instructed to wait in lobby for 15 min before leaving.Information on medication given. Verbalized understanding.

## 2025-07-01 NOTE — ASSESSMENT & PLAN NOTE
Location would suggest swollen olecranon bursa, but it is nontender and he has not been doing any activity a/w olecranon bursitis.  Additionally, he has been wearing a compression elbow wrap without any change in size.  The lack of transillumination could potentially suggest lipoma, but it would be unusual to appear suddenly like this.  Will get US of area to better delineate characteristics of the swelling.  Lack of any inflammatory sx argues against an inflammatory etiology but will check CRP and CBC for any additional clues.  The [place] cruise on which he first noticed it could potentially also be relevant opening possibility of a parasitic etiology, and his chronic HIV status implies chronic immune dysregulation.

## 2025-07-01 NOTE — PROGRESS NOTES
Primary Care Provider Appointment - Curt Lawrence MD      Subjective:      Patient ID: Simón Fong is a 65 y.o. male with   Past Medical History:   Diagnosis Date    HIV infection     Hypertension            Chief Complaint: Hypertension    Prior to this visit, patient's last encounter with PCP was 4/21/2025.    Since last visit has had additional ED visits for urinary retention and inability to self-cath; had cysto last month showing multiple false passages and had another bragg placed at that time. Now out and doing well w/o difficulty.  Back in gym.  Constipation doing better as well.  Currently having some elbow discomfort and swelling that developed while on cruise last month and hasn't changed.  [place]ise was of [place] [facility] [facility] [place].  Not painful, but notices it when resting elbow on a surface.  Denies any prior recent trauma to area.  No recent changes to activity prior to this.  Does weight train but no new lifts, etc recently.  Only new medication Myrbetriq but only started that last week; this was already present.  Was also prescribed cialis but doesn't take b/c it gives him a HA.    4/21: Pt reports for ED f/u 4/16; went to  prior to that and was sent by ambulance for urinary retention.  Had TURP 4/8 in [place] and had indwelling Bragg that he was supposed to remove on the 16th as planned.  After doing so, he was able to void a little but after some time found that he was not able to so drank a lot of water to see if that would help him void but eventually developed severe bladder pain and still couldn't void.  In the ED a new Bragg was placed and he put out 2L and has been fine since.  He discussed with his urologist who did the procedure who recommended leaving it in for another week before removing; which is coming up in the next couple of days.      4/1: ED visit 3/18 for abd pain.  Seen by urology for hydronephrosis and started on flomax which has helped relieve  the symptoms.  Doing much better overall.  Seeing GI; gas sx resolved and taking Miralax regularly to have regular BMs.  Has been noticing floating stools and oils in toilet and had stool enzymes that were abnormal but now has to repeat them off laxatives to confirm.  Worried about this b/c his brother  of pancreatic cancer.    Having laser TURP  at [place]; was told he won't qualify for the other procedure b/c of calcifications of the prostate.      : Pt reports for annual physical.  C/o gas pain, bloating, constipation since just after  when he tried a new plant-based protein drink and protein bar which he didn't tolerate very well.  Some occasional pain.  Sx relieved w/ belching or BM; taking dulcolax occasionally.  Tried some fiber yesterday but seemed to make sx worse.  Normally avoids dairy but can eat ice cream, etc. W/o difficulty if he wants to.    Has low-grade prostate cancer and is on active surveillance with Urology.  Will be due for repeat colonoscopy this summer.            Medications: Does not have pill packs              4Ms for Medical Decision-Making in Older Adults    Last Completed EAWV: None    MOBILITY:  Get Up and Go:       No data to display              Activities of Daily Living:       No data to display              Whisper Test:       No data to display              Disability Status:       No data to display              Nutrition Screening:       No data to display             Screening Score: 0-7 Malnourished, 8-11 At Risk, 12-14 Normal  Fall Risk:      2025     8:30 AM 2025    10:30 AM 2025     7:40 AM   Fall Risk Assessment - Outpatient   Mobility Status Ambulatory Ambulatory Ambulatory   Number of falls 0 0 0   Identified as fall risk False False False           MENTATION:   Depression Patient Health Questionnaire:      2025    11:09 AM   Depression Patient Health Questionnaire   PHQ-4 Total Score 0     Has Dementia Dx: No  Has Anxiety Dx:  No    Cognitive Function Screening:       No data to display              Cognitive Function Screening Total - Less than 4 = Abnormal,  Greater than or equal to 4 = Normal        MEDICATIONS:  High Risk Medications:  Total Active Medications: 1  HYDROcodone-acetaminophen - 5-325 mg    WHAT MATTERS MOST:  Advance Care Planning   ACP Status:   Patient has had an ACP conversation  Living Will: No  Power of : No  LaPOST: No    What is most important right now is to focus on remaining as independent as possible    Accordingly, we have decided that the best plan to meet the patient's goals includes continuing with treatment      What matters most to patient today is: addressing current complaint           PHQ-4 Score: 0     Social History     Socioeconomic History    Marital status: Single   Tobacco Use    Smoking status: Never     Passive exposure: Never    Smokeless tobacco: Never   Substance and Sexual Activity    Alcohol use: No    Drug use: No    Sexual activity: Not Currently     Social Drivers of Health     Financial Resource Strain: Low Risk  (4/1/2025)    Overall Financial Resource Strain (CARDIA)     Difficulty of Paying Living Expenses: Not hard at all   Food Insecurity: No Food Insecurity (4/1/2025)    Hunger Vital Sign     Worried About Running Out of Food in the Last Year: Never true     Ran Out of Food in the Last Year: Never true   Transportation Needs: No Transportation Needs (4/1/2025)    PRAPARE - Transportation     Lack of Transportation (Medical): No     Lack of Transportation (Non-Medical): No   Physical Activity: Sufficiently Active (4/1/2025)    Exercise Vital Sign     Days of Exercise per Week: 6 days     Minutes of Exercise per Session: 90 min   Stress: No Stress Concern Present (4/1/2025)    [employer] [place] [employer] - Occupational Stress Questionnaire     Feeling of Stress : Only a little   Housing Stability: Low Risk  (4/1/2025)    Housing Stability Vital Sign     Unable  to Pay for Housing in the Last Year: No     Homeless in the Last Year: No       Review of Systems   Constitutional:  Negative for activity change, appetite change and unexpected weight change.   Respiratory:  Negative for shortness of breath.    Cardiovascular:  Negative for chest pain and leg swelling.   Gastrointestinal:  Negative for abdominal distention, abdominal pain, change in bowel habit, constipation, nausea and vomiting.   Genitourinary:  Positive for erectile dysfunction. Negative for bladder incontinence, difficulty urinating and hematuria.   Musculoskeletal:  Positive for joint swelling. Negative for arthralgias.        Objective:   /70 (BP Location: Left arm, Patient Position: Sitting)   Pulse (!) 57   Resp 18   Ht 6' (1.829 m)   Wt 98.6 kg (217 lb 6 oz)   SpO2 96%   BMI 29.48 kg/m²     Physical Exam  Vitals reviewed.   Constitutional:       General: He is not in acute distress.     Appearance: Normal appearance.      Comments: Muscular physique   HENT:      Head: Normocephalic and atraumatic.      Right Ear: Tympanic membrane, ear canal and external ear normal.      Left Ear: Tympanic membrane, ear canal and external ear normal.      Nose: No congestion.      Mouth/Throat:      Mouth: Mucous membranes are moist.      Pharynx: Oropharynx is clear.   Eyes:      General: No scleral icterus.     Extraocular Movements: Extraocular movements intact.      Conjunctiva/sclera: Conjunctivae normal.      Pupils: Pupils are equal, round, and reactive to light.   Cardiovascular:      Rate and Rhythm: Normal rate and regular rhythm.      Pulses: Normal pulses.      Heart sounds: Normal heart sounds. No murmur heard.     No friction rub. No gallop.   Pulmonary:      Effort: Pulmonary effort is normal. No respiratory distress.      Breath sounds: Normal breath sounds.   Abdominal:      General: Bowel sounds are normal. There is no distension.      Palpations: Abdomen is soft.      Tenderness: There is no  abdominal tenderness. There is no guarding.   Musculoskeletal:         General: No tenderness. Normal range of motion.      Cervical back: Normal range of motion.      Right lower leg: No edema.      Left lower leg: No edema.      Comments: Soft, fleshy protuberance over R olecranon.  No erythema, tenderness, or warmth.  Does not transilluminate.  Mild point tenderness of ulna just distal to beginning of swollen protuberance.   Lymphadenopathy:      Cervical: No cervical adenopathy.   Skin:     General: Skin is warm and dry.      Findings: No rash.   Neurological:      General: No focal deficit present.      Mental Status: He is alert and oriented to person, place, and time.      Cranial Nerves: No cranial nerve deficit.      Gait: Gait normal.   Psychiatric:         Mood and Affect: Mood normal.         Behavior: Behavior normal.              Lab Results   Component Value Date    WBC 5.76 05/01/2025    HGB 13.9 (L) 05/01/2025    HCT 41.8 05/01/2025     05/01/2025    CHOL 171 01/07/2025    TRIG 71 01/07/2025    HDL 50 01/07/2025    ALT 15 05/01/2025    AST 18 05/01/2025     05/01/2025    K 3.4 (L) 05/01/2025     05/01/2025    CREATININE 0.9 05/01/2025    BUN 11 05/01/2025    CO2 26 05/01/2025    TSH 3.095 01/07/2025    PSA 6.5 (H) 12/21/2018    INR 1.2 02/08/2012    HGBA1C 5.6 01/07/2025       Current Outpatient Medications on File Prior to Visit   Medication Sig Dispense Refill    amLODIPine (NORVASC) 10 MG tablet Take 1 tablet (10 mg total) by mouth once daily. 90 tablet 3    ascorbic acid, vitamin C, (VITAMIN C) 1000 MG tablet Take 1,000 mg by mouth once daily.       dolutegravir-lamivudine (DOVATO)  mg Tab Take 1 tablet by mouth once daily. 90 tablet 5    fish oil-omega-3 fatty acids 300-1,000 mg capsule Take by mouth once daily.      glucosamine-chondroitin 500-400 mg tablet Take 1 tablet by mouth 3 (three) times daily.      HYDROcodone-acetaminophen (NORCO) 5-325 mg per tablet Take by  mouth.      [DISCONTINUED] GAVILYTE-C 240-22.72-6.72 -5.84 gram SolR Take 4,000 mLs by mouth once.      leucine-isoleucine-valine (NEOKE BCAA4) 82 gram-328 kcal/100 gram Powd Take by mouth.       mirabegron (MYRBETRIQ) 25 mg Tb24 ER tablet Take 1 tablet (25 mg total) by mouth once daily. 90 tablet 3    tadalafiL (CIALIS) 10 MG tablet Take 1 tablet (10 mg total) by mouth as needed (sexual activity). 30 tablet 3    tamsulosin (FLOMAX) 0.4 mg Cap Take 2 capsules (0.8 mg total) by mouth once daily. 180 capsule 0    [DISCONTINUED] naproxen (NAPROSYN) 500 MG tablet Take 1 tablet (500 mg total) by mouth 2 (two) times daily with meals. 30 tablet 1    [DISCONTINUED] oxybutynin (DITROPAN-XL) 5 MG TR24 Take 1 tablet (5 mg total) by mouth once daily. 30 tablet 5     No current facility-administered medications on file prior to visit.         Assessment:   65 y.o. male with multiple co-morbid illnesses here to follow-up for ongoing chronic disease management and preventive health maintenance.    Plan:     1. Swelling of right elbow  Assessment & Plan:  Location would suggest swollen olecranon bursa, but it is nontender and he has not been doing any activity a/w olecranon bursitis.  Additionally, he has been wearing a compression elbow wrap without any change in size.  The lack of transillumination could potentially suggest lipoma, but it would be unusual to appear suddenly like this.  Will get US of area to better delineate characteristics of the swelling.  Lack of any inflammatory sx argues against an inflammatory etiology but will check CRP and CBC for any additional clues.  The [place] on which he first noticed it could potentially also be relevant opening possibility of a parasitic etiology, and his chronic HIV status implies chronic immune dysregulation.    Orders:  -     US Soft Tissue Upper Extremity, Right; Future; Expected date: 07/01/2025  -     CBC W/ AUTO DIFFERENTIAL; Future; Expected date:  07/01/2025  -     C-Reactive Protein; Future; Expected date: 07/01/2025      Health Maintenance         Date Due Completion Date    Pneumococcal Vaccines (Age 50+) (4 of 4 - PCV20 or PCV21) 07/23/2025 7/23/2020    Influenza Vaccine (1) 09/01/2025 10/8/2024    TETANUS VACCINE 05/02/2026 5/2/2016    Hemoglobin A1c (Diabetic Prevention Screening) 01/07/2028 1/7/2025    Lipid Panel 01/07/2030 1/7/2025    Colorectal Cancer Screening 02/03/2032 2/3/2025        PCV-20 administered today.    Future Appointments   Date Time Provider Department Center   7/2/2025 10:15 AM [facility] OIC-US1 MASTER [facility] ULTR [facility]   7/29/2025  1:40 PM Abner Lawrence MD [facility] MED CLN [facility]   9/26/2025  7:00 AM LAB, APPOINTMENT [facility] INTMED [facility] LAB IM [facility] PC[facility]   9/29/2025 11:30 AM Chino Madrigal MD [facility] UROLOG [facility]         Follow up in about 4 weeks (around 7/29/2025) for Virtual Visit. Total clinical care time was 40 min.    Abner Lawrence MD  [facility]

## 2025-07-01 NOTE — ASSESSMENT & PLAN NOTE
Viral load chronically suppressed on Dovato; but even with chronic suppression there is typically some degree of chronic immune dysregulation.  He is due for another pneumococcal vaccine this month; PCV-20 given today.

## 2025-07-02 ENCOUNTER — HOSPITAL ENCOUNTER (OUTPATIENT)
Dept: RADIOLOGY | Facility: HOSPITAL | Age: 65
Discharge: HOME | End: 2025-07-02
Attending: HOSPITALIST
Payer: MEDICARE

## 2025-07-02 DIAGNOSIS — M25.421 SWELLING OF RIGHT ELBOW: ICD-10-CM

## 2025-07-02 PROCEDURE — 76882 US LMTD JT/FCL EVL NVASC XTR: CPT | Mod: 26,RT,, | Performed by: RADIOLOGY

## 2025-07-02 PROCEDURE — 76882 US LMTD JT/FCL EVL NVASC XTR: CPT | Mod: TC,RT

## 2025-07-07 ENCOUNTER — RESULTS FOLLOW-UP (OUTPATIENT)
Dept: PRIMARY CARE CLINIC | Facility: CLINIC | Age: 65
End: 2025-07-07

## 2025-07-10 ENCOUNTER — PATIENT MESSAGE (OUTPATIENT)
Dept: SPORTS MEDICINE | Facility: CLINIC | Age: 65
End: 2025-07-10
Payer: MEDICARE

## 2025-07-29 ENCOUNTER — TELEPHONE (OUTPATIENT)
Facility: CLINIC | Age: 65
End: 2025-07-29
Payer: MEDICARE

## 2025-07-29 ENCOUNTER — OFFICE VISIT (OUTPATIENT)
Dept: PRIMARY CARE CLINIC | Facility: CLINIC | Age: 65
End: 2025-07-29
Payer: MEDICARE

## 2025-07-29 DIAGNOSIS — M70.21 OLECRANON BURSITIS OF RIGHT ELBOW: Primary | ICD-10-CM

## 2025-07-29 PROCEDURE — 98005 SYNCH AUDIO-VIDEO EST LOW 20: CPT | Mod: 95,,, | Performed by: HOSPITALIST

## 2025-07-29 NOTE — ASSESSMENT & PLAN NOTE
All w/u has come back reassuring and has improved substantially since last visit and should continue to do so.

## 2025-07-29 NOTE — PROGRESS NOTES
[facility] Physician Telemedicine Appointment  Abner Lawrence MD      The patient location is:  Patient Home   The chief complaint leading to consultation is: f/u  Total time spent with patient: 20 min    Visit type: Virtual visit with synchronous audio and video  Each patient to whom he or she provides medical services by telemedicine is:  (1) informed of the relationship between the physician and patient and the respective role of any other health care provider with respect to management of the patient; and (2) notified that he or she may decline to receive medical services by telemedicine and may withdraw from such care at any time.      Subjective:      Patient ID: Simón Fong is a 65 y.o. male.    Chief Complaint: No chief complaint on file.    Prior to this visit, patient's last encounter with PCP was 7/1/2025.    Pt reports improvement in elbow swelling since last visit.  It is still present but significantly smaller.  He has been doing RICE as instructed.  Reviewed HM topics which are UTD until next year other than flu shot this fall.        4Ms for Medical Decision-Making in Older Adults    MOBILITY:  Activities of Daily Living:       No data to display              Fall Risk:      6/26/2025     8:30 AM 6/9/2025    10:30 AM 5/27/2025     7:40 AM   Fall Risk Assessment - Outpatient   Mobility Status Ambulatory Ambulatory Ambulatory   Number of falls 0 0 0   Identified as fall risk False False False     Disability Status:       No data to display              Nutrition Screening:       No data to display             Screening Score: 0-7 Malnourished, 8-11 At Risk, 12-14 Normal  Get Up and Go:       No data to display              Whisper Test:       No data to display                    MENTATION:   Has Dementia Dx: No  Has Anxiety Dx: No    Depression Patient Health Questionnaire:      7/1/2025    11:09 AM   Depression Patient Health Questionnaire   PHQ-4 Total Score 0     Cognitive  Function Screening:       No data to display              Cognitive Function Screening Total - Less than 4 = Abnormal,  Greater than or equal to 4 = Normal        MEDICATIONS:  High Risk Medications:  Total Active Medications: 1  HYDROcodone-acetaminophen - 5-325 mg    WHAT MATTERS MOST:  Advance Care Planning   ACP Status:   Patient has had an ACP conversation  Living Will: No  Power of : No  [place]: No    What is most important right now is to focus on remaining as independent as possible    Accordingly, we have decided that the best plan to meet the patient's goals includes continuing with treatment      What matters most to patient today is:                  Social History[1]    Past Surgical History:   Procedure Laterality Date    COLONOSCOPY      COLONOSCOPY N/A 7/31/2020    Procedure: COLONOSCOPY;  Surgeon: Tree Mendiola MD;  Location: [facility] (4TH FLR);  Service: Endoscopy;  Laterality: N/A;  covid 7/28-[facility]-    COLONOSCOPY, SCREENING, HIGH RISK PATIENT N/A 2/3/2025    Procedure: COLONOSCOPY, SCREENING, HIGH RISK PATIENT;  Surgeon: Clarence Nguyen MD;  [facility]  Service: Endoscopy;  Laterality: N/A;  Abner Floyd, peg, portal, ASam  1/31 pt rescheduled, PEG, portal -ml    PROSTATE BIOPSY         Past Medical History:   Diagnosis Date    HIV infection     Hypertension        Review of Systems   All other systems reviewed and are negative.      Objective:   There were no vitals taken for this visit.    Physical Exam  Constitutional:       General: He is not in acute distress.     Appearance: Normal appearance.      Comments: Pt seen via virtual visit.   Neurological:      Mental Status: He is alert.         Lab Results   Component Value Date    WBC 4.66 07/01/2025    HGB 13.3 (L) 07/01/2025    HCT 41.2 07/01/2025     07/01/2025    CHOL 171 01/07/2025    TRIG 71 01/07/2025    HDL 50 01/07/2025    ALT 15 05/01/2025    AST 18 05/01/2025    NA  141 05/01/2025    K 3.4 (L) 05/01/2025     05/01/2025    CREATININE 0.9 05/01/2025    BUN 11 05/01/2025    CO2 26 05/01/2025    TSH 3.095 01/07/2025    PSA 6.5 (H) 12/21/2018    INR 1.2 02/08/2012    HGBA1C 5.6 01/07/2025         Assessment:   65 y.o. male with multiple co-morbid illnesses seen virtually for follow-up for ongoing chronic disease management and preventive health maintenance.       Plan:   1. Olecranon bursitis of right elbow  Assessment & Plan:  All w/u has come back reassuring and has improved substantially since last visit and should continue to do so.          Health Maintenance         Date Due Completion Date    Influenza Vaccine (1) 09/01/2025 10/8/2024    TETANUS VACCINE 05/02/2026 5/2/2016    Hemoglobin A1c (Diabetic Prevention Screening) 01/07/2028 1/7/2025    Lipid Panel 01/07/2030 1/7/2025    Colorectal Cancer Screening 02/03/2032 2/3/2025            Follow up in about 6 months (around 1/29/2026). .    Abner Lawrence MD   [facility], and [employer]    This note was partially dictated using voice recognition software and although actively proofread during transcription, it is possible some errors in transcription may have been overlooked.The patient location is: [place]  The chief complaint leading to consultation is: f/u    Visit type: audiovisual    Face to Face time with patient: 20 min  20 minutes of total time spent on the encounter, which includes face to face time and non-face to face time preparing to see the patient (eg, review of tests), Obtaining and/or reviewing separately obtained history, Documenting clinical information in the electronic or other health record, Independently interpreting results (not separately reported) and communicating results to the patient/family/caregiver, or Care coordination (not separately reported).         Each patient to whom he or she provides medical services by telemedicine is:  (1) informed of the  relationship between the physician and patient and the respective role of any other health care provider with respect to management of the patient; and (2) notified that he or she may decline to receive medical services by telemedicine and may withdraw from such care at any time.    Notes:                                [1]   Social History  Socioeconomic History    Marital status: Single   Tobacco Use    Smoking status: Never     Passive exposure: Never    Smokeless tobacco: Never   Substance and Sexual Activity    Alcohol use: No    Drug use: No    Sexual activity: Not Currently     Social Drivers of Health     Financial Resource Strain: Low Risk  (4/1/2025)    Overall Financial Resource Strain (CARDIA)     Difficulty of Paying Living Expenses: Not hard at all   Food Insecurity: No Food Insecurity (4/1/2025)    Hunger Vital Sign     Worried About Running Out of Food in the Last Year: Never true     Ran Out of Food in the Last Year: Never true   Transportation Needs: No Transportation Needs (4/1/2025)    PRAPARE - Transportation     Lack of Transportation (Medical): No     Lack of Transportation (Non-Medical): No   Physical Activity: Sufficiently Active (4/1/2025)    Exercise Vital Sign     Days of Exercise per Week: 6 days     Minutes of Exercise per Session: 90 min   Stress: No Stress Concern Present (4/1/2025)    [employer] - Occupational Stress Questionnaire     Feeling of Stress : Only a little   Housing Stability: Low Risk  (4/1/2025)    Housing Stability Vital Sign     Unable to Pay for Housing in the Last Year: No     Homeless in the Last Year: No

## 2025-07-31 ENCOUNTER — PATIENT MESSAGE (OUTPATIENT)
Dept: ADMINISTRATIVE | Facility: OTHER | Age: 65
End: 2025-07-31
Payer: MEDICARE

## 2025-08-26 ENCOUNTER — CLINICAL SUPPORT (OUTPATIENT)
Dept: PRIMARY CARE CLINIC | Facility: CLINIC | Age: 65
End: 2025-08-26
Payer: MEDICARE

## 2025-08-26 ENCOUNTER — PATIENT MESSAGE (OUTPATIENT)
Dept: UROLOGY | Facility: CLINIC | Age: 65
End: 2025-08-26
Payer: MEDICARE

## 2025-08-26 DIAGNOSIS — R30.0 DYSURIA: Primary | ICD-10-CM

## 2025-08-27 ENCOUNTER — PATIENT MESSAGE (OUTPATIENT)
Dept: ADMINISTRATIVE | Facility: OTHER | Age: 65
End: 2025-08-27
Payer: MEDICARE

## 2025-08-27 DIAGNOSIS — N39.43 DRIBBLING OF URINE: ICD-10-CM

## 2025-08-27 DIAGNOSIS — N13.8 BPH WITH URINARY OBSTRUCTION: ICD-10-CM

## 2025-08-27 DIAGNOSIS — R39.12 WEAK URINARY STREAM: ICD-10-CM

## 2025-08-27 DIAGNOSIS — R33.9 URINARY RETENTION: ICD-10-CM

## 2025-08-27 DIAGNOSIS — N40.1 BPH WITH URINARY OBSTRUCTION: ICD-10-CM

## 2025-08-27 DIAGNOSIS — Z98.890 HISTORY OF PROSTATE SURGERY: ICD-10-CM

## 2025-08-27 DIAGNOSIS — C61 PROSTATE CANCER: ICD-10-CM

## 2025-08-28 RX ORDER — TAMSULOSIN HYDROCHLORIDE 0.4 MG/1
2 CAPSULE ORAL
Qty: 180 CAPSULE | Refills: 0 | Status: SHIPPED | OUTPATIENT
Start: 2025-08-28

## 2025-09-04 ENCOUNTER — PATIENT MESSAGE (OUTPATIENT)
Dept: PRIMARY CARE CLINIC | Facility: CLINIC | Age: 65
End: 2025-09-04
Payer: MEDICARE

## 2025-09-04 DIAGNOSIS — Z23 NEED FOR COVID-19 VACCINE: Primary | ICD-10-CM

## 2025-09-05 RX ORDER — CX-024414 0.1 MG/ML
0.5 INJECTION, SUSPENSION INTRAMUSCULAR ONCE
Qty: 0.5 ML | Refills: 0 | Status: SHIPPED | OUTPATIENT
Start: 2025-09-05 | End: 2025-09-05